# Patient Record
Sex: FEMALE | Race: WHITE | NOT HISPANIC OR LATINO | Employment: OTHER | URBAN - NONMETROPOLITAN AREA
[De-identification: names, ages, dates, MRNs, and addresses within clinical notes are randomized per-mention and may not be internally consistent; named-entity substitution may affect disease eponyms.]

---

## 2017-02-06 ENCOUNTER — OFFICE VISIT (OUTPATIENT)
Dept: FAMILY MEDICINE CLINIC | Facility: CLINIC | Age: 57
End: 2017-02-06

## 2017-02-06 VITALS
DIASTOLIC BLOOD PRESSURE: 70 MMHG | BODY MASS INDEX: 38.83 KG/M2 | SYSTOLIC BLOOD PRESSURE: 140 MMHG | OXYGEN SATURATION: 96 % | HEART RATE: 87 BPM | HEIGHT: 62 IN | WEIGHT: 211 LBS | TEMPERATURE: 98.3 F

## 2017-02-06 DIAGNOSIS — J44.9 CHRONIC OBSTRUCTIVE PULMONARY DISEASE, UNSPECIFIED COPD TYPE (HCC): ICD-10-CM

## 2017-02-06 DIAGNOSIS — L03.111 CELLULITIS OF RIGHT AXILLA: Primary | ICD-10-CM

## 2017-02-06 DIAGNOSIS — F17.200 TOBACCO USE DISORDER: ICD-10-CM

## 2017-02-06 DIAGNOSIS — E03.9 ACQUIRED HYPOTHYROIDISM: ICD-10-CM

## 2017-02-06 DIAGNOSIS — K21.9 GASTROESOPHAGEAL REFLUX DISEASE WITHOUT ESOPHAGITIS: ICD-10-CM

## 2017-02-06 PROCEDURE — 99214 OFFICE O/P EST MOD 30 MIN: CPT | Performed by: PHYSICIAN ASSISTANT

## 2017-02-06 PROCEDURE — 99406 BEHAV CHNG SMOKING 3-10 MIN: CPT | Performed by: PHYSICIAN ASSISTANT

## 2017-02-06 PROCEDURE — 10060 I&D ABSCESS SIMPLE/SINGLE: CPT | Performed by: PHYSICIAN ASSISTANT

## 2017-02-06 RX ORDER — DOXYCYCLINE HYCLATE 100 MG/1
100 CAPSULE ORAL 2 TIMES DAILY
Qty: 20 CAPSULE | Refills: 0 | Status: SHIPPED | OUTPATIENT
Start: 2017-02-06 | End: 2017-02-16

## 2017-02-06 NOTE — PROGRESS NOTES
Subjective   Lisa Hill is a 56 y.o. female.     History of Present Illness     Skin problem-  She is here today with complaints of a tender red bump in her right axilla.  Onset was 2 days ago.  No relief with antibiotic ointment topically.  She does complain of pain that is intermittent moderate and throbbing.      Tobacco use disorder-  She smoked 1 pack per day for over 30 years.  She states that she states she is interested in smoking cessation.  She states she does have some concerns about cost and insurance not covering her smoking cessation products.  Not at goal    COPD-not at goal due to continued tobacco use    Gastroesophageal reflux disease-stable    Hypothyroidism-stable with Synthroid 175 µg daily    The following portions of the patient's history were reviewed and updated as appropriate: allergies, current medications, past family history, past medical history, past social history, past surgical history and problem list.    Review of Systems   Constitutional: Negative for activity change, appetite change and fever.   HENT: Negative for ear pain, sinus pressure and sore throat.    Eyes: Negative for pain and visual disturbance.   Respiratory: Negative for cough and chest tightness.    Cardiovascular: Negative for chest pain and palpitations.   Gastrointestinal: Negative for abdominal pain, constipation, diarrhea, nausea and vomiting.   Endocrine: Negative for polydipsia and polyuria.   Genitourinary: Negative for dysuria and frequency.   Musculoskeletal: Negative for back pain and myalgias.   Skin: Negative for color change and rash.   Allergic/Immunologic: Negative for food allergies and immunocompromised state.   Neurological: Negative for dizziness, syncope and headaches.   Hematological: Negative for adenopathy. Does not bruise/bleed easily.   Psychiatric/Behavioral: Negative for hallucinations and suicidal ideas. The patient is not nervous/anxious.        Objective   Physical Exam    Constitutional: She is oriented to person, place, and time. She appears well-developed and well-nourished.   HENT:   Head: Normocephalic and atraumatic.   Nose: Nose normal.   Mouth/Throat: Oropharynx is clear and moist.   Eyes: Conjunctivae and EOM are normal. Pupils are equal, round, and reactive to light.   Neck: Normal range of motion. Neck supple. No tracheal deviation present. No thyromegaly present.   Cardiovascular: Normal rate, regular rhythm, normal heart sounds and intact distal pulses.    No murmur heard.  Pulmonary/Chest: Effort normal and breath sounds normal. No respiratory distress. She has no wheezes.   Abdominal: Soft. Bowel sounds are normal. There is no tenderness. There is no guarding.   Musculoskeletal: Normal range of motion. She exhibits no edema or tenderness.   Lymphadenopathy:     She has no cervical adenopathy.   Neurological: She is alert and oriented to person, place, and time.   Skin: Skin is warm and dry. No rash noted. There is erythema.   1 x 1 cm erythematous tender abscess noted in the right axilla   Psychiatric: She has a normal mood and affect. Her behavior is normal.   Nursing note and vitals reviewed.      Assessment/Plan   Diagnoses and all orders for this visit:    Cellulitis of right axilla  -     doxycycline (VIBRAMYCIN) 100 MG capsule; Take 1 capsule by mouth 2 (Two) Times a Day for 10 days.    Tobacco use disorder  -     nicotine (NICOTROL) 10 MG inhaler; Inhale 1 puff As Needed for smoking cessation.    Chronic obstructive pulmonary disease, unspecified COPD type    Gastroesophageal reflux disease without esophagitis    Acquired hypothyroidism     Incision and drainage of right axillary abscess with cellulitis was performed today using 11 blade scalpel and Betadine.  Patient tolerated the procedure well.    She was counseled on smoking cessation, benefits to her health, and options for successful cessation for 5 minutes.  Start Nicotrol inhaler

## 2017-03-31 ENCOUNTER — LAB (OUTPATIENT)
Dept: FAMILY MEDICINE CLINIC | Facility: CLINIC | Age: 57
End: 2017-03-31

## 2017-03-31 DIAGNOSIS — E78.49 OTHER HYPERLIPIDEMIA: ICD-10-CM

## 2017-03-31 DIAGNOSIS — R73.03 PREDIABETES: ICD-10-CM

## 2017-03-31 DIAGNOSIS — J44.9 CHRONIC OBSTRUCTIVE PULMONARY DISEASE, UNSPECIFIED COPD TYPE (HCC): ICD-10-CM

## 2017-03-31 DIAGNOSIS — E03.9 ACQUIRED HYPOTHYROIDISM: ICD-10-CM

## 2017-03-31 DIAGNOSIS — E55.9 VITAMIN D DEFICIENCY: ICD-10-CM

## 2017-03-31 DIAGNOSIS — Z00.00 HEALTHCARE MAINTENANCE: ICD-10-CM

## 2017-03-31 LAB
25(OH)D3 SERPL-MCNC: 38 NG/ML
ALBUMIN SERPL-MCNC: 4.8 G/DL (ref 3.5–5)
ALBUMIN/GLOB SERPL: 1.4 G/DL (ref 1.5–2.5)
ALP SERPL-CCNC: 58 U/L (ref 35–104)
ALT SERPL W P-5'-P-CCNC: 20 U/L (ref 10–36)
ANION GAP SERPL CALCULATED.3IONS-SCNC: 5.2 MMOL/L (ref 3.6–11.2)
AST SERPL-CCNC: 16 U/L (ref 10–30)
BASOPHILS # BLD AUTO: 0.08 10*3/MM3 (ref 0–0.3)
BASOPHILS NFR BLD AUTO: 0.7 % (ref 0–2)
BILIRUB SERPL-MCNC: 0.2 MG/DL (ref 0.2–1.8)
BUN BLD-MCNC: 12 MG/DL (ref 7–21)
BUN/CREAT SERPL: 11.3 (ref 7–25)
CALCIUM SPEC-SCNC: 9.8 MG/DL (ref 7.7–10)
CHLORIDE SERPL-SCNC: 104 MMOL/L (ref 99–112)
CHOLEST SERPL-MCNC: 160 MG/DL (ref 0–200)
CO2 SERPL-SCNC: 28.8 MMOL/L (ref 24.3–31.9)
CREAT BLD-MCNC: 1.06 MG/DL (ref 0.43–1.29)
DEPRECATED RDW RBC AUTO: 52.6 FL (ref 37–54)
EOSINOPHIL # BLD AUTO: 0.21 10*3/MM3 (ref 0–0.7)
EOSINOPHIL NFR BLD AUTO: 1.9 % (ref 0–5)
ERYTHROCYTE [DISTWIDTH] IN BLOOD BY AUTOMATED COUNT: 14.4 % (ref 11.5–14.5)
GFR SERPL CREATININE-BSD FRML MDRD: 53 ML/MIN/1.73
GLOBULIN UR ELPH-MCNC: 3.5 GM/DL
GLUCOSE BLD-MCNC: 114 MG/DL (ref 70–110)
HBA1C MFR BLD: 5.7 % (ref 4.5–5.7)
HBV SURFACE AB SER RIA-ACNC: NORMAL
HBV SURFACE AG SERPL QL IA: NORMAL
HCT VFR BLD AUTO: 47.6 % (ref 37–47)
HCV AB SER DONR QL: NORMAL
HDLC SERPL-MCNC: 43 MG/DL (ref 60–100)
HGB BLD-MCNC: 15.4 G/DL (ref 12–16)
IMM GRANULOCYTES # BLD: 0.03 10*3/MM3 (ref 0–0.03)
IMM GRANULOCYTES NFR BLD: 0.3 % (ref 0–0.5)
LDLC SERPL CALC-MCNC: 83 MG/DL (ref 0–100)
LDLC/HDLC SERPL: 1.93 {RATIO}
LYMPHOCYTES # BLD AUTO: 3.72 10*3/MM3 (ref 1–3)
LYMPHOCYTES NFR BLD AUTO: 34 % (ref 21–51)
MCH RBC QN AUTO: 32.7 PG (ref 27–33)
MCHC RBC AUTO-ENTMCNC: 32.4 G/DL (ref 33–37)
MCV RBC AUTO: 101.1 FL (ref 80–94)
MONOCYTES # BLD AUTO: 0.69 10*3/MM3 (ref 0.1–0.9)
MONOCYTES NFR BLD AUTO: 6.3 % (ref 0–10)
NEUTROPHILS # BLD AUTO: 6.2 10*3/MM3 (ref 1.4–6.5)
NEUTROPHILS NFR BLD AUTO: 56.8 % (ref 30–70)
OSMOLALITY SERPL CALC.SUM OF ELEC: 276.3 MOSM/KG (ref 273–305)
PLATELET # BLD AUTO: 331 10*3/MM3 (ref 130–400)
PMV BLD AUTO: 10.2 FL (ref 6–10)
POTASSIUM BLD-SCNC: 4.3 MMOL/L (ref 3.5–5.3)
PROT SERPL-MCNC: 8.3 G/DL (ref 6–8)
RBC # BLD AUTO: 4.71 10*6/MM3 (ref 4.2–5.4)
SODIUM BLD-SCNC: 138 MMOL/L (ref 135–153)
TRIGL SERPL-MCNC: 169 MG/DL (ref 0–150)
TSH SERPL DL<=0.05 MIU/L-ACNC: 5.5 MIU/ML (ref 0.55–4.78)
VLDLC SERPL-MCNC: 33.8 MG/DL
WBC NRBC COR # BLD: 10.93 10*3/MM3 (ref 4.5–12.5)

## 2017-03-31 PROCEDURE — 86803 HEPATITIS C AB TEST: CPT | Performed by: GENERAL PRACTICE

## 2017-03-31 PROCEDURE — 86704 HEP B CORE ANTIBODY TOTAL: CPT | Performed by: GENERAL PRACTICE

## 2017-03-31 PROCEDURE — 86706 HEP B SURFACE ANTIBODY: CPT | Performed by: GENERAL PRACTICE

## 2017-03-31 PROCEDURE — 82306 VITAMIN D 25 HYDROXY: CPT | Performed by: GENERAL PRACTICE

## 2017-03-31 PROCEDURE — 80050 GENERAL HEALTH PANEL: CPT | Performed by: GENERAL PRACTICE

## 2017-03-31 PROCEDURE — 87340 HEPATITIS B SURFACE AG IA: CPT | Performed by: GENERAL PRACTICE

## 2017-03-31 PROCEDURE — 83036 HEMOGLOBIN GLYCOSYLATED A1C: CPT | Performed by: GENERAL PRACTICE

## 2017-03-31 PROCEDURE — 80061 LIPID PANEL: CPT | Performed by: GENERAL PRACTICE

## 2017-03-31 PROCEDURE — 36415 COLL VENOUS BLD VENIPUNCTURE: CPT

## 2017-04-01 LAB — HBV CORE AB SER DONR QL IA: NEGATIVE

## 2017-04-03 DIAGNOSIS — D75.89 MACROCYTOSIS: Primary | ICD-10-CM

## 2017-04-10 ENCOUNTER — OFFICE VISIT (OUTPATIENT)
Dept: FAMILY MEDICINE CLINIC | Facility: CLINIC | Age: 57
End: 2017-04-10

## 2017-04-10 VITALS
TEMPERATURE: 97.8 F | RESPIRATION RATE: 12 BRPM | WEIGHT: 216 LBS | BODY MASS INDEX: 39.75 KG/M2 | HEART RATE: 104 BPM | OXYGEN SATURATION: 97 % | DIASTOLIC BLOOD PRESSURE: 70 MMHG | SYSTOLIC BLOOD PRESSURE: 115 MMHG | HEIGHT: 62 IN

## 2017-04-10 DIAGNOSIS — R73.03 PREDIABETES: ICD-10-CM

## 2017-04-10 DIAGNOSIS — E03.8 HYPOTHYROIDISM DUE TO HASHIMOTO'S THYROIDITIS: ICD-10-CM

## 2017-04-10 DIAGNOSIS — M15.9 GENERALIZED OSTEOARTHRITIS: ICD-10-CM

## 2017-04-10 DIAGNOSIS — Z00.00 HEALTHCARE MAINTENANCE: ICD-10-CM

## 2017-04-10 DIAGNOSIS — F17.200 SMOKER: ICD-10-CM

## 2017-04-10 DIAGNOSIS — B35.1 TINEA UNGUIUM: ICD-10-CM

## 2017-04-10 DIAGNOSIS — K21.9 GASTROESOPHAGEAL REFLUX DISEASE WITHOUT ESOPHAGITIS: ICD-10-CM

## 2017-04-10 DIAGNOSIS — J30.9 CHRONIC ALLERGIC RHINITIS: ICD-10-CM

## 2017-04-10 DIAGNOSIS — G47.39 OTHER SLEEP APNEA: ICD-10-CM

## 2017-04-10 DIAGNOSIS — F41.9 ANXIETY: ICD-10-CM

## 2017-04-10 DIAGNOSIS — E55.9 VITAMIN D DEFICIENCY: ICD-10-CM

## 2017-04-10 DIAGNOSIS — Z12.31 ENCOUNTER FOR SCREENING MAMMOGRAM FOR BREAST CANCER: ICD-10-CM

## 2017-04-10 DIAGNOSIS — L60.9 NAIL ABNORMALITIES: ICD-10-CM

## 2017-04-10 DIAGNOSIS — E78.2 MIXED HYPERLIPIDEMIA: Primary | ICD-10-CM

## 2017-04-10 DIAGNOSIS — K59.09 CHRONIC CONSTIPATION: ICD-10-CM

## 2017-04-10 DIAGNOSIS — E06.3 HYPOTHYROIDISM DUE TO HASHIMOTO'S THYROIDITIS: ICD-10-CM

## 2017-04-10 DIAGNOSIS — N39.3 STRESS BLADDER INCONTINENCE, FEMALE: ICD-10-CM

## 2017-04-10 DIAGNOSIS — J44.9 CHRONIC OBSTRUCTIVE PULMONARY DISEASE, UNSPECIFIED COPD TYPE (HCC): ICD-10-CM

## 2017-04-10 DIAGNOSIS — M85.80 OSTEOPENIA: ICD-10-CM

## 2017-04-10 DIAGNOSIS — F41.8 DEPRESSION WITH ANXIETY: ICD-10-CM

## 2017-04-10 PROCEDURE — 99214 OFFICE O/P EST MOD 30 MIN: CPT | Performed by: GENERAL PRACTICE

## 2017-04-10 PROCEDURE — 99407 BEHAV CHNG SMOKING > 10 MIN: CPT | Performed by: GENERAL PRACTICE

## 2017-04-10 RX ORDER — TERBINAFINE HYDROCHLORIDE 250 MG/1
250 TABLET ORAL DAILY
Qty: 30 TABLET | Refills: 3 | Status: SHIPPED | OUTPATIENT
Start: 2017-04-10 | End: 2017-07-06

## 2017-04-10 RX ORDER — VARENICLINE TARTRATE 1 MG/1
1 TABLET, FILM COATED ORAL 2 TIMES DAILY
Qty: 60 TABLET | Refills: 5 | Status: SHIPPED | OUTPATIENT
Start: 2017-04-10 | End: 2017-07-06

## 2017-04-10 RX ORDER — LORAZEPAM 1 MG/1
0.5 TABLET ORAL 2 TIMES DAILY
Qty: 90 TABLET | Refills: 0 | Status: SHIPPED | OUTPATIENT
Start: 2017-04-10 | End: 2017-08-11 | Stop reason: SDUPTHER

## 2017-04-10 NOTE — PROGRESS NOTES
Subjective   Lisa Hill is a 57 y.o. female.     History of Present Illness     COPD  The patient reports that her SOB and cough have been stable. She states that the symptoms occur at any time during the day or night. She reports that her symptoms become worse with activity, exposure to cold air and environmental allergens. Her symptoms are reduced with rest and with inhaled inhaled medication. The patient states she also has nasal congestion and post nasal drip/clearing throat. She is following the treatment plan, including medications as directed. She currently smokes approximately 1 packs per day.    Depression  Onset was a number of years ago. Symptoms were much worse through winter and have improved now in spring. Current symptoms include: difficulty concentrating, fatigue and impaired memory. Patient denies pression, anhedonia, recurrent thoughts of death or suicidal thoughts. Risk factors: history of seasonal affective disorder. Treatment has included medication buproprion, lorazepam and risperidone.  She stopped paroxetine several weeks ago and is doing well but intends to resume this in September or October.    Hypothyroidism  Patient presents for evaluation of thyroid function. Symptoms consist of weight gain, constipation. The symptoms are moderate.  The problem has been unchanged.  Previous thyroid studies include TSH. The hypothyroidism is due to Hashimoto's disease. She is currently prescribed levothyroxine. Most recent TSH:   Lab Results   Component Value Date    TSH 5.499 (H) 03/31/2017     Dyslipidemia  Compliance with treatment has been fair. The patient exercises occasionally. She is currently being prescribed the following medication for her dyslipidemia - simvastatin. Patient denies side effects associated with her medications. Most recent lipids include  Lab Results   Component Value Date    TRIG 169 (H) 03/31/2017    TRIG 208 (H) 09/02/2016    HDL 43 (L) 03/31/2017    HDL 41 (L)  09/02/2016    LDLCALC 83 03/31/2017    LDLCALC 70 09/02/2016    LDL 67 02/05/2016    LDL 55 06/22/2015     Rectal Bleeding  She has had intermittent bright red blood when wiping following bowel movements when constipated. There has been some improvement in the frequency and severity since last here. She denies any change in her bowel habits and has had no abdominal pain or melena.     Labs  Most recent Hgb 15.4 with a MCV of 101.1.  with an A1c of 5.7. Vitamin D 38. Hep C negative    The following portions of the patient's history were reviewed and updated as appropriate: allergies, current medications, past medical history, past social history and problem list.    Review of Systems   Constitutional: Positive for fatigue. Negative for appetite change, chills, fever and unexpected weight change.   HENT: Negative for congestion, ear pain, postnasal drip, rhinorrhea, sneezing, sore throat and voice change.    Eyes: Negative for visual disturbance.   Respiratory: Positive for cough and shortness of breath. Negative for wheezing.    Cardiovascular: Negative for chest pain, palpitations and leg swelling.   Gastrointestinal: Positive for anal bleeding and constipation. Negative for abdominal pain, blood in stool, diarrhea, nausea and vomiting.   Endocrine: Negative for polydipsia.   Genitourinary: Negative for difficulty urinating, dysuria, frequency, hematuria, menstrual problem, pelvic pain, urgency, vaginal bleeding, vaginal discharge and vaginal pain.        Incontinence with coughing, sneezing, or lifting   Musculoskeletal: Negative for arthralgias, back pain, joint swelling, myalgias and neck pain.   Skin: Negative for color change.        Chronic thickening and discoloration of the left second toenail   Neurological: Negative for tremors, weakness, numbness and headaches.   Psychiatric/Behavioral: Positive for decreased concentration and sleep disturbance (chronic-intermittent-sleeping well at present).  Negative for dysphoric mood and suicidal ideas.     Objective   Physical Exam   Constitutional: She is oriented to person, place, and time. No distress.   Bright, in good spirits.  No apparent distress.  No pallor, jaundice, cyanosis or diaphoresis   HENT:   Head: Atraumatic.   Right Ear: Tympanic membrane, external ear and ear canal normal.   Left Ear: Tympanic membrane, external ear and ear canal normal.   Nose: Nose normal.   Mouth/Throat: Oropharynx is clear and moist. Mucous membranes are not pale and not cyanotic.   Eyes: EOM are normal. Pupils are equal, round, and reactive to light. No scleral icterus.   Neck: No JVD present. Carotid bruit is not present. No tracheal deviation present. No thyromegaly present.   Cardiovascular: Normal rate, regular rhythm, S1 normal, S2 normal and intact distal pulses.  Exam reveals no gallop, no S3 and no S4.    No murmur heard.  Pulmonary/Chest: No accessory muscle usage or stridor. No respiratory distress. She has decreased breath sounds. She has wheezes (diffuse-mild).   Hyperinflation of the chest   Abdominal: Soft. Normal aorta and bowel sounds are normal. She exhibits no distension, no abdominal bruit and no mass. There is no hepatosplenomegaly. There is no tenderness. No hernia.   Musculoskeletal: She exhibits no tenderness or deformity.       Vascular Status -  Her exam exhibits no right foot edema. Her exam exhibits no left foot edema.  Lymphadenopathy:        Head (right side): No submandibular adenopathy present.        Head (left side): No submandibular adenopathy present.     She has no cervical adenopathy.   Neurological: She is alert and oriented to person, place, and time. She has normal reflexes. She displays normal reflexes. No cranial nerve deficit. She exhibits normal muscle tone. Coordination normal.   Skin: Skin is warm and dry. No rash noted. She is not diaphoretic. No cyanosis. No pallor. Nails show no clubbing.   Left second toenail thickened with  slight distal yellowish discoloration   Psychiatric: She has a normal mood and affect. Her speech is normal and behavior is normal. Thought content normal. Cognition and memory are normal.     Assessment/Plan   Problems Addressed this Visit        Cardiovascular and Mediastinum    Mixed hyperlipidemia  Encouraged to continue to work on her diet and exercise plan.       Respiratory    COPD (chronic obstructive pulmonary disease)  COPD is worsening.  Reminded of the importance of smoking cessation  Encouraged to remain as active as symptoms allow for  Trial of bivespi - provided with samples   Relevant Medications   Bivespi 2 puffs bid       Chronic allergic rhinitis       Digestive    Vitamin D deficiency    Gastroesophageal reflux disease without esophagitis    Chronic constipation       Endocrine    Hypothyroidism due to Hashimoto's thyroiditis  Clinically euthyroid. Continue current treatment.       Musculoskeletal and Integument    Osteopenia    Generalized osteoarthritis    Nail abnormalities  Possible tinea unguium.  Reviewed options and agreed on a trial of terbinafine     Relevant Medications    terbinafine (lamiSIL) 250 MG tablet    Genitourinary   Stress bladder incontinence, female   Other   Prediabetes  Will continue to monitor   Depression with anxiety  Well-controlled at present. Supportive therapy. Will continue current medication.   Relevant Medications   LORazepam (ATIVAN) 1 MG tablet   varenicline (CHANTIX STARTING MONTH NOLVIA) 0.5 MG X 11 & 1 MG X 42 tablet   varenicline (CHANTIX CONTINUING MONTH NOLVIA) 1 MG tablet   Sleep apnea   Smoker  Reminded of the importance of smoking cessation.  Lengthy discussion (15 minutes) regarding treatment options available - agreed on a trial of Chantix.  Advised of the potential side effects    Relevant Medications   varenicline (CHANTIX STARTING MONTH PAK) 0.5 MG X 11 & 1 MG X 42 tablet   varenicline (CHANTIX CONTINUING MONTH PAK) 1 MG tablet   Healthcare  maintenance  Reminded of the potential benefits of lung cancer screening.  Patient like to go ahead with this and arrangements will be made.  She remains uninterested in a screening on anoscopy at present but will consider and this will be discussed again at her return    Relevant Orders   CT Chest Low Dose Wo   Encounter for screening mammogram for breast cancer  Will also arrange an updated mammogram    Relevant Orders   Mammo Screening Digital Tomosynthesis Bilateral With CAD

## 2017-04-13 DIAGNOSIS — F17.200 SMOKER: Primary | ICD-10-CM

## 2017-04-13 DIAGNOSIS — Z87.891 PERSONAL HISTORY OF NICOTINE DEPENDENCE: ICD-10-CM

## 2017-05-05 ENCOUNTER — APPOINTMENT (OUTPATIENT)
Dept: CT IMAGING | Facility: HOSPITAL | Age: 57
End: 2017-05-05

## 2017-05-05 ENCOUNTER — APPOINTMENT (OUTPATIENT)
Dept: MAMMOGRAPHY | Facility: HOSPITAL | Age: 57
End: 2017-05-05

## 2017-05-10 DIAGNOSIS — E55.9 VITAMIN D DEFICIENCY: ICD-10-CM

## 2017-05-10 RX ORDER — MELATONIN
Qty: 30 TABLET | Refills: 5 | Status: SHIPPED | OUTPATIENT
Start: 2017-05-10 | End: 2017-11-10 | Stop reason: SDUPTHER

## 2017-05-12 ENCOUNTER — APPOINTMENT (OUTPATIENT)
Dept: CT IMAGING | Facility: HOSPITAL | Age: 57
End: 2017-05-12

## 2017-05-12 ENCOUNTER — APPOINTMENT (OUTPATIENT)
Dept: MAMMOGRAPHY | Facility: HOSPITAL | Age: 57
End: 2017-05-12

## 2017-06-02 ENCOUNTER — HOSPITAL ENCOUNTER (OUTPATIENT)
Dept: MAMMOGRAPHY | Facility: HOSPITAL | Age: 57
Discharge: HOME | End: 2017-06-02
Admitting: GENERAL PRACTICE

## 2017-06-02 ENCOUNTER — HOSPITAL ENCOUNTER (OUTPATIENT)
Dept: CT IMAGING | Facility: HOSPITAL | Age: 57
Discharge: HOME | End: 2017-06-02

## 2017-06-02 DIAGNOSIS — Z12.31 ENCOUNTER FOR SCREENING MAMMOGRAM FOR BREAST CANCER: ICD-10-CM

## 2017-06-02 DIAGNOSIS — Z00.00 HEALTHCARE MAINTENANCE: ICD-10-CM

## 2017-06-02 PROCEDURE — G0297 LDCT FOR LUNG CA SCREEN: HCPCS

## 2017-06-02 PROCEDURE — 77067 SCR MAMMO BI INCL CAD: CPT | Performed by: RADIOLOGY

## 2017-06-02 PROCEDURE — G0202 SCR MAMMO BI INCL CAD: HCPCS

## 2017-06-02 PROCEDURE — 77063 BREAST TOMOSYNTHESIS BI: CPT | Performed by: RADIOLOGY

## 2017-06-02 PROCEDURE — 77063 BREAST TOMOSYNTHESIS BI: CPT

## 2017-06-02 PROCEDURE — 71250 CT THORAX DX C-: CPT | Performed by: RADIOLOGY

## 2017-06-09 NOTE — PROGRESS NOTES
Spoke to pt about the following per Dr. Chavez.     -- Please let mrs wisdom know that her CT was ok - she will need her next in one year

## 2017-07-06 ENCOUNTER — OFFICE VISIT (OUTPATIENT)
Dept: FAMILY MEDICINE CLINIC | Facility: CLINIC | Age: 57
End: 2017-07-06

## 2017-07-06 VITALS
BODY MASS INDEX: 38.09 KG/M2 | HEIGHT: 62 IN | TEMPERATURE: 98.3 F | SYSTOLIC BLOOD PRESSURE: 118 MMHG | WEIGHT: 207 LBS | OXYGEN SATURATION: 95 % | HEART RATE: 96 BPM | DIASTOLIC BLOOD PRESSURE: 72 MMHG

## 2017-07-06 DIAGNOSIS — K21.9 GASTROESOPHAGEAL REFLUX DISEASE WITHOUT ESOPHAGITIS: ICD-10-CM

## 2017-07-06 DIAGNOSIS — L21.9 SEBORRHEIC DERMATITIS OF SCALP: ICD-10-CM

## 2017-07-06 DIAGNOSIS — L03.112 CELLULITIS OF LEFT AXILLA: Primary | ICD-10-CM

## 2017-07-06 DIAGNOSIS — E03.8 HYPOTHYROIDISM DUE TO HASHIMOTO'S THYROIDITIS: ICD-10-CM

## 2017-07-06 DIAGNOSIS — J44.9 CHRONIC OBSTRUCTIVE PULMONARY DISEASE, UNSPECIFIED COPD TYPE (HCC): ICD-10-CM

## 2017-07-06 DIAGNOSIS — E06.3 HYPOTHYROIDISM DUE TO HASHIMOTO'S THYROIDITIS: ICD-10-CM

## 2017-07-06 PROCEDURE — 99214 OFFICE O/P EST MOD 30 MIN: CPT | Performed by: PHYSICIAN ASSISTANT

## 2017-07-06 RX ORDER — BUDESONIDE AND FORMOTEROL FUMARATE DIHYDRATE 160; 4.5 UG/1; UG/1
2 AEROSOL RESPIRATORY (INHALATION)
Qty: 10.2 G | Refills: 5 | Status: SHIPPED | OUTPATIENT
Start: 2017-07-06 | End: 2017-08-11

## 2017-07-06 RX ORDER — AMOXICILLIN AND CLAVULANATE POTASSIUM 875; 125 MG/1; MG/1
1 TABLET, FILM COATED ORAL 2 TIMES DAILY
Qty: 20 TABLET | Refills: 0 | Status: SHIPPED | OUTPATIENT
Start: 2017-07-06 | End: 2017-07-27

## 2017-07-06 RX ORDER — TRIAMCINOLONE ACETONIDE 1 MG/G
CREAM TOPICAL 2 TIMES DAILY
Qty: 80 G | Refills: 1 | Status: SHIPPED | OUTPATIENT
Start: 2017-07-06 | End: 2020-07-27 | Stop reason: SDUPTHER

## 2017-07-06 NOTE — PROGRESS NOTES
Subjective   Lisa Hill is a 57 y.o. female.     History of Present Illness     Left axilla bump-  She is here today with complaints of a tender bump in her left axilla.  Onset was 2 days ago.  She denies any fever.    COPD-not at goal with intermittent shortness of breath.  She states this was well controlled in the past with Symbicort but her insurance stopped covering it.  She states that she now has a different insurance.    Scalp problem-  She complains of dry flaky scalp for over 6 months.  Some relief with T gel shampoo or head and shoulders dandruff shampoo.  She reports that over a month ago she had associated itching and scratched her scalp.  She states that all lesions have healed except for 2 that are very slow to heal on the right posterior scalp.  Not at goal    Gastroesophageal reflux disease-stable with pantoprazole    Hypothyroidism-stable with Synthroid 175 µg daily    The following portions of the patient's history were reviewed and updated as appropriate: allergies, current medications, past family history, past medical history, past social history, past surgical history and problem list.    Review of Systems   Constitutional: Negative for activity change, appetite change and fever.   HENT: Negative for ear pain, sinus pressure and sore throat.    Eyes: Negative for pain and visual disturbance.   Respiratory: Negative for cough and chest tightness.    Cardiovascular: Negative for chest pain and palpitations.   Gastrointestinal: Negative for abdominal pain, constipation, diarrhea, nausea and vomiting.   Endocrine: Negative for polydipsia and polyuria.   Genitourinary: Negative for dysuria and frequency.   Musculoskeletal: Negative for back pain and myalgias.   Skin: Negative for color change and rash.        Left axilla bump in skin   Allergic/Immunologic: Negative for food allergies and immunocompromised state.   Neurological: Negative for dizziness, syncope and headaches.   Hematological:  Negative for adenopathy. Does not bruise/bleed easily.   Psychiatric/Behavioral: Negative for hallucinations and suicidal ideas. The patient is not nervous/anxious.        Objective   Physical Exam   Constitutional: She is oriented to person, place, and time. She appears well-developed and well-nourished.   HENT:   Head: Normocephalic and atraumatic.   Nose: Nose normal.   Mouth/Throat: Oropharynx is clear and moist.   Eyes: Conjunctivae and EOM are normal. Pupils are equal, round, and reactive to light.   Neck: Normal range of motion. Neck supple. No tracheal deviation present. No thyromegaly present.   Cardiovascular: Normal rate, regular rhythm, normal heart sounds and intact distal pulses.    No murmur heard.  Pulmonary/Chest: Effort normal and breath sounds normal. No respiratory distress. She has no wheezes.   Abdominal: Soft. Bowel sounds are normal. There is no tenderness. There is no guarding.   Musculoskeletal: Normal range of motion. She exhibits no edema or tenderness.   Lymphadenopathy:     She has no cervical adenopathy.   Neurological: She is alert and oriented to person, place, and time.   Skin: Skin is warm and dry.   2 small scabbed lesions noted posterior right scalp.  Approximately 1 x 1 cm abscess noted in left axilla with associated tenderness   Psychiatric: She has a normal mood and affect. Her behavior is normal.   Nursing note and vitals reviewed.      Assessment/Plan   Diagnoses and all orders for this visit:    Cellulitis of left axilla  -     amoxicillin-clavulanate (AUGMENTIN) 875-125 MG per tablet; Take 1 tablet by mouth 2 (Two) Times a Day.    Chronic obstructive pulmonary disease, unspecified COPD type  -     budesonide-formoterol (SYMBICORT) 160-4.5 MCG/ACT inhaler; Inhale 2 puffs 2 (Two) Times a Day.    Seborrheic dermatitis of scalp  -     triamcinolone (KENALOG) 0.1 % cream; Apply  topically 2 (Two) Times a Day.    Gastroesophageal reflux disease without  esophagitis    Hypothyroidism due to Hashimoto's thyroiditis

## 2017-07-10 DIAGNOSIS — E03.9 ACQUIRED HYPOTHYROIDISM: ICD-10-CM

## 2017-07-10 RX ORDER — LEVOTHYROXINE SODIUM 175 UG/1
TABLET ORAL
Qty: 30 TABLET | Refills: 5 | Status: CANCELLED | OUTPATIENT
Start: 2017-07-10

## 2017-07-11 DIAGNOSIS — E03.9 ACQUIRED HYPOTHYROIDISM: ICD-10-CM

## 2017-07-11 DIAGNOSIS — J44.9 CHRONIC OBSTRUCTIVE PULMONARY DISEASE, UNSPECIFIED COPD TYPE (HCC): ICD-10-CM

## 2017-07-11 DIAGNOSIS — J30.9 CHRONIC ALLERGIC RHINITIS: ICD-10-CM

## 2017-07-12 RX ORDER — MONTELUKAST SODIUM 10 MG/1
TABLET ORAL
Qty: 30 TABLET | Refills: 5 | Status: SHIPPED | OUTPATIENT
Start: 2017-07-12 | End: 2017-08-11 | Stop reason: SDUPTHER

## 2017-07-12 RX ORDER — LEVOTHYROXINE SODIUM 175 UG/1
175 TABLET ORAL DAILY
Qty: 30 TABLET | Refills: 5
Start: 2017-07-12 | End: 2017-07-27 | Stop reason: SDUPTHER

## 2017-07-19 RX ORDER — FLUCONAZOLE 150 MG/1
150 TABLET ORAL ONCE
Qty: 2 TABLET | Refills: 0
Start: 2017-07-19 | End: 2017-07-19

## 2017-07-25 ENCOUNTER — DOCUMENTATION (OUTPATIENT)
Dept: FAMILY MEDICINE CLINIC | Facility: CLINIC | Age: 57
End: 2017-07-25

## 2017-07-25 NOTE — PROGRESS NOTES
Pt called and said she has taken all of the antibiotic that was given for a sore under her arm. Pt has been trying to get all of the pus out of the wound and it is still not getting better. Spoke with Kerry and she said to have the patient come in and be seen and have a culture done on the wound. Called pt and let her know to make appt to come in and be checked. PKF

## 2017-07-27 ENCOUNTER — OFFICE VISIT (OUTPATIENT)
Dept: FAMILY MEDICINE CLINIC | Facility: CLINIC | Age: 57
End: 2017-07-27

## 2017-07-27 VITALS
TEMPERATURE: 97.9 F | DIASTOLIC BLOOD PRESSURE: 80 MMHG | OXYGEN SATURATION: 96 % | HEART RATE: 82 BPM | BODY MASS INDEX: 38.09 KG/M2 | HEIGHT: 62 IN | SYSTOLIC BLOOD PRESSURE: 136 MMHG | WEIGHT: 207 LBS

## 2017-07-27 DIAGNOSIS — J44.9 CHRONIC OBSTRUCTIVE PULMONARY DISEASE, UNSPECIFIED COPD TYPE (HCC): ICD-10-CM

## 2017-07-27 DIAGNOSIS — R59.0 ENLARGED LYMPH NODES IN ARMPIT: Primary | ICD-10-CM

## 2017-07-27 DIAGNOSIS — E03.8 HYPOTHYROIDISM DUE TO HASHIMOTO'S THYROIDITIS: ICD-10-CM

## 2017-07-27 DIAGNOSIS — E06.3 HYPOTHYROIDISM DUE TO HASHIMOTO'S THYROIDITIS: ICD-10-CM

## 2017-07-27 DIAGNOSIS — K21.9 GASTROESOPHAGEAL REFLUX DISEASE WITHOUT ESOPHAGITIS: ICD-10-CM

## 2017-07-27 DIAGNOSIS — E03.9 ACQUIRED HYPOTHYROIDISM: ICD-10-CM

## 2017-07-27 PROCEDURE — 99214 OFFICE O/P EST MOD 30 MIN: CPT | Performed by: PHYSICIAN ASSISTANT

## 2017-07-27 RX ORDER — LEVOTHYROXINE SODIUM 175 UG/1
175 TABLET ORAL DAILY
Qty: 30 TABLET | Refills: 5
Start: 2017-07-27 | End: 2017-08-11 | Stop reason: SDUPTHER

## 2017-07-27 NOTE — PROGRESS NOTES
Subjective   Lisa Hill is a 57 y.o. female.     History of Present Illness     Lump in armpit-  She complains of left axilla lump that is improved with Augmentin.  The lump is no longer red or tender but is still hard and mobile.  She did get a candidiasis infection for which was treated with Diflucan.    COPD-stable    Gastro esophageal reflux disease-stable with Protonix    Hypothyroidism-stable with Synthroid 175 mg daily    The following portions of the patient's history were reviewed and updated as appropriate: allergies, current medications, past family history, past medical history, past social history, past surgical history and problem list.    Review of Systems   Constitutional: Negative for activity change, appetite change and fever.   HENT: Negative for ear pain, sinus pressure and sore throat.    Eyes: Negative for pain and visual disturbance.   Respiratory: Negative for cough and chest tightness.    Cardiovascular: Negative for chest pain and palpitations.   Gastrointestinal: Negative for abdominal pain, constipation, diarrhea, nausea and vomiting.   Endocrine: Negative for polydipsia and polyuria.   Genitourinary: Negative for dysuria and frequency.   Musculoskeletal: Negative for back pain and myalgias.   Skin: Negative for color change and rash.        Left axilla lump   Allergic/Immunologic: Negative for food allergies and immunocompromised state.   Neurological: Negative for dizziness, syncope and headaches.   Hematological: Negative for adenopathy. Does not bruise/bleed easily.   Psychiatric/Behavioral: Negative for hallucinations and suicidal ideas. The patient is not nervous/anxious.        Objective   Physical Exam   Constitutional: She is oriented to person, place, and time. She appears well-developed and well-nourished.   HENT:   Head: Normocephalic and atraumatic.   Nose: Nose normal.   Mouth/Throat: Oropharynx is clear and moist.   Eyes: Conjunctivae and EOM are normal. Pupils are  equal, round, and reactive to light.   Neck: Normal range of motion. Neck supple. No tracheal deviation present. No thyromegaly present.   Cardiovascular: Normal rate, regular rhythm, normal heart sounds and intact distal pulses.    No murmur heard.  Pulmonary/Chest: Effort normal and breath sounds normal. No respiratory distress. She has no wheezes.   Abdominal: Soft. Bowel sounds are normal. There is no tenderness. There is no guarding.   Musculoskeletal: Normal range of motion. She exhibits no edema or tenderness.   Lymphadenopathy:     She has no cervical adenopathy.   Neurological: She is alert and oriented to person, place, and time.   Skin: Skin is warm and dry.   2 small scabbed lesions noted posterior right scalp.  Approximately 1 x 1 cm hard mobile slightly darkened areain left axilla without tenderness or erythema   Psychiatric: She has a normal mood and affect. Her behavior is normal.   Nursing note and vitals reviewed.      Assessment/Plan   Diagnoses and all orders for this visit:    Enlarged lymph nodes in armpit    Chronic obstructive pulmonary disease, unspecified COPD type    Gastroesophageal reflux disease without esophagitis    Hypothyroidism due to Hashimoto's thyroiditis    Acquired hypothyroidism  -     levothyroxine (SYNTHROID, LEVOTHROID) 175 MCG tablet; Take 1 tablet by mouth Daily.    She was advised to continue to watch the enlarged lymph node in her armpit and should it change she will be referred for excisional biopsy.  She declines referral to surgeon at this time.

## 2017-08-01 DIAGNOSIS — F41.9 ANXIETY: ICD-10-CM

## 2017-08-02 RX ORDER — BUPROPION HYDROCHLORIDE 300 MG/1
TABLET ORAL
Qty: 30 TABLET | Refills: 5 | Status: SHIPPED | OUTPATIENT
Start: 2017-08-02 | End: 2017-08-11 | Stop reason: SDUPTHER

## 2017-08-10 DIAGNOSIS — K59.09 CHRONIC CONSTIPATION: ICD-10-CM

## 2017-08-10 RX ORDER — DOCUSATE SODIUM 100 MG/1
CAPSULE ORAL
Qty: 60 CAPSULE | Refills: 5 | Status: SHIPPED | OUTPATIENT
Start: 2017-08-10 | End: 2017-08-11 | Stop reason: SDUPTHER

## 2017-08-11 ENCOUNTER — OFFICE VISIT (OUTPATIENT)
Dept: FAMILY MEDICINE CLINIC | Facility: CLINIC | Age: 57
End: 2017-08-11

## 2017-08-11 DIAGNOSIS — M15.9 GENERALIZED OSTEOARTHRITIS: ICD-10-CM

## 2017-08-11 DIAGNOSIS — K59.09 CHRONIC CONSTIPATION: ICD-10-CM

## 2017-08-11 DIAGNOSIS — Z00.00 HEALTHCARE MAINTENANCE: ICD-10-CM

## 2017-08-11 DIAGNOSIS — E55.9 VITAMIN D DEFICIENCY: ICD-10-CM

## 2017-08-11 DIAGNOSIS — F17.200 SMOKER: ICD-10-CM

## 2017-08-11 DIAGNOSIS — K21.9 GASTROESOPHAGEAL REFLUX DISEASE WITHOUT ESOPHAGITIS: ICD-10-CM

## 2017-08-11 DIAGNOSIS — F41.9 ANXIETY: ICD-10-CM

## 2017-08-11 DIAGNOSIS — J44.9 CHRONIC OBSTRUCTIVE PULMONARY DISEASE, UNSPECIFIED COPD TYPE (HCC): ICD-10-CM

## 2017-08-11 DIAGNOSIS — E03.9 ACQUIRED HYPOTHYROIDISM: ICD-10-CM

## 2017-08-11 DIAGNOSIS — R73.03 PREDIABETES: ICD-10-CM

## 2017-08-11 DIAGNOSIS — M85.80 OSTEOPENIA: ICD-10-CM

## 2017-08-11 DIAGNOSIS — E03.8 HYPOTHYROIDISM DUE TO HASHIMOTO'S THYROIDITIS: ICD-10-CM

## 2017-08-11 DIAGNOSIS — F41.8 DEPRESSION WITH ANXIETY: ICD-10-CM

## 2017-08-11 DIAGNOSIS — R10.2 VAGINAL PAIN: ICD-10-CM

## 2017-08-11 DIAGNOSIS — E78.2 MIXED HYPERLIPIDEMIA: Primary | ICD-10-CM

## 2017-08-11 DIAGNOSIS — J30.1 SEASONAL ALLERGIC RHINITIS DUE TO POLLEN: ICD-10-CM

## 2017-08-11 DIAGNOSIS — E06.3 HYPOTHYROIDISM DUE TO HASHIMOTO'S THYROIDITIS: ICD-10-CM

## 2017-08-11 DIAGNOSIS — J30.9 CHRONIC ALLERGIC RHINITIS: ICD-10-CM

## 2017-08-11 PROBLEM — L60.9 NAIL ABNORMALITIES: Status: RESOLVED | Noted: 2017-04-10 | Resolved: 2017-08-11

## 2017-08-11 PROBLEM — B35.1 TINEA UNGUIUM: Status: RESOLVED | Noted: 2017-04-10 | Resolved: 2017-08-11

## 2017-08-11 PROCEDURE — 99214 OFFICE O/P EST MOD 30 MIN: CPT | Performed by: GENERAL PRACTICE

## 2017-08-11 RX ORDER — LORAZEPAM 1 MG/1
0.5 TABLET ORAL 2 TIMES DAILY
Qty: 90 TABLET | Refills: 0 | Status: SHIPPED | OUTPATIENT
Start: 2017-08-11 | End: 2017-11-16 | Stop reason: SDUPTHER

## 2017-08-11 RX ORDER — PANTOPRAZOLE SODIUM 40 MG/1
40 TABLET, DELAYED RELEASE ORAL DAILY
Qty: 30 TABLET | Refills: 5 | Status: SHIPPED | OUTPATIENT
Start: 2017-08-11 | End: 2017-11-16 | Stop reason: SDUPTHER

## 2017-08-11 RX ORDER — ALBUTEROL SULFATE 90 UG/1
2 AEROSOL, METERED RESPIRATORY (INHALATION) EVERY 4 HOURS PRN
Qty: 18 G | Refills: 5 | Status: SHIPPED | OUTPATIENT
Start: 2017-08-11 | End: 2019-05-15 | Stop reason: SDUPTHER

## 2017-08-11 RX ORDER — CETIRIZINE HYDROCHLORIDE 10 MG/1
10 TABLET ORAL DAILY
Qty: 30 TABLET | Refills: 5 | Status: SHIPPED | OUTPATIENT
Start: 2017-08-11 | End: 2018-01-19

## 2017-08-11 RX ORDER — TERBINAFINE HYDROCHLORIDE 250 MG/1
TABLET ORAL
COMMUNITY
Start: 2017-08-10 | End: 2017-09-26

## 2017-08-11 RX ORDER — FLUTICASONE PROPIONATE 50 MCG
SPRAY, SUSPENSION (ML) NASAL
Qty: 1 BOTTLE | Refills: 5 | Status: SHIPPED | OUTPATIENT
Start: 2017-08-11 | End: 2018-12-04 | Stop reason: SDUPTHER

## 2017-08-11 RX ORDER — DOCUSATE SODIUM 100 MG/1
100 CAPSULE, LIQUID FILLED ORAL 2 TIMES DAILY
Qty: 60 CAPSULE | Refills: 5 | Status: SHIPPED | OUTPATIENT
Start: 2017-08-11 | End: 2018-09-13

## 2017-08-11 RX ORDER — BUPROPION HYDROCHLORIDE 300 MG/1
300 TABLET ORAL EVERY MORNING
Qty: 30 TABLET | Refills: 5 | Status: SHIPPED | OUTPATIENT
Start: 2017-08-11 | End: 2018-05-16 | Stop reason: SDUPTHER

## 2017-08-11 RX ORDER — SIMVASTATIN 20 MG
20 TABLET ORAL NIGHTLY
Qty: 30 TABLET | Refills: 5 | Status: SHIPPED | OUTPATIENT
Start: 2017-08-11 | End: 2018-03-06 | Stop reason: SDUPTHER

## 2017-08-11 RX ORDER — RISPERIDONE 2 MG/1
2 TABLET ORAL NIGHTLY
Qty: 30 TABLET | Refills: 5 | Status: SHIPPED | OUTPATIENT
Start: 2017-08-11 | End: 2017-11-16

## 2017-08-11 RX ORDER — MONTELUKAST SODIUM 10 MG/1
10 TABLET ORAL DAILY
Qty: 30 TABLET | Refills: 5 | Status: SHIPPED | OUTPATIENT
Start: 2017-08-11 | End: 2018-01-19

## 2017-08-11 RX ORDER — LEVOTHYROXINE SODIUM 175 UG/1
175 TABLET ORAL DAILY
Qty: 30 TABLET | Refills: 5
Start: 2017-08-11 | End: 2018-01-04 | Stop reason: SDUPTHER

## 2017-08-11 RX ORDER — ALBUTEROL SULFATE 2.5 MG/3ML
2.5 SOLUTION RESPIRATORY (INHALATION) EVERY 4 HOURS PRN
Qty: 180 VIAL | Refills: 5 | Status: SHIPPED | OUTPATIENT
Start: 2017-08-11 | End: 2019-05-15

## 2017-08-11 NOTE — PROGRESS NOTES
Subjective   Lisa Hill is a 57 y.o. female.     History of Present Illness     COPD  The patient reports that her SOB and cough have remained stable. She states that these symptoms occur at any time during the day or night. She reports that her symptoms become worse with activity, exposure to cold air and environmental allergens. Her symptoms are reduced with rest and with inhaled inhaled medication. The patient states she also has nasal congestion and post nasal drip/clearing throat. She is following the treatment plan, including medications as directed. She feels that both bivespi and symbicort helped some with her SOB but made her cough worse for an hour or two after each dose. She currently smokes approximately 1 packs per day. Low dose CT of the chest performed on 6/2/17 revaled moderate emphysema as well as a benign appearing 4 mm semisolid nodule in the LLL. Repeat study in 1 year recommended    Depression  Onset was a number of years ago. Symptoms have consistently been worse in the winter months. Current symptoms include: difficulty concentrating, fatigue and impaired memory. Patient denies pression, anhedonia, recurrent thoughts of death or suicidal thoughts. Risk factors: history of seasonal affective disorder. Treatment has included medication buproprion, lorazepam and risperidone. She would like to try trintellix as her daughter has done really well with it    Hypothyroidism  Patient presents for evaluation of thyroid function. Symptoms consist of weight gain, constipation. The symptoms are moderate.  The problem has been unchanged.  Previous thyroid studies include TSH. The hypothyroidism is due to Hashimoto's disease.     Dyslipidemia  Compliance with treatment has been fair. The patient exercises occasionally. She is currently being prescribed the following medication for her dyslipidemia - simvastatin. Patient denies side effects associated with her medications. She has had no recent  labs    Rectal Bleeding  She has had occasional bright red blood when wiping following bowel movements when constipated. There has been a further improvement in the frequency and severity since last here. She denies any change in her bowel habits and has had no abdominal pain or melena.     The following portions of the patient's history were reviewed and updated as appropriate: allergies, current medications, past medical history, past social history and problem list.    Review of Systems   Constitutional: Positive for fatigue. Negative for appetite change, chills, fever and unexpected weight change.   HENT: Negative for congestion, ear pain, postnasal drip, rhinorrhea, sneezing, sore throat and voice change.    Eyes: Negative for visual disturbance.   Respiratory: Positive for cough and shortness of breath. Negative for wheezing.    Cardiovascular: Negative for chest pain, palpitations and leg swelling.   Gastrointestinal: Positive for anal bleeding and constipation. Negative for abdominal pain, blood in stool, diarrhea, nausea and vomiting.   Endocrine: Negative for polydipsia.   Genitourinary: Negative for difficulty urinating, dysuria, frequency, hematuria, menstrual problem, pelvic pain, urgency, vaginal bleeding, vaginal discharge and vaginal pain.        Incontinence with coughing, sneezing, or lifting   Musculoskeletal: Negative for arthralgias, back pain, joint swelling, myalgias and neck pain.   Skin: Negative for color change.   Neurological: Negative for tremors, weakness, numbness and headaches.   Psychiatric/Behavioral: Positive for decreased concentration and sleep disturbance (chronic-intermittent). Negative for dysphoric mood and suicidal ideas.     Objective   Physical Exam   Constitutional: She is oriented to person, place, and time. No distress.   Bright, in good spirits.  No apparent distress.  No pallor, jaundice, cyanosis or diaphoresis   HENT:   Head: Atraumatic.   Right Ear: Tympanic  membrane, external ear and ear canal normal.   Left Ear: Tympanic membrane, external ear and ear canal normal.   Nose: Nose normal.   Mouth/Throat: Oropharynx is clear and moist. Mucous membranes are not pale and not cyanotic.   Eyes: EOM are normal. Pupils are equal, round, and reactive to light. No scleral icterus.   Neck: No JVD present. Carotid bruit is not present. No tracheal deviation present. No thyromegaly present.   Cardiovascular: Normal rate, regular rhythm, S1 normal, S2 normal and intact distal pulses.  Exam reveals no gallop, no S3 and no S4.    No murmur heard.  Pulmonary/Chest: No accessory muscle usage or stridor. No respiratory distress. She has decreased breath sounds. She has wheezes (diffuse-mild).   Hyperinflation of the chest   Abdominal: Soft. Normal aorta and bowel sounds are normal. She exhibits no distension, no abdominal bruit and no mass. There is no hepatosplenomegaly. There is no tenderness. No hernia.   Musculoskeletal: She exhibits no tenderness or deformity.       Vascular Status -  Her exam exhibits no right foot edema. Her exam exhibits no left foot edema.  Lymphadenopathy:        Head (right side): No submandibular adenopathy present.        Head (left side): No submandibular adenopathy present.     She has no cervical adenopathy.   Neurological: She is alert and oriented to person, place, and time. She has normal reflexes. She displays normal reflexes. No cranial nerve deficit. She exhibits normal muscle tone. Coordination normal.   Skin: Skin is warm and dry. No rash noted. She is not diaphoretic. No cyanosis. No pallor. Nails show no clubbing.   Left second toenail thickened with slight distal yellowish discoloration   Psychiatric: She has a normal mood and affect. Her speech is normal and behavior is normal. Thought content normal. Cognition and memory are normal.     Assessment/Plan   Problems Addressed this Visit        Cardiovascular and Mediastinum    Mixed  hyperlipidemia  Encouraged to continue to work on her diet and exercise plan.  Continue current medication  Fasting labs will be updated at her return in 3-4 months    Relevant Medications    simvastatin (ZOCOR) 20 MG tablet       Respiratory    COPD (chronic obstructive pulmonary disease)  COPD is worsening.  Reminded of the importance of smoking cessation  Encouraged to remain as active as symptoms allow for  Trial of stiolto - provided with samples    Relevant Medications    albuterol (PROVENTIL) (2.5 MG/3ML) 0.083% nebulizer solution    albuterol (VENTOLIN HFA) 108 (90 BASE) MCG/ACT inhaler    cetirizine (zyrTEC) 10 MG tablet    fluticasone (FLONASE) 50 MCG/ACT nasal spray    montelukast (SINGULAIR) 10 MG tablet    tiotropium bromide-olodaterol (STIOLTO RESPIMAT) 2.5-2.5 MCG/ACT aerosol solution inhaler    Chronic allergic rhinitis    Relevant Medications    cetirizine (zyrTEC) 10 MG tablet    montelukast (SINGULAIR) 10 MG tablet       Digestive    Vitamin D deficiency  Will continue to monitor    Gastroesophageal reflux disease without esophagitis    Relevant Medications    pantoprazole (PROTONIX) 40 MG EC tablet    Chronic constipation    Relevant Medications    docusate sodium (DOCQLACE) 100 MG capsule       Endocrine    Hypothyroidism due to Hashimoto's thyroiditis  Clinically euthyroid. Continue current treatment.    Relevant Medications    levothyroxine (SYNTHROID, LEVOTHROID) 175 MCG tablet       Musculoskeletal and Integument    Osteopenia    Generalized osteoarthritis  Reminded regarding symptomatic treatment.   Will continue current treatment.       Other    Prediabetes  Will continue to monitor    Depression with anxiety  With strong seasonal component. Trial of trintellix. Will discontinue bupropion in 6 weeks  Encouraged to report if worse if any way prior to her return    Relevant Medications    buPROPion XL (WELLBUTRIN XL) 300 MG 24 hr tablet    LORazepam (ATIVAN) 1 MG tablet    risperiDONE  (risperDAL) 2 MG tablet    Vortioxetine HBr 10 MG tablet    Smoker    Healthcare maintenance  Patient remains uninterested in a colonoscopy  Reminded to get a flu shot when available

## 2017-08-12 VITALS
RESPIRATION RATE: 12 BRPM | WEIGHT: 206 LBS | BODY MASS INDEX: 37.91 KG/M2 | OXYGEN SATURATION: 96 % | HEART RATE: 91 BPM | SYSTOLIC BLOOD PRESSURE: 115 MMHG | TEMPERATURE: 97.8 F | DIASTOLIC BLOOD PRESSURE: 70 MMHG | HEIGHT: 62 IN

## 2017-08-15 DIAGNOSIS — R91.8 ABNORMAL CT LUNG SCREENING: Primary | ICD-10-CM

## 2017-08-16 DIAGNOSIS — J44.9 CHRONIC OBSTRUCTIVE PULMONARY DISEASE, UNSPECIFIED COPD TYPE (HCC): ICD-10-CM

## 2017-08-16 DIAGNOSIS — F41.8 DEPRESSION WITH ANXIETY: ICD-10-CM

## 2017-09-22 ENCOUNTER — OFFICE VISIT (OUTPATIENT)
Dept: PULMONOLOGY | Facility: CLINIC | Age: 57
End: 2017-09-22

## 2017-09-22 VITALS
HEIGHT: 62 IN | BODY MASS INDEX: 39.2 KG/M2 | SYSTOLIC BLOOD PRESSURE: 146 MMHG | TEMPERATURE: 97.9 F | HEART RATE: 83 BPM | OXYGEN SATURATION: 97 % | WEIGHT: 213 LBS | DIASTOLIC BLOOD PRESSURE: 85 MMHG

## 2017-09-22 DIAGNOSIS — J44.9 CHRONIC OBSTRUCTIVE PULMONARY DISEASE, UNSPECIFIED COPD TYPE (HCC): ICD-10-CM

## 2017-09-22 DIAGNOSIS — R91.1 LUNG NODULE: Primary | ICD-10-CM

## 2017-09-22 PROCEDURE — 99204 OFFICE O/P NEW MOD 45 MIN: CPT | Performed by: INTERNAL MEDICINE

## 2017-09-22 NOTE — PROGRESS NOTES
Subjective   Lisa Hill is a 57 y.o. female who is being seen for abnormal ct scan    History of Present Illness   This 57-year-old female has been referred to us for follow-up on an abnormal CT scan of the chest.  Patient tells me that she had a screening chest CT done because of her smoking history the CT scan showed a small nodular density and she was sent to us for further evaluation and management.  On questioning she denies any new symptoms, tells me that she has dyspnea on exertion for a long time and still has it.  Denies any unusual cough or hemoptysis.  Denies any weight loss.    Her shortness of breath is mostly exertional but sometime addressed especially after coughing spells.  She does not have any wheezing.  Complains of dry cough  Past Medical History:   Diagnosis Date   • Anxiety    • COPD (chronic obstructive pulmonary disease)    • Depression    • GERD (gastroesophageal reflux disease)    • Hyperlipidemia      Past Surgical History:   Procedure Laterality Date   • CHOLECYSTECTOMY     • HYSTERECTOMY     • TONSILLECTOMY       Family History   Problem Relation Age of Onset   • Kidney disease Mother    • Diabetes Mother    • Hypertension Father    • Breast cancer Maternal Aunt       reports that she has been smoking Cigarettes.  She has been smoking about 1.00 pack per day. She has never used smokeless tobacco. She reports that she does not drink alcohol.  Allergies   Allergen Reactions   • Sulfa Antibiotics            The following portions of the patient's history were reviewed and updated as appropriate: allergies, current medications, past family history, past medical history, past social history, past surgical history and problem list.    Review of Systems   Constitutional: Negative for appetite change, chills, diaphoresis and unexpected weight change.   HENT: Negative for sore throat, trouble swallowing and voice change.    Eyes: Negative for visual disturbance.   Respiratory: Positive for  "cough and shortness of breath. Negative for apnea, choking and wheezing.    Cardiovascular: Negative for chest pain, palpitations and leg swelling.   Gastrointestinal: Negative for abdominal pain, constipation, diarrhea, nausea and vomiting.   Endocrine: Negative for cold intolerance, heat intolerance, polydipsia, polyphagia and polyuria.   Genitourinary: Negative for difficulty urinating and dysuria.   Musculoskeletal: Negative for gait problem.   Skin: Negative for rash and wound.   Neurological: Negative for syncope and light-headedness.   Hematological: Negative for adenopathy.   Psychiatric/Behavioral: Negative for agitation, behavioral problems and confusion.   All other systems reviewed and are negative.      Objective   /85 (BP Location: Left arm, Patient Position: Sitting, Cuff Size: Adult)  Pulse 83  Temp 97.9 °F (36.6 °C) (Oral)   Ht 62\" (157.5 cm)  Wt 213 lb (96.6 kg)  SpO2 97%  BMI 38.96 kg/m2  Physical Exam   Constitutional: She is oriented to person, place, and time.   HENT:   Head: Normocephalic and atraumatic.   Nose: Mucosal edema present.   Eyes: EOM are normal. Pupils are equal, round, and reactive to light.   Neck: Neck supple.   Cardiovascular: Normal rate, regular rhythm and normal heart sounds.    Pulmonary/Chest: She has rhonchi.   Vesicular breath sound bilaterally with prolonged expiratory phase   Abdominal: Soft. Bowel sounds are normal.   Musculoskeletal: Normal range of motion. She exhibits no deformity.   Neurological: She is alert and oriented to person, place, and time.   Skin: Skin is warm and dry.   Psychiatric: She has a normal mood and affect. Her behavior is normal.   Nursing note and vitals reviewed.        Radiology:  Ct Chest Low Dose Wo    Result Date: 6/5/2017  1. 4 mm benign-appearing semisolid nodule left lower lobe. No suspicious nodules or pulmonary masses. 2. Moderate emphysema and underlying granulomatous changes. 3. Lung RADS category 2: Benign  Need " comparison:  No.  MANAGEMENT: IMAGING FOLLOWUP: Low Dose CT 12 months from screening exam. CLINICAL FOLLOWUP: See Physician as previously instructed.   Nodule     Low-Risk Patient       High-Risk Patient  Size < 4 mm     no follow up needed    follow up CT at 12 months                                   if unchanged, no follow up  4-8 mm       follow up CT at 6, 12 and 24 months               if no change, further follow up  > 8 mm     contrast enhanced CT, PET and/or biopsy, OR            watchful waiting: follow up CT at 3, 9 & 24 months   Notes: diameter = average width; high risk is defined as a history of smoking or other know risk factors for lung cancer; low risk is defined as minimal or absent history of smoking or other known risk factors; caveat: nodules with a ground glass component may require longer follow up to exclude indolent adenocarcinoma      This report was finalized on 6/5/2017 10:01 AM by Dr. Antonio Lewis MD.      Mammo Screening Digital Tomosynthesis Bilateral With Cad    Result Date: 6/5/2017  BI-RADS CATEGORY I: NEGATIVE  MANAGEMENT: Routine Mammography Screening     COMMUNICATION: A lay language written letter was sent to the patient regarding negative results.  PLEASE NOTE THE FOLLOWING ACR RECOMMENDATIONS: A:  Only 80% of breast cancers can be diagnosed by mammography. B:  A negative mammogram does not exclude cancer in clinically palpable abnormalities.  Biopsy should be done based on ultrasound findings and clinical judgment. C:  A mammogram has limited value in detecting cancer in patients with adenosis or dense breasts.  [employer] GUIDELINES For breast cancer detection in asymptomatic women: Age  ACR Recommendations 35-40  Baseline Mammogram 40-49  Annual or Biannual Mammogram 50+  Annual Mammogram Annual physical and frequent self breast examination.  Patient has been entered into an automatic reminder system.    This report was finalized on 6/5/2017 9:47 AM by  Dr. Antonio Lewis MD.        Lab Results:  Lab on 03/31/2017   Component Date Value Ref Range Status   • Glucose 03/31/2017 114* 70 - 110 mg/dL Final   • BUN 03/31/2017 12  7 - 21 mg/dL Final   • Creatinine 03/31/2017 1.06  0.43 - 1.29 mg/dL Final   • Sodium 03/31/2017 138  135 - 153 mmol/L Final   • Potassium 03/31/2017 4.3  3.5 - 5.3 mmol/L Final   • Chloride 03/31/2017 104  99 - 112 mmol/L Final   • CO2 03/31/2017 28.8  24.3 - 31.9 mmol/L Final   • Calcium 03/31/2017 9.8  7.7 - 10.0 mg/dL Final   • Total Protein 03/31/2017 8.3* 6.0 - 8.0 g/dL Final   • Albumin 03/31/2017 4.80  3.50 - 5.00 g/dL Final   • ALT (SGPT) 03/31/2017 20  10 - 36 U/L Final   • AST (SGOT) 03/31/2017 16  10 - 30 U/L Final   • Alkaline Phosphatase 03/31/2017 58  35 - 104 U/L Final   • Total Bilirubin 03/31/2017 0.2  0.2 - 1.8 mg/dL Final   • eGFR Non African Amer 03/31/2017 53* >60 mL/min/1.73 Final   • Globulin 03/31/2017 3.5  gm/dL Final   • A/G Ratio 03/31/2017 1.4* 1.5 - 2.5 g/dL Final   • BUN/Creatinine Ratio 03/31/2017 11.3  7.0 - 25.0 Final   • Anion Gap 03/31/2017 5.2  3.6 - 11.2 mmol/L Final   • Total Cholesterol 03/31/2017 160  0 - 200 mg/dL Final   • Triglycerides 03/31/2017 169* 0 - 150 mg/dL Final   • HDL Cholesterol 03/31/2017 43* 60 - 100 mg/dL Final   • LDL Cholesterol  03/31/2017 83  0 - 100 mg/dL Final   • VLDL Cholesterol 03/31/2017 33.8  mg/dL Final   • LDL/HDL Ratio 03/31/2017 1.93   Final   • TSH 03/31/2017 5.499* 0.550 - 4.780 mIU/mL Final   • Hemoglobin A1C 03/31/2017 5.70  4.50 - 5.70 % Final   • 25 Hydroxy, Vitamin D 03/31/2017 38.0  ng/ml Final   • Hepatitis C Ab 03/31/2017 Non-Reactive  Non-Reactive Final   • Hepatitis B Surface Ag 03/31/2017 Non-Reactive  Non-Reactive Final   • Hep B S Ab 03/31/2017 Non-Reactive  Non-Reactive Final   • Hep B Core Total Ab 03/31/2017 Negative  Negative Final   • WBC 03/31/2017 10.93  4.50 - 12.50 10*3/mm3 Final   • RBC 03/31/2017 4.71  4.20 - 5.40 10*6/mm3 Final   • Hemoglobin  03/31/2017 15.4  12.0 - 16.0 g/dL Final   • Hematocrit 03/31/2017 47.6* 37.0 - 47.0 % Final   • MCV 03/31/2017 101.1* 80.0 - 94.0 fL Final   • MCH 03/31/2017 32.7  27.0 - 33.0 pg Final   • MCHC 03/31/2017 32.4* 33.0 - 37.0 g/dL Final   • RDW 03/31/2017 14.4  11.5 - 14.5 % Final   • RDW-SD 03/31/2017 52.6  37.0 - 54.0 fl Final   • MPV 03/31/2017 10.2* 6.0 - 10.0 fL Final   • Platelets 03/31/2017 331  130 - 400 10*3/mm3 Final   • Neutrophil % 03/31/2017 56.8  30.0 - 70.0 % Final   • Lymphocyte % 03/31/2017 34.0  21.0 - 51.0 % Final   • Monocyte % 03/31/2017 6.3  0.0 - 10.0 % Final   • Eosinophil % 03/31/2017 1.9  0.0 - 5.0 % Final   • Basophil % 03/31/2017 0.7  0.0 - 2.0 % Final   • Immature Grans % 03/31/2017 0.3  0.0 - 0.5 % Final   • Neutrophils, Absolute 03/31/2017 6.20  1.40 - 6.50 10*3/mm3 Final   • Lymphocytes, Absolute 03/31/2017 3.72* 1.00 - 3.00 10*3/mm3 Final   • Monocytes, Absolute 03/31/2017 0.69  0.10 - 0.90 10*3/mm3 Final   • Eosinophils, Absolute 03/31/2017 0.21  0.00 - 0.70 10*3/mm3 Final   • Basophils, Absolute 03/31/2017 0.08  0.00 - 0.30 10*3/mm3 Final   • Immature Grans, Absolute 03/31/2017 0.03  0.00 - 0.03 10*3/mm3 Final   • Osmolality Calc 03/31/2017 276.3  273.0 - 305.0 mOsm/kg Final       Assessment      ICD-10-CM ICD-9-CM   1. Lung nodule R91.1 793.11   2. Chronic obstructive pulmonary disease, unspecified COPD type J44.9 496                DISCUSSION:  I have reviewed the chest CT which was actually a screening chest CT done in April of this year.  Patient had bullous emphysematous changes bilaterally, also had a approximately 4 mm size noted in the left lower lobe.    I'm sending her for a pulmonary function test for objective assessment of her airways function.  We also repeating her CT scan 6 months from the prior one and I like to see her again after that.  Meanwhile we counseled her for smoking cessation, spent approximately 5 minutes talking about it.  Patient tells me that she is  already motivated and has stopped smoking 2 days ago.  I, congratulated her and told her not to start smoking again.    Plan    Orders Placed This Encounter   Procedures   • CT Chest Without Contrast   • Pulmonary Function Test     No orders of the defined types were placed in this encounter.                 Sierra English MD, FCCP, [facility]  Pulmonary, Critical Care, and Sleep Medicine

## 2017-09-26 ENCOUNTER — OFFICE VISIT (OUTPATIENT)
Dept: FAMILY MEDICINE CLINIC | Facility: CLINIC | Age: 57
End: 2017-09-26

## 2017-09-26 VITALS
WEIGHT: 213 LBS | BODY MASS INDEX: 39.2 KG/M2 | HEART RATE: 84 BPM | SYSTOLIC BLOOD PRESSURE: 138 MMHG | OXYGEN SATURATION: 97 % | TEMPERATURE: 98.2 F | HEIGHT: 62 IN | DIASTOLIC BLOOD PRESSURE: 70 MMHG

## 2017-09-26 DIAGNOSIS — S23.9XXA THORACIC BACK SPRAIN, INITIAL ENCOUNTER: Primary | ICD-10-CM

## 2017-09-26 DIAGNOSIS — F17.200 TOBACCO USE DISORDER: ICD-10-CM

## 2017-09-26 DIAGNOSIS — J30.9 CHRONIC ALLERGIC RHINITIS: ICD-10-CM

## 2017-09-26 DIAGNOSIS — K21.9 GASTROESOPHAGEAL REFLUX DISEASE WITHOUT ESOPHAGITIS: ICD-10-CM

## 2017-09-26 DIAGNOSIS — J41.0 SIMPLE CHRONIC BRONCHITIS (HCC): ICD-10-CM

## 2017-09-26 PROCEDURE — 99406 BEHAV CHNG SMOKING 3-10 MIN: CPT | Performed by: PHYSICIAN ASSISTANT

## 2017-09-26 PROCEDURE — 96372 THER/PROPH/DIAG INJ SC/IM: CPT | Performed by: PHYSICIAN ASSISTANT

## 2017-09-26 PROCEDURE — 99214 OFFICE O/P EST MOD 30 MIN: CPT | Performed by: PHYSICIAN ASSISTANT

## 2017-09-26 RX ORDER — METHOCARBAMOL 500 MG/1
500 TABLET, FILM COATED ORAL NIGHTLY PRN
Qty: 30 TABLET | Refills: 0 | Status: SHIPPED | OUTPATIENT
Start: 2017-09-26 | End: 2018-01-19

## 2017-09-26 RX ORDER — METHYLPREDNISOLONE ACETATE 80 MG/ML
80 INJECTION, SUSPENSION INTRA-ARTICULAR; INTRALESIONAL; INTRAMUSCULAR; SOFT TISSUE ONCE
Status: COMPLETED | OUTPATIENT
Start: 2017-09-26 | End: 2017-09-26

## 2017-09-26 RX ORDER — NICOTINE 21 MG/24HR
1 PATCH, TRANSDERMAL 24 HOURS TRANSDERMAL SEE ADMIN INSTRUCTIONS
Qty: 30 PATCH | Refills: 1 | Status: SHIPPED | OUTPATIENT
Start: 2017-09-26 | End: 2017-11-16

## 2017-09-26 RX ADMIN — METHYLPREDNISOLONE ACETATE 80 MG: 80 INJECTION, SUSPENSION INTRA-ARTICULAR; INTRALESIONAL; INTRAMUSCULAR; SOFT TISSUE at 12:22

## 2017-09-26 NOTE — PROGRESS NOTES
Subjective   Lisa Hill is a 57 y.o. female.     History of Present Illness     Back pain-  She complains of moderate achy left lower thoracic back pain for the past week.  No known injury.  Pain is worse in the morning upon awakening.  Some relief with BenGay cream.    Tobacco use disorder-  She smokes 1 pack per day for the past 40+ years.  She reports that she cut down to 4 cigarettes per day 5 days ago.  Her  is also stopping smoking with her.  She is interested in smoking cessation help.  Not at goal    COPD-not at goal due to continued smoking.    Gastroesophageal reflux disease-stable with proton X    Chronic allergic rhinitis-stable with Flonase    The following portions of the patient's history were reviewed and updated as appropriate: allergies, current medications, past family history, past medical history, past social history, past surgical history and problem list.    Review of Systems   Constitutional: Negative for activity change, appetite change and fever.   HENT: Negative for ear pain, sinus pressure and sore throat.    Eyes: Negative for pain and visual disturbance.   Respiratory: Negative for cough and chest tightness.    Cardiovascular: Negative for chest pain and palpitations.   Gastrointestinal: Negative for abdominal pain, constipation, diarrhea, nausea and vomiting.   Endocrine: Negative for polydipsia and polyuria.   Genitourinary: Negative for dysuria and frequency.   Musculoskeletal: Positive for back pain. Negative for myalgias.   Skin: Negative for color change and rash.   Allergic/Immunologic: Negative for food allergies and immunocompromised state.   Neurological: Negative for dizziness, syncope and headaches.   Hematological: Negative for adenopathy. Does not bruise/bleed easily.   Psychiatric/Behavioral: Negative for hallucinations and suicidal ideas. The patient is not nervous/anxious.        Objective   Physical Exam   Constitutional: She is oriented to person, place,  and time. She appears well-developed and well-nourished.   HENT:   Head: Normocephalic and atraumatic.   Nose: Nose normal.   Mouth/Throat: Oropharynx is clear and moist.   Eyes: Conjunctivae and EOM are normal. Pupils are equal, round, and reactive to light.   Neck: Normal range of motion. Neck supple. No tracheal deviation present. No thyromegaly present.   Cardiovascular: Normal rate, regular rhythm, normal heart sounds and intact distal pulses.    No murmur heard.  Pulmonary/Chest: Effort normal and breath sounds normal. No respiratory distress. She has no wheezes.   Abdominal: Soft. Bowel sounds are normal. There is no tenderness. There is no guarding.   Musculoskeletal: Normal range of motion. She exhibits tenderness. She exhibits no edema.   Left lower thoracic lateral muscle tenderness without swelling or erythema noted.   Lymphadenopathy:     She has no cervical adenopathy.   Neurological: She is alert and oriented to person, place, and time.   Skin: Skin is warm and dry. No rash noted.   Psychiatric: She has a normal mood and affect. Her behavior is normal.   Nursing note and vitals reviewed.      Assessment/Plan   Diagnoses and all orders for this visit:    Thoracic back sprain, initial encounter  -     methocarbamol (ROBAXIN) 500 MG tablet; Take 1 tablet by mouth At Night As Needed for Muscle Spasms.  -     methylPREDNISolone acetate (DEPO-medrol) injection 80 mg; Inject 1 mL into the shoulder, thigh, or buttocks 1 (One) Time.    Tobacco use disorder  -     nicotine (NICODERM CQ) 14 MG/24HR patch; Place 1 patch on the skin See Admin Instructions. 14mg/d patch x 6 weeks then 7mg/d patch x 2 weeks    Simple chronic bronchitis    Gastroesophageal reflux disease without esophagitis    Chronic allergic rhinitis      She was counseled on smoking cessation and options for cessation for 4 minutes today.  Start NicoDerm CQ.

## 2017-09-29 ENCOUNTER — TELEPHONE (OUTPATIENT)
Dept: FAMILY MEDICINE CLINIC | Facility: CLINIC | Age: 57
End: 2017-09-29

## 2017-09-29 NOTE — TELEPHONE ENCOUNTER
----- Message from Scott Chavez MD sent at 9/29/2017  9:53 AM EDT -----  For now she should continue to check it twice weekly when at rest for 10-15 minutes and when relaxed and comfortable    ----- Message -----     From: Yesenia Cruz MA     Sent: 9/29/2017   9:03 AM       To: Scott Chavez MD    Alejandra stopped by the office this morning and wanted me to check her blood pressure. She stated that it has been running high for the last couple of weeks. Today it was 160/80. She wants to know what you would recommend her doing. Or if you wanted to see her?

## 2017-10-20 ENCOUNTER — OFFICE VISIT (OUTPATIENT)
Dept: FAMILY MEDICINE CLINIC | Facility: CLINIC | Age: 57
End: 2017-10-20

## 2017-10-20 VITALS
HEART RATE: 96 BPM | BODY MASS INDEX: 39.01 KG/M2 | SYSTOLIC BLOOD PRESSURE: 110 MMHG | DIASTOLIC BLOOD PRESSURE: 74 MMHG | WEIGHT: 212 LBS | OXYGEN SATURATION: 96 % | HEIGHT: 62 IN | TEMPERATURE: 98.7 F

## 2017-10-20 DIAGNOSIS — M62.830 MUSCLE SPASM OF BACK: Primary | ICD-10-CM

## 2017-10-20 DIAGNOSIS — M62.838 MUSCLE SPASM OF RIGHT LEG: ICD-10-CM

## 2017-10-20 DIAGNOSIS — R21 RASH: ICD-10-CM

## 2017-10-20 PROCEDURE — 99214 OFFICE O/P EST MOD 30 MIN: CPT | Performed by: NURSE PRACTITIONER

## 2017-10-20 PROCEDURE — 96372 THER/PROPH/DIAG INJ SC/IM: CPT | Performed by: NURSE PRACTITIONER

## 2017-10-20 RX ORDER — PREDNISONE 20 MG/1
TABLET ORAL
Qty: 6 TABLET | Refills: 0 | Status: SHIPPED | OUTPATIENT
Start: 2017-10-20 | End: 2017-10-30

## 2017-10-20 RX ORDER — KETOCONAZOLE 20 MG/ML
SHAMPOO TOPICAL 2 TIMES WEEKLY
Qty: 120 ML | Refills: 5 | Status: SHIPPED | OUTPATIENT
Start: 2017-10-23 | End: 2020-07-07 | Stop reason: SDUPTHER

## 2017-10-20 RX ORDER — DEXAMETHASONE SODIUM PHOSPHATE 4 MG/ML
8 INJECTION, SOLUTION INTRA-ARTICULAR; INTRALESIONAL; INTRAMUSCULAR; INTRAVENOUS; SOFT TISSUE ONCE
Status: COMPLETED | OUTPATIENT
Start: 2017-10-20 | End: 2017-10-20

## 2017-10-20 RX ORDER — PAROXETINE HYDROCHLORIDE 20 MG/1
TABLET, FILM COATED ORAL
COMMUNITY
Start: 2017-09-21 | End: 2017-11-15 | Stop reason: SDUPTHER

## 2017-10-20 RX ORDER — INFLUENZA A VIRUS A/SINGAPORE/GP1908/2015 IVR-180A (H1N1) ANTIGEN (PROPIOLACTONE INACTIVATED), INFLUENZA A VIRUS A/SINGAPORE/INFIMH-16-0019/2016 IVR-186 (H3N2) ANTIGEN (PROPIOLACTONE INACTIVATED), INFLUENZA B VIRUS B/MARYLAND/15/2016 ANTIGEN (PROPIOLACTONE INACTIVATED), AND INFLUENZA B VIRUS B/PHUKET/3073/2013 BVR-1B ANTIGEN (PROPIOLACTONE INACTIVATED) 15; 15; 15; 15 UG/.5ML; UG/.5ML; UG/.5ML; UG/.5ML
INJECTION, SUSPENSION INTRAMUSCULAR
COMMUNITY
Start: 2017-09-12 | End: 2018-01-19

## 2017-10-20 RX ADMIN — DEXAMETHASONE SODIUM PHOSPHATE 8 MG: 4 INJECTION, SOLUTION INTRA-ARTICULAR; INTRALESIONAL; INTRAMUSCULAR; INTRAVENOUS; SOFT TISSUE at 17:03

## 2017-10-20 NOTE — PROGRESS NOTES
"Subjective   Lisa Hill is a 57 y.o. female.     Chief Complaint   Patient presents with   • Spasms     right shoulder blade and right thigh       History of Present Illness     Thoracic back pain-under right scapular area.  She reports has been present for several weeks and did receive a steroid injection that helped for approx 2 days.  She denies any injury.  She had used heat prior to her last appt but has not used any since.    Posterior right thigh pain-She reports it has been present since before her last visit but she did not report it as she felt it was related to shoulder pain.  No known injury.  She reports no excessive walking, stretching or straining, and no near fall.  She does not work, does not stand excessively during the day.  She denies any overuse activity such as long periods of driving or foot use.  Not at goal.   Itching in head-reports last winter she got a rash in the back of her head.  Resolved with cream use.  She has not changed shampoo or styling products.  She denies sweating more than her usual. She reports she was told it was an allergy.  Since cold weather has occurred she is having rash again.  Not at goal.     The following portions of the patient's history were reviewed and updated as appropriate: allergies, current medications, past family history, past medical history, past social history, past surgical history and problem list.    Review of Systems   Constitutional: Negative for activity change, appetite change and fever.   HENT: Negative for ear pain, sinus pressure and sore throat.    Eyes: Negative for pain and visual disturbance.   Respiratory: Positive for shortness of breath (not more than her usual). Negative for cough and chest tightness.         Reports a \"spot on bottom of left lung\" does have follow up imaging ordered per pulmonology   Cardiovascular: Negative for chest pain and palpitations.   Gastrointestinal: Negative for abdominal pain, constipation, " "diarrhea, nausea and vomiting.   Endocrine: Negative for polydipsia and polyuria.   Genitourinary: Negative for dysuria and frequency.   Musculoskeletal: Positive for back pain and myalgias (right thoracic back, right thigh).   Skin: Positive for rash (head). Negative for color change.   Allergic/Immunologic: Negative for food allergies and immunocompromised state.   Neurological: Negative for dizziness, syncope and headaches.   Hematological: Negative for adenopathy. Does not bruise/bleed easily.   Psychiatric/Behavioral: Negative for hallucinations and suicidal ideas. The patient is not nervous/anxious.        Objective     /74 (BP Location: Left arm, Patient Position: Sitting)  Pulse 96  Temp 98.7 °F (37.1 °C) (Temporal Artery )   Ht 62\" (157.5 cm)  Wt 212 lb (96.2 kg)  SpO2 96%  BMI 38.78 kg/m2    Physical Exam   Constitutional: She is oriented to person, place, and time. She appears well-developed and well-nourished.   HENT:   Head: Normocephalic and atraumatic.   Nose: Nose normal.   Mouth/Throat: Oropharynx is clear and moist.   Eyes: Conjunctivae and EOM are normal. Pupils are equal, round, and reactive to light. No scleral icterus.   Neck: Normal range of motion. Neck supple. No tracheal deviation present. No thyromegaly present.   Cardiovascular: Normal rate, regular rhythm, normal heart sounds and intact distal pulses.    No murmur heard.  Pulmonary/Chest: Effort normal and breath sounds normal. No respiratory distress. She has no wheezes.   Abdominal: Soft. Bowel sounds are normal. There is no tenderness. There is no guarding.   Musculoskeletal: Normal range of motion. She exhibits tenderness. She exhibits no edema.   Left lower thoracic lateral muscle tenderness without swelling or erythema noted.   Lymphadenopathy:     She has no cervical adenopathy.   Neurological: She is alert and oriented to person, place, and time. No cranial nerve deficit. Coordination normal.   Skin: Skin is warm and " dry. No rash noted.   Psychiatric: She has a normal mood and affect. Her behavior is normal.   Nursing note and vitals reviewed.    Assessment/Plan     Problem List Items Addressed This Visit     None      Visit Diagnoses     Muscle spasm of back    -  Primary    Relevant Medications    dexamethasone (DECADRON) injection 8 mg (Start on 10/20/2017  5:15 PM)    predniSONE (DELTASONE) 20 MG tablet    Other Relevant Orders    Ambulatory Referral to Physical Therapy Evaluate and treat (Completed)    Muscle spasm of right leg        Relevant Orders    Ambulatory Referral to Physical Therapy Evaluate and treat (Completed)    Rash        scalp      Relevant Medications    ketoconazole (NIZORAL) 2 % shampoo (Start on 10/23/2017)        Injections today for acute symptom relief.   Referral as above to speciality.  Heat/Ice alternating.  Do not apply directly to skin.  Gentle stretching of area and massage.  Avoid overuse or activities that exacerbate.  Trial of shampoo for scalp irritation.  Understands disease processes and need for medications.  Understands reasons for urgent and emergent care.  Patient (& family) verbalized agreement for treatment plan.   RTC PRN 3-5 days for worsening or non resolving symptoms        This document has been electronically signed by:  NATALIE Mendenhall, FNP-C

## 2017-10-27 ENCOUNTER — APPOINTMENT (OUTPATIENT)
Dept: RESPIRATORY THERAPY | Facility: HOSPITAL | Age: 57
End: 2017-10-27
Attending: INTERNAL MEDICINE

## 2017-11-06 ENCOUNTER — HOSPITAL ENCOUNTER (OUTPATIENT)
Dept: RESPIRATORY THERAPY | Facility: HOSPITAL | Age: 57
End: 2017-11-06
Attending: INTERNAL MEDICINE

## 2017-11-10 DIAGNOSIS — E55.9 VITAMIN D DEFICIENCY: ICD-10-CM

## 2017-11-10 RX ORDER — MELATONIN
Qty: 30 TABLET | Refills: 5 | Status: SHIPPED | OUTPATIENT
Start: 2017-11-10 | End: 2018-05-03 | Stop reason: SDUPTHER

## 2017-11-15 ENCOUNTER — TELEPHONE (OUTPATIENT)
Dept: FAMILY MEDICINE CLINIC | Facility: CLINIC | Age: 57
End: 2017-11-15

## 2017-11-15 RX ORDER — PAROXETINE HYDROCHLORIDE 40 MG/1
40 TABLET, FILM COATED ORAL DAILY
Qty: 30 TABLET | Refills: 5 | Status: SHIPPED | OUTPATIENT
Start: 2017-11-15 | End: 2018-01-20

## 2017-11-15 NOTE — TELEPHONE ENCOUNTER
----- Message from Scott Chavez MD sent at 11/15/2017 10:03 AM EST -----  Regarding: RE: Pt called having alot of anxiety  Dinah emailed a script for the 40 mg tabs  She can use up the 20's by taking 2 daily    ----- Message -----     From: Scott Auguste MA     Sent: 11/14/2017   4:31 PM       To: Scott Chavez MD  Subject: Pt called having alot of anxiety                 Pt said she is on paxil right now 20mg. She said it doesn't seem to be working well enough. She is staying very anxious. She wants to know if you will increase her dosage on it. I told her that she has an appointment Thursday but she was insistent that she needs something now. Please advise. She uses [facility].  Thanks  Scott MEJIA

## 2017-11-16 ENCOUNTER — OFFICE VISIT (OUTPATIENT)
Dept: FAMILY MEDICINE CLINIC | Facility: CLINIC | Age: 57
End: 2017-11-16

## 2017-11-16 VITALS
SYSTOLIC BLOOD PRESSURE: 135 MMHG | DIASTOLIC BLOOD PRESSURE: 80 MMHG | BODY MASS INDEX: 40.3 KG/M2 | WEIGHT: 219 LBS | TEMPERATURE: 98.7 F | HEIGHT: 62 IN | OXYGEN SATURATION: 97 % | HEART RATE: 95 BPM | RESPIRATION RATE: 12 BRPM

## 2017-11-16 DIAGNOSIS — J44.9 CHRONIC OBSTRUCTIVE PULMONARY DISEASE, UNSPECIFIED COPD TYPE (HCC): ICD-10-CM

## 2017-11-16 DIAGNOSIS — E78.2 MIXED HYPERLIPIDEMIA: Primary | ICD-10-CM

## 2017-11-16 DIAGNOSIS — E55.9 VITAMIN D DEFICIENCY: ICD-10-CM

## 2017-11-16 DIAGNOSIS — K21.9 GASTROESOPHAGEAL REFLUX DISEASE WITHOUT ESOPHAGITIS: ICD-10-CM

## 2017-11-16 DIAGNOSIS — F41.8 DEPRESSION WITH ANXIETY: ICD-10-CM

## 2017-11-16 DIAGNOSIS — F17.200 SMOKER: ICD-10-CM

## 2017-11-16 DIAGNOSIS — R73.03 PREDIABETES: ICD-10-CM

## 2017-11-16 DIAGNOSIS — D75.89 MACROCYTOSIS: ICD-10-CM

## 2017-11-16 DIAGNOSIS — E06.3 HYPOTHYROIDISM DUE TO HASHIMOTO'S THYROIDITIS: ICD-10-CM

## 2017-11-16 DIAGNOSIS — M85.80 OSTEOPENIA, UNSPECIFIED LOCATION: ICD-10-CM

## 2017-11-16 DIAGNOSIS — K62.5 RECTAL BLEEDING: ICD-10-CM

## 2017-11-16 DIAGNOSIS — K59.09 CHRONIC CONSTIPATION: ICD-10-CM

## 2017-11-16 DIAGNOSIS — J30.2 CHRONIC SEASONAL ALLERGIC RHINITIS, UNSPECIFIED TRIGGER: ICD-10-CM

## 2017-11-16 DIAGNOSIS — Z00.00 HEALTHCARE MAINTENANCE: ICD-10-CM

## 2017-11-16 DIAGNOSIS — M15.9 GENERALIZED OSTEOARTHRITIS: ICD-10-CM

## 2017-11-16 DIAGNOSIS — E03.8 HYPOTHYROIDISM DUE TO HASHIMOTO'S THYROIDITIS: ICD-10-CM

## 2017-11-16 DIAGNOSIS — F41.9 ANXIETY: ICD-10-CM

## 2017-11-16 PROCEDURE — 99214 OFFICE O/P EST MOD 30 MIN: CPT | Performed by: GENERAL PRACTICE

## 2017-11-16 RX ORDER — PANTOPRAZOLE SODIUM 40 MG/1
40 TABLET, DELAYED RELEASE ORAL DAILY
Qty: 30 TABLET | Refills: 5 | Status: SHIPPED | OUTPATIENT
Start: 2017-11-16 | End: 2018-08-31 | Stop reason: SDUPTHER

## 2017-11-16 RX ORDER — LORAZEPAM 1 MG/1
0.5 TABLET ORAL 2 TIMES DAILY
Qty: 90 TABLET | Refills: 0 | Status: SHIPPED | OUTPATIENT
Start: 2017-11-16 | End: 2018-02-16 | Stop reason: SDUPTHER

## 2017-11-16 NOTE — PROGRESS NOTES
Subjective   Lisa Hill is a 57 y.o. female.     History of Present Illness     Depression  Onset was a number of years ago. Symptoms have consistently been worse in the winter months and she has once again noted an exacerbation over the last several months. Current symptoms include: depression, nervousness, anhedonia, difficulty concentrating, fatigue and impaired memory. Patient denies recurrent thoughts of death or suicidal thoughts. Risk factors: history of seasonal affective disorder.  Current medication includes paroxetine that was recently increased from 20-40 mg, bupropion 300 daily, and lorazepam 0.5 twice a day.  She stopped buspirone and trintellix due to a lack of affect as well as risperidone due to increased difficulty concentrating.    COPD  The patient reports that her SOB and cough have remained stable. She states that these symptoms occur at any time during the day or night. She reports that her symptoms become worse with activity, exposure to cold air and environmental allergens. Her symptoms are reduced with rest and with inhaled inhaled medication. The patient states she also has nasal congestion and post nasal drip/clearing throat. She is following the treatment plan, including medications as directed.  She is currently on incruse along with albuterol as needed.  She currently smokes approximately 1 packs per day. Low dose CT of the chest performed on 6/2/17 revaled moderate emphysema as well as a benign appearing 4 mm semisolid nodule in the LLL. Repeat study in 1 year was recommended    Hypothyroidism  Patient presents for evaluation of thyroid function. Symptoms consist of weight gain, constipation. The symptoms are moderate.  The problem has been unchanged.  Previous thyroid studies include TSH. The hypothyroidism is due to Hashimoto's disease.     Dyslipidemia  Compliance with treatment has been fair. The patient exercises occasionally. She is currently being prescribed the following  medication for her dyslipidemia - simvastatin. Patient denies side effects associated with her medications. She has had no recent labs    Rectal Bleeding  She continues to have occasional bright red blood when wiping following bowel movements when constipated. She denies any change in her bowel habits and has had no abdominal pain or melena.  There is no history of any fever, chills, night sweats or weight loss    The following portions of the patient's history were reviewed and updated as appropriate: allergies, current medications, past medical history, past social history and problem list.    Review of Systems   Constitutional: Positive for fatigue. Negative for appetite change, chills, fever and unexpected weight change.   HENT: Negative for congestion, ear pain, postnasal drip, rhinorrhea, sneezing, sore throat and voice change.    Eyes: Negative for visual disturbance.   Respiratory: Positive for cough and shortness of breath. Negative for wheezing.    Cardiovascular: Negative for chest pain, palpitations and leg swelling.   Gastrointestinal: Positive for anal bleeding and constipation. Negative for abdominal pain, blood in stool, diarrhea, nausea and vomiting.        Recurrent heartburn since last here - primarily occurs following meals and when she lies down at night   Endocrine: Negative for polydipsia.   Genitourinary: Negative for difficulty urinating, dysuria, frequency, hematuria, menstrual problem, pelvic pain, urgency, vaginal bleeding, vaginal discharge and vaginal pain.        Incontinence with coughing, sneezing, or lifting   Musculoskeletal: Negative for arthralgias, back pain, joint swelling, myalgias and neck pain.   Skin: Negative for color change.   Neurological: Negative for tremors, weakness, numbness and headaches.   Psychiatric/Behavioral: Positive for decreased concentration, dysphoric mood and sleep disturbance. Negative for suicidal ideas. The patient is nervous/anxious.      Objective    Physical Exam   Constitutional: She is oriented to person, place, and time. No distress.   Bright, in fair spirits.  No apparent distress.  No pallor, jaundice, cyanosis or diaphoresis   HENT:   Head: Atraumatic.   Right Ear: Tympanic membrane, external ear and ear canal normal.   Left Ear: Tympanic membrane, external ear and ear canal normal.   Nose: Nose normal.   Mouth/Throat: Oropharynx is clear and moist. Mucous membranes are not pale and not cyanotic.   Eyes: EOM are normal. Pupils are equal, round, and reactive to light. No scleral icterus.   Neck: No JVD present. Carotid bruit is not present. No tracheal deviation present. No thyromegaly present.   Cardiovascular: Normal rate, regular rhythm, S1 normal, S2 normal and intact distal pulses.  Exam reveals no gallop, no S3 and no S4.    No murmur heard.  Pulmonary/Chest: No accessory muscle usage or stridor. No respiratory distress. She has decreased breath sounds. She has wheezes (diffuse-mild).   Hyperinflation of the chest   Abdominal: Soft. Normal aorta and bowel sounds are normal. She exhibits no distension, no abdominal bruit and no mass. There is no hepatosplenomegaly. There is no tenderness. No hernia.   Musculoskeletal: She exhibits no tenderness or deformity.       Vascular Status -  Her exam exhibits no right foot edema. Her exam exhibits no left foot edema.  Lymphadenopathy:        Head (right side): No submandibular adenopathy present.        Head (left side): No submandibular adenopathy present.     She has no cervical adenopathy.   Neurological: She is alert and oriented to person, place, and time. She has normal reflexes. She displays normal reflexes. No cranial nerve deficit. She exhibits normal muscle tone. Coordination normal.   Skin: Skin is warm and dry. No rash noted. She is not diaphoretic. No cyanosis. No pallor. Nails show no clubbing.   Psychiatric: She has a normal mood and affect. Her speech is normal and behavior is normal. Thought  content normal. Cognition and memory are normal. She expresses no suicidal ideation.     Assessment/Plan   Problems Addressed this Visit        Cardiovascular and Mediastinum    Mixed hyperlipidemia   Encouraged to continue to work on her diet and exercise plan.  Continue current medication  Fasting lab work scheduled     Relevant Orders    Comprehensive Metabolic Panel    Lipid Panel       Respiratory    COPD (chronic obstructive pulmonary disease)  COPD is worsening.  Reminded of the importance of smoking cessation  Encouraged to remain as active as symptoms allow for    Chronic seasonal allergic rhinitis       Digestive    Vitamin D deficiency  Will continue to monitor    Relevant Orders    Vitamin D 25 Hydroxy    Gastroesophageal reflux disease without esophagitis  Symptoms are currently recurrent  Reminded regarding lifestyle modification.  Will titrate pantoprazole to twice a day  Encouraged to report if any worse, any new symptoms, or if not resolving over the next several weeks     Relevant Medications    pantoprazole (PROTONIX) 40 MG EC tablet    Rectal bleeding  Strongly recommended a GI assessment.  Patient remains uninterested but will report if she should change her mind    Chronic constipation       Endocrine    Hypothyroidism due to Hashimoto's thyroiditis  Clinically euthyroid  Continue current medication    Relevant Orders    TSH       Musculoskeletal and Integument    Osteopenia    Generalized osteoarthritis       Other    Prediabetes  Will continue to monitor    Relevant Orders    Hemoglobin A1c    Depression with anxiety  Supportive therapy  Referral for counseling  Trial of brexpiprazole  Encouraged to report if any worse or if any new symptoms or concerns.    Relevant Medications    LORazepam (ATIVAN) 1 MG tablet    Brexpiprazole (REXULTI) 1 MG tablet    Smoker    Healthcare maintenance  Patient has already received a flu shot this fall    Macrocytosis  Will continue to monitor    Relevant  Orders    CBC & Differential    Folate RBC    Vitamin B12

## 2017-11-18 ENCOUNTER — APPOINTMENT (OUTPATIENT)
Dept: RESPIRATORY THERAPY | Facility: HOSPITAL | Age: 57
End: 2017-11-18
Attending: INTERNAL MEDICINE

## 2017-11-29 ENCOUNTER — OFFICE VISIT (OUTPATIENT)
Dept: PSYCHIATRY | Facility: CLINIC | Age: 57
End: 2017-11-29

## 2017-11-29 ENCOUNTER — LAB (OUTPATIENT)
Dept: FAMILY MEDICINE CLINIC | Facility: CLINIC | Age: 57
End: 2017-11-29

## 2017-11-29 DIAGNOSIS — J30.2 CHRONIC SEASONAL ALLERGIC RHINITIS, UNSPECIFIED TRIGGER: ICD-10-CM

## 2017-11-29 DIAGNOSIS — E55.9 VITAMIN D DEFICIENCY: ICD-10-CM

## 2017-11-29 DIAGNOSIS — J44.9 CHRONIC OBSTRUCTIVE PULMONARY DISEASE, UNSPECIFIED COPD TYPE (HCC): ICD-10-CM

## 2017-11-29 DIAGNOSIS — E78.2 MIXED HYPERLIPIDEMIA: ICD-10-CM

## 2017-11-29 DIAGNOSIS — F41.8 DEPRESSION WITH ANXIETY: ICD-10-CM

## 2017-11-29 DIAGNOSIS — D75.89 MACROCYTOSIS: ICD-10-CM

## 2017-11-29 DIAGNOSIS — E06.3 HYPOTHYROIDISM DUE TO HASHIMOTO'S THYROIDITIS: ICD-10-CM

## 2017-11-29 DIAGNOSIS — E03.8 HYPOTHYROIDISM DUE TO HASHIMOTO'S THYROIDITIS: ICD-10-CM

## 2017-11-29 DIAGNOSIS — K59.09 CHRONIC CONSTIPATION: ICD-10-CM

## 2017-11-29 DIAGNOSIS — R73.03 PREDIABETES: ICD-10-CM

## 2017-11-29 LAB
25(OH)D3 SERPL-MCNC: 39 NG/ML
ALBUMIN SERPL-MCNC: 4.1 G/DL (ref 3.5–5)
ALBUMIN/GLOB SERPL: 1.4 G/DL (ref 1.5–2.5)
ALP SERPL-CCNC: 60 U/L (ref 35–104)
ALT SERPL W P-5'-P-CCNC: 27 U/L (ref 10–36)
ANION GAP SERPL CALCULATED.3IONS-SCNC: 6.3 MMOL/L (ref 3.6–11.2)
AST SERPL-CCNC: 18 U/L (ref 10–30)
BASOPHILS # BLD AUTO: 0.07 10*3/MM3 (ref 0–0.3)
BASOPHILS NFR BLD AUTO: 0.5 % (ref 0–2)
BILIRUB SERPL-MCNC: 0.3 MG/DL (ref 0.2–1.8)
BUN BLD-MCNC: 6 MG/DL (ref 7–21)
BUN/CREAT SERPL: 6.2 (ref 7–25)
CALCIUM SPEC-SCNC: 8.9 MG/DL (ref 7.7–10)
CHLORIDE SERPL-SCNC: 104 MMOL/L (ref 99–112)
CHOLEST SERPL-MCNC: 165 MG/DL (ref 0–200)
CO2 SERPL-SCNC: 28.7 MMOL/L (ref 24.3–31.9)
CREAT BLD-MCNC: 0.97 MG/DL (ref 0.43–1.29)
DEPRECATED RDW RBC AUTO: 48.3 FL (ref 37–54)
EOSINOPHIL # BLD AUTO: 0.18 10*3/MM3 (ref 0–0.7)
EOSINOPHIL NFR BLD AUTO: 1.4 % (ref 0–5)
ERYTHROCYTE [DISTWIDTH] IN BLOOD BY AUTOMATED COUNT: 13.6 % (ref 11.5–14.5)
GFR SERPL CREATININE-BSD FRML MDRD: 59 ML/MIN/1.73
GLOBULIN UR ELPH-MCNC: 3 GM/DL
GLUCOSE BLD-MCNC: 111 MG/DL (ref 70–110)
HBA1C MFR BLD: 6.1 % (ref 4.5–5.7)
HCT VFR BLD AUTO: 44.8 % (ref 37–47)
HDLC SERPL-MCNC: 48 MG/DL (ref 60–100)
HGB BLD-MCNC: 14.4 G/DL (ref 12–16)
IMM GRANULOCYTES # BLD: 0.04 10*3/MM3 (ref 0–0.03)
IMM GRANULOCYTES NFR BLD: 0.3 % (ref 0–0.5)
LDLC SERPL CALC-MCNC: 89 MG/DL (ref 0–100)
LDLC/HDLC SERPL: 1.85 {RATIO}
LYMPHOCYTES # BLD AUTO: 3.32 10*3/MM3 (ref 1–3)
LYMPHOCYTES NFR BLD AUTO: 25.3 % (ref 21–51)
MCH RBC QN AUTO: 31.4 PG (ref 27–33)
MCHC RBC AUTO-ENTMCNC: 32.1 G/DL (ref 33–37)
MCV RBC AUTO: 97.6 FL (ref 80–94)
MONOCYTES # BLD AUTO: 0.94 10*3/MM3 (ref 0.1–0.9)
MONOCYTES NFR BLD AUTO: 7.2 % (ref 0–10)
NEUTROPHILS # BLD AUTO: 8.57 10*3/MM3 (ref 1.4–6.5)
NEUTROPHILS NFR BLD AUTO: 65.3 % (ref 30–70)
OSMOLALITY SERPL CALC.SUM OF ELEC: 275.8 MOSM/KG (ref 273–305)
PLATELET # BLD AUTO: 363 10*3/MM3 (ref 130–400)
PMV BLD AUTO: 10.3 FL (ref 6–10)
POTASSIUM BLD-SCNC: 4.3 MMOL/L (ref 3.5–5.3)
PROT SERPL-MCNC: 7.1 G/DL (ref 6–8)
RBC # BLD AUTO: 4.59 10*6/MM3 (ref 4.2–5.4)
SODIUM BLD-SCNC: 139 MMOL/L (ref 135–153)
TRIGL SERPL-MCNC: 140 MG/DL (ref 0–150)
TSH SERPL DL<=0.05 MIU/L-ACNC: 0.47 MIU/ML (ref 0.55–4.78)
VIT B12 BLD-MCNC: 301 PG/ML (ref 211–911)
VLDLC SERPL-MCNC: 28 MG/DL
WBC NRBC COR # BLD: 13.12 10*3/MM3 (ref 4.5–12.5)

## 2017-11-29 PROCEDURE — 82747 ASSAY OF FOLIC ACID RBC: CPT | Performed by: GENERAL PRACTICE

## 2017-11-29 PROCEDURE — 82607 VITAMIN B-12: CPT | Performed by: GENERAL PRACTICE

## 2017-11-29 PROCEDURE — 36415 COLL VENOUS BLD VENIPUNCTURE: CPT | Performed by: GENERAL PRACTICE

## 2017-11-29 PROCEDURE — 80050 GENERAL HEALTH PANEL: CPT | Performed by: GENERAL PRACTICE

## 2017-11-29 PROCEDURE — 80061 LIPID PANEL: CPT | Performed by: GENERAL PRACTICE

## 2017-11-29 PROCEDURE — 85014 HEMATOCRIT: CPT | Performed by: GENERAL PRACTICE

## 2017-11-29 PROCEDURE — 82306 VITAMIN D 25 HYDROXY: CPT | Performed by: GENERAL PRACTICE

## 2017-11-29 PROCEDURE — 83036 HEMOGLOBIN GLYCOSYLATED A1C: CPT | Performed by: GENERAL PRACTICE

## 2017-11-29 PROCEDURE — 90791 PSYCH DIAGNOSTIC EVALUATION: CPT | Performed by: SOCIAL WORKER
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
## 2017-11-30 LAB
FOLATE BLD-MCNC: 459.4 NG/ML
FOLATE RBC-MCNC: 1044 NG/ML
HCT VFR BLD AUTO: 44 % (ref 34–46.6)

## 2017-12-08 ENCOUNTER — TELEPHONE (OUTPATIENT)
Dept: FAMILY MEDICINE CLINIC | Facility: CLINIC | Age: 57
End: 2017-12-08

## 2017-12-11 ENCOUNTER — APPOINTMENT (OUTPATIENT)
Dept: RESPIRATORY THERAPY | Facility: HOSPITAL | Age: 57
End: 2017-12-11
Attending: INTERNAL MEDICINE

## 2017-12-13 ENCOUNTER — OFFICE VISIT (OUTPATIENT)
Dept: PSYCHIATRY | Facility: CLINIC | Age: 57
End: 2017-12-13

## 2017-12-13 DIAGNOSIS — F41.8 DEPRESSION WITH ANXIETY: ICD-10-CM

## 2017-12-13 PROCEDURE — 90834 PSYTX W PT 45 MINUTES: CPT | Performed by: SOCIAL WORKER

## 2017-12-13 NOTE — PROGRESS NOTES
PROGRESS NOTE  Data:  Lisa Hill was seen for their regularly scheduled individual psychotherapy session in the [facility] at 1:45 PM to 2:30 PM.  Patient presenting on time, smiling and interacting with staff.  Patient talked about the family dynamics and the interactions that took place on Thanksgiving day between herself and her children.  According to patient her children are respectful to their father, they listen to him.  She questioned as to why they did not listen to what she had to say.    (Scales based on 0 - 10 with 10 being the worst)  Depression: 2 Anxiety: 5   Distress: 2 Sleep: 0   Tasks Completed on Time: 2 Mood: 1   Number of Panic Attacks: 0 Appetite: 0       Mental Status Exam  Appearance:  clean and casually dressed, appropriate  Attitude toward clinician:  cooperative and agreeable   Speech:    Rate:  regular rate and rhythm   Volume:  normal  Motor:  no abnormal movements present  Mood:  Within normal limits  Affect:  mood congruent  Thought Processes:  tangential  Thought Content:  Worrying excessively about health issues  Suicidal Thoughts:  absent  Homicidal Thoughts:  absent  Perceptual Disturbance: no perceptual disturbance  Attention and Concentration:  fair  Insight and Judgement:  poor  Memory:  memory appears to be intact    Patient's Support Network Includes:  Family  Assessment/Plan patient to return in 3 weeks for continued psychotherapy focusing on assisting patient in developing coping strategies associated with anxiety.  Provided patient with mindfulness CDs to begin education as well as specific techniques that will assist her.  Dr. Pena will continue to monitor patient's medication needs.  Clinical Maneuvering/Intervention:  Processing the above with patient.  Validating patient's emotions and feelings as well as her concerns.  Providing an environment which patient can explore and express those emotions and feelings without judgment.  Providing positive  support therapy.  Exploring patient's faulty thought process and believes systems associated with anxiety pertaining to health issues.  Patient admits that she worries excessively about her health.  In fact she was wanting a clarification and what her diagnosis was today.  Patient shared that she had a good Thanksgiving day with her entire family including her estranged .  Diagnoses and all orders for this visit:    Depression with anxiety    Prognosis: Fair at this time with patient having a long history of problems relating to anxiety and depression.  Patient has issues pertaining to cognitive limitations as well.  GAF: 60-patient reports she is doing better by not isolating at home.    Return in about 3 weeks (around 1/3/2018).

## 2017-12-27 ENCOUNTER — HOSPITAL ENCOUNTER (OUTPATIENT)
Dept: RESPIRATORY THERAPY | Facility: HOSPITAL | Age: 57
Discharge: HOME | End: 2017-12-27
Attending: INTERNAL MEDICINE | Admitting: INTERNAL MEDICINE

## 2017-12-27 DIAGNOSIS — J44.9 CHRONIC OBSTRUCTIVE PULMONARY DISEASE, UNSPECIFIED COPD TYPE (HCC): ICD-10-CM

## 2017-12-27 DIAGNOSIS — R91.1 LUNG NODULE: ICD-10-CM

## 2017-12-27 PROCEDURE — 94727 GAS DIL/WSHOT DETER LNG VOL: CPT | Performed by: INTERNAL MEDICINE

## 2017-12-27 PROCEDURE — 94729 DIFFUSING CAPACITY: CPT | Performed by: INTERNAL MEDICINE

## 2017-12-27 PROCEDURE — 94727 GAS DIL/WSHOT DETER LNG VOL: CPT

## 2017-12-27 PROCEDURE — 94729 DIFFUSING CAPACITY: CPT

## 2017-12-27 PROCEDURE — 94060 EVALUATION OF WHEEZING: CPT | Performed by: INTERNAL MEDICINE

## 2017-12-27 PROCEDURE — 94060 EVALUATION OF WHEEZING: CPT

## 2018-01-04 DIAGNOSIS — E03.9 ACQUIRED HYPOTHYROIDISM: ICD-10-CM

## 2018-01-04 RX ORDER — LEVOTHYROXINE SODIUM 175 UG/1
TABLET ORAL
Qty: 30 TABLET | Refills: 5 | Status: SHIPPED | OUTPATIENT
Start: 2018-01-04 | End: 2018-07-03 | Stop reason: SDUPTHER

## 2018-01-12 ENCOUNTER — OFFICE VISIT (OUTPATIENT)
Dept: PSYCHIATRY | Facility: CLINIC | Age: 58
End: 2018-01-12

## 2018-01-12 DIAGNOSIS — F41.8 DEPRESSION WITH ANXIETY: Primary | ICD-10-CM

## 2018-01-12 PROCEDURE — 90834 PSYTX W PT 45 MINUTES: CPT | Performed by: SOCIAL WORKER

## 2018-01-12 NOTE — PROGRESS NOTES
PROGRESS NOTE  Data:  Lisa Hill came in 1/12/2018 for her regularly scheduled therapy session starting at 10:45 AM and ending at 11:30 AM with Terry Brito LCSW .  Pt. Reports symptoms has improved.  Patient dressed in coordinating outfit.  Patient making good eye contact and smiling throughout session.  Patient reports that the holidays were good.  Apparently patient has been babysitting her 7-year-old grandson for 2 weeks due to school being out because of bad weather conditions.  When making the follow-up appointment patient was concerned as that day would be her son's 40th birthday.  Patient stating that she is uncertain as to what her daughter-in-law might have planned for the day.  Previous sessions patient had claimed that she has very little contact with her son as well as her daughter's.     (Scales based on 0 - 10 with 10 being the worst)  Depression: 1 Anxiety: 1   Distress: 1 Sleep: 0   Tasks Completed on Time: 1 Mood: 1   Number of Panic Attacks: 0 Appetite: 0     Mental Status Exam  Hygiene:  good  Dress:  casual  Attitude:  Cooperative  Motor Activity:  Appropriate  Speech:  Normal  Mood:  within normal limits  Affect:  congruent  Thought Processes:  Goal directed and Until she attempts to explain her mood changes that are related to times of the year such as September and March  Thought Content:  tangential  Suicidal Thoughts:  denies  Homicidal Thoughts:  denies  Crisis Safety Plan: yes, to come to the emergency room.  Hallucinations:  denies      Clinical Maneuvering/Intervention:     Allowed patient to freely discuss issues without interruption or judgment. Provided safe, confidential environment to facilitate the development of positive therapeutic relationship and encourage open, honest communication. Assisted patient in identifying risk factors which would indicate the need for higher level of care including thoughts to harm self or others and/or self-harming behavior and encouraged  "patient to contact this office, call 911, or present to the nearest emergency room should any of these events occur. Discussed crisis intervention services and means to access.  Patient adamantly and convincingly denies current suicidal or homicidal ideation or perceptual disturbance.    Prognosis: Fair with Ongoing Treatment     GAF: No impairment    Assessment at times patient's thought processes is intact and she is able to communicate clearly.  However in her attempts to discuss her bouts of depression, anxiety, and \"my head issues.,\"  It becomes unclear as to what is exactly taking place.  According to patient these episodes only last about 3 weeks and she slowly improves.  It has only been 2 months since first meeting patient and she does not present to be confused disoriented depressed and/or anxious.  Strongly encouraged patient to continue utilizing the mindfulness CDs.  At the beginning of the session patient asked if she needed to return though CDs as she had listened to them.  Reminding patient that it needs to be ongoing and she needs to apply this as part of coping strategies and be prepared to deal with the next episode of her depression and anxiety that she expects to return in March    Diagnoses and all orders for this visit:    Depression with anxiety          Patient's Support Network Includes:  extended family    Plan     Patient will adhere to medication regimen as prescribed and report any side effects. Patient will contact this office, call 911 or present to the nearest emergency room should suicidal or homicidal ideations occur. Provide Cognitive Behavioral Therapy and Solution Focused Therapy to improve functioning, maintain stability, and avoid decompensation and the need for higher level of care.          Return in about 1 month (around 2/12/2018) for Next scheduled follow up.          "

## 2018-01-19 ENCOUNTER — OFFICE VISIT (OUTPATIENT)
Dept: FAMILY MEDICINE CLINIC | Facility: CLINIC | Age: 58
End: 2018-01-19

## 2018-01-19 DIAGNOSIS — J30.2 CHRONIC SEASONAL ALLERGIC RHINITIS, UNSPECIFIED TRIGGER: ICD-10-CM

## 2018-01-19 DIAGNOSIS — R73.03 PREDIABETES: ICD-10-CM

## 2018-01-19 DIAGNOSIS — F17.200 SMOKER: ICD-10-CM

## 2018-01-19 DIAGNOSIS — E78.2 MIXED HYPERLIPIDEMIA: Primary | ICD-10-CM

## 2018-01-19 DIAGNOSIS — Z00.00 HEALTHCARE MAINTENANCE: ICD-10-CM

## 2018-01-19 DIAGNOSIS — M15.9 GENERALIZED OSTEOARTHRITIS: ICD-10-CM

## 2018-01-19 DIAGNOSIS — K21.9 GASTROESOPHAGEAL REFLUX DISEASE WITHOUT ESOPHAGITIS: ICD-10-CM

## 2018-01-19 DIAGNOSIS — E03.8 HYPOTHYROIDISM DUE TO HASHIMOTO'S THYROIDITIS: ICD-10-CM

## 2018-01-19 DIAGNOSIS — E06.3 HYPOTHYROIDISM DUE TO HASHIMOTO'S THYROIDITIS: ICD-10-CM

## 2018-01-19 DIAGNOSIS — M85.80 OSTEOPENIA, UNSPECIFIED LOCATION: ICD-10-CM

## 2018-01-19 DIAGNOSIS — D75.89 MACROCYTOSIS: ICD-10-CM

## 2018-01-19 DIAGNOSIS — F41.8 DEPRESSION WITH ANXIETY: ICD-10-CM

## 2018-01-19 DIAGNOSIS — E55.9 VITAMIN D DEFICIENCY: ICD-10-CM

## 2018-01-19 DIAGNOSIS — J44.9 CHRONIC OBSTRUCTIVE PULMONARY DISEASE, UNSPECIFIED COPD TYPE (HCC): ICD-10-CM

## 2018-01-19 PROCEDURE — 99214 OFFICE O/P EST MOD 30 MIN: CPT | Performed by: GENERAL PRACTICE

## 2018-01-20 VITALS
HEIGHT: 62 IN | RESPIRATION RATE: 12 BRPM | HEART RATE: 97 BPM | OXYGEN SATURATION: 97 % | SYSTOLIC BLOOD PRESSURE: 125 MMHG | WEIGHT: 233 LBS | BODY MASS INDEX: 42.88 KG/M2 | DIASTOLIC BLOOD PRESSURE: 70 MMHG | TEMPERATURE: 97.6 F

## 2018-01-20 RX ORDER — VILAZODONE HYDROCHLORIDE 20 MG/1
20 TABLET ORAL DAILY
Qty: 45 TABLET | Refills: 0 | Status: SHIPPED | OUTPATIENT
Start: 2018-01-20 | End: 2018-02-17

## 2018-01-20 NOTE — PROGRESS NOTES
Subjective   Lisa Hill is a 57 y.o. female.     History of Present Illness     Headache  She gives a several week history of headache. This has been nearly continuous over this time. She describes the pain as a bilateral pressure about the lower forehead. While she admits to nasal congestion she does not feel this has been any worse then usual and denies any other upper respiratory tract symptoms. There's no history of any changes in her vision, strength or sensation and she denies any new joint or muscle pain. There's no history of any nausea and she denies any fever or chills. She has not been on cetirizine for awhile as her insurance apparently doesn't cover it anymore. Her son is allergic to loratadine and she is reluctant to try it    COPD  The patient reports that her SOB and cough have remained stable. She states that these symptoms occur at any time during the day or night. She reports that her symptoms become worse with activity, exposure to cold air and environmental allergens. Her symptoms are reduced with rest and with inhaled inhaled medication. The patient states she also has nasal congestion. She is following the treatment plan, including medications as directed.  She is currently on albuterol as needed.  She currently smokes approximately 1 packs per day. Low dose CT of the chest performed on 6/2/17 revaled moderate emphysema as well as a benign appearing 4 mm semisolid nodule in the LLL. Repeat study in 1 year was recommended    Depression  Onset was a number of years ago. Symptoms have consistently been worse in the winter months and she once again noted an exacerbation late in the fall. Current symptoms include: depression, nervousness, anhedonia, difficulty concentrating, fatigue and impaired memory. Patient denies recurrent thoughts of death or suicidal thoughts. Risk factors: history of seasonal affective disorder.  Current medication includes paroxetine 40 mg, bupropion 300 daily, and  lorazepam 0.5 twice a day. She wants to change the paroxetine as she feels it has contributed to her weight gain. She did not feel any different while on rexulti. She has done better overall since last here. She feels psychotherapy has helped a lot    Hypothyroidism  Patient presents for evaluation of thyroid function. Symptoms consist of weight gain, constipation. The symptoms are moderate.  The problem has been unchanged.  Previous thyroid studies include TSH. The hypothyroidism is due to Hashimoto's disease.   Lab Results   Component Value Date    TSH 0.466 (L) 11/29/2017     Dyslipidemia  Compliance with treatment has been fair. The patient exercises occasionally. She is currently being prescribed the following medication for her dyslipidemia - simvastatin. Patient denies side effects associated with her medications.   Lab Results   Component Value Date    CHOL 165 11/29/2017    CHLPL 141 02/05/2016    TRIG 140 11/29/2017    HDL 48 (L) 11/29/2017    LDLCALC 89 11/29/2017    LDL 67 02/05/2016     Labs  Most recent  with an A1c of 6.1. Vitamin D 39. MCV 97.6.  B12 301 and RBC folate 1044.    The following portions of the patient's history were reviewed and updated as appropriate: allergies, current medications, past medical history, past social history and problem list.    Review of Systems   Constitutional: Positive for fatigue. Negative for appetite change, chills, fever and unexpected weight change.   HENT: Positive for congestion and sinus pressure. Negative for ear pain, postnasal drip, rhinorrhea, sneezing, sore throat and voice change.    Eyes: Negative for visual disturbance.   Respiratory: Positive for cough and shortness of breath. Negative for wheezing.    Cardiovascular: Negative for chest pain, palpitations and leg swelling.   Gastrointestinal: Positive for constipation. Negative for abdominal pain, anal bleeding, blood in stool, diarrhea, nausea and vomiting.        Occasional heartburn -  better since last here   Endocrine: Negative for polydipsia.   Genitourinary: Negative for difficulty urinating, dysuria, frequency, hematuria, menstrual problem, pelvic pain, urgency, vaginal bleeding, vaginal discharge and vaginal pain.        Incontinence with coughing, sneezing, or lifting   Musculoskeletal: Negative for arthralgias, back pain, joint swelling, myalgias and neck pain.   Skin: Negative for color change.   Neurological: Positive for headaches. Negative for tremors, weakness and numbness.   Psychiatric/Behavioral: Positive for decreased concentration, dysphoric mood and sleep disturbance. Negative for suicidal ideas. The patient is nervous/anxious.      Objective   Physical Exam   Constitutional: She is oriented to person, place, and time. No distress.   Bright, in fair spirits.  No apparent distress.  No pallor, jaundice, cyanosis or diaphoresis   HENT:   Head: Atraumatic.   Right Ear: Tympanic membrane, external ear and ear canal normal.   Left Ear: Tympanic membrane, external ear and ear canal normal.   Nose: Nose normal.   Mouth/Throat: Oropharynx is clear and moist. Mucous membranes are not pale and not cyanotic.   Eyes: EOM are normal. Pupils are equal, round, and reactive to light. No scleral icterus.   Neck: No JVD present. Carotid bruit is not present. No tracheal deviation present. No thyromegaly present.   Cardiovascular: Normal rate, regular rhythm, S1 normal, S2 normal and intact distal pulses.  Exam reveals no gallop, no S3 and no S4.    No murmur heard.  Pulmonary/Chest: No accessory muscle usage or stridor. No respiratory distress. She has decreased breath sounds. She has wheezes (diffuse-mild).   Hyperinflation of the chest   Abdominal: Soft. Normal aorta and bowel sounds are normal. She exhibits no distension, no abdominal bruit and no mass. There is no hepatosplenomegaly. There is no tenderness. No hernia.   Musculoskeletal: She exhibits no tenderness or deformity.       Vascular  Status -  Her exam exhibits no right foot edema. Her exam exhibits no left foot edema.  Lymphadenopathy:        Head (right side): No submandibular adenopathy present.        Head (left side): No submandibular adenopathy present.     She has no cervical adenopathy.   Neurological: She is alert and oriented to person, place, and time. She has normal reflexes. She displays normal reflexes. No cranial nerve deficit. She exhibits normal muscle tone. Coordination normal.   Skin: Skin is warm and dry. No rash noted. She is not diaphoretic. No cyanosis. No pallor. Nails show no clubbing.   Psychiatric: She has a normal mood and affect. Her speech is normal and behavior is normal. Thought content normal. Cognition and memory are normal. She expresses no suicidal ideation.     Assessment/Plan   Problems Addressed this Visit        Cardiovascular and Mediastinum    Mixed hyperlipidemia   Encouraged to continue to work on her diet and exercise plan.  Continue current medication       Respiratory    COPD (chronic obstructive pulmonary disease)  COPD is stable.  Reminded of the importance of smoking cessation  Encouraged to remain as active as symptoms allow for    Chronic seasonal allergic rhinitis  With headaches  Reviewed options. Patient will buy OTC cetirizine and report if any worse, any new symptoms, or if her headaches haven't resolved within the next week       Digestive    Vitamin D deficiency  Continue maintenance supplementation with monitoring.    Gastroesophageal reflux disease without esophagitis       Endocrine    Hypothyroidism due to Hashimoto's thyroiditis  Clinically euthyroid.  Continue current medication.       Musculoskeletal and Integument    Osteopenia    Generalized osteoarthritis       Other    Prediabetes  Will continue to monitor    Depression with anxiety  With probable SAD  Supportive therapy  Trial of viibryd in place of paroxetine  Follow up with psychotherapy  Encouraged to report if any worse  or if any new symptoms or concerns.    Smoker    Healthcare maintenance  Reviewed the potential benefits of shingrix. Will discuss further at return.  Patient remains uninterested in a screening colonoscopy    Macrocytosis  Unassociated with anemia. Stable. Normal B12 and folate  Will continue to monitor

## 2018-01-24 ENCOUNTER — APPOINTMENT (OUTPATIENT)
Dept: CT IMAGING | Facility: HOSPITAL | Age: 58
End: 2018-01-24

## 2018-01-24 ENCOUNTER — APPOINTMENT (OUTPATIENT)
Dept: GENERAL RADIOLOGY | Facility: HOSPITAL | Age: 58
End: 2018-01-24

## 2018-01-24 ENCOUNTER — HOSPITAL ENCOUNTER (EMERGENCY)
Facility: HOSPITAL | Age: 58
Discharge: HOME | End: 2018-01-24
Attending: FAMILY MEDICINE | Admitting: FAMILY MEDICINE

## 2018-01-24 VITALS
HEART RATE: 88 BPM | BODY MASS INDEX: 42.33 KG/M2 | OXYGEN SATURATION: 98 % | RESPIRATION RATE: 16 BRPM | WEIGHT: 230 LBS | DIASTOLIC BLOOD PRESSURE: 95 MMHG | SYSTOLIC BLOOD PRESSURE: 161 MMHG | HEIGHT: 62 IN | TEMPERATURE: 97.6 F

## 2018-01-24 DIAGNOSIS — J44.1 COPD EXACERBATION (HCC): Primary | ICD-10-CM

## 2018-01-24 LAB
A-A DO2: 13.9 MMHG (ref 0–300)
ALBUMIN SERPL-MCNC: 4.4 G/DL (ref 3.5–5)
ALBUMIN/GLOB SERPL: 1.3 G/DL (ref 1.5–2.5)
ALP SERPL-CCNC: 57 U/L (ref 35–104)
ALT SERPL W P-5'-P-CCNC: 45 U/L (ref 10–36)
ANION GAP SERPL CALCULATED.3IONS-SCNC: 9.1 MMOL/L (ref 3.6–11.2)
ARTERIAL PATENCY WRIST A: NORMAL
AST SERPL-CCNC: 31 U/L (ref 10–30)
ATMOSPHERIC PRESS: 729 MMHG
BASE EXCESS BLDA CALC-SCNC: -0.4 MMOL/L
BASOPHILS # BLD AUTO: 0.1 10*3/MM3 (ref 0–0.3)
BASOPHILS NFR BLD AUTO: 0.8 % (ref 0–2)
BDY SITE: NORMAL
BILIRUB SERPL-MCNC: 0.2 MG/DL (ref 0.2–1.8)
BODY TEMPERATURE: 98.6 C
BUN BLD-MCNC: 13 MG/DL (ref 7–21)
BUN/CREAT SERPL: 13.5 (ref 7–25)
CALCIUM SPEC-SCNC: 9.1 MG/DL (ref 7.7–10)
CHLORIDE SERPL-SCNC: 107 MMOL/L (ref 99–112)
CO2 SERPL-SCNC: 19.9 MMOL/L (ref 24.3–31.9)
COHGB MFR BLD: 4.9 % (ref 0–5)
CREAT BLD-MCNC: 0.96 MG/DL (ref 0.43–1.29)
D DIMER PPP FEU-MCNC: 1.12 MCGFEU/ML (ref 0–0.5)
DEPRECATED RDW RBC AUTO: 49 FL (ref 37–54)
EOSINOPHIL # BLD AUTO: 0.2 10*3/MM3 (ref 0–0.7)
EOSINOPHIL NFR BLD AUTO: 1.7 % (ref 0–5)
ERYTHROCYTE [DISTWIDTH] IN BLOOD BY AUTOMATED COUNT: 13.9 % (ref 11.5–14.5)
GFR SERPL CREATININE-BSD FRML MDRD: 59 ML/MIN/1.73
GLOBULIN UR ELPH-MCNC: 3.3 GM/DL
GLUCOSE BLD-MCNC: 99 MG/DL (ref 70–110)
HCO3 BLDA-SCNC: 23.5 MMOL/L (ref 22–26)
HCT VFR BLD AUTO: 45.1 % (ref 37–47)
HCT VFR BLD CALC: 42 % (ref 37–47)
HGB BLD-MCNC: 15.1 G/DL (ref 12–16)
HGB BLDA-MCNC: 14.3 G/DL (ref 12–16)
HOLD SPECIMEN: NORMAL
HOLD SPECIMEN: NORMAL
HOROWITZ INDEX BLD+IHG-RTO: 21 %
IMM GRANULOCYTES # BLD: 0.06 10*3/MM3 (ref 0–0.03)
IMM GRANULOCYTES NFR BLD: 0.5 % (ref 0–0.5)
INR PPP: 0.93 (ref 0.9–1.1)
LARGE PLATELETS: NORMAL
LYMPHOCYTES # BLD AUTO: 3.39 10*3/MM3 (ref 1–3)
LYMPHOCYTES NFR BLD AUTO: 28.7 % (ref 21–51)
MCH RBC QN AUTO: 32.3 PG (ref 27–33)
MCHC RBC AUTO-ENTMCNC: 33.5 G/DL (ref 33–37)
MCV RBC AUTO: 96.4 FL (ref 80–94)
METHGB BLD QL: 0 % (ref 0–3)
MODALITY: NORMAL
MONOCYTES # BLD AUTO: 1.05 10*3/MM3 (ref 0.1–0.9)
MONOCYTES NFR BLD AUTO: 8.9 % (ref 0–10)
NEUTROPHILS # BLD AUTO: 7.02 10*3/MM3 (ref 1.4–6.5)
NEUTROPHILS NFR BLD AUTO: 59.4 % (ref 30–70)
NRBC BLD MANUAL-RTO: 0 /100 WBC (ref 0–0)
OSMOLALITY SERPL CALC.SUM OF ELEC: 272.1 MOSM/KG (ref 273–305)
OXYHGB MFR BLDV: 92.2 % (ref 85–100)
PCO2 BLDA: 36.1 MM HG (ref 35–45)
PH BLDA: 7.43 PH UNITS (ref 7.35–7.45)
PLATELET # BLD AUTO: 377 10*3/MM3 (ref 130–400)
PMV BLD AUTO: 11.1 FL (ref 6–10)
PO2 BLDA: 86.1 MM HG (ref 80–100)
POTASSIUM BLD-SCNC: 4.6 MMOL/L (ref 3.5–5.3)
PROT SERPL-MCNC: 7.7 G/DL (ref 6–8)
PROTHROMBIN TIME: 12.5 SECONDS (ref 11–15.4)
RBC # BLD AUTO: 4.68 10*6/MM3 (ref 4.2–5.4)
RBC MORPH BLD: NORMAL
SAO2 % BLDCOA: 97 % (ref 90–100)
SODIUM BLD-SCNC: 136 MMOL/L (ref 135–153)
TROPONIN I SERPL-MCNC: <0.006 NG/ML
TROPONIN I SERPL-MCNC: <0.006 NG/ML
WBC NRBC COR # BLD: 11.82 10*3/MM3 (ref 4.5–12.5)
WHOLE BLOOD HOLD SPECIMEN: NORMAL
WHOLE BLOOD HOLD SPECIMEN: NORMAL

## 2018-01-24 PROCEDURE — 93005 ELECTROCARDIOGRAM TRACING: CPT | Performed by: PHYSICIAN ASSISTANT

## 2018-01-24 PROCEDURE — 36600 WITHDRAWAL OF ARTERIAL BLOOD: CPT | Performed by: PHYSICIAN ASSISTANT

## 2018-01-24 PROCEDURE — 85610 PROTHROMBIN TIME: CPT | Performed by: PHYSICIAN ASSISTANT

## 2018-01-24 PROCEDURE — 99284 EMERGENCY DEPT VISIT MOD MDM: CPT

## 2018-01-24 PROCEDURE — 82375 ASSAY CARBOXYHB QUANT: CPT | Performed by: PHYSICIAN ASSISTANT

## 2018-01-24 PROCEDURE — 85025 COMPLETE CBC W/AUTO DIFF WBC: CPT | Performed by: PHYSICIAN ASSISTANT

## 2018-01-24 PROCEDURE — 85379 FIBRIN DEGRADATION QUANT: CPT | Performed by: PHYSICIAN ASSISTANT

## 2018-01-24 PROCEDURE — 84484 ASSAY OF TROPONIN QUANT: CPT | Performed by: PHYSICIAN ASSISTANT

## 2018-01-24 PROCEDURE — 71275 CT ANGIOGRAPHY CHEST: CPT | Performed by: RADIOLOGY

## 2018-01-24 PROCEDURE — 80053 COMPREHEN METABOLIC PANEL: CPT | Performed by: PHYSICIAN ASSISTANT

## 2018-01-24 PROCEDURE — 93010 ELECTROCARDIOGRAM REPORT: CPT | Performed by: INTERNAL MEDICINE

## 2018-01-24 PROCEDURE — 83050 HGB METHEMOGLOBIN QUAN: CPT | Performed by: PHYSICIAN ASSISTANT

## 2018-01-24 PROCEDURE — 94799 UNLISTED PULMONARY SVC/PX: CPT

## 2018-01-24 PROCEDURE — 82805 BLOOD GASES W/O2 SATURATION: CPT | Performed by: PHYSICIAN ASSISTANT

## 2018-01-24 PROCEDURE — 71045 X-RAY EXAM CHEST 1 VIEW: CPT

## 2018-01-24 PROCEDURE — 0 IOPAMIDOL PER 1 ML: Performed by: PHYSICIAN ASSISTANT

## 2018-01-24 PROCEDURE — 94640 AIRWAY INHALATION TREATMENT: CPT

## 2018-01-24 PROCEDURE — 71275 CT ANGIOGRAPHY CHEST: CPT

## 2018-01-24 PROCEDURE — 85007 BL SMEAR W/DIFF WBC COUNT: CPT | Performed by: PHYSICIAN ASSISTANT

## 2018-01-24 PROCEDURE — 71045 X-RAY EXAM CHEST 1 VIEW: CPT | Performed by: RADIOLOGY

## 2018-01-24 PROCEDURE — 36415 COLL VENOUS BLD VENIPUNCTURE: CPT

## 2018-01-24 RX ORDER — PREDNISONE 10 MG/1
10 TABLET ORAL DAILY
Qty: 10 TABLET | Refills: 0 | Status: SHIPPED | OUTPATIENT
Start: 2018-01-24 | End: 2018-01-29

## 2018-01-24 RX ORDER — DOXYCYCLINE HYCLATE 100 MG/1
100 TABLET, DELAYED RELEASE ORAL 2 TIMES DAILY
Qty: 20 TABLET | Refills: 0 | Status: SHIPPED | OUTPATIENT
Start: 2018-01-24 | End: 2018-02-03

## 2018-01-24 RX ORDER — ASPIRIN 325 MG
325 TABLET ORAL ONCE
Status: COMPLETED | OUTPATIENT
Start: 2018-01-24 | End: 2018-01-24

## 2018-01-24 RX ORDER — SODIUM CHLORIDE 0.9 % (FLUSH) 0.9 %
10 SYRINGE (ML) INJECTION AS NEEDED
Status: DISCONTINUED | OUTPATIENT
Start: 2018-01-24 | End: 2018-01-24 | Stop reason: HOSPADM

## 2018-01-24 RX ORDER — LISINOPRIL 10 MG/1
10 TABLET ORAL DAILY
COMMUNITY
End: 2018-02-16

## 2018-01-24 RX ORDER — IPRATROPIUM BROMIDE AND ALBUTEROL SULFATE 2.5; .5 MG/3ML; MG/3ML
3 SOLUTION RESPIRATORY (INHALATION) ONCE
Status: COMPLETED | OUTPATIENT
Start: 2018-01-24 | End: 2018-01-24

## 2018-01-24 RX ADMIN — ASPIRIN 325 MG: 325 TABLET ORAL at 16:52

## 2018-01-24 RX ADMIN — IOPAMIDOL 80 ML: 755 INJECTION, SOLUTION INTRAVENOUS at 20:34

## 2018-01-24 RX ADMIN — IPRATROPIUM BROMIDE AND ALBUTEROL SULFATE 3 ML: .5; 3 SOLUTION RESPIRATORY (INHALATION) at 18:22

## 2018-01-24 NOTE — ED PROVIDER NOTES
Subjective   Patient is a 57 y.o. female presenting with chest pain.   Chest Pain   Pain location:  Substernal area  Pain quality: pressure    Pain radiates to:  Does not radiate  Pain severity:  Moderate  Onset quality:  Gradual  Duration:  3 days  Timing:  Intermittent  Chronicity:  New  Context: at rest    Context: not breathing    Associated symptoms: shortness of breath    Associated symptoms: no abdominal pain, no anxiety and no fever        Review of Systems   Constitutional: Negative.  Negative for fever.   HENT: Negative.    Respiratory: Positive for shortness of breath.    Cardiovascular: Positive for chest pain.   Gastrointestinal: Negative.  Negative for abdominal pain.   Endocrine: Negative.    Genitourinary: Negative.  Negative for dysuria.   Skin: Negative.    Neurological: Negative.    Psychiatric/Behavioral: Negative.    All other systems reviewed and are negative.      Past Medical History:   Diagnosis Date   • Anxiety    • COPD (chronic obstructive pulmonary disease)    • Depression    • GERD (gastroesophageal reflux disease)    • Hyperlipidemia    • Hypertension        Allergies   Allergen Reactions   • Sulfa Antibiotics        Past Surgical History:   Procedure Laterality Date   • CHOLECYSTECTOMY     • HYSTERECTOMY     • TONSILLECTOMY         Family History   Problem Relation Age of Onset   • Kidney disease Mother    • Diabetes Mother    • Hypertension Father    • Breast cancer Maternal Aunt        Social History     Social History   • Marital status:      Spouse name: kateryna   • Number of children: 3   • Years of education: 10     Occupational History   • disabled      Social History Main Topics   • Smoking status: Current Every Day Smoker     Packs/day: 0.25     Types: Cigarettes   • Smokeless tobacco: Never Used   • Alcohol use No   • Drug use: None   • Sexual activity: Defer     Other Topics Concern   • None     Social History Narrative   • None           Objective   Physical Exam    Constitutional: She is oriented to person, place, and time. She appears well-developed and well-nourished. No distress.   HENT:   Head: Normocephalic and atraumatic.   Right Ear: External ear normal.   Left Ear: External ear normal.   Nose: Nose normal.   Eyes: Conjunctivae and EOM are normal. Pupils are equal, round, and reactive to light.   Neck: Normal range of motion. Neck supple. No JVD present. No tracheal deviation present.   Cardiovascular: Normal rate, regular rhythm and normal heart sounds.    No murmur heard.  Pulmonary/Chest: Effort normal. No respiratory distress. She has wheezes.   Abdominal: Soft. Bowel sounds are normal. There is no tenderness.   Musculoskeletal: Normal range of motion. She exhibits no edema or deformity.   Neurological: She is alert and oriented to person, place, and time. No cranial nerve deficit.   Skin: Skin is warm and dry. No rash noted. She is not diaphoretic. No erythema. No pallor.   Psychiatric: She has a normal mood and affect. Her behavior is normal. Thought content normal.   Nursing note and vitals reviewed.      Procedures         ED Course  ED Course   Comment By Time   Endorsed to ANGEL Johnson 01/24 1957   Assumed care from LILI Dodson.  Patient is resting comfortably in bed. We are awaiting imaging and repeat cardiac enzymes. ANGEL Verma 01/24 2028   Patient continues to be resting comfortably in no distress. Patient states she is feeling better and is ready to go home. Vitals stable- 100% on RA with ambulation and at rest. Patient instructed to f/u with PCP. Discussed sx that owuld warrant return to the ED. ANGEL Verma 01/24 2120                  [facility]    Final diagnoses:   COPD exacerbation            ANGEL Verma  01/24/18 2122

## 2018-01-24 NOTE — ED NOTES
Pt presents to the ER with complaints of midsternal chest pain early this morning. Pt states that she has had intermittent chest pain for several weeks, but the pain this morning was different. Pt states that the pain has since passed. Pt states that she was recently taken off Paxil on Friday and started a on Lisinopril 10mg yesterday after being seen at [facility] for a headache. Pt states that her blood pressure was in the 190's systolically at the ER. Pt states that she has had 3 doses of Lisinopril. Pt denies chest pain at this time.     Loretta Lebron, AGUSTÍN  01/24/18 [id number]

## 2018-01-24 NOTE — ED NOTES
I called [facility] for patients most recent hospital visit and most recent ekgs.      Kenya Lainez  01/24/18 [id number]

## 2018-01-24 NOTE — ED NOTES
Pt verbalized understanding of CT scan. Pt signed consent at this time. Family at bedside.     Loretta Lebron RN  01/24/18 [id number]

## 2018-01-25 NOTE — DISCHARGE INSTRUCTIONS
Chronic Obstructive Pulmonary Disease Exacerbation  Chronic obstructive pulmonary disease (COPD) is a common lung problem. In COPD, the flow of air from the lungs is limited. COPD exacerbations are times that breathing gets worse and you need extra treatment. Without treatment they can be life threatening. If they happen often, your lungs can become more damaged. If your COPD gets worse, your doctor may treat you with:  · Medicines.  · Oxygen.  · Different ways to clear your airway, such as using a mask.  Follow these instructions at home:  · Do not smoke.  · Avoid tobacco smoke and other things that bother your lungs.  · If given, take your antibiotic medicine as told. Finish the medicine even if you start to feel better.  · Only take medicines as told by your doctor.  · Drink enough fluids to keep your pee (urine) clear or pale yellow (unless your doctor has told you not to).  · Use a cool mist machine (vaporizer).  · If you use oxygen or a machine that turns liquid medicine into a mist (nebulizer), continue to use them as told.  · Keep up with shots (vaccinations) as told by your doctor.  · Exercise regularly.  · Eat healthy foods.  · Keep all doctor visits as told.  Get help right away if:  · You are very short of breath and it gets worse.  · You have trouble talking.  · You have bad chest pain.  · You have blood in your spit (sputum).  · You have a fever.  · You keep throwing up (vomiting).  · You feel weak, or you pass out (faint).  · You feel confused.  · You keep getting worse.  This information is not intended to replace advice given to you by your health care provider. Make sure you discuss any questions you have with your health care provider.  Document Released: 12/06/2012 Document Revised: 05/25/2017 Document Reviewed: 08/22/2014  [employer]vier Interactive Patient Education © 2017 [employer]

## 2018-02-16 ENCOUNTER — OFFICE VISIT (OUTPATIENT)
Dept: FAMILY MEDICINE CLINIC | Facility: CLINIC | Age: 58
End: 2018-02-16

## 2018-02-16 DIAGNOSIS — F41.8 DEPRESSION WITH ANXIETY: ICD-10-CM

## 2018-02-16 DIAGNOSIS — E03.8 HYPOTHYROIDISM DUE TO HASHIMOTO'S THYROIDITIS: ICD-10-CM

## 2018-02-16 DIAGNOSIS — R73.03 PREDIABETES: ICD-10-CM

## 2018-02-16 DIAGNOSIS — K59.09 CHRONIC CONSTIPATION: ICD-10-CM

## 2018-02-16 DIAGNOSIS — J30.2 CHRONIC SEASONAL ALLERGIC RHINITIS, UNSPECIFIED TRIGGER: ICD-10-CM

## 2018-02-16 DIAGNOSIS — M15.9 GENERALIZED OSTEOARTHRITIS: ICD-10-CM

## 2018-02-16 DIAGNOSIS — E78.2 MIXED HYPERLIPIDEMIA: Primary | ICD-10-CM

## 2018-02-16 DIAGNOSIS — Z00.00 HEALTHCARE MAINTENANCE: ICD-10-CM

## 2018-02-16 DIAGNOSIS — M85.80 OSTEOPENIA, UNSPECIFIED LOCATION: ICD-10-CM

## 2018-02-16 DIAGNOSIS — F17.200 SMOKER: ICD-10-CM

## 2018-02-16 DIAGNOSIS — E06.3 HYPOTHYROIDISM DUE TO HASHIMOTO'S THYROIDITIS: ICD-10-CM

## 2018-02-16 DIAGNOSIS — E55.9 VITAMIN D DEFICIENCY: ICD-10-CM

## 2018-02-16 DIAGNOSIS — J44.9 CHRONIC OBSTRUCTIVE PULMONARY DISEASE, UNSPECIFIED COPD TYPE (HCC): ICD-10-CM

## 2018-02-16 DIAGNOSIS — K21.9 GASTROESOPHAGEAL REFLUX DISEASE WITHOUT ESOPHAGITIS: ICD-10-CM

## 2018-02-16 DIAGNOSIS — F41.9 ANXIETY: ICD-10-CM

## 2018-02-16 DIAGNOSIS — N39.3 STRESS BLADDER INCONTINENCE, FEMALE: ICD-10-CM

## 2018-02-16 PROCEDURE — 99214 OFFICE O/P EST MOD 30 MIN: CPT | Performed by: GENERAL PRACTICE

## 2018-02-16 RX ORDER — LORAZEPAM 1 MG/1
0.5 TABLET ORAL 2 TIMES DAILY
Qty: 90 TABLET | Refills: 0 | Status: SHIPPED | OUTPATIENT
Start: 2018-02-16 | End: 2018-03-14 | Stop reason: SDUPTHER

## 2018-02-16 NOTE — PROGRESS NOTES
Subjective   Lisa Hill is a 57 y.o. female.     History of Present Illness     COPD  Seen at [facility] [place] [facility] on 1/24/18 with an exacerbation of her symptoms. CT of the chest was reported as showing COPD as well as a groundglass density within the lungs bilaterally that might represent mild pulmonary edema. Treated with a course of antibiotics and oral corticosteroids with a return in her cough, shortness of breath and wheezing to baseline. She denies any chest pain or hemoptysis. Unfortunately she continues to smoke    Depression  Onset was a number of years ago. Symptoms have been relatively stable since last here. Current symptoms include: depression, nervousness, anhedonia, difficulty concentrating, fatigue and impaired memory. Patient denies recurrent thoughts of death or suicidal thoughts. Risk factors: history of seasonal affective disorder.  Current medication includes bupropion 300 daily, and lorazepam 0.5 twice a day. She did not start on viibryd because of concerns regarding potential side effects. She feels psychotherapy has helped and has an appointment for follow up within the next month    Hypothyroidism  Patient presents for evaluation of thyroid function. Symptoms consist of weight gain, constipation. The symptoms are moderate.  The problem has been unchanged.  Previous thyroid studies include TSH. The hypothyroidism is due to Hashimoto's disease.     Dyslipidemia  Compliance with treatment has been fair. The patient exercises occasionally. She is currently being prescribed the following medication for her dyslipidemia - simvastatin. Patient denies side effects associated with her medications.     Headache  She denies any further headaches since last here    The following portions of the patient's history were reviewed and updated as appropriate: allergies, current medications, past medical history, past social history and problem list.    Review of Systems   Constitutional: Positive for fatigue.  Negative for appetite change, chills, fever and unexpected weight change.   HENT: Negative for congestion, ear pain, postnasal drip, rhinorrhea, sneezing, sore throat and voice change.    Eyes: Negative for visual disturbance.   Respiratory: Positive for cough, shortness of breath and wheezing.    Cardiovascular: Negative for chest pain, palpitations and leg swelling.   Gastrointestinal: Positive for constipation. Negative for abdominal pain, anal bleeding, blood in stool, diarrhea, nausea and vomiting.   Endocrine: Negative for polydipsia.   Genitourinary: Negative for difficulty urinating, dysuria, frequency, hematuria, menstrual problem, pelvic pain, urgency, vaginal bleeding, vaginal discharge and vaginal pain.        Incontinence with coughing, sneezing, or lifting   Musculoskeletal: Negative for arthralgias, back pain, joint swelling, myalgias and neck pain.   Skin: Negative for color change.   Neurological: Negative for tremors, weakness, numbness and headaches.   Psychiatric/Behavioral: Positive for decreased concentration, dysphoric mood and sleep disturbance (chronic-intermittent). Negative for suicidal ideas.     Objective   Physical Exam   Constitutional: She is oriented to person, place, and time. No distress.   Bright, in fair spirits.  No apparent distress.  No pallor, jaundice, cyanosis or diaphoresis   HENT:   Head: Atraumatic.   Right Ear: Tympanic membrane, external ear and ear canal normal.   Left Ear: Tympanic membrane, external ear and ear canal normal.   Nose: Nose normal.   Mouth/Throat: Oropharynx is clear and moist. Mucous membranes are not pale and not cyanotic.   Eyes: EOM are normal. Pupils are equal, round, and reactive to light. No scleral icterus.   Neck: No JVD present. Carotid bruit is not present. No tracheal deviation present. No thyromegaly present.   Cardiovascular: Normal rate, regular rhythm, S1 normal, S2 normal and intact distal pulses.  Exam reveals no gallop, no S3 and no  S4.    No murmur heard.  Pulmonary/Chest: No accessory muscle usage or stridor. No respiratory distress. She has decreased breath sounds. She has wheezes (diffuse-mild).   Hyperinflation of the chest   Abdominal: Soft. Normal aorta and bowel sounds are normal. She exhibits no distension, no abdominal bruit and no mass. There is no hepatosplenomegaly. There is no tenderness. No hernia.   Musculoskeletal: She exhibits no tenderness or deformity.       Vascular Status -  Her exam exhibits no right foot edema. Her exam exhibits no left foot edema.  Lymphadenopathy:        Head (right side): No submandibular adenopathy present.        Head (left side): No submandibular adenopathy present.     She has no cervical adenopathy.   Neurological: She is alert and oriented to person, place, and time. She has normal reflexes. She displays normal reflexes. No cranial nerve deficit. She exhibits normal muscle tone. Coordination normal.   Skin: Skin is warm and dry. No rash noted. She is not diaphoretic. No cyanosis. No pallor. Nails show no clubbing.   Psychiatric: She has a normal mood and affect. Her speech is normal and behavior is normal. Thought content normal. Cognition and memory are normal. She expresses no suicidal ideation.     Assessment/Plan   Problems Addressed this Visit        Cardiovascular and Mediastinum    Mixed hyperlipidemia  Encouraged to continue to work on her diet and exercise plan.  Continue current medication       Respiratory    COPD (chronic obstructive pulmonary disease)  COPD is worsening.  Reminded of the importance of smoking cessation  Encouraged to remain as active as symptoms allow for    Chronic seasonal allergic rhinitis       Digestive    Vitamin D deficiency  Continue maintenance supplementation with monitoring.    Gastroesophageal reflux disease without esophagitis    Chronic constipation       Endocrine    Hypothyroidism due to Hashimoto's thyroiditis  Clinically euthyroid.  Continue  current medication.       Musculoskeletal and Integument    Osteopenia    Generalized osteoarthritis       Genitourinary    Stress bladder incontinence, female       Other    Prediabetes  Will continue to monitor    Depression with anxiety  Supportive therapy  Will resume risperidone as this has helped in the past with no apparent side effects  Follow up with psychotherapy    Relevant Medications    LORazepam (ATIVAN) 1 MG tablet    risperiDONE (risperDAL) 2 MG tablet    Smoker    Healthcare maintenance  Reviewed the potential benefits of shingrix. Prescription written.  Patient remains uninterested in a screening colonoscopy  Will plan on doing a DEXA scan at the time of her mammogram this year    Relevant Medications    Zoster Vac Recomb Adjuvanted (SHINGRIX) 50 MCG reconstituted suspension

## 2018-02-17 VITALS
SYSTOLIC BLOOD PRESSURE: 120 MMHG | DIASTOLIC BLOOD PRESSURE: 70 MMHG | HEIGHT: 62 IN | RESPIRATION RATE: 12 BRPM | BODY MASS INDEX: 43.06 KG/M2 | TEMPERATURE: 97.4 F | WEIGHT: 234 LBS | HEART RATE: 100 BPM | OXYGEN SATURATION: 97 %

## 2018-02-17 RX ORDER — RISPERIDONE 2 MG/1
2 TABLET ORAL NIGHTLY
Qty: 30 TABLET | Refills: 5 | Status: SHIPPED | OUTPATIENT
Start: 2018-02-17 | End: 2018-05-16

## 2018-02-20 ENCOUNTER — OFFICE VISIT (OUTPATIENT)
Dept: PSYCHIATRY | Facility: CLINIC | Age: 58
End: 2018-02-20

## 2018-02-20 DIAGNOSIS — M15.9 GENERALIZED OSTEOARTHRITIS: ICD-10-CM

## 2018-02-20 DIAGNOSIS — F45.0 SOMATIZATION DISORDER: ICD-10-CM

## 2018-02-20 PROCEDURE — 90834 PSYTX W PT 45 MINUTES: CPT | Performed by: SOCIAL WORKER

## 2018-02-20 NOTE — PROGRESS NOTES
PROGRESS NOTE  Data:  Lisa Hill came in 2/21/2018 for her regularly scheduled therapy session starting at 1:45 PM and ending at 2:30 PM with Terry Brito LCSW.  Patient finds it difficult to describe symptoms associated with seasonal affective disorder.  At one time patient was diagnosed as bipolar she was a patient at the local [facility].  Patient is an SSI recipient based upon her learning difficulties as well as back injuries, since 1992.  Patient has trouble with reading and spelling, she reports that she has always had a good memory for numbers.  According to patient the fact that she cannot read and write are issues that she is embarrassed and humiliated about.  She tries to keep others from discovering her limitations.  By fixating on health issues, patient is able to distract others from discovering that she cannot read and write.  Medicalization disorder cannot be ruled out at this time.  Patient has been on the same medications for the past 9 years.  According to patient she stopped taking those medicines for about a 3 month time frame.  Patient insists that there was no difference in regard to when she was taking the medicine and when she was not taking the medicine.        (Scales based on 0 - 10 with 10 being the worst)  Depression: 2 Anxiety: 2   Distress: 3 Sleep: 2   Tasks Completed on Time: 0 Mood: 0   Number of Panic Attacks: 0 Appetite: 0     Mental Status Exam  Hygiene:  good  Dress:  casual  Attitude:  Cooperative  Motor Activity:  Appropriate  Speech:  Normal  Mood:  within normal limits  Affect: Congruent  Thought Processes:  Tangential  Thought Content:  tangential  Suicidal Thoughts:  denies  Homicidal Thoughts:  denies  Crisis Safety Plan: yes, to come to the emergency room.  Hallucinations:  denies      Clinical Maneuvering/Intervention:   Processing the above with patient, rephrasing questions and attempting to assist patient in expressing her emotions and feelings.   "Patient acknowledges that she is not \"depressed\" nor is she \"happy\".  Allowed patient to freely discuss issues without interruption or judgment. Provided safe, confidential environment to facilitate the development of positive therapeutic relationship and encourage open, honest communication. Assisted patient in identifying risk factors which would indicate the need for higher level of care including thoughts to harm self or others and/or self-harming behavior and encouraged patient to contact this office, call 911, or present to the nearest emergency room should any of these events occur. Discussed crisis intervention services and means to access.  Patient adamantly and convincingly denies current suicidal or homicidal ideation or perceptual disturbance.    Prognosis: Fair with Ongoing Treatment     GAF: Mild impairment     Assessment   Patient believes that she has symptoms associated with seasonal affective disorder, however she finds it difficult to express verbally.  She describes her symptoms as if it is a biological or physical reaction instead of mental health disorder.  Patient has been to the emergency room twice in the past month another factor that would best fit into the category of hypochondriac and/or somatizations disorder instead of seasonal affective disorder.       Patient's Support Network Includes:  extended family  Visit Diagnosis: Somatization Disorder  Plan     Believe patient would benefit from an assessment/evaluation relating to medication management, will arrange for patient to see NATALIE Hernandez here in the [facility].  Patient will adhere to medication regimen as prescribed and report any side effects. Patient will contact this office, call 911 or present to the nearest emergency room should suicidal or homicidal ideations occur. Provide Cognitive Behavioral Therapy and Solution Focused Therapy to improve functioning, maintain stability, and avoid decompensation and the need " for higher level of care.          Return in about 1 month (around 3/20/2018), or needs appointment with ERICK Hernandez, for Next scheduled follow up.

## 2018-02-23 ENCOUNTER — OFFICE VISIT (OUTPATIENT)
Dept: FAMILY MEDICINE CLINIC | Facility: CLINIC | Age: 58
End: 2018-02-23

## 2018-02-23 DIAGNOSIS — M85.811 OSTEOPENIA OF RIGHT SHOULDER: ICD-10-CM

## 2018-02-23 DIAGNOSIS — J44.9 CHRONIC OBSTRUCTIVE PULMONARY DISEASE, UNSPECIFIED COPD TYPE (HCC): ICD-10-CM

## 2018-02-23 DIAGNOSIS — F17.200 SMOKER: ICD-10-CM

## 2018-02-23 DIAGNOSIS — R73.03 PREDIABETES: ICD-10-CM

## 2018-02-23 DIAGNOSIS — F41.8 DEPRESSION WITH ANXIETY: ICD-10-CM

## 2018-02-23 DIAGNOSIS — M15.9 GENERALIZED OSTEOARTHRITIS: ICD-10-CM

## 2018-02-23 DIAGNOSIS — E03.8 HYPOTHYROIDISM DUE TO HASHIMOTO'S THYROIDITIS: ICD-10-CM

## 2018-02-23 DIAGNOSIS — E78.2 MIXED HYPERLIPIDEMIA: Primary | ICD-10-CM

## 2018-02-23 DIAGNOSIS — K21.9 GASTROESOPHAGEAL REFLUX DISEASE WITHOUT ESOPHAGITIS: ICD-10-CM

## 2018-02-23 DIAGNOSIS — E06.3 HYPOTHYROIDISM DUE TO HASHIMOTO'S THYROIDITIS: ICD-10-CM

## 2018-02-23 DIAGNOSIS — G47.33 OBSTRUCTIVE SLEEP APNEA SYNDROME: ICD-10-CM

## 2018-02-23 DIAGNOSIS — J30.2 CHRONIC SEASONAL ALLERGIC RHINITIS, UNSPECIFIED TRIGGER: ICD-10-CM

## 2018-02-23 DIAGNOSIS — Z00.00 HEALTHCARE MAINTENANCE: ICD-10-CM

## 2018-02-23 PROCEDURE — 99407 BEHAV CHNG SMOKING > 10 MIN: CPT | Performed by: GENERAL PRACTICE

## 2018-02-23 PROCEDURE — 99214 OFFICE O/P EST MOD 30 MIN: CPT | Performed by: GENERAL PRACTICE

## 2018-02-23 RX ORDER — VARENICLINE TARTRATE 1 MG/1
1 TABLET, FILM COATED ORAL 2 TIMES DAILY
Qty: 60 TABLET | Refills: 5 | Status: SHIPPED | OUTPATIENT
Start: 2018-02-23 | End: 2018-05-19

## 2018-02-23 RX ORDER — DOXYCYCLINE HYCLATE 100 MG
TABLET ORAL
COMMUNITY
Start: 2018-01-25 | End: 2018-02-23

## 2018-02-23 RX ORDER — MONTELUKAST SODIUM 10 MG/1
TABLET ORAL
COMMUNITY
Start: 2018-02-01 | End: 2018-07-30

## 2018-02-23 RX ORDER — FUROSEMIDE 40 MG/1
40 TABLET ORAL EVERY MORNING
Qty: 5 TABLET | Refills: 0 | Status: SHIPPED | OUTPATIENT
Start: 2018-02-23 | End: 2018-07-30

## 2018-02-23 NOTE — PROGRESS NOTES
Subjective   Lisa Hill is a 57 y.o. female.     History of Present Illness     Swelling  She has noted more swelling of her hands and feet since last here. There has been no change on her shortness of breath and there's no history of any orthopnea or PND. She denies any diarrhea. She admits to a diet high in salt.  Lab Results   Component Value Date    CREATININE 0.96 01/24/2018     COPD  The patient reports that her SOB and cough have remained stable. She states that these symptoms occur at any time during the day or night. She reports that her symptoms become worse with activity, exposure to cold air and environmental allergens. Her symptoms are reduced with rest and with inhaled inhaled medication. The patient states she also has nasal congestion. She is following the treatment plan, including medications as directed.  She is currently on albuterol as needed.  She currently smokes approximately 1 packs per day. Low dose CT of the chest performed on 6/2/17 revaled moderate emphysema as well as a benign appearing 4 mm semisolid nodule in the LLL. Repeat study in 1 year was recommended    Depression  Onset was a number of years ago. Symptoms have been relatively stable since last here. Current symptoms include: depression, nervousness, anhedonia, difficulty concentrating, fatigue and impaired memory. Patient denies recurrent thoughts of death or suicidal thoughts. Risk factors: history of seasonal affective disorder.  Current medication includes bupropion 300 daily, risperidone 2 qpm. and lorazepam 0.5 twice a day. She did not start on viibryd because of concerns regarding potential side effects. She feels psychotherapy has helped    Hypothyroidism  Patient presents for evaluation of thyroid function. Symptoms consist of weight gain, constipation. The symptoms are moderate.  The problem has been unchanged.  Previous thyroid studies include TSH. The hypothyroidism is due to Hashimoto's disease.      Dyslipidemia  Compliance with treatment has been fair. The patient exercises occasionally. She is currently being prescribed the following medication for her dyslipidemia - simvastatin. Patient denies side effects associated with her medications.     The following portions of the patient's history were reviewed and updated as appropriate: allergies, current medications, past medical history, past social history and problem list.    Review of Systems   Constitutional: Positive for fatigue. Negative for appetite change, chills, fever and unexpected weight change.   HENT: Negative for congestion, ear pain, postnasal drip, rhinorrhea, sneezing, sore throat and voice change.    Eyes: Negative for visual disturbance.   Respiratory: Positive for cough, shortness of breath and wheezing.    Cardiovascular: Positive for leg swelling. Negative for chest pain and palpitations.   Gastrointestinal: Positive for constipation. Negative for abdominal pain, anal bleeding, blood in stool, diarrhea, nausea and vomiting.   Endocrine: Negative for polydipsia.   Genitourinary: Negative for difficulty urinating, dysuria, frequency, hematuria, menstrual problem, pelvic pain, urgency, vaginal bleeding, vaginal discharge and vaginal pain.        Incontinence with coughing, sneezing, or lifting   Musculoskeletal: Positive for arthralgias and back pain (worse as of late). Negative for joint swelling, myalgias and neck pain.   Skin: Negative for color change.   Neurological: Negative for tremors, weakness, numbness and headaches.   Psychiatric/Behavioral: Positive for decreased concentration, dysphoric mood and sleep disturbance (chronic-intermittent). Negative for suicidal ideas.     Objective   Physical Exam   Constitutional: She is oriented to person, place, and time. No distress.   Bright, in fair spirits.  No apparent distress.  No pallor, jaundice, cyanosis or diaphoresis   HENT:   Head: Atraumatic.   Right Ear: Tympanic membrane,  external ear and ear canal normal.   Left Ear: Tympanic membrane, external ear and ear canal normal.   Nose: Nose normal.   Mouth/Throat: Oropharynx is clear and moist. Mucous membranes are not pale and not cyanotic.   Eyes: EOM are normal. Pupils are equal, round, and reactive to light. No scleral icterus.   Neck: No JVD present. Carotid bruit is not present. No tracheal deviation present. No thyromegaly present.   Cardiovascular: Normal rate, regular rhythm, S1 normal, S2 normal and intact distal pulses.  Exam reveals no gallop, no S3 and no S4.    No murmur heard.  Pulmonary/Chest: No accessory muscle usage or stridor. No respiratory distress. She has decreased breath sounds. She has wheezes (diffuse-mild).   Hyperinflation of the chest   Abdominal: Soft. Normal aorta and bowel sounds are normal. She exhibits no distension, no abdominal bruit and no mass. There is no hepatosplenomegaly. There is no tenderness. No hernia.   Musculoskeletal: She exhibits no tenderness or deformity.       Vascular Status -  Her exam exhibits right foot edema (trace). Her exam exhibits left foot edema (trace).  Lymphadenopathy:        Head (right side): No submandibular adenopathy present.        Head (left side): No submandibular adenopathy present.     She has no cervical adenopathy.   Neurological: She is alert and oriented to person, place, and time. She has normal reflexes. She displays normal reflexes. No cranial nerve deficit. She exhibits normal muscle tone. Coordination normal.   Skin: Skin is warm and dry. No rash noted. She is not diaphoretic. No cyanosis. No pallor. Nails show no clubbing.   Psychiatric: She has a normal mood and affect. Her speech is normal and behavior is normal. Thought content normal. Cognition and memory are normal. She expresses no suicidal ideation.     Assessment/Plan   Problems Addressed this Visit        Cardiovascular and Mediastinum    Mixed hyperlipidemia   As above.   Continue current  medication.       Respiratory    COPD (chronic obstructive pulmonary disease)  COPD is worsening.  Reminded of the importance of smoking cessation  Encouraged to remain as active as symptoms allow for  Continue current medication    Relevant Medications    ANORO ELLIPTA 62.5-25 MCG/INH aerosol powder  inhaler    montelukast (SINGULAIR) 10 MG tablet    furosemide (LASIX) 40 MG tablet    varenicline (CHANTIX STARTING MONTH PAK) 0.5 MG X 11 & 1 MG X 42 tablet    varenicline (CHANTIX CONTINUING MONTH PAK) 1 MG tablet    Chronic seasonal allergic rhinitis       Digestive    Gastroesophageal reflux disease without esophagitis       Endocrine    Hypothyroidism due to Hashimoto's thyroiditis  Clinically euthyroid.  Continue current medication.       Musculoskeletal and Integument    Osteopenia    Generalized osteoarthritis  Reminded regarding symptomatic treatment.   Will arrange PT    Relevant Orders    Ambulatory Referral to Physical Therapy Evaluate and treat (Completed)       Other    Prediabetes  Will continue to monitor    Depression with anxiety    Stable.  Supportive therapy.   Continue current medication    Sleep apnea   Smoker  More than 10 minute discussion regarding the options with respect to smoking cessation. Agreed on a trial of   varenicline.   Relevant Medications   varenicline (CHANTIX STARTING MONTH NOLVIA) 0.5 MG X 11 & 1 MG X 42 tablet   varenicline (CHANTIX CONTINUING MONTH NOLVIA) 1 MG tablet   Healthcare maintenance  Advised to reduce her salt intake and continue to work in becoming more active  Agreed on a short course of furosemide  Relevant Medications   furosemide (LASIX) 40 MG tablet

## 2018-02-24 VITALS
HEART RATE: 97 BPM | SYSTOLIC BLOOD PRESSURE: 125 MMHG | RESPIRATION RATE: 12 BRPM | HEIGHT: 62 IN | OXYGEN SATURATION: 97 % | BODY MASS INDEX: 43.24 KG/M2 | DIASTOLIC BLOOD PRESSURE: 80 MMHG | TEMPERATURE: 98.7 F | WEIGHT: 235 LBS

## 2018-03-01 DIAGNOSIS — F41.9 ANXIETY: ICD-10-CM

## 2018-03-01 DIAGNOSIS — E78.2 MIXED HYPERLIPIDEMIA: ICD-10-CM

## 2018-03-01 RX ORDER — SIMVASTATIN 20 MG
TABLET ORAL
Qty: 30 TABLET | Refills: 5 | Status: CANCELLED | OUTPATIENT
Start: 2018-03-01

## 2018-03-06 ENCOUNTER — OFFICE VISIT (OUTPATIENT)
Dept: PULMONOLOGY | Facility: CLINIC | Age: 58
End: 2018-03-06

## 2018-03-06 VITALS
WEIGHT: 236 LBS | SYSTOLIC BLOOD PRESSURE: 195 MMHG | OXYGEN SATURATION: 97 % | DIASTOLIC BLOOD PRESSURE: 73 MMHG | HEIGHT: 62 IN | BODY MASS INDEX: 43.43 KG/M2 | HEART RATE: 95 BPM | TEMPERATURE: 97.5 F

## 2018-03-06 DIAGNOSIS — E66.01 MORBID OBESITY WITH BMI OF 40.0-44.9, ADULT (HCC): ICD-10-CM

## 2018-03-06 DIAGNOSIS — J44.9 CHRONIC OBSTRUCTIVE PULMONARY DISEASE, UNSPECIFIED COPD TYPE (HCC): Primary | ICD-10-CM

## 2018-03-06 DIAGNOSIS — E78.2 MIXED HYPERLIPIDEMIA: ICD-10-CM

## 2018-03-06 DIAGNOSIS — G47.33 OSA (OBSTRUCTIVE SLEEP APNEA): ICD-10-CM

## 2018-03-06 PROCEDURE — 99214 OFFICE O/P EST MOD 30 MIN: CPT | Performed by: INTERNAL MEDICINE

## 2018-03-06 RX ORDER — SIMVASTATIN 20 MG
20 TABLET ORAL NIGHTLY
Qty: 30 TABLET | Refills: 5 | Status: SHIPPED | OUTPATIENT
Start: 2018-03-06 | End: 2018-07-30

## 2018-03-06 RX ORDER — BUDESONIDE AND FORMOTEROL FUMARATE DIHYDRATE 160; 4.5 UG/1; UG/1
2 AEROSOL RESPIRATORY (INHALATION)
Qty: 1 INHALER | Refills: 5 | Status: SHIPPED | OUTPATIENT
Start: 2018-03-06 | End: 2018-05-18

## 2018-03-06 RX ORDER — LORAZEPAM 1 MG/1
TABLET ORAL
Qty: 90 TABLET | Refills: 0 | OUTPATIENT
Start: 2018-03-06

## 2018-03-06 RX ORDER — ALBUTEROL SULFATE 90 UG/1
2 AEROSOL, METERED RESPIRATORY (INHALATION) EVERY 4 HOURS PRN
Qty: 1 INHALER | Refills: 11 | Status: SHIPPED | OUTPATIENT
Start: 2018-03-06 | End: 2018-05-25 | Stop reason: SDUPTHER

## 2018-03-06 NOTE — PROGRESS NOTES
Subjective   Lisa Hill is a 57 y.o. female who is being seen for Results and Lung Nodule    History of Present Illness   Patient returns after pulmonary function test and a CT scan of the chest.  This 57-year-old female is a smoker and we wanted to do a pulmonary function test to see her airways function objectively.  She also had a 4 mm size lung nodule for which we aren't repeating her CT scan in 6 months interval.  Subjectively she still continues to have significant shortness of breath on mild exertion.  Today she is telling me that her sleep disturbances increased, she wakes up multiple times at night and in the morning does not feel fresh.  Daytime sleepiness as well as fatigue has also increased.  She tells me that she is gaining weight 2  Past Medical History:   Diagnosis Date   • Anxiety    • COPD (chronic obstructive pulmonary disease)    • Depression    • GERD (gastroesophageal reflux disease)    • Hyperlipidemia    • Hypertension      Past Surgical History:   Procedure Laterality Date   • CHOLECYSTECTOMY     • HYSTERECTOMY     • TONSILLECTOMY       Family History   Problem Relation Age of Onset   • Kidney disease Mother    • Diabetes Mother    • Hypertension Father    • Breast cancer Maternal Aunt       reports that she has been smoking Cigarettes.  She has been smoking about 0.25 packs per day. She has never used smokeless tobacco. She reports that she does not drink alcohol.  Allergies   Allergen Reactions   • Sulfa Antibiotics            The following portions of the patient's history were reviewed and updated as appropriate: allergies, current medications, past family history, past medical history, past social history, past surgical history and problem list.    Review of Systems   Constitutional: Negative for appetite change, chills, diaphoresis and unexpected weight change.   HENT: Negative for sore throat, trouble swallowing and voice change.    Eyes: Negative for visual disturbance.  "  Respiratory: Positive for cough, shortness of breath (Worsens at night) and wheezing. Negative for apnea and choking.    Cardiovascular: Negative for chest pain, palpitations and leg swelling.   Gastrointestinal: Negative for abdominal pain, constipation, diarrhea, nausea and vomiting.   Endocrine: Negative for cold intolerance, heat intolerance, polydipsia, polyphagia and polyuria.   Genitourinary: Negative for difficulty urinating and dysuria.   Musculoskeletal: Negative for gait problem.   Skin: Negative for rash and wound.   Neurological: Negative for syncope and light-headedness.   Hematological: Negative for adenopathy.   Psychiatric/Behavioral: Negative for agitation, behavioral problems and confusion.   All other systems reviewed and are negative.      Objective   BP (!) 195/73 (BP Location: Left arm, Patient Position: Sitting)  Pulse 95  Temp 97.5 °F (36.4 °C)  Ht 157.5 cm (62.01\")  Wt 107 kg (236 lb)  SpO2 97%  BMI 43.15 kg/m2  Physical Exam   Constitutional: She is oriented to person, place, and time.   HENT:   Head: Normocephalic and atraumatic.   Nose: Mucosal edema present.   Eyes: EOM are normal. Pupils are equal, round, and reactive to light.   Neck: Neck supple.   Cardiovascular: Normal rate, regular rhythm and normal heart sounds.    Pulmonary/Chest: She has rhonchi.   Vesicular breath sound bilaterally with prolonged expiratory phase   Abdominal: Soft. Bowel sounds are normal.   Musculoskeletal: Normal range of motion. She exhibits no deformity.   Neurological: She is alert and oriented to person, place, and time.   Skin: Skin is warm and dry.   Psychiatric: She has a normal mood and affect. Her behavior is normal.   Nursing note and vitals reviewed.        Radiology:  Xr Chest 1 View    Result Date: 1/25/2018  No evidence of active or acute cardiopulmonary disease on today's chest radiograph.     This report was finalized on 1/25/2018 7:49 AM by Dr. Sergio Juares MD.      Ct Chest " Pulmonary Embolism With Contrast    Result Date: 1/25/2018  1. No evidence of a pulmonary embolus. 2. Groundglass density in the lungs bilaterally which may represent mild degree of edema. 3. COPD.  This report was finalized on 1/25/2018 7:43 AM by Dr. Sergio Juares MD.        Lab Results:  Admission on 01/24/2018, Discharged on 01/24/2018   Component Date Value Ref Range Status   • Glucose 01/24/2018 99  70 - 110 mg/dL Final   • BUN 01/24/2018 13  7 - 21 mg/dL Final   • Creatinine 01/24/2018 0.96  0.43 - 1.29 mg/dL Final   • Sodium 01/24/2018 136  135 - 153 mmol/L Final   • Potassium 01/24/2018 4.6  3.5 - 5.3 mmol/L Final   • Chloride 01/24/2018 107  99 - 112 mmol/L Final   • CO2 01/24/2018 19.9* 24.3 - 31.9 mmol/L Final   • Calcium 01/24/2018 9.1  7.7 - 10.0 mg/dL Final   • Total Protein 01/24/2018 7.7  6.0 - 8.0 g/dL Final   • Albumin 01/24/2018 4.40  3.50 - 5.00 g/dL Final   • ALT (SGPT) 01/24/2018 45* 10 - 36 U/L Final   • AST (SGOT) 01/24/2018 31* 10 - 30 U/L Final   • Alkaline Phosphatase 01/24/2018 57  35 - 104 U/L Final   • Total Bilirubin 01/24/2018 0.2  0.2 - 1.8 mg/dL Final   • eGFR Non African Amer 01/24/2018 59* >60 mL/min/1.73 Final   • Globulin 01/24/2018 3.3  gm/dL Final   • A/G Ratio 01/24/2018 1.3* 1.5 - 2.5 g/dL Final   • BUN/Creatinine Ratio 01/24/2018 13.5  7.0 - 25.0 Final   • Anion Gap 01/24/2018 9.1  3.6 - 11.2 mmol/L Final   • Troponin I 01/24/2018 <0.006  <=0.040 ng/mL Final   • Protime 01/24/2018 12.5  11.0 - 15.4 Seconds Final   • INR 01/24/2018 0.93  0.90 - 1.10 Final   • Extra Tube 01/24/2018 hold for add-on   Final   • Extra Tube 01/24/2018 Hold for add-ons.   Final   • Extra Tube 01/24/2018 hold for add-on   Final   • Extra Tube 01/24/2018 Hold for add-ons.   Final   • WBC 01/24/2018 11.82  4.50 - 12.50 10*3/mm3 Final   • RBC 01/24/2018 4.68  4.20 - 5.40 10*6/mm3 Final   • Hemoglobin 01/24/2018 15.1  12.0 - 16.0 g/dL Final   • Hematocrit 01/24/2018 45.1  37.0 - 47.0 % Final   • MCV  01/24/2018 96.4* 80.0 - 94.0 fL Final   • MCH 01/24/2018 32.3  27.0 - 33.0 pg Final   • MCHC 01/24/2018 33.5  33.0 - 37.0 g/dL Final   • RDW 01/24/2018 13.9  11.5 - 14.5 % Final   • RDW-SD 01/24/2018 49.0  37.0 - 54.0 fl Final   • MPV 01/24/2018 11.1* 6.0 - 10.0 fL Final   • Platelets 01/24/2018 377  130 - 400 10*3/mm3 Final   • Neutrophil % 01/24/2018 59.4  30.0 - 70.0 % Final   • Lymphocyte % 01/24/2018 28.7  21.0 - 51.0 % Final   • Monocyte % 01/24/2018 8.9  0.0 - 10.0 % Final   • Eosinophil % 01/24/2018 1.7  0.0 - 5.0 % Final   • Basophil % 01/24/2018 0.8  0.0 - 2.0 % Final   • Immature Grans % 01/24/2018 0.5  0.0 - 0.5 % Final   • Neutrophils, Absolute 01/24/2018 7.02* 1.40 - 6.50 10*3/mm3 Final   • Lymphocytes, Absolute 01/24/2018 3.39* 1.00 - 3.00 10*3/mm3 Final   • Monocytes, Absolute 01/24/2018 1.05* 0.10 - 0.90 10*3/mm3 Final   • Eosinophils, Absolute 01/24/2018 0.20  0.00 - 0.70 10*3/mm3 Final   • Basophils, Absolute 01/24/2018 0.10  0.00 - 0.30 10*3/mm3 Final   • Immature Grans, Absolute 01/24/2018 0.06* 0.00 - 0.03 10*3/mm3 Final   • nRBC 01/24/2018 0.0  0.0 - 0.0 /100 WBC Final   • RBC Morphology 01/24/2018 Normal  Normal Final   • Large Platelets 01/24/2018 Slight/1+  None Seen Final   • Osmolality Calc 01/24/2018 272.1* 273.0 - 305.0 mOsm/kg Final   • Site 01/24/2018 Arterial: right brachial   Final   • Ivan's Test 01/24/2018 N/A   Final   • pH, Arterial 01/24/2018 7.431  7.350 - 7.450 pH units Final   • pCO2, Arterial 01/24/2018 36.1  35.0 - 45.0 mm Hg Final   • pO2, Arterial 01/24/2018 86.1  80.0 - 100.0 mm Hg Final   • HCO3, Arterial 01/24/2018 23.5  22.0 - 26.0 mmol/L Final   • Base Excess, Arterial 01/24/2018 -0.4  mmol/L Final   • O2 Saturation, Arterial 01/24/2018 97.0  90.0 - 100.0 % Final   • Hemoglobin, Blood Gas 01/24/2018 14.3  12 - 16 g/dL Final   • Hematocrit, Blood Gas 01/24/2018 42.0  37.0 - 47.0 % Final   • Oxyhemoglobin 01/24/2018 92.2  85 - 100 % Final   • Methemoglobin 01/24/2018  0.00  0.00 - 3.00 % Final   • Carboxyhemoglobin 01/24/2018 4.9  0 - 5 % Final   • A-a Gradiant 01/24/2018 13.9  0.0 - 300.0 mmHg Final   • Temperature 01/24/2018 98.6  C Final   • Barometric Pressure for Blood Gas 01/24/2018 729  mmHg Final   • Modality 01/24/2018 Room Air   Final   • FIO2 01/24/2018 21  % Final   • D-Dimer, Quantitative 01/24/2018 1.12* 0.00 - 0.50 MCGFEU/mL Final   • Troponin I 01/24/2018 <0.006  <=0.040 ng/mL Final     Pulmonary Function Test   Order: [id number]   Status:  Final result   Visible to patient:  No (Not Released)   Dx:  Lung nodule; Chronic obstructive pulm...   Details   Reading Physician Reading Date Result Priority   Fransisco Phoenix MD 12/29/2017 Routine   Narrative & Impression         Moderate obstructive lung disease without significant cartilage response end of this occasion.  Lung volumes are abnormally high likely due to leaking the system.  Flow-volume loop is obstructed and restricted.  Diffusing capacity is normal.  Recommend repeating the lung volumes.             Assessment      ICD-10-CM ICD-9-CM   1. Chronic obstructive pulmonary disease, unspecified COPD type J44.9 496   2. HERBERT (obstructive sleep apnea) G47.33 327.23   3. Morbid obesity with BMI of 40.0-44.9, adult E66.01 278.01    Z68.41 V85.41                DISCUSSION:  I have reviewed the pulmonary function test.  Patient has evidence of obstructive airway disease, FEV1 was reduced to 1.70 L which was 71% predicted and had no improvement after bronchodilator.  FEV1 to FVC ratio was reduced to 65.  Diffusion capacity was normal.  Findings are consistent with COPD.    I have reviewed her CT scan of the chest, previously noted nodule is not visible in this film.  This on the other hand shows mild groundglass opacity bilaterally.    Currently patient is using albuterol inhaler, I'm adding Symbicort to her regimen.  Regarding her sleep disturbance are equal and is to have a nocturnal polysomnography.  Patient tells me  that she does not want to go for an in the test since she has problem at home.  We are ordering a home sleep study instead.  I would see her again in approximately 3 months from now.    Plan    Orders Placed This Encounter   Procedures   • Home Sleep Study     New Medications Ordered This Visit   Medications   • albuterol (PROAIR HFA) 108 (90 Base) MCG/ACT inhaler     Sig: Inhale 2 puffs Every 4 (Four) Hours As Needed for Shortness of Air.     Dispense:  1 inhaler     Refill:  11   • budesonide-formoterol (SYMBICORT) 160-4.5 MCG/ACT inhaler     Sig: Inhale 2 puffs 2 (Two) Times a Day.     Dispense:  1 inhaler     Refill:  5                  Sierra English MD, FCCP, FAASM  Pulmonary, Critical Care, and Sleep Medicine

## 2018-03-14 ENCOUNTER — OFFICE VISIT (OUTPATIENT)
Dept: PSYCHIATRY | Facility: CLINIC | Age: 58
End: 2018-03-14

## 2018-03-14 VITALS
BODY MASS INDEX: 43.06 KG/M2 | HEIGHT: 62 IN | SYSTOLIC BLOOD PRESSURE: 138 MMHG | WEIGHT: 234 LBS | DIASTOLIC BLOOD PRESSURE: 82 MMHG | HEART RATE: 94 BPM

## 2018-03-14 DIAGNOSIS — F41.9 ANXIETY: ICD-10-CM

## 2018-03-14 DIAGNOSIS — F41.8 DEPRESSION WITH ANXIETY: ICD-10-CM

## 2018-03-14 DIAGNOSIS — F45.0 SOMATIZATION DISORDER: Primary | ICD-10-CM

## 2018-03-14 PROCEDURE — 99214 OFFICE O/P EST MOD 30 MIN: CPT | Performed by: NURSE PRACTITIONER

## 2018-03-14 RX ORDER — LORAZEPAM 1 MG/1
0.5 TABLET ORAL DAILY PRN
Qty: 15 TABLET | Refills: 0 | Status: SHIPPED | OUTPATIENT
Start: 2018-03-14 | End: 2018-05-18 | Stop reason: SDUPTHER

## 2018-03-14 NOTE — PROGRESS NOTES
"Subjective   Lisa Hill is a 57 y.o. female who is here today for initial appointment.     Chief Complaint:  No chief complaint on file.      HPI:  History of Present IllnessPatient presents for medication management of anxiety and depression.  Patient has long history of symptoms-she relates varying symptoms.  She reports that her symptoms are made extremely worse by the winter months.  She relates that her depression anxiety become so severe that she will be isolated in her room for long periods of time.  She has panic attacks feels as if she cannot breathe and that her heart is skipping.  It appears that her anxiety symptoms are physiologic in nature.  The patient is reporting that during the spring she has minimal to no symptoms and they're currently being controlled by her medication regime.  She denies any medication related side effects She is currently denying depression -but reports anxiety is pretty bad sometimes\".  The patient reports the following symptoms of anxiety: constant anxiety/worry, restlessness/on edge, difficulty concentrating, mind goes blank, irritability, muscle tension, sleep disturbance and anxiety causes distress/impairment in important areas of functioning and have caused impairment in important areas of functioning.     During the winter months patient reports depressive symptoms including depressed mood, crying spells, overeating, feelings of guilt, feelings of helplessness, feelings of worthlessness and low energy, and have caused impairment in important areas of functioning.  Currently the patient denies any sad moods or depression.  She currently rates Depression rated 1/10 with 10 being the worst.  She adamantly and consistently denied any thoughts to harm herself or others.  She denied any psychotic phenomenon.         Past Psych History: Reviewed previous records by Terry Brito.  Patient reports a long history of treatment also reports a long history of benzodiazepine use " for anxiety management.  Substance Abuse: Patient denies any she denies any problems in the past with substances    Past Social History: Please see previous social and personal history reviewed from information by Terry Brito.    Family History:  family history includes Breast cancer in her maternal aunt; Diabetes in her mother; Hypertension in her father; Kidney disease in her mother.    Medical/Surgical History:  Past Medical History:   Diagnosis Date   • Anxiety    • COPD (chronic obstructive pulmonary disease)    • Depression    • GERD (gastroesophageal reflux disease)    • Hyperlipidemia    • Hypertension      Past Surgical History:   Procedure Laterality Date   • CHOLECYSTECTOMY     • HYSTERECTOMY     • TONSILLECTOMY         Allergies   Allergen Reactions   • Sulfa Antibiotics        Current Medications:   Current Outpatient Prescriptions   Medication Sig Dispense Refill   • albuterol (PROAIR HFA) 108 (90 Base) MCG/ACT inhaler Inhale 2 puffs Every 4 (Four) Hours As Needed for Shortness of Air. 1 inhaler 11   • albuterol (PROVENTIL) (2.5 MG/3ML) 0.083% nebulizer solution Take 2.5 mg by nebulization Every 4 (Four) Hours As Needed for Shortness of Air. 180 vial 5   • albuterol (VENTOLIN HFA) 108 (90 BASE) MCG/ACT inhaler Inhale 2 puffs Every 4 (Four) Hours As Needed for Wheezing. 18 g 5   • ANORO ELLIPTA 62.5-25 MCG/INH aerosol powder  inhaler      • budesonide-formoterol (SYMBICORT) 160-4.5 MCG/ACT inhaler Inhale 2 puffs 2 (Two) Times a Day. 1 inhaler 5   • buPROPion XL (WELLBUTRIN XL) 300 MG 24 hr tablet Take 1 tablet by mouth Every Morning. 30 tablet 5   • cholecalciferol (VITAMIN D3) 1000 units tablet TAKE ONE TABLET BY MOUTH EVERY DAY 30 tablet 5   • conjugated estrogens (PREMARIN) 0.625 MG/GM vaginal cream Insert  into the vagina 2 (Two) Times a Week. 30 g 5   • docusate sodium (DOCQLACE) 100 MG capsule Take 1 capsule by mouth 2 (Two) Times a Day. 60 capsule 5   • fluticasone (FLONASE) 50 MCG/ACT nasal  spray Administer 2 sprays both nostrils once daily 1 bottle 5   • furosemide (LASIX) 40 MG tablet Take 1 tablet by mouth Every Morning. 5 tablet 0   • ketoconazole (NIZORAL) 2 % shampoo Apply  topically 2 (Two) Times a Week. 120 mL 5   • levothyroxine (SYNTHROID, LEVOTHROID) 175 MCG tablet TAKE 1 TABLET BY MOUTH DAILY 30 tablet 5   • LORazepam (ATIVAN) 1 MG tablet Take 0.5 tablets by mouth 2 (Two) Times a Day. 90 tablet 0   • montelukast (SINGULAIR) 10 MG tablet      • pantoprazole (PROTONIX) 40 MG EC tablet Take 1 tablet by mouth Daily. 30 tablet 5   • risperiDONE (risperDAL) 2 MG tablet Take 1 tablet by mouth Every Night. 30 tablet 5   • simvastatin (ZOCOR) 20 MG tablet Take 1 tablet by mouth Every Night. 30 tablet 5   • triamcinolone (KENALOG) 0.1 % cream Apply  topically 2 (Two) Times a Day. 80 g 1   • varenicline (CHANTIX CONTINUING MONTH NOLVIA) 1 MG tablet Take 1 tablet by mouth 2 (Two) Times a Day. 60 tablet 5   • varenicline (CHANTIX STARTING MONTH NOLVIA) 0.5 MG X 11 & 1 MG X 42 tablet Take 0.5 mg one daily on days 1-3 and and 0.5 mg twice daily on days 4-7.Then 1 mg twice daily. 53 tablet 0   • Zoster Vac Recomb Adjuvanted (SHINGRIX) 50 MCG reconstituted suspension Inject 50 mcg into the shoulder, thigh, or buttocks See Admin Instructions. Repeat in 2-6 months 1 each 1     No current facility-administered medications for this visit.        Review of Systems    Review of Systems - General ROS: negative for - chills, fever or malaise  Ophthalmic ROS: negative for - loss of vision  ENT ROS: negative for - hearing change  Allergy and Immunology ROS: negative for - hives  Hematological and Lymphatic ROS: negative for - bleeding problems  Endocrine ROS: negative for - skin changes  Respiratory ROS: no cough, shortness of breath, or wheezing  Cardiovascular ROS: no chest pain or dyspnea on exertion  Gastrointestinal ROS: no abdominal pain, change in bowel habits, or black or bloody stools  Genito-Urinary ROS: no  "dysuria, trouble voiding, or hematuria  Musculoskeletal ROS: negative for - gait disturbance  Neurological ROS: no TIA or stroke symptoms  Dermatological ROS: negative for rash    Objective   Physical Exam  Blood pressure 138/82, pulse 94, height 157.5 cm (62\"), weight 106 kg (234 lb).    Mental Status Exam:   Hygiene:   good  Cooperation:  Cooperative  Eye Contact:  Good  Psychomotor Behavior:  Appropriate  Affect:  Full range  Hopelessness: Denies  Speech:  Normal  Thought Process:  Goal directed and Linear  Thought Content:  Mood congurent  Suicidal:  None  Homicidal:  None  Hallucinations:  None  Delusion:  None  Memory:  Intact  Orientation:  Person, Place, Time and Situation  Reliability:  fair  Insight:  Fair  Judgement:  Fair  Impulse Control:  Fair  Physical/Medical Issues:  Yes See medical history        Assessment/Plan   Diagnoses and all orders for this visit:    Somatization disorder    Depression with anxiety    Anxiety  -     LORazepam (ATIVAN) 1 MG tablet; Take 0.5 tablets by mouth Daily As Needed for Anxiety (Panic). May take in addition to previously prescribed 0.5 mg-during periods of panic.        Patient was continue her previously prescribed medications including Wellbutrin 300 mg once a day Risperdal 2 mg by mouth once a day.  Patient will be maintained as she is relatively symptom-free at this time.  We will resume and begin medication trials and September related to her seasonal symptoms.  Patient is being prescribed a controlled substance as part of treatment plan. Patient has been educated of appropriate use of the medications, including risk of somnolence, limited ability to drive and/or work safely, and potential for dependence, respiratory depression and overdose. Patient is also informed that the medication are to be used by the patient only- avoid any combined use of ETOH or other substances unless prescribed.       We discussed risks, benefits, and side effects of the above " medication and the patient was agreeable with the plan.     Return in about 2 months (around 5/14/2018).

## 2018-03-20 ENCOUNTER — OFFICE VISIT (OUTPATIENT)
Dept: PSYCHIATRY | Facility: CLINIC | Age: 58
End: 2018-03-20

## 2018-03-20 DIAGNOSIS — F45.0 SOMATIZATION DISORDER: Primary | ICD-10-CM

## 2018-03-20 DIAGNOSIS — F41.8 DEPRESSION WITH ANXIETY: ICD-10-CM

## 2018-03-20 PROCEDURE — 90832 PSYTX W PT 30 MINUTES: CPT | Performed by: SOCIAL WORKER

## 2018-03-20 NOTE — PROGRESS NOTES
PROGRESS NOTE  Data:  Lisa Hill came in 3/20/2018 for her regularly scheduled therapy session starting at 1 PM and ending at 1: 30 p.m. with Terry Brito LCSW in the [facility] .  Patient reports that her mood continues to improve in addition to her anxiety decreasing.  In fact patient states that her relationship with her children is also improved in the form of decreased arguing.  Patient able to acknowledge that how she presented the relationship between herself and her children may have been affected by her depression and in fact it is quite possible them the family dynamics are normal with her adult children engaged in her own life and activities.  According to patient her neighbors are THAT of the elderly population.  She has been living in that apartment complex for almost a year now.  Patient made reference to the fact that her  also lives in the same apartment.  Patient had previously shared that she and  were  for the past 19 years.  Uncertain as to what the motivation would be and not disclosing this information before now.  According to patient they have been  for 43 years.     (Scales based on 0 - 10 with 10 being the worst)  Depression: 1 Anxiety: 1   Distress: 0 Sleep: 0   Tasks Completed on Time: 0 Mood: 1   Number of Panic Attacks: 0 Appetite: 0     Mental Status Exam  Hygiene:  good  Dress:  casual  Attitude:  Guarded  Motor Activity:  Appropriate  Speech:  Normal  Mood:  within normal limits  Affect:  calm and pleasant and labile  Thought Processes:  Linear  Thought Content:  normal  Suicidal Thoughts:  denies  Homicidal Thoughts:  denies  Crisis Safety Plan: yes, to come to the emergency room.  Hallucinations:  denies      Clinical Maneuvering/Intervention:     Allowed patient to freely discuss issues without interruption or judgment. Provided safe, confidential environment to facilitate the development of positive therapeutic relationship and  encourage open, honest communication. Assisted patient in identifying risk factors which would indicate the need for higher level of care including thoughts to harm self or others and/or self-harming behavior and encouraged patient to contact this office, call 911, or present to the nearest emergency room should any of these events occur. Discussed crisis intervention services and means to access.  Patient adamantly and convincingly denies current suicidal or homicidal ideation or perceptual disturbance.    Prognosis: Good with Ongoing Treatment     GAF: No impairment    Assessment from further information gathered over the course of treatment, there have been no significant changes in patient's lifestyle and/or environment.  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  Contrary to the claims that they have been  for 19 years her  apparently lives in the same apartment that patient does.  He is present for all family gatherings    Diagnoses and all orders for this visit:    Somatization disorder    Depression with anxiety            Patient's Support Network Includes:  significant other and children    Plan     Patient will adhere to medication regimen as prescribed and report any side effects. Patient will contact this office, call 911 or present to the nearest emergency room should suicidal or homicidal ideations occur. Provide Cognitive Behavioral Therapy and Solution Focused Therapy to improve functioning, maintain stability, and avoid decompensation and the need for higher level of care.          Return in about 45 days (around 5/4/2018) for Next scheduled follow up.

## 2018-03-22 ENCOUNTER — TELEPHONE (OUTPATIENT)
Dept: PSYCHIATRY | Facility: CLINIC | Age: 58
End: 2018-03-22

## 2018-03-22 NOTE — TELEPHONE ENCOUNTER
Patient called stating she is not able to get her script of the Ativan until 3/28/18 also getting ativan from another dr due to that her insurance will not allow her to get both scripts. Wants to change script until she is able to get her Script Ativan from you.

## 2018-03-28 NOTE — TELEPHONE ENCOUNTER
She said she couldn't get your script until 3/28/18 which is today. I've not heard anything back from her.

## 2018-03-28 NOTE — TELEPHONE ENCOUNTER
Where she is getting another script on Ativan from another provider her insurance will not cover in until that other script runs out.

## 2018-05-03 DIAGNOSIS — E55.9 VITAMIN D DEFICIENCY: ICD-10-CM

## 2018-05-03 DIAGNOSIS — F41.9 ANXIETY: ICD-10-CM

## 2018-05-03 RX ORDER — BUSPIRONE HYDROCHLORIDE 15 MG/1
TABLET ORAL
Qty: 90 TABLET | Refills: 5 | Status: SHIPPED | OUTPATIENT
Start: 2018-05-03 | End: 2018-05-16 | Stop reason: SDUPTHER

## 2018-05-03 RX ORDER — MELATONIN
Qty: 30 TABLET | Refills: 5 | Status: SHIPPED | OUTPATIENT
Start: 2018-05-03 | End: 2018-10-27 | Stop reason: SDUPTHER

## 2018-05-16 ENCOUNTER — OFFICE VISIT (OUTPATIENT)
Dept: PSYCHIATRY | Facility: CLINIC | Age: 58
End: 2018-05-16

## 2018-05-16 VITALS
BODY MASS INDEX: 43.06 KG/M2 | HEART RATE: 103 BPM | SYSTOLIC BLOOD PRESSURE: 140 MMHG | HEIGHT: 62 IN | WEIGHT: 234 LBS | DIASTOLIC BLOOD PRESSURE: 86 MMHG | OXYGEN SATURATION: 97 %

## 2018-05-16 DIAGNOSIS — F41.9 ANXIETY: ICD-10-CM

## 2018-05-16 PROCEDURE — 99214 OFFICE O/P EST MOD 30 MIN: CPT | Performed by: NURSE PRACTITIONER

## 2018-05-16 RX ORDER — BUSPIRONE HYDROCHLORIDE 15 MG/1
15 TABLET ORAL 3 TIMES DAILY
Qty: 90 TABLET | Refills: 0 | Status: SHIPPED | OUTPATIENT
Start: 2018-05-16 | End: 2018-06-13 | Stop reason: SDUPTHER

## 2018-05-16 RX ORDER — BUPROPION HYDROCHLORIDE 300 MG/1
300 TABLET ORAL EVERY MORNING
Qty: 30 TABLET | Refills: 0 | Status: SHIPPED | OUTPATIENT
Start: 2018-05-16 | End: 2018-06-13 | Stop reason: SDUPTHER

## 2018-05-16 RX ORDER — MIRTAZAPINE 15 MG/1
15 TABLET, FILM COATED ORAL NIGHTLY
Qty: 30 TABLET | Refills: 0 | Status: SHIPPED | OUTPATIENT
Start: 2018-05-16 | End: 2018-06-13 | Stop reason: SDUPTHER

## 2018-05-16 RX ORDER — ARIPIPRAZOLE 5 MG/1
2.5 TABLET ORAL EVERY MORNING
Qty: 15 TABLET | Refills: 0 | Status: SHIPPED | OUTPATIENT
Start: 2018-05-16 | End: 2018-06-13 | Stop reason: SDUPTHER

## 2018-05-16 NOTE — PROGRESS NOTES
"Subjective   Lisa Hill is a 58 y.o. female who is here today for initial appointment.     Chief Complaint:  No chief complaint on file.      HPI:  History of Present IllnessPatient presents for medication management of anxiety and depression.  Patient has long history of symptoms-she relates varying symptoms.  She reports that her symptoms have not resolved. She relates that her depression anxiety remain She has panic attacks feels as if she cannot breathe and that her heart is skipping.  I The patient is reporting that by this time her symptoms are cleared./  She denies any medication related side effects She  reports anxiety is pretty bad sometimes\".  The patient reports the following symptoms of anxiety: constant anxiety/worry, restlessness/on edge, difficulty concentrating, mind goes blank, irritability, muscle tension, sleep disturbance and anxiety causes distress/impairment in important areas of functioning and have caused impairment in important areas of functioning.     Currently,  patient reports depressive symptoms including depressed mood, crying spells, overeating, feelings of guilt, feelings of helplessness, feelings of worthlessness and low energy, and have caused impairment in important areas of functioning.  .  She currently rates Depression rated 7/10 with 10 being the worst.  She adamantly and consistently denied any thoughts to harm herself or others.  She denied any psychotic phenomenon.     Family History:  family history includes Breast cancer in her maternal aunt; Diabetes in her mother; Hypertension in her father; Kidney disease in her mother.    Medical/Surgical History:  Past Medical History:   Diagnosis Date   • Anxiety    • COPD (chronic obstructive pulmonary disease)    • Depression    • GERD (gastroesophageal reflux disease)    • Hyperlipidemia    • Hypertension      Past Surgical History:   Procedure Laterality Date   • CHOLECYSTECTOMY     • HYSTERECTOMY     • TONSILLECTOMY   "       Allergies   Allergen Reactions   • Sulfa Antibiotics        Current Medications:   Current Outpatient Prescriptions   Medication Sig Dispense Refill   • albuterol (PROAIR HFA) 108 (90 Base) MCG/ACT inhaler Inhale 2 puffs Every 4 (Four) Hours As Needed for Shortness of Air. 1 inhaler 11   • albuterol (PROVENTIL) (2.5 MG/3ML) 0.083% nebulizer solution Take 2.5 mg by nebulization Every 4 (Four) Hours As Needed for Shortness of Air. 180 vial 5   • albuterol (VENTOLIN HFA) 108 (90 BASE) MCG/ACT inhaler Inhale 2 puffs Every 4 (Four) Hours As Needed for Wheezing. 18 g 5   • ANORO ELLIPTA 62.5-25 MCG/INH aerosol powder  inhaler      • budesonide-formoterol (SYMBICORT) 160-4.5 MCG/ACT inhaler Inhale 2 puffs 2 (Two) Times a Day. 1 inhaler 5   • buPROPion XL (WELLBUTRIN XL) 300 MG 24 hr tablet Take 1 tablet by mouth Every Morning. 30 tablet 5   • busPIRone (BUSPAR) 15 MG tablet TAKE 1 TABLET BY MOUTH 3 TIMES DAILY 90 tablet 5   • cholecalciferol (VITAMIN D3) 1000 units tablet TAKE 1 TABLET BY MOUTH DAILY 30 tablet 5   • conjugated estrogens (PREMARIN) 0.625 MG/GM vaginal cream Insert  into the vagina 2 (Two) Times a Week. 30 g 5   • docusate sodium (DOCQLACE) 100 MG capsule Take 1 capsule by mouth 2 (Two) Times a Day. 60 capsule 5   • fluticasone (FLONASE) 50 MCG/ACT nasal spray Administer 2 sprays both nostrils once daily 1 bottle 5   • furosemide (LASIX) 40 MG tablet Take 1 tablet by mouth Every Morning. 5 tablet 0   • ketoconazole (NIZORAL) 2 % shampoo Apply  topically 2 (Two) Times a Week. 120 mL 5   • levothyroxine (SYNTHROID, LEVOTHROID) 175 MCG tablet TAKE 1 TABLET BY MOUTH DAILY 30 tablet 5   • LORazepam (ATIVAN) 1 MG tablet Take 0.5 tablets by mouth Daily As Needed for Anxiety (Panic). May take in addition to previously prescribed 0.5 mg-during periods of panic. 15 tablet 0   • montelukast (SINGULAIR) 10 MG tablet      • pantoprazole (PROTONIX) 40 MG EC tablet Take 1 tablet by mouth Daily. 30 tablet 5   •  "risperiDONE (risperDAL) 2 MG tablet Take 1 tablet by mouth Every Night. 30 tablet 5   • simvastatin (ZOCOR) 20 MG tablet Take 1 tablet by mouth Every Night. 30 tablet 5   • triamcinolone (KENALOG) 0.1 % cream Apply  topically 2 (Two) Times a Day. 80 g 1   • varenicline (CHANTIX CONTINUING MONTH PAK) 1 MG tablet Take 1 tablet by mouth 2 (Two) Times a Day. 60 tablet 5   • varenicline (CHANTIX STARTING MONTH PAK) 0.5 MG X 11 & 1 MG X 42 tablet Take 0.5 mg one daily on days 1-3 and and 0.5 mg twice daily on days 4-7.Then 1 mg twice daily. 53 tablet 0   • Zoster Vac Recomb Adjuvanted (SHINGRIX) 50 MCG reconstituted suspension Inject 50 mcg into the shoulder, thigh, or buttocks See Admin Instructions. Repeat in 2-6 months 1 each 1     No current facility-administered medications for this visit.        Review of Systems      Objective   Physical Exam  Blood pressure 140/86, pulse 103, height 157.5 cm (62\"), weight 106 kg (234 lb), SpO2 97 %.    Mental Status Exam:   Hygiene:   good  Cooperation:  Cooperative  Eye Contact:  Good  Psychomotor Behavior:  Appropriate  Affect:  Full range  Hopelessness: Denies  Speech:  Normal  Thought Process:  Goal directed and Linear  Thought Content:  Mood congurent  Suicidal:  None  Homicidal:  None  Hallucinations:  None  Delusion:  None  Memory:  Intact  Orientation:  Person, Place, Time and Situation  Reliability:  fair  Insight:  Fair  Judgement:  Fair  Impulse Control:  Fair  Physical/Medical Issues:  Yes See medical history        Assessment/Plan   Diagnoses and all orders for this visit:    Anxiety  -     buPROPion XL (WELLBUTRIN XL) 300 MG 24 hr tablet; Take 1 tablet by mouth Every Morning.  -     busPIRone (BUSPAR) 15 MG tablet; Take 1 tablet by mouth 3 (Three) Times a Day.    Other orders  -     ARIPiprazole (ABILIFY) 5 MG tablet; Take 0.5 tablets by mouth Every Morning.  -     mirtazapine (REMERON) 15 MG tablet; Take 1 tablet by mouth Every Night.        Patient was continue her " previously prescribed medications including Wellbutrin 300 mg once a day-will add remeron. will discontinue Risperdal 2 mg by mouth once a day.  Will attempt to add Abilify she has had minimal use of Abilify states that she'll and taken one pillin last month.  Patient is being prescribed a controlled substance as part of treatment plan. Patient has been educated of appropriate use of the medications, including risk of somnolence, limited ability to drive and/or work safely, and potential for dependence, respiratory depression and overdose. Patient is also informed that the medication are to be used by the patient only- avoid any combined use of ETOH or other substances unless prescribed.       We discussed risks, benefits, and side effects of the above medication and the patient was agreeable with the plan.     Return in about 6 weeks (around 6/27/2018).

## 2018-05-18 ENCOUNTER — OFFICE VISIT (OUTPATIENT)
Dept: FAMILY MEDICINE CLINIC | Facility: CLINIC | Age: 58
End: 2018-05-18

## 2018-05-18 DIAGNOSIS — F41.9 ANXIETY: ICD-10-CM

## 2018-05-18 DIAGNOSIS — E06.3 HYPOTHYROIDISM DUE TO HASHIMOTO'S THYROIDITIS: ICD-10-CM

## 2018-05-18 DIAGNOSIS — E55.9 VITAMIN D DEFICIENCY: ICD-10-CM

## 2018-05-18 DIAGNOSIS — K21.9 GASTROESOPHAGEAL REFLUX DISEASE WITHOUT ESOPHAGITIS: ICD-10-CM

## 2018-05-18 DIAGNOSIS — K59.09 CHRONIC CONSTIPATION: ICD-10-CM

## 2018-05-18 DIAGNOSIS — J30.2 CHRONIC SEASONAL ALLERGIC RHINITIS, UNSPECIFIED TRIGGER: ICD-10-CM

## 2018-05-18 DIAGNOSIS — R73.03 PREDIABETES: ICD-10-CM

## 2018-05-18 DIAGNOSIS — D75.89 MACROCYTOSIS: ICD-10-CM

## 2018-05-18 DIAGNOSIS — F17.200 SMOKER: ICD-10-CM

## 2018-05-18 DIAGNOSIS — J44.9 COPD MIXED TYPE (HCC): ICD-10-CM

## 2018-05-18 DIAGNOSIS — Z00.00 HEALTHCARE MAINTENANCE: ICD-10-CM

## 2018-05-18 DIAGNOSIS — N39.3 STRESS BLADDER INCONTINENCE, FEMALE: ICD-10-CM

## 2018-05-18 DIAGNOSIS — E03.8 HYPOTHYROIDISM DUE TO HASHIMOTO'S THYROIDITIS: ICD-10-CM

## 2018-05-18 DIAGNOSIS — M85.80 OSTEOPENIA, UNSPECIFIED LOCATION: ICD-10-CM

## 2018-05-18 DIAGNOSIS — M15.9 GENERALIZED OSTEOARTHRITIS: ICD-10-CM

## 2018-05-18 DIAGNOSIS — E78.2 MIXED HYPERLIPIDEMIA: Primary | ICD-10-CM

## 2018-05-18 DIAGNOSIS — F41.8 DEPRESSION WITH ANXIETY: ICD-10-CM

## 2018-05-18 PROCEDURE — 99214 OFFICE O/P EST MOD 30 MIN: CPT | Performed by: GENERAL PRACTICE

## 2018-05-18 RX ORDER — LORAZEPAM 1 MG/1
1 TABLET ORAL DAILY PRN
Qty: 90 TABLET | Refills: 0 | Status: SHIPPED | OUTPATIENT
Start: 2018-05-18 | End: 2018-06-13 | Stop reason: SDUPTHER

## 2018-05-18 NOTE — PROGRESS NOTES
Subjective   Lisa Hill is a 58 y.o. female.     History of Present Illness     COPD  The patient reports that her SOB and cough have remained stable. She states that these symptoms occur at any time during the day or night. She reports that her symptoms become worse with activity, exposure to cold air and environmental allergens. Her symptoms are reduced with rest and with inhaled inhaled medication. The patient states she also has nasal congestion. She is following the treatment plan, including medications as directed.  She is currently on albuterol as needed. She underwent a pulmonology assessment with Dr English since last here and was started on symbicort but was unable to tolerate this. She will return for a pulmonology reassessment in 6/20/18. She continues to smoke about 1 pack per day. Low dose CT of the chest performed on 6/2/17 revaled moderate emphysema as well as a benign appearing 4 mm semisolid nodule in the LLL. Repeat study in 1 year was recommended    Depression  Onset was a number of years ago. Symptoms have been relatively stable since last here. Current symptoms include: depression, nervousness, anhedonia, difficulty concentrating, fatigue and impaired memory. Patient denies recurrent thoughts of death or suicidal thoughts. Risk factors: history of seasonal affective disorder.  Current medication includes bupropion 300 daily, mirtazapine 15 qpm, aripiprazole 2.5 qam, and lorazepam 0.5 twice a day. She is currently followed by psychiatry and psychotherapy    Hypothyroidism  Patient presents for evaluation of thyroid function. Symptoms consist of weight gain, constipation. The symptoms are moderate.  The problem has been unchanged.  Previous thyroid studies include TSH. The hypothyroidism is due to Hashimoto's disease.     Dyslipidemia  Compliance with treatment has been fair. The patient exercises occasionally. She is currently being prescribed the following medication for her dyslipidemia -  simvastatin. Patient denies side effects associated with her medications.     The following portions of the patient's history were reviewed and updated as appropriate: allergies, current medications, past medical history, past social history and problem list.    Review of Systems   Constitutional: Positive for fatigue. Negative for appetite change, chills, fever and unexpected weight change.   HENT: Negative for congestion, ear pain, postnasal drip, rhinorrhea, sneezing, sore throat and voice change.    Eyes: Negative for visual disturbance.   Respiratory: Positive for cough, shortness of breath and wheezing.    Cardiovascular: Positive for leg swelling. Negative for chest pain and palpitations.   Gastrointestinal: Positive for constipation. Negative for abdominal pain, anal bleeding, blood in stool, diarrhea, nausea and vomiting.   Endocrine: Negative for polydipsia.   Genitourinary: Negative for difficulty urinating, dysuria, frequency, hematuria, menstrual problem, pelvic pain, urgency, vaginal bleeding, vaginal discharge and vaginal pain.        Incontinence with coughing, sneezing, or lifting   Musculoskeletal: Positive for arthralgias and back pain. Negative for joint swelling, myalgias and neck pain.   Skin: Negative for color change.   Neurological: Negative for tremors, weakness, numbness and headaches.   Psychiatric/Behavioral: Positive for decreased concentration, dysphoric mood and sleep disturbance (chronic-intermittent). Negative for suicidal ideas.     Objective   Physical Exam   Constitutional: She is oriented to person, place, and time. No distress.   Bright, in fair spirits.  No apparent distress.  No pallor, jaundice, cyanosis or diaphoresis   HENT:   Head: Atraumatic.   Right Ear: Tympanic membrane, external ear and ear canal normal.   Left Ear: Tympanic membrane, external ear and ear canal normal.   Nose: Nose normal.   Mouth/Throat: Oropharynx is clear and moist. Mucous membranes are not pale  and not cyanotic.   Eyes: EOM are normal. Pupils are equal, round, and reactive to light. No scleral icterus.   Neck: No JVD present. Carotid bruit is not present. No tracheal deviation present. No thyromegaly present.   Cardiovascular: Normal rate, regular rhythm, S1 normal, S2 normal and intact distal pulses.  Exam reveals no gallop, no S3 and no S4.    No murmur heard.  Pulmonary/Chest: No accessory muscle usage or stridor. No respiratory distress. She has decreased breath sounds. She has wheezes (diffuse-mild).   Hyperinflation of the chest   Abdominal: Soft. Normal aorta and bowel sounds are normal. She exhibits no distension, no abdominal bruit and no mass. There is no hepatosplenomegaly. There is no tenderness. No hernia.   Musculoskeletal: She exhibits no tenderness or deformity.     Vascular Status -  Her right foot exhibits abnormal foot edema (trace). Her left foot exhibits abnormal foot edema (trace).  Lymphadenopathy:        Head (right side): No submandibular adenopathy present.        Head (left side): No submandibular adenopathy present.     She has no cervical adenopathy.   Neurological: She is alert and oriented to person, place, and time. She has normal reflexes. She displays normal reflexes. No cranial nerve deficit. She exhibits normal muscle tone. Coordination normal.   Skin: Skin is warm and dry. No rash noted. She is not diaphoretic. No cyanosis. No pallor. Nails show no clubbing.   Psychiatric: She has a normal mood and affect. Her speech is normal and behavior is normal. Thought content normal. Cognition and memory are normal. She expresses no suicidal ideation.     Assessment/Plan   Problems Addressed this Visit        Cardiovascular and Mediastinum    Mixed hyperlipidemia   Encouraged to continue to work on her diet and exercise plan.  Continue current medication  Fasting labs scheduled    Relevant Orders    Comprehensive Metabolic Panel    Lipid Panel       Respiratory    COPD mixed  type  COPD is worsening.  Reminded of the importance of smoking cessation  Encouraged to remain as active as symptoms allow for  Follow up with pulmonology     Chronic seasonal allergic rhinitis       Digestive    Vitamin D deficiency  Continue maintenance supplementation with monitoring.    Relevant Orders    Vitamin D 25 Hydroxy    Gastroesophageal reflux disease without esophagitis    Chronic constipation       Endocrine    Hypothyroidism due to Hashimoto's thyroiditis  Clinically euthyroid.  Continue current medication.    Relevant Orders    TSH       Musculoskeletal and Integument    Osteopenia    Generalized osteoarthritis  Reminded regarding symptomatic treatment.   Continue current medication       Genitourinary    Stress bladder incontinence, female       Other    Prediabetes  Will continue to monitor    Relevant Orders    Hemoglobin A1c    Depression with anxiety  Improved  Supportive therapy  Continue current medication  Follow up with psychiatry    Relevant Medications    LORazepam (ATIVAN) 1 MG tablet    Smoker    Healthcare maintenance  Will arrange for a DEXA scan at the same time as her scheduled mammogram    Relevant Orders    DEXA Bone Density Axial    Macrocytosis  Will continue to monitor    Relevant Orders    CBC & Differential    Vitamin B12    Folate RBC

## 2018-05-19 VITALS
HEIGHT: 62 IN | BODY MASS INDEX: 42.69 KG/M2 | SYSTOLIC BLOOD PRESSURE: 125 MMHG | TEMPERATURE: 98.6 F | OXYGEN SATURATION: 96 % | HEART RATE: 108 BPM | WEIGHT: 232 LBS | RESPIRATION RATE: 12 BRPM | DIASTOLIC BLOOD PRESSURE: 70 MMHG

## 2018-05-25 RX ORDER — ALBUTEROL SULFATE 90 UG/1
2 AEROSOL, METERED RESPIRATORY (INHALATION) EVERY 4 HOURS PRN
Qty: 1 INHALER | Refills: 11 | Status: SHIPPED | OUTPATIENT
Start: 2018-05-25 | End: 2018-07-30 | Stop reason: SDUPTHER

## 2018-05-30 ENCOUNTER — OFFICE VISIT (OUTPATIENT)
Dept: PSYCHIATRY | Facility: CLINIC | Age: 58
End: 2018-05-30

## 2018-05-30 DIAGNOSIS — F41.8 DEPRESSION WITH ANXIETY: ICD-10-CM

## 2018-05-30 DIAGNOSIS — F45.0 SOMATIZATION DISORDER: ICD-10-CM

## 2018-05-30 PROCEDURE — 90834 PSYTX W PT 45 MINUTES: CPT | Performed by: SOCIAL WORKER

## 2018-05-30 NOTE — PROGRESS NOTES
"PROGRESS NOTE  Data:  Lisa Hill came in 5/30/2018 for her regularly scheduled therapy session starting at 2 PM and ending at 245 PM with Terry Brito, [facility] .  Patient reporting that she was started on new medications 2 weeks ago.  According to patient her sleep has improved significantly.  Patient has not followed through on the homework assignment of listening and utilizing the mindfulness CDs that were given to her to address techniques in coping with her anxiety.  Patient shared that only this past week she had thoughts about the pending fall whether and return of the \"seasonal affective disorder\".  Reinforcing to patient the importance of focusing on the present as a necessity to overcoming her anxiety and fears.  Patient is taking care of her 7-year-old grandson throughout the day during the summer months.  Apparently this has been an ongoing situation for the past 3 years.  She acknowledges that it is good for her to have the company of her grandson yet when he returns to school she does become somewhat saddened.  This may be a cousin tripping factor to patient's mood changes that occur at the time in which she starts experiencing symptoms associated with her seasonal affective disorder      (Scales based on 0 - 10 with 10 being the worst)  Depression: 0 Anxiety: 2   Distress: 1 Sleep: 0   Tasks Completed on Time: 2 Mood: 1   Number of Panic Attacks: 0 Appetite: 0     Mental Status Exam  Hygiene:  good  Dress:  casual  Attitude:  Cooperative  Motor Activity:  hand fidgeting and twisting observed at times  Speech:  Normal  Mood:  within normal limits  Affect:  labile  Thought Processes:  Tangential  Thought Content:  normal  Suicidal Thoughts:  denies  Homicidal Thoughts:  denies  Crisis Safety Plan: yes, to come to the emergency room.  Hallucinations:  denies      Clinical Maneuvering/Intervention:  Processing the above with patient.  Validating patient's emotions and feelings as " well as her concerns.  Exploring issues associated with aging and developmental stages as well as the importance of finding alternative techniques in accomplishing the same task that she has found enjoyment in in the past but physically she is not up to completing them as of this time.  Identifying and setting goals to accomplish tasks that she wants to continue engaging in.  Allowed patient to freely discuss issues without interruption or judgment. Provided safe, confidential environment to facilitate the development of positive therapeutic relationship and encourage open, honest communication. Assisted patient in identifying risk factors which would indicate the need for higher level of care including thoughts to harm self or others and/or self-harming behavior and encouraged patient to contact this office, call 911, or present to the nearest emergency room should any of these events occur. Discussed crisis intervention services and means to access.  Patient adamantly and convincingly denies current suicidal or homicidal ideation or perceptual disturbance.    Prognosis: Fair with Ongoing Treatment     GAF: No impairment    Assessment patient displaying the tendency of engaging in self-fulfilling prophecy as she was informed and educated that she had seasonal affective disorder and she saw that as a disability instead of a disorder that she could overcome.  Has limited intellectual skills and this may be a contributing factor to her inability to believe in herself and make the necessary changes.    Diagnoses and all orders for this visit:    Depression with anxiety    Somatization disorder            Patient's Support Network Includes:  , daughter, son and extended family    Plan     We will titrate the psychotherapy appointments over the course of summer months with the focus being on patient living in the present and enjoying her life with out the fear of changes that may come in the fall months.  Patient  to continue utilizing the mindfulness CDs during this time.  Patient was informed that she could at any time call the office here in the [facility] to schedule an appointment if she began to feel distressed or needed to come in to see therapist before her next scheduled appointment.  Patient will adhere to medication regimen as prescribed and report any side effects. Patient will contact this office, call 911 or present to the nearest emergency room should suicidal or homicidal ideations occur. Provide Cognitive Behavioral Therapy and Solution Focused Therapy to improve functioning, maintain stability, and avoid decompensation and the need for higher level of care.          Return in about 2 months (around 7/30/2018) for Next scheduled follow up.

## 2018-06-04 ENCOUNTER — HOSPITAL ENCOUNTER (OUTPATIENT)
Dept: MAMMOGRAPHY | Facility: HOSPITAL | Age: 58
Discharge: HOME | End: 2018-06-04

## 2018-06-04 ENCOUNTER — APPOINTMENT (OUTPATIENT)
Dept: BONE DENSITY | Facility: HOSPITAL | Age: 58
End: 2018-06-04

## 2018-06-13 ENCOUNTER — OFFICE VISIT (OUTPATIENT)
Dept: PSYCHIATRY | Facility: CLINIC | Age: 58
End: 2018-06-13

## 2018-06-13 VITALS
DIASTOLIC BLOOD PRESSURE: 80 MMHG | HEIGHT: 62 IN | HEART RATE: 96 BPM | OXYGEN SATURATION: 94 % | SYSTOLIC BLOOD PRESSURE: 140 MMHG | BODY MASS INDEX: 42.88 KG/M2 | WEIGHT: 233 LBS

## 2018-06-13 DIAGNOSIS — F33.1 MDD (MAJOR DEPRESSIVE DISORDER), RECURRENT EPISODE, MODERATE (HCC): ICD-10-CM

## 2018-06-13 DIAGNOSIS — F45.0 SOMATIZATION DISORDER: Primary | ICD-10-CM

## 2018-06-13 PROCEDURE — 99214 OFFICE O/P EST MOD 30 MIN: CPT | Performed by: NURSE PRACTITIONER

## 2018-06-13 RX ORDER — BUPROPION HYDROCHLORIDE 300 MG/1
300 TABLET ORAL EVERY MORNING
Qty: 30 TABLET | Refills: 1 | Status: SHIPPED | OUTPATIENT
Start: 2018-06-13 | End: 2018-09-06 | Stop reason: SDUPTHER

## 2018-06-13 RX ORDER — ARIPIPRAZOLE 5 MG/1
5 TABLET ORAL EVERY MORNING
Qty: 30 TABLET | Refills: 1 | Status: SHIPPED | OUTPATIENT
Start: 2018-06-13 | End: 2018-07-30 | Stop reason: SINTOL

## 2018-06-13 RX ORDER — LORAZEPAM 1 MG/1
1 TABLET ORAL DAILY PRN
Qty: 90 TABLET | Refills: 0 | Status: SHIPPED | OUTPATIENT
Start: 2018-06-13 | End: 2018-08-14 | Stop reason: SDUPTHER

## 2018-06-13 RX ORDER — MIRTAZAPINE 15 MG/1
15 TABLET, FILM COATED ORAL NIGHTLY
Qty: 30 TABLET | Refills: 1 | Status: SHIPPED | OUTPATIENT
Start: 2018-06-13 | End: 2018-07-30

## 2018-06-13 RX ORDER — BUSPIRONE HYDROCHLORIDE 15 MG/1
15 TABLET ORAL 3 TIMES DAILY
Qty: 90 TABLET | Refills: 1 | Status: SHIPPED | OUTPATIENT
Start: 2018-06-13 | End: 2020-08-17

## 2018-06-13 NOTE — PROGRESS NOTES
"Subjective   Lisa Hill is a 58 y.o. female who is here today for follow up appointment.     Chief Complaint:       HPI:  History of Present IllnessPatient presents for medication management of anxiety and depression.  Patient has long history of symptoms-she relates varying symptoms.  She reports that her symptoms have not resolved. She shared she continues to have issues with depression and anxiety.  She has panic attacks feels as if she cannot breathe and that her heart is skipping.  The patient is reporting that by this time her symptoms are cleared.  She denies any medication related side effects.  She reports anxiety is \"pretty bad sometimes\".  The patient reports the following symptoms of anxiety: constant anxiety/worry, restlessness/on edge, difficulty concentrating, mind goes blank, irritability.  Sleep disturbance has improved, sleeping 9 hours per night with occasional awakening to go to the bathroom but falls back to sleep.  Rates 2/10 with 10 being the worst.       Currently, patient reports depressive symptoms including depressed mood, overeating, feelings of guilt, feelings of helplessness, feelings of worthlessness and low energy, and have caused impairment in important areas of functioning.  .  She currently rates Depression rated 4/10 with 10 being the worst.  She adamantly and consistently denied any thoughts to harm herself or others.  She denied any psychotic phenomenon.     Family History:  family history includes Breast cancer in her maternal aunt; Diabetes in her mother; Hypertension in her father; Kidney disease in her mother.    Medical/Surgical History:  Past Medical History:   Diagnosis Date   • Anxiety    • COPD (chronic obstructive pulmonary disease)    • Depression    • GERD (gastroesophageal reflux disease)    • Hyperlipidemia    • Hypertension      Past Surgical History:   Procedure Laterality Date   • CHOLECYSTECTOMY     • HYSTERECTOMY     • TONSILLECTOMY         Allergies "   Allergen Reactions   • Sulfa Antibiotics        Current Medications:   Current Outpatient Prescriptions   Medication Sig Dispense Refill   • albuterol (PROAIR HFA) 108 (90 Base) MCG/ACT inhaler Inhale 2 puffs Every 4 (Four) Hours As Needed for Shortness of Air. 1 inhaler 11   • albuterol (PROVENTIL) (2.5 MG/3ML) 0.083% nebulizer solution Take 2.5 mg by nebulization Every 4 (Four) Hours As Needed for Shortness of Air. 180 vial 5   • albuterol (VENTOLIN HFA) 108 (90 BASE) MCG/ACT inhaler Inhale 2 puffs Every 4 (Four) Hours As Needed for Wheezing. 18 g 5   • ARIPiprazole (ABILIFY) 5 MG tablet Take 0.5 tablets by mouth Every Morning. 15 tablet 0   • buPROPion XL (WELLBUTRIN XL) 300 MG 24 hr tablet Take 1 tablet by mouth Every Morning. 30 tablet 0   • busPIRone (BUSPAR) 15 MG tablet Take 1 tablet by mouth 3 (Three) Times a Day. 90 tablet 0   • cholecalciferol (VITAMIN D3) 1000 units tablet TAKE 1 TABLET BY MOUTH DAILY 30 tablet 5   • conjugated estrogens (PREMARIN) 0.625 MG/GM vaginal cream Insert  into the vagina 2 (Two) Times a Week. 30 g 5   • docusate sodium (DOCQLACE) 100 MG capsule Take 1 capsule by mouth 2 (Two) Times a Day. 60 capsule 5   • fluticasone (FLONASE) 50 MCG/ACT nasal spray Administer 2 sprays both nostrils once daily 1 bottle 5   • furosemide (LASIX) 40 MG tablet Take 1 tablet by mouth Every Morning. 5 tablet 0   • ketoconazole (NIZORAL) 2 % shampoo Apply  topically 2 (Two) Times a Week. 120 mL 5   • levothyroxine (SYNTHROID, LEVOTHROID) 175 MCG tablet TAKE 1 TABLET BY MOUTH DAILY 30 tablet 5   • LORazepam (ATIVAN) 1 MG tablet Take 1 tablet by mouth Daily As Needed for Anxiety (Panic). May take in addition to previously prescribed 0.5 mg-during periods of panic. 90 tablet 0   • mirtazapine (REMERON) 15 MG tablet Take 1 tablet by mouth Every Night. 30 tablet 0   • montelukast (SINGULAIR) 10 MG tablet      • pantoprazole (PROTONIX) 40 MG EC tablet Take 1 tablet by mouth Daily. 30 tablet 5   •  "simvastatin (ZOCOR) 20 MG tablet Take 1 tablet by mouth Every Night. 30 tablet 5   • triamcinolone (KENALOG) 0.1 % cream Apply  topically 2 (Two) Times a Day. 80 g 1   • Zoster Vac Recomb Adjuvanted (SHINGRIX) 50 MCG reconstituted suspension Inject 50 mcg into the shoulder, thigh, or buttocks See Admin Instructions. Repeat in 2-6 months 1 each 1     No current facility-administered medications for this visit.        Review of Systems      Objective   Physical Exam  Blood pressure 140/80, pulse 96, height 157.5 cm (62\"), weight 106 kg (233 lb), SpO2 94 %.    Mental Status Exam:   Hygiene:   good  Cooperation:  Cooperative  Eye Contact:  Good  Psychomotor Behavior:  Appropriate  Affect:  Full range  Hopelessness: Denies  Speech:  Normal  Thought Process:  Goal directed and Linear  Thought Content:  Mood congurent  Suicidal:  None  Homicidal:  None  Hallucinations:  None  Delusion:  None  Memory:  Intact  Orientation:  Person, Place, Time and Situation  Reliability:  fair  Insight:  Fair  Judgement:  Fair  Impulse Control:  Fair  Physical/Medical Issues:  Yes See medical history        Assessment/Plan   Diagnoses and all orders for this visit:    Somatization disorder    MDD (major depressive disorder), recurrent episode, moderate        Patient was continue her previously prescribed medications including Wellbutrin 300 mg once a day, Remeron 15 PO QHS. Increase Abilify 5mg PO daily.  Patient has poor insight into symptoms of anxiety and depression.    Patient is being prescribed a controlled substance as part of treatment plan. Patient has been educated of appropriate use of the medications, including risk of somnolence, limited ability to drive and/or work safely, and potential for dependence, respiratory depression and overdose. Patient is also informed that the medication are to be used by the patient only- avoid any combined use of ETOH or other substances unless prescribed.       We discussed risks, benefits, and " side effects of the above medication and the patient was agreeable with the plan.     No Follow-up on file.

## 2018-06-20 ENCOUNTER — OFFICE VISIT (OUTPATIENT)
Dept: PULMONOLOGY | Facility: CLINIC | Age: 58
End: 2018-06-20

## 2018-06-20 VITALS
WEIGHT: 233 LBS | BODY MASS INDEX: 42.88 KG/M2 | SYSTOLIC BLOOD PRESSURE: 170 MMHG | OXYGEN SATURATION: 97 % | HEART RATE: 97 BPM | HEIGHT: 62 IN | DIASTOLIC BLOOD PRESSURE: 97 MMHG

## 2018-06-20 DIAGNOSIS — J44.9 NOCTURNAL HYPOXEMIA DUE TO OBSTRUCTIVE CHRONIC BRONCHITIS (HCC): ICD-10-CM

## 2018-06-20 DIAGNOSIS — G47.36 NOCTURNAL HYPOXEMIA DUE TO OBSTRUCTIVE CHRONIC BRONCHITIS (HCC): ICD-10-CM

## 2018-06-20 DIAGNOSIS — J41.0 SIMPLE CHRONIC BRONCHITIS (HCC): Primary | ICD-10-CM

## 2018-06-20 DIAGNOSIS — E66.01 MORBID OBESITY WITH BMI OF 40.0-44.9, ADULT (HCC): ICD-10-CM

## 2018-06-20 PROCEDURE — 99214 OFFICE O/P EST MOD 30 MIN: CPT | Performed by: INTERNAL MEDICINE

## 2018-06-20 RX ORDER — ALBUTEROL SULFATE 90 UG/1
2 AEROSOL, METERED RESPIRATORY (INHALATION) EVERY 4 HOURS PRN
Qty: 1 INHALER | Refills: 11 | Status: SHIPPED | OUTPATIENT
Start: 2018-06-20 | End: 2018-07-30 | Stop reason: SDUPTHER

## 2018-06-20 NOTE — PROGRESS NOTES
Subjective   Lisa Hill is a 58 y.o. female who is being seen for Shortness of Breath; Cough; Wheezing; and COPD    History of Present Illness   Patient returns after home sleep study.  This patient presented to us with symptoms of ongoing shortness of breath, she also had symptoms consistent with ulcerative sleep apnea.  We went to order an in lab study but patient did not want to go for it.  Finally she went for a home sleep study.  Patient tells me that that night she could not sleep properly and her symptoms of snoring was much less.  Subjectively she still continues to have significant shortness of breath on mild exertion.  She also has the same symptoms of snoring, fragmented sleep and increased daytime sleepiness.  Past Medical History:   Diagnosis Date   • Anxiety    • COPD (chronic obstructive pulmonary disease)    • Depression    • GERD (gastroesophageal reflux disease)    • Hyperlipidemia    • Hypertension      Past Surgical History:   Procedure Laterality Date   • CHOLECYSTECTOMY     • HYSTERECTOMY     • TONSILLECTOMY       Family History   Problem Relation Age of Onset   • Kidney disease Mother    • Diabetes Mother    • Hypertension Father    • Breast cancer Maternal Aunt       reports that she has been smoking Cigarettes.  She has been smoking about 0.25 packs per day. She has never used smokeless tobacco. She reports that she does not drink alcohol.  Allergies   Allergen Reactions   • Sulfa Antibiotics            Patient has received a flu shot and a pneumonia vaccination.     The following portions of the patient's history were reviewed and updated as appropriate: allergies, current medications, past family history, past medical history, past social history, past surgical history and problem list.    Review of Systems   Constitutional: Negative for appetite change, chills, diaphoresis and unexpected weight change.   HENT: Negative for sore throat, trouble swallowing and voice change.    Eyes:  "Negative for visual disturbance.   Respiratory: Positive for cough (Productive), shortness of breath and wheezing. Negative for apnea and choking.    Cardiovascular: Negative for chest pain, palpitations and leg swelling.   Gastrointestinal: Negative for abdominal pain, constipation, diarrhea, nausea and vomiting.   Endocrine: Negative for cold intolerance, heat intolerance, polydipsia, polyphagia and polyuria.   Genitourinary: Negative for difficulty urinating and dysuria.   Musculoskeletal: Negative for gait problem.   Skin: Negative for rash and wound.   Neurological: Negative for syncope and light-headedness.   Hematological: Negative for adenopathy.   Psychiatric/Behavioral: Negative for agitation, behavioral problems and confusion.   All other systems reviewed and are negative.      Objective   /97   Pulse 97   Ht 157.5 cm (62\")   Wt 106 kg (233 lb)   SpO2 97%   BMI 42.62 kg/m²   Physical Exam   Constitutional: She is oriented to person, place, and time.   HENT:   Head: Normocephalic and atraumatic.   Nose: Mucosal edema present.   Eyes: EOM are normal. Pupils are equal, round, and reactive to light.   Neck: Neck supple.   Cardiovascular: Normal rate, regular rhythm and normal heart sounds.    Pulmonary/Chest: She has rhonchi.   Vesicular breath sound bilaterally with prolonged expiratory phase   Abdominal: Soft. Bowel sounds are normal.   Musculoskeletal: Normal range of motion. She exhibits no deformity.   Neurological: She is alert and oriented to person, place, and time.   Skin: Skin is warm and dry.   Psychiatric: She has a normal mood and affect. Her behavior is normal.   Nursing note and vitals reviewed.        Radiology:  No Images in the past 120 days found..    Lab Results:  Admission on 01/24/2018, Discharged on 01/24/2018   Component Date Value Ref Range Status   • Glucose 01/24/2018 99  70 - 110 mg/dL Final   • BUN 01/24/2018 13  7 - 21 mg/dL Final   • Creatinine 01/24/2018 0.96  0.43 - " 1.29 mg/dL Final   • Sodium 01/24/2018 136  135 - 153 mmol/L Final   • Potassium 01/24/2018 4.6  3.5 - 5.3 mmol/L Final   • Chloride 01/24/2018 107  99 - 112 mmol/L Final   • CO2 01/24/2018 19.9* 24.3 - 31.9 mmol/L Final   • Calcium 01/24/2018 9.1  7.7 - 10.0 mg/dL Final   • Total Protein 01/24/2018 7.7  6.0 - 8.0 g/dL Final   • Albumin 01/24/2018 4.40  3.50 - 5.00 g/dL Final   • ALT (SGPT) 01/24/2018 45* 10 - 36 U/L Final   • AST (SGOT) 01/24/2018 31* 10 - 30 U/L Final   • Alkaline Phosphatase 01/24/2018 57  35 - 104 U/L Final   • Total Bilirubin 01/24/2018 0.2  0.2 - 1.8 mg/dL Final   • eGFR Non African Amer 01/24/2018 59* >60 mL/min/1.73 Final   • Globulin 01/24/2018 3.3  gm/dL Final   • A/G Ratio 01/24/2018 1.3* 1.5 - 2.5 g/dL Final   • BUN/Creatinine Ratio 01/24/2018 13.5  7.0 - 25.0 Final   • Anion Gap 01/24/2018 9.1  3.6 - 11.2 mmol/L Final   • Troponin I 01/24/2018 <0.006  <=0.040 ng/mL Final   • Protime 01/24/2018 12.5  11.0 - 15.4 Seconds Final   • INR 01/24/2018 0.93  0.90 - 1.10 Final   • Extra Tube 01/24/2018 hold for add-on   Final   • Extra Tube 01/24/2018 Hold for add-ons.   Final   • Extra Tube 01/24/2018 hold for add-on   Final   • Extra Tube 01/24/2018 Hold for add-ons.   Final   • WBC 01/24/2018 11.82  4.50 - 12.50 10*3/mm3 Final   • RBC 01/24/2018 4.68  4.20 - 5.40 10*6/mm3 Final   • Hemoglobin 01/24/2018 15.1  12.0 - 16.0 g/dL Final   • Hematocrit 01/24/2018 45.1  37.0 - 47.0 % Final   • MCV 01/24/2018 96.4* 80.0 - 94.0 fL Final   • MCH 01/24/2018 32.3  27.0 - 33.0 pg Final   • MCHC 01/24/2018 33.5  33.0 - 37.0 g/dL Final   • RDW 01/24/2018 13.9  11.5 - 14.5 % Final   • RDW-SD 01/24/2018 49.0  37.0 - 54.0 fl Final   • MPV 01/24/2018 11.1* 6.0 - 10.0 fL Final   • Platelets 01/24/2018 377  130 - 400 10*3/mm3 Final   • Neutrophil % 01/24/2018 59.4  30.0 - 70.0 % Final   • Lymphocyte % 01/24/2018 28.7  21.0 - 51.0 % Final   • Monocyte % 01/24/2018 8.9  0.0 - 10.0 % Final   • Eosinophil % 01/24/2018  1.7  0.0 - 5.0 % Final   • Basophil % 01/24/2018 0.8  0.0 - 2.0 % Final   • Immature Grans % 01/24/2018 0.5  0.0 - 0.5 % Final   • Neutrophils, Absolute 01/24/2018 7.02* 1.40 - 6.50 10*3/mm3 Final   • Lymphocytes, Absolute 01/24/2018 3.39* 1.00 - 3.00 10*3/mm3 Final   • Monocytes, Absolute 01/24/2018 1.05* 0.10 - 0.90 10*3/mm3 Final   • Eosinophils, Absolute 01/24/2018 0.20  0.00 - 0.70 10*3/mm3 Final   • Basophils, Absolute 01/24/2018 0.10  0.00 - 0.30 10*3/mm3 Final   • Immature Grans, Absolute 01/24/2018 0.06* 0.00 - 0.03 10*3/mm3 Final   • nRBC 01/24/2018 0.0  0.0 - 0.0 /100 WBC Final   • RBC Morphology 01/24/2018 Normal  Normal Final   • Large Platelets 01/24/2018 Slight/1+  None Seen Final   • Osmolality Calc 01/24/2018 272.1* 273.0 - 305.0 mOsm/kg Final   • Site 01/24/2018 Arterial: right brachial   Final   • Ivan's Test 01/24/2018 N/A   Final   • pH, Arterial 01/24/2018 7.431  7.350 - 7.450 pH units Final   • pCO2, Arterial 01/24/2018 36.1  35.0 - 45.0 mm Hg Final   • pO2, Arterial 01/24/2018 86.1  80.0 - 100.0 mm Hg Final   • HCO3, Arterial 01/24/2018 23.5  22.0 - 26.0 mmol/L Final   • Base Excess, Arterial 01/24/2018 -0.4  mmol/L Final   • O2 Saturation, Arterial 01/24/2018 97.0  90.0 - 100.0 % Final   • Hemoglobin, Blood Gas 01/24/2018 14.3  12 - 16 g/dL Final   • Hematocrit, Blood Gas 01/24/2018 42.0  37.0 - 47.0 % Final   • Oxyhemoglobin 01/24/2018 92.2  85 - 100 % Final   • Methemoglobin 01/24/2018 0.00  0.00 - 3.00 % Final   • Carboxyhemoglobin 01/24/2018 4.9  0 - 5 % Final   • A-a Gradiant 01/24/2018 13.9  0.0 - 300.0 mmHg Final   • Temperature 01/24/2018 98.6  C Final   • Barometric Pressure for Blood Gas 01/24/2018 729  mmHg Final   • Modality 01/24/2018 Room Air   Final   • FIO2 01/24/2018 21  % Final   • D-Dimer, Quantitative 01/24/2018 1.12* 0.00 - 0.50 MCGFEU/mL Final   • Troponin I 01/24/2018 <0.006  <=0.040 ng/mL Final           Assessment      ICD-10-CM ICD-9-CM   1. Simple chronic  bronchitis J41.0 491.0   2. Nocturnal hypoxemia due to obstructive chronic bronchitis J44.9 491.20    G47.36 327.26   3. Morbid obesity with BMI of 40.0-44.9, adult E66.01 278.01    Z68.41 V85.41                DISCUSSION:  I have reviewed the home sleep study.  Patient not meet the threshold for a diagnosis of obstructive sleep apnea although she had few apnea-hypopnea episodes.  On the other hand she had significant oxygen desaturation.  I'm wondering if we have missed that diagnosis of obstructive sleep apnea.  I had a good discussion with her, our recommendation is to go for a in lab study.  But she wanted to wait little bit.  Meanwhile we are ordering a overnight sleep oximetry study to see if she would qualify for nocturnal oxygen therapy.    Her symptoms of COPD is still persisting but this is at her baseline.  We are continuing her same medications and giving her refills.    Plan    Orders Placed This Encounter   Procedures   • Overnight Sleep Oximetry Study     New Medications Ordered This Visit   Medications   • albuterol (PROAIR HFA) 108 (90 Base) MCG/ACT inhaler     Sig: Inhale 2 puffs Every 4 (Four) Hours As Needed for Shortness of Air.     Dispense:  1 inhaler     Refill:  11   • umeclidinium-vilanterol (ANORO ELLIPTA) 62.5-25 MCG/INH aerosol powder  inhaler     Sig: Inhale 1 puff Daily.     Dispense:  1 each     Refill:  6                  Sierra English MD, FCCP, [facility]  Pulmonary, Critical Care, and Sleep Medicine

## 2018-06-21 ENCOUNTER — OFFICE VISIT (OUTPATIENT)
Dept: FAMILY MEDICINE CLINIC | Facility: CLINIC | Age: 58
End: 2018-06-21

## 2018-06-21 VITALS
HEIGHT: 62 IN | RESPIRATION RATE: 12 BRPM | SYSTOLIC BLOOD PRESSURE: 150 MMHG | OXYGEN SATURATION: 96 % | TEMPERATURE: 98.1 F | DIASTOLIC BLOOD PRESSURE: 75 MMHG | HEART RATE: 100 BPM | BODY MASS INDEX: 43.02 KG/M2 | WEIGHT: 233.8 LBS

## 2018-06-21 DIAGNOSIS — I10 ESSENTIAL HYPERTENSION: Primary | ICD-10-CM

## 2018-06-21 DIAGNOSIS — E06.3 HYPOTHYROIDISM DUE TO HASHIMOTO'S THYROIDITIS: ICD-10-CM

## 2018-06-21 DIAGNOSIS — Z00.00 HEALTHCARE MAINTENANCE: ICD-10-CM

## 2018-06-21 DIAGNOSIS — F17.200 SMOKER: ICD-10-CM

## 2018-06-21 DIAGNOSIS — R73.03 PREDIABETES: ICD-10-CM

## 2018-06-21 DIAGNOSIS — F41.8 DEPRESSION WITH ANXIETY: ICD-10-CM

## 2018-06-21 DIAGNOSIS — E03.8 HYPOTHYROIDISM DUE TO HASHIMOTO'S THYROIDITIS: ICD-10-CM

## 2018-06-21 DIAGNOSIS — E78.2 MIXED HYPERLIPIDEMIA: ICD-10-CM

## 2018-06-21 DIAGNOSIS — J44.9 COPD MIXED TYPE (HCC): ICD-10-CM

## 2018-06-21 PROCEDURE — 99214 OFFICE O/P EST MOD 30 MIN: CPT | Performed by: GENERAL PRACTICE

## 2018-06-21 PROCEDURE — 96372 THER/PROPH/DIAG INJ SC/IM: CPT | Performed by: GENERAL PRACTICE

## 2018-06-21 RX ORDER — METHYLPREDNISOLONE ACETATE 80 MG/ML
80 INJECTION, SUSPENSION INTRA-ARTICULAR; INTRALESIONAL; INTRAMUSCULAR; SOFT TISSUE ONCE
Status: COMPLETED | OUTPATIENT
Start: 2018-06-21 | End: 2018-06-21

## 2018-06-21 RX ORDER — LOSARTAN POTASSIUM 50 MG/1
50 TABLET ORAL EVERY EVENING
Qty: 30 TABLET | Refills: 5 | Status: SHIPPED | OUTPATIENT
Start: 2018-06-21 | End: 2018-07-16 | Stop reason: SDUPTHER

## 2018-06-21 RX ADMIN — METHYLPREDNISOLONE ACETATE 80 MG: 80 INJECTION, SUSPENSION INTRA-ARTICULAR; INTRALESIONAL; INTRAMUSCULAR; SOFT TISSUE at 12:16

## 2018-06-21 NOTE — PROGRESS NOTES
Subjective   Lisa Hill is a 58 y.o. female.     History of Present Illness     COPD Exacerbation  Current symptoms include acute on chronic dyspnea, cough productive of white sputum in small amounts and wheezing. Symptoms have been present since about a month ago and have been unchanged. She denies any chest pain or hemoptysis.  There is no history of any fever or chills.  Underwent a pulmonology reassessment yesterday at which time she was changed from symbicort to anoro and has been advised to undergo an another sleep study    Elevated Blood Pressure  When seen by pulmonology yesterday her blood pressure was 170/97.  She has been hypertensive on three other occasions over the last 6 months.    Depression  Onset was a number of years ago. Symptoms have been somewhat better since last here. Current symptoms include: depression, nervousness, anhedonia, difficulty concentrating, fatigue and impaired memory. Patient denies recurrent thoughts of death or suicidal thoughts. Risk factors: history of seasonal affective disorder.  The dosage of aripiprazole was titrated to 5 every morning since last here.  She remains on bupropion 300 daily, mirtazapine 15 qpm,  and lorazepam 0.5 twice a day. She is currently followed by psychiatry and psychotherapy    The following portions of the patient's history were reviewed and updated as appropriate: allergies, current medications, past medical history and problem list.    Review of Systems   Constitutional: Positive for fatigue. Negative for appetite change, chills, fever and unexpected weight change.   HENT: Negative for congestion, ear pain, postnasal drip, rhinorrhea, sneezing, sore throat and voice change.    Eyes: Negative for visual disturbance.   Respiratory: Positive for cough, shortness of breath and wheezing.    Cardiovascular: Positive for leg swelling. Negative for chest pain and palpitations.   Gastrointestinal: Positive for constipation. Negative for abdominal  pain, anal bleeding, blood in stool, diarrhea, nausea and vomiting.   Endocrine: Negative for polydipsia.   Genitourinary: Negative for difficulty urinating, dysuria, frequency, hematuria, menstrual problem, pelvic pain, urgency, vaginal bleeding, vaginal discharge and vaginal pain.        Incontinence with coughing, sneezing, or lifting   Musculoskeletal: Positive for arthralgias and back pain. Negative for joint swelling, myalgias and neck pain.   Skin: Negative for color change.   Neurological: Negative for tremors, weakness, numbness and headaches.   Psychiatric/Behavioral: Positive for decreased concentration, dysphoric mood and sleep disturbance (chronic-intermittent). Negative for suicidal ideas.     Objective   Physical Exam   Constitutional: She is oriented to person, place, and time. No distress.   Bright, in fair spirits.  No apparent distress.  No pallor, jaundice, cyanosis or diaphoresis   HENT:   Head: Atraumatic.   Right Ear: Tympanic membrane, external ear and ear canal normal.   Left Ear: Tympanic membrane, external ear and ear canal normal.   Nose: Nose normal.   Mouth/Throat: Oropharynx is clear and moist. Mucous membranes are not pale and not cyanotic.   Eyes: EOM are normal. Pupils are equal, round, and reactive to light. No scleral icterus.   Neck: No JVD present. Carotid bruit is not present. No tracheal deviation present. No thyromegaly present.   Cardiovascular: Normal rate, regular rhythm, S1 normal, S2 normal and intact distal pulses.  Exam reveals no gallop, no S3 and no S4.    No murmur heard.  Pulmonary/Chest: No accessory muscle usage or stridor. No respiratory distress. She has decreased breath sounds. She has wheezes (diffuse-mild).   Hyperinflation of the chest   Abdominal: Soft. Normal aorta and bowel sounds are normal. She exhibits no distension, no abdominal bruit and no mass. There is no hepatosplenomegaly. There is no tenderness. No hernia.   Musculoskeletal: She exhibits no  tenderness or deformity.     Vascular Status -  Her right foot exhibits abnormal foot edema (trace). Her left foot exhibits abnormal foot edema (trace).  Lymphadenopathy:        Head (right side): No submandibular adenopathy present.        Head (left side): No submandibular adenopathy present.     She has no cervical adenopathy.   Neurological: She is alert and oriented to person, place, and time. She has normal reflexes. She displays normal reflexes. No cranial nerve deficit. She exhibits normal muscle tone. Coordination normal.   Skin: Skin is warm and dry. No rash noted. She is not diaphoretic. No cyanosis. No pallor. Nails show no clubbing.   Psychiatric: She has a normal mood and affect. Her speech is normal and behavior is normal. Thought content normal. Cognition and memory are normal. She expresses no suicidal ideation.     Assessment/Plan   Problems Addressed this Visit        Cardiovascular and Mediastinum    Mixed hyperlipidemia  Encouraged to continue to work on her diet and exercise plan.  Continue current medication    Essential hypertension  As above.  Agreed on a trial of losartan     Relevant Medications    losartan (COZAAR) 50 MG tablet       Respiratory    COPD mixed type  COPD is worsening.  Reminded of the importance of smoking cessation  Encouraged to remain as active as symptoms allow for  Agreed on a depo corticosteroid shot   Follow up with pulmonology     Relevant Medications    methylPREDNISolone acetate (DEPO-medrol) injection 80 mg (Completed)       Endocrine    Hypothyroidism due to Hashimoto's thyroiditis       Other    Prediabetes    Depression with anxiety  Stable.  Supportive therapy.   Continue current medication.  Follow up with psychiatry    Smoker    Healthcare maintenance  Encouraged to follow up with shingrix.

## 2018-06-28 ENCOUNTER — APPOINTMENT (OUTPATIENT)
Dept: MAMMOGRAPHY | Facility: HOSPITAL | Age: 58
End: 2018-06-28

## 2018-06-28 ENCOUNTER — APPOINTMENT (OUTPATIENT)
Dept: BONE DENSITY | Facility: HOSPITAL | Age: 58
End: 2018-06-28

## 2018-07-02 ENCOUNTER — TELEPHONE (OUTPATIENT)
Dept: PSYCHIATRY | Facility: CLINIC | Age: 58
End: 2018-07-02

## 2018-07-02 NOTE — TELEPHONE ENCOUNTER
Patient of Yohana Cates, patient called stating Abilify or Trazodone is making her nervous, on the edge, and cant sit still. States she is not like she was. Also states the Trazodone is not helping her sleep at night, made her aware Yohana is out on vacation and I would try to get another providers opinion. I also suggested for her to half the Abilify and Trazodone to see if that would help as well.

## 2018-07-02 NOTE — TELEPHONE ENCOUNTER
She can take half the abilify for now see if this helps.  I do not see Trazadone listed on her medications.  Did she get that from us? What mg is it?

## 2018-07-02 NOTE — TELEPHONE ENCOUNTER
My mistake its the Remeron she is not sleeping well on, but she is aware to only take half of the Abilify to see if that helps.

## 2018-07-03 DIAGNOSIS — E03.9 ACQUIRED HYPOTHYROIDISM: ICD-10-CM

## 2018-07-03 DIAGNOSIS — J44.9 CHRONIC OBSTRUCTIVE PULMONARY DISEASE, UNSPECIFIED COPD TYPE (HCC): ICD-10-CM

## 2018-07-03 DIAGNOSIS — J30.9 CHRONIC ALLERGIC RHINITIS: ICD-10-CM

## 2018-07-03 RX ORDER — MONTELUKAST SODIUM 10 MG/1
10 TABLET ORAL DAILY
Qty: 30 TABLET | Refills: 5 | Status: SHIPPED | OUTPATIENT
Start: 2018-07-03 | End: 2018-07-30

## 2018-07-03 RX ORDER — LEVOTHYROXINE SODIUM 175 UG/1
TABLET ORAL
Qty: 30 TABLET | Refills: 5 | Status: SHIPPED | OUTPATIENT
Start: 2018-07-03 | End: 2019-01-03 | Stop reason: SDUPTHER

## 2018-07-13 ENCOUNTER — CLINICAL SUPPORT (OUTPATIENT)
Dept: FAMILY MEDICINE CLINIC | Facility: CLINIC | Age: 58
End: 2018-07-13

## 2018-07-13 VITALS — SYSTOLIC BLOOD PRESSURE: 170 MMHG | DIASTOLIC BLOOD PRESSURE: 85 MMHG

## 2018-07-16 DIAGNOSIS — I10 ESSENTIAL HYPERTENSION: ICD-10-CM

## 2018-07-16 RX ORDER — LOSARTAN POTASSIUM 50 MG/1
50 TABLET ORAL 2 TIMES DAILY
Qty: 30 TABLET | Refills: 5 | Status: SHIPPED | OUTPATIENT
Start: 2018-07-16 | End: 2018-11-21 | Stop reason: SDUPTHER

## 2018-07-16 NOTE — TELEPHONE ENCOUNTER
Patient called and stated that her pharmacy did not receive the prescription for Anoro. Re-ordered and sending for approval.

## 2018-07-16 NOTE — TELEPHONE ENCOUNTER
Patient is aware of this information.  ----- Message from Scott Chavez MD sent at 7/14/2018 10:08 AM EDT -----  She can start taking 2 of the losartan 50 mg tabs daily - she can take them at the same time    ----- Message -----  From: Yesenia Cruz MA  Sent: 7/13/2018   2:50 PM  To: Scott Chavez MD    Patient came in today for a BP check. It was 170/85. She said you had changed her medicine's at her last appointment. She wants to know if you want to change it again?

## 2018-07-30 ENCOUNTER — OFFICE VISIT (OUTPATIENT)
Dept: FAMILY MEDICINE CLINIC | Facility: CLINIC | Age: 58
End: 2018-07-30

## 2018-07-30 VITALS
TEMPERATURE: 97.4 F | WEIGHT: 228 LBS | DIASTOLIC BLOOD PRESSURE: 70 MMHG | HEIGHT: 62 IN | OXYGEN SATURATION: 95 % | SYSTOLIC BLOOD PRESSURE: 130 MMHG | BODY MASS INDEX: 41.96 KG/M2 | HEART RATE: 91 BPM

## 2018-07-30 DIAGNOSIS — F41.8 DEPRESSION WITH ANXIETY: ICD-10-CM

## 2018-07-30 DIAGNOSIS — F17.200 SMOKER: ICD-10-CM

## 2018-07-30 DIAGNOSIS — L29.0 RECTAL ITCHING: Primary | ICD-10-CM

## 2018-07-30 DIAGNOSIS — R21 RASH: ICD-10-CM

## 2018-07-30 PROCEDURE — 99407 BEHAV CHNG SMOKING > 10 MIN: CPT | Performed by: NURSE PRACTITIONER

## 2018-07-30 PROCEDURE — 99214 OFFICE O/P EST MOD 30 MIN: CPT | Performed by: NURSE PRACTITIONER

## 2018-07-30 RX ORDER — NYSTATIN AND TRIAMCINOLONE ACETONIDE 100000; 1 [USP'U]/G; MG/G
OINTMENT TOPICAL EVERY 12 HOURS SCHEDULED
Qty: 30 BOTTLE | Refills: 2 | Status: SHIPPED | OUTPATIENT
Start: 2018-07-30 | End: 2019-12-11

## 2018-07-30 RX ORDER — ESCITALOPRAM OXALATE 10 MG/1
10 TABLET ORAL DAILY
Qty: 30 TABLET | Refills: 5 | Status: SHIPPED | OUTPATIENT
Start: 2018-07-30 | End: 2019-01-31 | Stop reason: SDUPTHER

## 2018-07-30 RX ORDER — LIDOCAINE 50 MG/G
OINTMENT TOPICAL
Qty: 1 TUBE | Refills: 5 | Status: SHIPPED | OUTPATIENT
Start: 2018-07-30 | End: 2019-08-02 | Stop reason: SDUPTHER

## 2018-07-30 NOTE — PROGRESS NOTES
Subjective   Lisa Hill is a 58 y.o. female.     Chief Complaint   Patient presents with   • personal       History of Present Illness     Rectal pain and excoriation. Rash and tenderness around her rectal area. Worse after a bowel movement. Has been putting some yeast cream on the area. Becomes so raw that she bleeds at times. No history of trauma. Not had a colonoscopy. Does see large amounts of blood when she wipes at times Not noticed if blood is mixed with stool. Stool is normal consistency most of the time.     Obesity - would like to have bariatric surgery but has not quit smoking.    Smoker - smokes over a pack a day. Has smoked for many years. Using a tar blocking end for her cigarettes.     Copd - frequent exacerbations.  Under the care of pulmonology.        The following portions of the patient's history were reviewed and updated as appropriate: allergies, current medications, past family history, past medical history, past social history, past surgical history and problem list.    Review of Systems   Constitutional: Positive for fatigue. Negative for appetite change and unexpected weight change.   HENT: Positive for sinus pain. Negative for congestion, ear pain, nosebleeds, postnasal drip, rhinorrhea, sinus pressure, sore throat, trouble swallowing and voice change.    Eyes: Negative for pain and visual disturbance.   Respiratory: Positive for cough, shortness of breath and wheezing. Negative for chest tightness.    Cardiovascular: Negative for chest pain, palpitations and leg swelling.   Gastrointestinal: Positive for anal bleeding and rectal pain. Negative for abdominal pain, blood in stool, constipation and diarrhea.   Endocrine: Negative for cold intolerance and polydipsia.   Genitourinary: Negative for difficulty urinating, dysuria, flank pain, hematuria and pelvic pain.   Musculoskeletal: Positive for arthralgias and back pain. Negative for gait problem, joint swelling and myalgias.   Skin:  "Positive for color change and rash.   Allergic/Immunologic: Positive for environmental allergies.   Neurological: Negative for syncope, numbness and headaches.   Hematological: Negative.    Psychiatric/Behavioral: Positive for dysphoric mood (seasonal depression, stopped paxil a few months ago. ready to start back on med, requesting to try lexapro). Negative for sleep disturbance and suicidal ideas.   All other systems reviewed and are negative.      Objective     /70   Pulse 91   Temp 97.4 °F (36.3 °C) (Tympanic)   Ht 157.5 cm (62\")   Wt 103 kg (228 lb)   SpO2 95%   BMI 41.70 kg/m²   Admission on 01/24/2018, Discharged on 01/24/2018   Component Date Value Ref Range Status   • Glucose 01/24/2018 99  70 - 110 mg/dL Final   • BUN 01/24/2018 13  7 - 21 mg/dL Final   • Creatinine 01/24/2018 0.96  0.43 - 1.29 mg/dL Final   • Sodium 01/24/2018 136  135 - 153 mmol/L Final   • Potassium 01/24/2018 4.6  3.5 - 5.3 mmol/L Final   • Chloride 01/24/2018 107  99 - 112 mmol/L Final   • CO2 01/24/2018 19.9* 24.3 - 31.9 mmol/L Final   • Calcium 01/24/2018 9.1  7.7 - 10.0 mg/dL Final   • Total Protein 01/24/2018 7.7  6.0 - 8.0 g/dL Final   • Albumin 01/24/2018 4.40  3.50 - 5.00 g/dL Final   • ALT (SGPT) 01/24/2018 45* 10 - 36 U/L Final   • AST (SGOT) 01/24/2018 31* 10 - 30 U/L Final   • Alkaline Phosphatase 01/24/2018 57  35 - 104 U/L Final   • Total Bilirubin 01/24/2018 0.2  0.2 - 1.8 mg/dL Final   • eGFR Non African Amer 01/24/2018 59* >60 mL/min/1.73 Final   • Globulin 01/24/2018 3.3  gm/dL Final   • A/G Ratio 01/24/2018 1.3* 1.5 - 2.5 g/dL Final   • BUN/Creatinine Ratio 01/24/2018 13.5  7.0 - 25.0 Final   • Anion Gap 01/24/2018 9.1  3.6 - 11.2 mmol/L Final   • Troponin I 01/24/2018 <0.006  <=0.040 ng/mL Final   • Protime 01/24/2018 12.5  11.0 - 15.4 Seconds Final   • INR 01/24/2018 0.93  0.90 - 1.10 Final   • Extra Tube 01/24/2018 hold for add-on   Final   • Extra Tube 01/24/2018 Hold for add-ons.   Final   • Extra " Tube 01/24/2018 hold for add-on   Final   • Extra Tube 01/24/2018 Hold for add-ons.   Final   • WBC 01/24/2018 11.82  4.50 - 12.50 10*3/mm3 Final   • RBC 01/24/2018 4.68  4.20 - 5.40 10*6/mm3 Final   • Hemoglobin 01/24/2018 15.1  12.0 - 16.0 g/dL Final   • Hematocrit 01/24/2018 45.1  37.0 - 47.0 % Final   • MCV 01/24/2018 96.4* 80.0 - 94.0 fL Final   • MCH 01/24/2018 32.3  27.0 - 33.0 pg Final   • MCHC 01/24/2018 33.5  33.0 - 37.0 g/dL Final   • RDW 01/24/2018 13.9  11.5 - 14.5 % Final   • RDW-SD 01/24/2018 49.0  37.0 - 54.0 fl Final   • MPV 01/24/2018 11.1* 6.0 - 10.0 fL Final   • Platelets 01/24/2018 377  130 - 400 10*3/mm3 Final   • Neutrophil % 01/24/2018 59.4  30.0 - 70.0 % Final   • Lymphocyte % 01/24/2018 28.7  21.0 - 51.0 % Final   • Monocyte % 01/24/2018 8.9  0.0 - 10.0 % Final   • Eosinophil % 01/24/2018 1.7  0.0 - 5.0 % Final   • Basophil % 01/24/2018 0.8  0.0 - 2.0 % Final   • Immature Grans % 01/24/2018 0.5  0.0 - 0.5 % Final   • Neutrophils, Absolute 01/24/2018 7.02* 1.40 - 6.50 10*3/mm3 Final   • Lymphocytes, Absolute 01/24/2018 3.39* 1.00 - 3.00 10*3/mm3 Final   • Monocytes, Absolute 01/24/2018 1.05* 0.10 - 0.90 10*3/mm3 Final   • Eosinophils, Absolute 01/24/2018 0.20  0.00 - 0.70 10*3/mm3 Final   • Basophils, Absolute 01/24/2018 0.10  0.00 - 0.30 10*3/mm3 Final   • Immature Grans, Absolute 01/24/2018 0.06* 0.00 - 0.03 10*3/mm3 Final   • nRBC 01/24/2018 0.0  0.0 - 0.0 /100 WBC Final   • RBC Morphology 01/24/2018 Normal  Normal Final   • Large Platelets 01/24/2018 Slight/1+  None Seen Final   • Osmolality Calc 01/24/2018 272.1* 273.0 - 305.0 mOsm/kg Final   • Site 01/24/2018 Arterial: right brachial   Final   • Ivan's Test 01/24/2018 N/A   Final   • pH, Arterial 01/24/2018 7.431  7.350 - 7.450 pH units Final   • pCO2, Arterial 01/24/2018 36.1  35.0 - 45.0 mm Hg Final   • pO2, Arterial 01/24/2018 86.1  80.0 - 100.0 mm Hg Final   • HCO3, Arterial 01/24/2018 23.5  22.0 - 26.0 mmol/L Final   • Base  Excess, Arterial 01/24/2018 -0.4  mmol/L Final   • O2 Saturation, Arterial 01/24/2018 97.0  90.0 - 100.0 % Final   • Hemoglobin, Blood Gas 01/24/2018 14.3  12 - 16 g/dL Final   • Hematocrit, Blood Gas 01/24/2018 42.0  37.0 - 47.0 % Final   • Oxyhemoglobin 01/24/2018 92.2  85 - 100 % Final   • Methemoglobin 01/24/2018 0.00  0.00 - 3.00 % Final   • Carboxyhemoglobin 01/24/2018 4.9  0 - 5 % Final   • A-a Gradiant 01/24/2018 13.9  0.0 - 300.0 mmHg Final   • Temperature 01/24/2018 98.6  C Final   • Barometric Pressure for Blood Gas 01/24/2018 729  mmHg Final   • Modality 01/24/2018 Room Air   Final   • FIO2 01/24/2018 21  % Final   • D-Dimer, Quantitative 01/24/2018 1.12* 0.00 - 0.50 MCGFEU/mL Final   • Troponin I 01/24/2018 <0.006  <=0.040 ng/mL Final       Physical Exam   Constitutional: She is oriented to person, place, and time. Vital signs are normal. She appears well-developed and well-nourished. No distress.   Visibly obese.   Very pleasant lady.    HENT:   Head: Normocephalic and atraumatic.   Right Ear: External ear normal.   Left Ear: External ear normal.   Nose: Nose normal.   Mouth/Throat: Oropharynx is clear and moist. No oropharyngeal exudate.   Eyes: Pupils are equal, round, and reactive to light. Conjunctivae and EOM are normal. Right eye exhibits no discharge. Left eye exhibits no discharge.   Neck: Normal range of motion. Neck supple. No tracheal deviation present. No thyromegaly present.   Cardiovascular: Normal rate, regular rhythm, normal heart sounds and intact distal pulses.    No murmur heard.  Pulmonary/Chest: Effort normal and breath sounds normal. No respiratory distress.   Abdominal: Soft. Bowel sounds are normal.   Genitourinary: Rectal exam shows no external hemorrhoid and anal tone normal.       Pelvic exam was performed with patient prone.   Lymphadenopathy:     She has no cervical adenopathy.   Neurological: She is alert and oriented to person, place, and time.   Skin: Skin is warm and  dry. Capillary refill takes less than 2 seconds. Rash noted. She is not diaphoretic. There is erythema.   Psychiatric: She has a normal mood and affect. Her behavior is normal. Judgment and thought content normal.       Assessment/Plan     Problem List Items Addressed This Visit        Musculoskeletal and Integument    Rash    Relevant Medications    nystatin-triamcinolone (MYCOLOG) 760884-0.1 UNIT/GM-% ointment    lidocaine (XYLOCAINE) 5 % ointment    Rectal itching - Primary    Relevant Medications    nystatin-triamcinolone (MYCOLOG) 614635-0.1 UNIT/GM-% ointment    lidocaine (XYLOCAINE) 5 % ointment    Other Relevant Orders    Stool Culture With Yersinia - Stool, Per Rectum    Ambulatory Referral For Screening Colonoscopy    Ova & Parasite Examination - Stool, Per Rectum    Occult Blood X 3, Stool - Stool, Per Rectum       Other    Depression with anxiety    Current Assessment & Plan     Psychological condition is worsening.  remain under the care of behavior health. Start lexapro 10 mg daily along with current medication regimen.   Psychological condition  will be reassessed in 4 weeks.         Relevant Medications    escitalopram (LEXAPRO) 10 MG tablet    Smoker    Current Assessment & Plan     Smoke cessation education provided.  We have established a quit date. Reviewed the adverse effects of smoking. Discussed community programs as well as medical options to assist with cessation. Total time today spent on smoke cessation education 11 minutes. We have discussed the risk versus benefits of nicotine patches, Wellbutrin and Chantix.  We have discussed methods to distract from the habit of having a cigarette in hand such as carrying a strong chewing gum.  Individualized plan of care has been developed.                         Patient's Body mass index is 41.7 kg/m². BMI is above normal parameters. Recommendations include: exercise counseling and nutrition counseling.    I advised Lisa of the risks of  continuing to use tobacco, and I provided her with tobacco cessation educational materials in the After Visit Summary.     During this visit, I spent 11 minutes counseling the patient regarding tobacco cessation.    I have discussed diagnosis in detail today allowing time for questions and answers. Pt is aware of reasons to seek urgent or emergent medical care as well as reasons to return to the clinic for evaluation. Possible side effects, interactions and progression of symptoms discussed as well. Pt / family states understanding.   Emotional support and active listening provided.       Schedule for colonoscopy. Keep rash dry and clean. Use oint/cream as directed. Follow up in 2-4 weeks for evaluation, sooner if needed.           Errors in dictation may reflect use of voice recognition software and not all errors in transcription may have been detected prior to signing.         This document has been electronically signed by:  NATALIE Kauffman, NP-C

## 2018-07-31 ENCOUNTER — LAB (OUTPATIENT)
Dept: FAMILY MEDICINE CLINIC | Facility: CLINIC | Age: 58
End: 2018-07-31

## 2018-07-31 ENCOUNTER — HOSPITAL ENCOUNTER (OUTPATIENT)
Dept: MAMMOGRAPHY | Facility: HOSPITAL | Age: 58
Discharge: HOME | End: 2018-07-31

## 2018-07-31 ENCOUNTER — HOSPITAL ENCOUNTER (OUTPATIENT)
Dept: BONE DENSITY | Facility: HOSPITAL | Age: 58
Discharge: HOME | End: 2018-07-31
Admitting: GENERAL PRACTICE

## 2018-07-31 DIAGNOSIS — D75.89 MACROCYTOSIS: ICD-10-CM

## 2018-07-31 DIAGNOSIS — E55.9 VITAMIN D DEFICIENCY: ICD-10-CM

## 2018-07-31 DIAGNOSIS — Z00.00 HEALTHCARE MAINTENANCE: ICD-10-CM

## 2018-07-31 DIAGNOSIS — R73.03 PREDIABETES: ICD-10-CM

## 2018-07-31 DIAGNOSIS — E03.8 HYPOTHYROIDISM DUE TO HASHIMOTO'S THYROIDITIS: ICD-10-CM

## 2018-07-31 DIAGNOSIS — Z12.39 BREAST CANCER SCREENING: ICD-10-CM

## 2018-07-31 DIAGNOSIS — L29.0 RECTAL ITCHING: ICD-10-CM

## 2018-07-31 DIAGNOSIS — E78.2 MIXED HYPERLIPIDEMIA: ICD-10-CM

## 2018-07-31 DIAGNOSIS — E06.3 HYPOTHYROIDISM DUE TO HASHIMOTO'S THYROIDITIS: ICD-10-CM

## 2018-07-31 LAB
COLLECT DATE SP2 STL: NORMAL
COLLECT DATE STL: NORMAL
HEMOCCULT STL QL: NEGATIVE
HEMOCCULT STL QL: NEGATIVE
HEMOCCULT STL QL: NORMAL
Lab: 1100
Lab: 600

## 2018-07-31 PROCEDURE — 87045 FECES CULTURE AEROBIC BACT: CPT | Performed by: NURSE PRACTITIONER

## 2018-07-31 PROCEDURE — 77063 BREAST TOMOSYNTHESIS BI: CPT | Performed by: RADIOLOGY

## 2018-07-31 PROCEDURE — 77067 SCR MAMMO BI INCL CAD: CPT

## 2018-07-31 PROCEDURE — 77080 DXA BONE DENSITY AXIAL: CPT

## 2018-07-31 PROCEDURE — 77080 DXA BONE DENSITY AXIAL: CPT | Performed by: RADIOLOGY

## 2018-07-31 PROCEDURE — 87046 STOOL CULTR AEROBIC BACT EA: CPT | Performed by: NURSE PRACTITIONER

## 2018-07-31 PROCEDURE — 87899 AGENT NOS ASSAY W/OPTIC: CPT | Performed by: NURSE PRACTITIONER

## 2018-07-31 PROCEDURE — 77063 BREAST TOMOSYNTHESIS BI: CPT

## 2018-07-31 PROCEDURE — 82270 OCCULT BLOOD FECES: CPT | Performed by: NURSE PRACTITIONER

## 2018-07-31 PROCEDURE — 77067 SCR MAMMO BI INCL CAD: CPT | Performed by: RADIOLOGY

## 2018-07-31 PROCEDURE — 87177 OVA AND PARASITES SMEARS: CPT | Performed by: NURSE PRACTITIONER

## 2018-07-31 PROCEDURE — 87209 SMEAR COMPLEX STAIN: CPT | Performed by: NURSE PRACTITIONER

## 2018-07-31 NOTE — ASSESSMENT & PLAN NOTE
Psychological condition is worsening.  remain under the care of behavior health. Start lexapro 10 mg daily along with current medication regimen.   Psychological condition  will be reassessed in 4 weeks.

## 2018-07-31 NOTE — ASSESSMENT & PLAN NOTE
Smoke cessation education provided.  We have established a quit date. Reviewed the adverse effects of smoking. Discussed community programs as well as medical options to assist with cessation. Total time today spent on smoke cessation education 11 minutes. We have discussed the risk versus benefits of nicotine patches, Wellbutrin and Chantix.  We have discussed methods to distract from the habit of having a cigarette in hand such as carrying a strong chewing gum.  Individualized plan of care has been developed.

## 2018-08-02 LAB — BACTERIA SPEC AEROBE CULT: NORMAL

## 2018-08-03 ENCOUNTER — TELEPHONE (OUTPATIENT)
Dept: FAMILY MEDICINE CLINIC | Facility: CLINIC | Age: 58
End: 2018-08-03

## 2018-08-03 NOTE — TELEPHONE ENCOUNTER
----- Message from NATAILE Drew sent at 8/1/2018  8:15 AM EDT -----  Let pt know that her stools are normal.    Spoke with the pt

## 2018-08-04 LAB
YERSINIA SPEC CULT: NORMAL
YERSINIA SPEC CULT: NORMAL

## 2018-08-06 LAB
25(OH)D3 SERPL-MCNC: 33 NG/ML
ALBUMIN SERPL-MCNC: 4.1 G/DL (ref 3.4–4.8)
ALBUMIN/GLOB SERPL: 1.4 G/DL (ref 1.5–2.5)
ALP SERPL-CCNC: 69 U/L (ref 35–104)
ALT SERPL W P-5'-P-CCNC: 39 U/L (ref 10–36)
ANION GAP SERPL CALCULATED.3IONS-SCNC: 5.2 MMOL/L (ref 3.6–11.2)
AST SERPL-CCNC: 24 U/L (ref 10–30)
BASOPHILS # BLD AUTO: 0.07 10*3/MM3 (ref 0–0.3)
BASOPHILS NFR BLD AUTO: 0.6 % (ref 0–2)
BILIRUB SERPL-MCNC: 0.3 MG/DL (ref 0.2–1.8)
BUN BLD-MCNC: 9 MG/DL (ref 7–21)
BUN/CREAT SERPL: 9.1 (ref 7–25)
CALCIUM SPEC-SCNC: 9.2 MG/DL (ref 7.7–10)
CHLORIDE SERPL-SCNC: 107 MMOL/L (ref 99–112)
CHOLEST SERPL-MCNC: 135 MG/DL (ref 0–200)
CO2 SERPL-SCNC: 26.8 MMOL/L (ref 24.3–31.9)
CREAT BLD-MCNC: 0.99 MG/DL (ref 0.43–1.29)
DEPRECATED RDW RBC AUTO: 47.6 FL (ref 37–54)
EOSINOPHIL # BLD AUTO: 0.22 10*3/MM3 (ref 0–0.7)
EOSINOPHIL NFR BLD AUTO: 2 % (ref 0–5)
ERYTHROCYTE [DISTWIDTH] IN BLOOD BY AUTOMATED COUNT: 13.9 % (ref 11.5–14.5)
GFR SERPL CREATININE-BSD FRML MDRD: 57 ML/MIN/1.73
GLOBULIN UR ELPH-MCNC: 3 GM/DL
GLUCOSE BLD-MCNC: 107 MG/DL (ref 70–110)
HBA1C MFR BLD: 5.7 % (ref 4.5–5.7)
HCT VFR BLD AUTO: 43.2 % (ref 37–47)
HDLC SERPL-MCNC: 44 MG/DL (ref 60–100)
HGB BLD-MCNC: 14.2 G/DL (ref 12–16)
IMM GRANULOCYTES # BLD: 0.03 10*3/MM3 (ref 0–0.03)
IMM GRANULOCYTES NFR BLD: 0.3 % (ref 0–0.5)
LDLC SERPL CALC-MCNC: 62 MG/DL (ref 0–100)
LDLC/HDLC SERPL: 1.42 {RATIO}
LYMPHOCYTES # BLD AUTO: 3.26 10*3/MM3 (ref 1–3)
LYMPHOCYTES NFR BLD AUTO: 29.8 % (ref 21–51)
MCH RBC QN AUTO: 32 PG (ref 27–33)
MCHC RBC AUTO-ENTMCNC: 32.9 G/DL (ref 33–37)
MCV RBC AUTO: 97.3 FL (ref 80–94)
MONOCYTES # BLD AUTO: 0.59 10*3/MM3 (ref 0.1–0.9)
MONOCYTES NFR BLD AUTO: 5.4 % (ref 0–10)
NEUTROPHILS # BLD AUTO: 6.76 10*3/MM3 (ref 1.4–6.5)
NEUTROPHILS NFR BLD AUTO: 61.9 % (ref 30–70)
OSMOLALITY SERPL CALC.SUM OF ELEC: 276.7 MOSM/KG (ref 273–305)
PLATELET # BLD AUTO: 356 10*3/MM3 (ref 130–400)
PMV BLD AUTO: 10.7 FL (ref 6–10)
POTASSIUM BLD-SCNC: 4.4 MMOL/L (ref 3.5–5.3)
PROT SERPL-MCNC: 7.1 G/DL (ref 6–8)
RBC # BLD AUTO: 4.44 10*6/MM3 (ref 4.2–5.4)
SODIUM BLD-SCNC: 139 MMOL/L (ref 135–153)
TRIGL SERPL-MCNC: 143 MG/DL (ref 0–150)
TSH SERPL DL<=0.05 MIU/L-ACNC: 0.42 MIU/ML (ref 0.55–4.78)
VIT B12 BLD-MCNC: 247 PG/ML (ref 211–911)
VLDLC SERPL-MCNC: 28.6 MG/DL
WBC NRBC COR # BLD: 10.93 10*3/MM3 (ref 4.5–12.5)

## 2018-08-06 PROCEDURE — 80061 LIPID PANEL: CPT | Performed by: GENERAL PRACTICE

## 2018-08-06 PROCEDURE — 82306 VITAMIN D 25 HYDROXY: CPT | Performed by: GENERAL PRACTICE

## 2018-08-06 PROCEDURE — 82747 ASSAY OF FOLIC ACID RBC: CPT | Performed by: GENERAL PRACTICE

## 2018-08-06 PROCEDURE — 36415 COLL VENOUS BLD VENIPUNCTURE: CPT

## 2018-08-06 PROCEDURE — 83036 HEMOGLOBIN GLYCOSYLATED A1C: CPT | Performed by: GENERAL PRACTICE

## 2018-08-06 PROCEDURE — 80050 GENERAL HEALTH PANEL: CPT | Performed by: GENERAL PRACTICE

## 2018-08-06 PROCEDURE — 82607 VITAMIN B-12: CPT | Performed by: GENERAL PRACTICE

## 2018-08-06 PROCEDURE — 85014 HEMATOCRIT: CPT | Performed by: GENERAL PRACTICE

## 2018-08-06 RX ORDER — LANOLIN ALCOHOL/MO/W.PET/CERES
1000 CREAM (GRAM) TOPICAL DAILY
Qty: 30 TABLET | Refills: 5 | Status: SHIPPED | OUTPATIENT
Start: 2018-08-06 | End: 2019-01-31 | Stop reason: SDUPTHER

## 2018-08-07 LAB
FOLATE BLD-MCNC: 405 NG/ML
FOLATE RBC-MCNC: 957 NG/ML
HCT VFR BLD AUTO: 42.3 % (ref 34–46.6)

## 2018-08-13 LAB
O+P SPEC MICRO: NORMAL
OVA + PARASITE RESULT 1: NORMAL

## 2018-08-14 ENCOUNTER — OFFICE VISIT (OUTPATIENT)
Dept: FAMILY MEDICINE CLINIC | Facility: CLINIC | Age: 58
End: 2018-08-14

## 2018-08-14 DIAGNOSIS — F17.200 SMOKER: ICD-10-CM

## 2018-08-14 DIAGNOSIS — K21.9 GASTROESOPHAGEAL REFLUX DISEASE WITHOUT ESOPHAGITIS: ICD-10-CM

## 2018-08-14 DIAGNOSIS — F41.8 DEPRESSION WITH ANXIETY: ICD-10-CM

## 2018-08-14 DIAGNOSIS — J30.2 CHRONIC SEASONAL ALLERGIC RHINITIS, UNSPECIFIED TRIGGER: ICD-10-CM

## 2018-08-14 DIAGNOSIS — N39.3 STRESS BLADDER INCONTINENCE, FEMALE: ICD-10-CM

## 2018-08-14 DIAGNOSIS — E78.2 MIXED HYPERLIPIDEMIA: ICD-10-CM

## 2018-08-14 DIAGNOSIS — R73.03 PREDIABETES: ICD-10-CM

## 2018-08-14 DIAGNOSIS — J44.9 COPD MIXED TYPE (HCC): ICD-10-CM

## 2018-08-14 DIAGNOSIS — I10 ESSENTIAL HYPERTENSION: Primary | ICD-10-CM

## 2018-08-14 DIAGNOSIS — E06.3 HYPOTHYROIDISM DUE TO HASHIMOTO'S THYROIDITIS: ICD-10-CM

## 2018-08-14 DIAGNOSIS — E55.9 VITAMIN D DEFICIENCY: ICD-10-CM

## 2018-08-14 DIAGNOSIS — K59.09 CHRONIC CONSTIPATION: ICD-10-CM

## 2018-08-14 DIAGNOSIS — M15.9 GENERALIZED OSTEOARTHRITIS: ICD-10-CM

## 2018-08-14 DIAGNOSIS — E03.8 HYPOTHYROIDISM DUE TO HASHIMOTO'S THYROIDITIS: ICD-10-CM

## 2018-08-14 DIAGNOSIS — Z00.00 HEALTHCARE MAINTENANCE: ICD-10-CM

## 2018-08-14 DIAGNOSIS — M85.80 OSTEOPENIA, UNSPECIFIED LOCATION: ICD-10-CM

## 2018-08-14 PROCEDURE — 99214 OFFICE O/P EST MOD 30 MIN: CPT | Performed by: GENERAL PRACTICE

## 2018-08-14 RX ORDER — LORAZEPAM 1 MG/1
0.5 TABLET ORAL 2 TIMES DAILY PRN
Qty: 90 TABLET | Refills: 0 | Status: SHIPPED | OUTPATIENT
Start: 2018-08-14 | End: 2018-11-29 | Stop reason: SDUPTHER

## 2018-08-14 NOTE — PROGRESS NOTES
Subjective   Lisa Hill is a 58 y.o. female.     History of Present Illness     COPD  The patient reports that her SOB and cough have remained stable. She states that these symptoms occur at any time during the day or night. She reports that her symptoms become worse with activity, exposure to cold air and environmental allergens. Her symptoms are reduced with rest and inhaled inhaled medication. She is using supplemental oxygen at night but is unconvinced that it helps at all. The patient states she also has nasal congestion. She is following the treatment plan, including medications as directed. She continues to smoke about 1 pack per day. Low dose CT of the chest performed on 6/2/17 revaled moderate emphysema as well as a benign appearing 4 mm semisolid nodule in the LLL.  CT of the chest performed with PE protocol on 1/24/18 was reported as showing COPD as well as ground-glass densities in both lungs suggestive of edema    Depression  Onset was a number of years ago. Symptoms have been relatively stable since last here. Current symptoms include: depression, nervousness, anhedonia, difficulty concentrating, fatigue and impaired memory. Patient denies recurrent thoughts of death or suicidal thoughts. Risk factors: history of seasonal affective disorder.  Current medication includes escitalopram 10 daily,  bupropion 300 daily, risperdone 2 nightly, and lorazepam 0.5 twice a day. She denies any side effects with escitalopram    Perianal Irritation  She continues to have occasional perianal irritation. This generally follows harder BMs. She denies any associated symptoms. She cancelled her appointment with Dr Melgoza for a colonoscopy    Hypothyroidism  Patient presents for evaluation of thyroid function. Symptoms consist of weight gain, constipation. The symptoms are moderate.  The problem has been unchanged.  Previous thyroid studies include TSH. The hypothyroidism is due to Hashimoto's disease.   Lab Results    Component Value Date    TSH 0.418 (L) 08/06/2018     Dyslipidemia  Compliance with treatment has been fair. The patient exercises occasionally. She is currently being prescribed the following medication for her dyslipidemia - simvastatin. Patient denies side effects associated with her medications.  Lab Results   Component Value Date    CHOL 135 08/06/2018    CHLPL 141 02/05/2016    TRIG 143 08/06/2018    HDL 44 (L) 08/06/2018    LDL 62 08/06/2018     Labs  Most recent fasting glucose 107 with an A1c of 5.7. B12 247 with a vitamin D of 33    Imaging  DEXA performed on 7/31/18 returned with a T score of -1.8    The following portions of the patient's history were reviewed and updated as appropriate: allergies, current medications, past medical history, past social history and problem list.    Review of Systems   Constitutional: Positive for fatigue. Negative for appetite change, chills, fever and unexpected weight change.   HENT: Negative for congestion, ear pain, postnasal drip, rhinorrhea, sneezing, sore throat and voice change.    Eyes: Negative for visual disturbance.   Respiratory: Positive for cough, shortness of breath and wheezing.    Cardiovascular: Positive for leg swelling. Negative for chest pain and palpitations.   Gastrointestinal: Positive for constipation (with occasional perianal irritation). Negative for abdominal pain, anal bleeding, blood in stool, diarrhea, nausea and vomiting.   Endocrine: Negative for polydipsia.   Genitourinary: Negative for difficulty urinating, dysuria, frequency, hematuria, menstrual problem, pelvic pain, urgency, vaginal bleeding, vaginal discharge and vaginal pain.        Incontinence with coughing, sneezing, or lifting   Musculoskeletal: Positive for arthralgias and back pain. Negative for joint swelling, myalgias and neck pain.   Skin: Negative for color change.   Neurological: Negative for tremors, weakness, numbness and headaches.   Psychiatric/Behavioral: Positive  for decreased concentration, dysphoric mood and sleep disturbance (chronic-intermittent). Negative for suicidal ideas.     Objective   Physical Exam   Constitutional: She is oriented to person, place, and time. No distress.   Bright, in fair spirits.  No apparent distress.  No pallor, jaundice, cyanosis or diaphoresis   HENT:   Head: Atraumatic.   Right Ear: Tympanic membrane, external ear and ear canal normal.   Left Ear: Tympanic membrane, external ear and ear canal normal.   Nose: Nose normal.   Mouth/Throat: Oropharynx is clear and moist. Mucous membranes are not pale and not cyanotic.   Eyes: Pupils are equal, round, and reactive to light. EOM are normal. No scleral icterus.   Neck: No JVD present. Carotid bruit is not present. No tracheal deviation present. No thyromegaly present.   Cardiovascular: Normal rate, regular rhythm, S1 normal, S2 normal and intact distal pulses.  Exam reveals no gallop, no S3 and no S4.    No murmur heard.  Pulmonary/Chest: No accessory muscle usage or stridor. No respiratory distress. She has decreased breath sounds. She has wheezes (diffuse-mild).   Hyperinflation of the chest   Abdominal: Soft. Normal aorta and bowel sounds are normal. She exhibits no distension, no abdominal bruit and no mass. There is no hepatosplenomegaly. There is no tenderness. No hernia.   Musculoskeletal: She exhibits no tenderness or deformity.     Vascular Status -  Her right foot exhibits abnormal foot edema (trace). Her left foot exhibits abnormal foot edema (trace).  Lymphadenopathy:        Head (right side): No submandibular adenopathy present.        Head (left side): No submandibular adenopathy present.     She has no cervical adenopathy.   Neurological: She is alert and oriented to person, place, and time. She has normal reflexes. She displays normal reflexes. No cranial nerve deficit. She exhibits normal muscle tone. Coordination normal.   Skin: Skin is warm and dry. No rash noted. She is not  diaphoretic. No cyanosis. No pallor. Nails show no clubbing.   Psychiatric: She has a normal mood and affect. Her speech is normal and behavior is normal. Thought content normal. Cognition and memory are normal. She expresses no suicidal ideation.     Assessment/Plan   Problems Addressed this Visit        Cardiovascular and Mediastinum    Mixed hyperlipidemia  Encouraged to continue to work on her diet and exercise plan.  Continue current medication    Essential hypertension  As above.   Continue current medication.       Respiratory    COPD mixed type (CMS/HCC)  COPD is worsening.  Reminded of the importance of smoking cessation  Encouraged to remain as active as symptoms allow for    Chronic seasonal allergic rhinitis       Digestive    Vitamin D deficiency  Continue maintenance supplementation with monitoring.    Gastroesophageal reflux disease without esophagitis    Chronic constipation  Recommended a colonoscopy. Patient remains uninterested but does agree to a cologard and this will be arranged       Endocrine    Hypothyroidism due to Hashimoto's thyroiditis  Clinically euthyroid.  Continue current medication.       Musculoskeletal and Integument    Osteopenia  DEXA scan stable  Encouraged to continue to pursue weight bearing activities while exercising joint protection.    Generalized osteoarthritis       Genitourinary    Stress bladder incontinence, female       Other    Prediabetes  Will continue to monitor    Depression with anxiety  Stable.  Supportive therapy.   Continue current medication.    Relevant Medications    LORazepam (ATIVAN) 1 MG tablet    Smoker    Healthcare maintenance  Encouraged to follow up with shingrix.  Reminded to get a flu shot when available.    Relevant Medications    Zoster Vac Recomb Adjuvanted (SHINGRIX) 50 MCG reconstituted suspension

## 2018-08-15 VITALS
WEIGHT: 228 LBS | DIASTOLIC BLOOD PRESSURE: 80 MMHG | OXYGEN SATURATION: 96 % | HEART RATE: 94 BPM | BODY MASS INDEX: 41.96 KG/M2 | TEMPERATURE: 98.5 F | RESPIRATION RATE: 12 BRPM | SYSTOLIC BLOOD PRESSURE: 125 MMHG | HEIGHT: 62 IN

## 2018-08-15 PROBLEM — R21 RASH: Status: RESOLVED | Noted: 2018-07-30 | Resolved: 2018-08-15

## 2018-08-15 PROBLEM — L29.0 RECTAL ITCHING: Status: RESOLVED | Noted: 2018-07-30 | Resolved: 2018-08-15

## 2018-08-17 ENCOUNTER — TELEPHONE (OUTPATIENT)
Dept: FAMILY MEDICINE CLINIC | Facility: CLINIC | Age: 58
End: 2018-08-17

## 2018-08-17 NOTE — TELEPHONE ENCOUNTER
Faxed PA request to [facility]    ----- Message from Scott Chavez MD sent at 8/15/2018 11:01 AM EDT -----  Please call in script for cologard to her pharm

## 2018-08-18 RX ORDER — NICOTINE 21 MG/24HR
1 PATCH, TRANSDERMAL 24 HOURS TRANSDERMAL EVERY 24 HOURS
Qty: 28 PATCH | Refills: 0 | Status: SHIPPED | OUTPATIENT
Start: 2018-08-18 | End: 2019-03-14

## 2018-08-20 ENCOUNTER — TELEPHONE (OUTPATIENT)
Dept: FAMILY MEDICINE CLINIC | Facility: CLINIC | Age: 58
End: 2018-08-20

## 2018-08-20 NOTE — TELEPHONE ENCOUNTER
----- Message from Scott Chavez MD sent at 8/18/2018  9:54 AM EDT -----  Emailed    ----- Message -----  From: Yesenia Cruz MA  Sent: 8/17/2018   3:01 PM  To: Scott Chavez MD    Patient wants to know if you would send her in some nicotine patches.

## 2018-08-22 ENCOUNTER — PRIOR AUTHORIZATION (OUTPATIENT)
Dept: FAMILY MEDICINE CLINIC | Facility: CLINIC | Age: 58
End: 2018-08-22

## 2018-08-23 ENCOUNTER — OFFICE VISIT (OUTPATIENT)
Dept: PULMONOLOGY | Facility: CLINIC | Age: 58
End: 2018-08-23

## 2018-08-23 VITALS
TEMPERATURE: 97.6 F | BODY MASS INDEX: 42.69 KG/M2 | OXYGEN SATURATION: 94 % | HEART RATE: 80 BPM | WEIGHT: 232 LBS | DIASTOLIC BLOOD PRESSURE: 85 MMHG | SYSTOLIC BLOOD PRESSURE: 171 MMHG | HEIGHT: 62 IN

## 2018-08-23 DIAGNOSIS — E66.01 MORBID OBESITY WITH BMI OF 40.0-44.9, ADULT (HCC): ICD-10-CM

## 2018-08-23 DIAGNOSIS — G47.33 OSA (OBSTRUCTIVE SLEEP APNEA): Primary | ICD-10-CM

## 2018-08-23 DIAGNOSIS — G47.34 NOCTURNAL HYPOXEMIA: ICD-10-CM

## 2018-08-23 PROCEDURE — 99214 OFFICE O/P EST MOD 30 MIN: CPT | Performed by: INTERNAL MEDICINE

## 2018-08-27 RX ORDER — LORAZEPAM 1 MG/1
TABLET ORAL
Qty: 90 TABLET | Refills: 2 | OUTPATIENT
Start: 2018-08-27

## 2018-08-29 ENCOUNTER — TELEPHONE (OUTPATIENT)
Dept: FAMILY MEDICINE CLINIC | Facility: CLINIC | Age: 58
End: 2018-08-29

## 2018-08-29 NOTE — TELEPHONE ENCOUNTER
Pt is aware of this information.   ----- Message from Scott Chavez MD sent at 8/29/2018  8:50 AM EDT -----  Please let patient know that her insurance has approved shingrix but that she has to get her second dose by 2/22/19

## 2018-08-31 DIAGNOSIS — E78.2 MIXED HYPERLIPIDEMIA: ICD-10-CM

## 2018-08-31 DIAGNOSIS — K21.9 GASTROESOPHAGEAL REFLUX DISEASE WITHOUT ESOPHAGITIS: ICD-10-CM

## 2018-08-31 RX ORDER — PANTOPRAZOLE SODIUM 40 MG/1
TABLET, DELAYED RELEASE ORAL
Qty: 30 TABLET | Refills: 5 | Status: SHIPPED | OUTPATIENT
Start: 2018-08-31 | End: 2019-02-28 | Stop reason: SDUPTHER

## 2018-08-31 RX ORDER — SIMVASTATIN 20 MG
20 TABLET ORAL NIGHTLY
Qty: 30 TABLET | Refills: 5 | Status: SHIPPED | OUTPATIENT
Start: 2018-08-31 | End: 2019-02-28 | Stop reason: SDUPTHER

## 2018-09-05 DIAGNOSIS — R19.5 POSITIVE COLORECTAL CANCER SCREENING USING DNA-BASED STOOL TEST: Primary | ICD-10-CM

## 2018-09-10 ENCOUNTER — OFFICE VISIT (OUTPATIENT)
Dept: SURGERY | Facility: CLINIC | Age: 58
End: 2018-09-10

## 2018-09-10 VITALS
HEIGHT: 62 IN | DIASTOLIC BLOOD PRESSURE: 74 MMHG | SYSTOLIC BLOOD PRESSURE: 140 MMHG | HEART RATE: 85 BPM | BODY MASS INDEX: 42.69 KG/M2 | WEIGHT: 232 LBS

## 2018-09-10 DIAGNOSIS — R19.5 OCCULT BLOOD POSITIVE STOOL: Primary | ICD-10-CM

## 2018-09-10 PROCEDURE — 99243 OFF/OP CNSLTJ NEW/EST LOW 30: CPT | Performed by: SURGERY

## 2018-09-10 PROCEDURE — 99406 BEHAV CHNG SMOKING 3-10 MIN: CPT | Performed by: SURGERY

## 2018-09-10 RX ORDER — BUPROPION HYDROCHLORIDE 300 MG/1
300 TABLET ORAL EVERY MORNING
Qty: 30 TABLET | Refills: 1 | Status: SHIPPED | OUTPATIENT
Start: 2018-09-10 | End: 2018-11-02 | Stop reason: SDUPTHER

## 2018-09-10 NOTE — PROGRESS NOTES
Subjective   Lisa Hill is a 58 y.o. female here as consultation from Scott Chavez MD    58 y.o. female here for occult positive blood. There is no family history of colon neoplasia. No family hx of gastric, ovarian, or uterine cancer.No previous colonoscopy.  Patient has previously undergone the following surgeries: cholecystectomy and hysterectomy.  Patient is not on anticoagulation.  Patient denies weight loss.  Normal bowel function and consistency..     The following portions of the patient's history were reviewed and updated as appropriate: allergies, current medications, past family history, past medical history, past social history, past surgical history and problem list.    Past Medical History:   Diagnosis Date   • Anxiety    • COPD (chronic obstructive pulmonary disease) (CMS/HCC)    • Depression    • GERD (gastroesophageal reflux disease)    • Hyperlipidemia    • Hypertension        Family History   Problem Relation Age of Onset   • Kidney disease Mother    • Diabetes Mother    • Hypertension Father    • Breast cancer Maternal Aunt        Social History     Social History   • Marital status:      Spouse name: kateryna   • Number of children: 3   • Years of education: 10     Occupational History   • disabled      Social History Main Topics   • Smoking status: Current Every Day Smoker     Packs/day: 0.25     Types: Cigarettes   • Smokeless tobacco: Never Used      Comment: cessation has been discussed   • Alcohol use No   • Drug use: No   • Sexual activity: Defer     Other Topics Concern   • Not on file     Social History Narrative   • No narrative on file       Past Surgical History:   Procedure Laterality Date   • CHOLECYSTECTOMY     • HYSTERECTOMY     • TONSILLECTOMY           Review of Systems   Constitutional: Negative for activity change, appetite change, chills and fever.   HENT: Negative for sore throat and trouble swallowing.    Eyes: Negative for visual disturbance.  "  Respiratory: Negative for cough and shortness of breath.    Cardiovascular: Negative for chest pain and palpitations.   Gastrointestinal: Negative for abdominal distention, abdominal pain, blood in stool, constipation, diarrhea, nausea and vomiting.   Endocrine: Negative for cold intolerance and heat intolerance.   Genitourinary: Negative for dysuria.   Musculoskeletal: Negative for joint swelling.   Skin: Negative for color change, rash and wound.   Allergic/Immunologic: Negative for immunocompromised state.   Neurological: Negative for dizziness, seizures, weakness and headaches.   Hematological: Negative for adenopathy. Does not bruise/bleed easily.   Psychiatric/Behavioral: Negative for agitation and confusion.         /74   Pulse 85   Ht 157.5 cm (62\")   Wt 105 kg (232 lb)   LMP  (LMP Unknown)   BMI 42.43 kg/m²   Objective   Physical Exam   Constitutional: She is oriented to person, place, and time. She appears well-developed.   HENT:   Head: Normocephalic and atraumatic.   Mouth/Throat: Mucous membranes are normal.   Eyes: Pupils are equal, round, and reactive to light. Conjunctivae are normal.   Neck: Neck supple. No JVD present. No tracheal deviation present. No thyromegaly present.   Cardiovascular: Normal rate and regular rhythm.  Exam reveals no gallop and no friction rub.    No murmur heard.  Pulmonary/Chest: Effort normal and breath sounds normal.   Abdominal: Soft. She exhibits no distension. There is no splenomegaly or hepatomegaly. There is no tenderness. No hernia.   Musculoskeletal: Normal range of motion. She exhibits no deformity.   Neurological: She is alert and oriented to person, place, and time.   Skin: Skin is warm and dry.   Psychiatric: She has a normal mood and affect.         Lisa was seen today for colonoscopy consult.    Diagnoses and all orders for this visit:    Occult blood positive stool        Assessment     57 yo obese F with occult positive blood.  Patient is in " need of screening colonoscopy.  She understands the risks of surgery and will proceed with colonoscopy.  Patient's Body mass index is 42.43 kg/m². BMI is above normal parameters. Recommendations include: educational material.    I advised Lisa of the risks of continuing to use tobacco, and I provided her with tobacco cessation educational materials in the After Visit Summary.     During this visit, I spent 3 minutes counseling the patient regarding tobacco cessation.

## 2018-09-14 ENCOUNTER — HOSPITAL ENCOUNTER (OUTPATIENT)
Facility: HOSPITAL | Age: 58
Setting detail: HOSPITAL OUTPATIENT SURGERY
Discharge: HOME | End: 2018-09-14
Attending: SURGERY | Admitting: ANESTHESIOLOGY

## 2018-09-14 ENCOUNTER — ANESTHESIA EVENT (OUTPATIENT)
Dept: PERIOP | Facility: HOSPITAL | Age: 58
End: 2018-09-14

## 2018-09-14 ENCOUNTER — ANESTHESIA (OUTPATIENT)
Dept: PERIOP | Facility: HOSPITAL | Age: 58
End: 2018-09-14

## 2018-09-14 VITALS
OXYGEN SATURATION: 95 % | TEMPERATURE: 97.5 F | HEART RATE: 82 BPM | SYSTOLIC BLOOD PRESSURE: 139 MMHG | RESPIRATION RATE: 20 BRPM | DIASTOLIC BLOOD PRESSURE: 73 MMHG

## 2018-09-14 DIAGNOSIS — R19.5 OCCULT BLOOD POSITIVE STOOL: ICD-10-CM

## 2018-09-14 PROCEDURE — 25010000002 PROPOFOL 10 MG/ML EMULSION: Performed by: NURSE ANESTHETIST, CERTIFIED REGISTERED

## 2018-09-14 PROCEDURE — 25010000002 FENTANYL CITRATE (PF) 100 MCG/2ML SOLUTION: Performed by: NURSE ANESTHETIST, CERTIFIED REGISTERED

## 2018-09-14 PROCEDURE — 45385 COLONOSCOPY W/LESION REMOVAL: CPT | Performed by: SURGERY

## 2018-09-14 RX ORDER — ONDANSETRON 2 MG/ML
4 INJECTION INTRAMUSCULAR; INTRAVENOUS ONCE AS NEEDED
Status: DISCONTINUED | OUTPATIENT
Start: 2018-09-14 | End: 2018-09-14 | Stop reason: HOSPADM

## 2018-09-14 RX ORDER — IPRATROPIUM BROMIDE AND ALBUTEROL SULFATE 2.5; .5 MG/3ML; MG/3ML
3 SOLUTION RESPIRATORY (INHALATION) ONCE AS NEEDED
Status: DISCONTINUED | OUTPATIENT
Start: 2018-09-14 | End: 2018-09-14 | Stop reason: HOSPADM

## 2018-09-14 RX ORDER — PROPOFOL 10 MG/ML
VIAL (ML) INTRAVENOUS CONTINUOUS PRN
Status: DISCONTINUED | OUTPATIENT
Start: 2018-09-14 | End: 2018-09-14 | Stop reason: SURG

## 2018-09-14 RX ORDER — OXYCODONE HYDROCHLORIDE AND ACETAMINOPHEN 5; 325 MG/1; MG/1
1 TABLET ORAL ONCE AS NEEDED
Status: DISCONTINUED | OUTPATIENT
Start: 2018-09-14 | End: 2018-09-14 | Stop reason: HOSPADM

## 2018-09-14 RX ORDER — SODIUM CHLORIDE, SODIUM LACTATE, POTASSIUM CHLORIDE, CALCIUM CHLORIDE 600; 310; 30; 20 MG/100ML; MG/100ML; MG/100ML; MG/100ML
125 INJECTION, SOLUTION INTRAVENOUS CONTINUOUS
Status: DISCONTINUED | OUTPATIENT
Start: 2018-09-14 | End: 2018-09-14 | Stop reason: HOSPADM

## 2018-09-14 RX ORDER — FENTANYL CITRATE 50 UG/ML
50 INJECTION, SOLUTION INTRAMUSCULAR; INTRAVENOUS
Status: DISCONTINUED | OUTPATIENT
Start: 2018-09-14 | End: 2018-09-14 | Stop reason: HOSPADM

## 2018-09-14 RX ORDER — SODIUM CHLORIDE 0.9 % (FLUSH) 0.9 %
1-10 SYRINGE (ML) INJECTION AS NEEDED
Status: DISCONTINUED | OUTPATIENT
Start: 2018-09-14 | End: 2018-09-14 | Stop reason: HOSPADM

## 2018-09-14 RX ORDER — FENTANYL CITRATE 50 UG/ML
INJECTION, SOLUTION INTRAMUSCULAR; INTRAVENOUS AS NEEDED
Status: DISCONTINUED | OUTPATIENT
Start: 2018-09-14 | End: 2018-09-14 | Stop reason: SURG

## 2018-09-14 RX ORDER — MEPERIDINE HYDROCHLORIDE 50 MG/ML
12.5 INJECTION INTRAMUSCULAR; INTRAVENOUS; SUBCUTANEOUS
Status: DISCONTINUED | OUTPATIENT
Start: 2018-09-14 | End: 2018-09-14 | Stop reason: HOSPADM

## 2018-09-14 RX ORDER — LIDOCAINE HYDROCHLORIDE 10 MG/ML
INJECTION, SOLUTION INFILTRATION; PERINEURAL AS NEEDED
Status: DISCONTINUED | OUTPATIENT
Start: 2018-09-14 | End: 2018-09-14 | Stop reason: SURG

## 2018-09-14 RX ADMIN — LIDOCAINE HYDROCHLORIDE 60 MG: 10 INJECTION, SOLUTION INFILTRATION; PERINEURAL at 10:30

## 2018-09-14 RX ADMIN — PROPOFOL 100 MCG/KG/MIN: 10 INJECTION, EMULSION INTRAVENOUS at 10:33

## 2018-09-14 RX ADMIN — SODIUM CHLORIDE, POTASSIUM CHLORIDE, SODIUM LACTATE AND CALCIUM CHLORIDE: 600; 310; 30; 20 INJECTION, SOLUTION INTRAVENOUS at 10:25

## 2018-09-14 RX ADMIN — FENTANYL CITRATE 100 MCG: 50 INJECTION INTRAMUSCULAR; INTRAVENOUS at 10:30

## 2018-09-14 NOTE — OP NOTE
COLONOSCOPY  Procedure Note    Lisa Hill  9/14/2018    Pre-op Diagnosis:   Occult blood positive stool [R19.5]    Post-op Diagnosis:   Appendiceal orifice mass Indications: See above    Procedure(s):  COLONOSCOPY    Surgeon(s):  Keon Melgoza MD    Anesthesia: Choice    Staff:   Circulator: Sammi Lovett RN  Endo Technician: Griffin Campo    Findings: Normal appearing colon, at the appendiceal orifice there was a polypoid mass that was removed with snare polypectomy    Operative Procedure: The patient was taken to the operating suite and placed in left lateral decubitus position.  Bilateral sequential compression devices were in place and IV anesthesia was administered.  Timeout procedure was performed.  Digital rectal examination was negative.  The colonoscope was inserted and advanced to the cecum as evidenced by the ileocecal valve and appendiceal orifice.  The bowel prep was excellent.  The colonoscope was slowly removed and the entirety of the colonic mucosa was evaluated.  Colonoscopic findings included At the cecum at the appendiceal orifice there was a polypoid appearing lesion with no evidence of active bleeding.  Snare polypectomy was performed with cautery. The colonoscope was then removed and the patient was awakened from anesthesia and taken recovery.  They tolerated the procedure well.    Estimated Blood Loss: minimal    Specimens:  Appendiceal orifice mass                 Drains: none    Grafts or Implants: none    Complications: None    Recommendations: screening colonoscopy in 10 years pending pathology    Keon Melgoza MD     Date: 9/14/2018  Time: 3:35 PM

## 2018-09-14 NOTE — ANESTHESIA POSTPROCEDURE EVALUATION
Patient: Lisa Hill    Procedure Summary     Date:  09/14/18 Room / Location:  [facility] OR [address] [place] [address] COR OR    Anesthesia Start:  1031 Anesthesia Stop:  1055    Procedure:  COLONOSCOPY (N/A ) Diagnosis:       Occult blood positive stool      (Occult blood positive stool [R19.5])    Surgeon:  Keon Melgoza MD Provider:  Osmani Jose MD    Anesthesia Type:  general ASA Status:  3          Anesthesia Type: general  Last vitals  BP   123/63 (09/14/18 1105)   Temp   97.5 °F (36.4 °C) (09/14/18 1055)   Pulse   88 (09/14/18 1105)   Resp   20 (09/14/18 1105)     SpO2   94 % (09/14/18 1105)     Post Anesthesia Care and Evaluation    Patient location during evaluation: PHASE II  Patient participation: complete - patient participated  Level of consciousness: awake and alert  Pain score: 1  Pain management: adequate  Airway patency: patent  Anesthetic complications: No anesthetic complications  PONV Status: controlled  Cardiovascular status: acceptable  Respiratory status: acceptable  Hydration status: acceptable

## 2018-09-14 NOTE — ANESTHESIA PREPROCEDURE EVALUATION
Anesthesia Evaluation     Patient summary reviewed and Nursing notes reviewed   no history of anesthetic complications:  NPO Solid Status: > 8 hours  NPO Liquid Status: > 8 hours           Airway   Mallampati: II  TM distance: <3 FB  Neck ROM: full  Possible difficult intubation, Small opening, Anterior and Large neck circumference  Dental - normal exam   (+) poor dentition and upper dentures    Pulmonary - normal exam   (+) a smoker Current Smoked day of surgery, COPD, sleep apnea,   Cardiovascular - normal exam  Exercise tolerance: good (4-7 METS)    NYHA Classification: II    (+) hypertension, past MI (2011)  >12 months, dysrhythmias, angina at rest, hyperlipidemia,   (-) CHF      Neuro/Psych  (+) psychiatric history Depression and Anxiety,     (-) seizures, CVA  GI/Hepatic/Renal/Endo    (+)  GERD,  hypothyroidism,   (-) liver disease, no renal disease, diabetes    Musculoskeletal     Abdominal  - normal exam    Bowel sounds: normal.   Substance History - negative use     OB/GYN negative ob/gyn ROS         Other   (+) arthritis                     Anesthesia Plan    ASA 3     general     intravenous induction   Anesthetic plan, all risks, benefits, and alternatives have been provided, discussed and informed consent has been obtained with: patient.  Use of blood products discussed with patient  Consented to blood products.

## 2018-09-18 LAB
LAB AP CASE REPORT: NORMAL
PATH REPORT.FINAL DX SPEC: NORMAL

## 2018-09-21 ENCOUNTER — PRIOR AUTHORIZATION (OUTPATIENT)
Dept: FAMILY MEDICINE CLINIC | Facility: CLINIC | Age: 58
End: 2018-09-21

## 2018-10-01 DIAGNOSIS — F41.8 DEPRESSION WITH ANXIETY: ICD-10-CM

## 2018-10-01 RX ORDER — RISPERIDONE 2 MG/1
2 TABLET ORAL NIGHTLY
Qty: 30 TABLET | Refills: 5 | Status: SHIPPED | OUTPATIENT
Start: 2018-10-01 | End: 2019-03-28 | Stop reason: SDUPTHER

## 2018-10-09 ENCOUNTER — OFFICE VISIT (OUTPATIENT)
Dept: SURGERY | Facility: CLINIC | Age: 58
End: 2018-10-09

## 2018-10-09 VITALS — HEIGHT: 62 IN | BODY MASS INDEX: 42.69 KG/M2 | WEIGHT: 232 LBS

## 2018-10-09 DIAGNOSIS — K59.09 CHRONIC CONSTIPATION: Primary | ICD-10-CM

## 2018-10-09 DIAGNOSIS — R19.5 OCCULT BLOOD POSITIVE STOOL: ICD-10-CM

## 2018-10-09 PROCEDURE — 99212 OFFICE O/P EST SF 10 MIN: CPT | Performed by: SURGERY

## 2018-10-09 RX ORDER — HYDROCORTISONE ACETATE 25 MG/1
25 SUPPOSITORY RECTAL 2 TIMES DAILY
Qty: 28 SUPPOSITORY | Refills: 0 | Status: SHIPPED | OUTPATIENT
Start: 2018-10-09 | End: 2018-10-23

## 2018-10-09 NOTE — PROGRESS NOTES
Subjective   Lisa Hill is a 58 y.o. female  is here today for follow-up.         Lisa Hill is a 58 y.o. female here for follow up on anoscopy.  Final pathology was consistent with benign appendiceal mucosa likely secondary to an incidental appendectomy at the time of hysterectomy.  No atypia or malignancy identified.  The patient is doing well and her internal hemorrhoids are persistent but because no active bleeding or pain.  Itching as her biggest complaint.        Lisa was seen today for s/p colonoscopy.    Diagnoses and all orders for this visit:    Chronic constipation    Occult blood positive stool    Other orders  -     hydrocortisone (ANUSOL-HC) 25 MG suppository; Insert 1 suppository into the rectum 2 (Two) Times a Day for 14 days.        Assessment     Lisa Hill is a 58 y.o. female status post screening colonoscopy which was negative for malignancy.  The patient will require repeat colonoscopy in 10 years unless symptoms develop.  With regards to her internal hemorrhoids she is been prescribed appropriate bowel regimen and Anusol suppositories.

## 2018-10-11 ENCOUNTER — TELEPHONE (OUTPATIENT)
Dept: SURGERY | Facility: CLINIC | Age: 58
End: 2018-10-11

## 2018-10-11 PROBLEM — K64.8 INTERNAL HEMORRHOIDS: Status: ACTIVE | Noted: 2018-10-11

## 2018-10-24 ENCOUNTER — OFFICE VISIT (OUTPATIENT)
Dept: PULMONOLOGY | Facility: CLINIC | Age: 58
End: 2018-10-24

## 2018-10-24 VITALS
DIASTOLIC BLOOD PRESSURE: 72 MMHG | HEART RATE: 84 BPM | HEIGHT: 62 IN | BODY MASS INDEX: 44.16 KG/M2 | TEMPERATURE: 97.9 F | SYSTOLIC BLOOD PRESSURE: 134 MMHG | OXYGEN SATURATION: 93 % | WEIGHT: 240 LBS

## 2018-10-24 DIAGNOSIS — E66.01 MORBID OBESITY WITH BMI OF 40.0-44.9, ADULT (HCC): ICD-10-CM

## 2018-10-24 DIAGNOSIS — J41.0 SIMPLE CHRONIC BRONCHITIS (HCC): ICD-10-CM

## 2018-10-24 DIAGNOSIS — G47.33 OSA (OBSTRUCTIVE SLEEP APNEA): Primary | ICD-10-CM

## 2018-10-24 PROCEDURE — 99214 OFFICE O/P EST MOD 30 MIN: CPT | Performed by: INTERNAL MEDICINE

## 2018-10-24 NOTE — PROGRESS NOTES
Subjective   Lisa Hill is a 58 y.o. female who is being seen for Sleep Apnea    History of Present Illness   Patient returns after a nocturnal polysomnography.  Symptomatically she continues to have exertional dyspnea which is better than before after she was started on Anoro.  Sleep disturbance is still persisting.  Past Medical History:   Diagnosis Date   • Anxiety    • Arthritis    • COPD (chronic obstructive pulmonary disease) (CMS/HCC)    • Depression    • Disease of thyroid gland     GRAVES DISEASE   • Elevated cholesterol    • GERD (gastroesophageal reflux disease)    • History of transfusion    • Hyperlipidemia    • Hypertension      Past Surgical History:   Procedure Laterality Date   • CHOLECYSTECTOMY     • COLONOSCOPY N/A 9/14/2018    Procedure: COLONOSCOPY;  Surgeon: Keon Melgoza MD;  Location: [facility];  Service: Gastroenterology   • HYSTERECTOMY     • TONSILLECTOMY       Family History   Problem Relation Age of Onset   • Kidney disease Mother    • Diabetes Mother    • Hypertension Father    • Breast cancer Maternal Aunt       reports that she has been smoking Cigarettes.  She has a 43.00 pack-year smoking history. She has never used smokeless tobacco. She reports that she does not drink alcohol or use drugs.  Allergies   Allergen Reactions   • Sulfa Antibiotics        Occupational history: Patient is current on both flu and pneumonia vaccinations.     The following portions of the patient's history were reviewed and updated as appropriate: allergies, current medications, past family history, past medical history, past social history, past surgical history and problem list.    Review of Systems   Constitutional: Positive for fatigue (After doing simple chores around the house. ). Negative for appetite change, chills, diaphoresis and unexpected weight change.   HENT: Negative for sore throat, trouble swallowing and voice change.    Eyes: Negative for visual disturbance.   Respiratory:  "Positive for cough (Productive, and worse in the mornings after waking up. ) and shortness of breath. Negative for apnea, choking and wheezing.    Cardiovascular: Negative for chest pain, palpitations and leg swelling.   Gastrointestinal: Negative for abdominal pain, constipation, diarrhea, nausea and vomiting.   Endocrine: Negative for cold intolerance, heat intolerance, polydipsia, polyphagia and polyuria.   Genitourinary: Negative for difficulty urinating and dysuria.   Musculoskeletal: Negative for gait problem.   Skin: Negative for rash and wound.   Neurological: Positive for dizziness. Negative for syncope and light-headedness.   Hematological: Negative for adenopathy.   Psychiatric/Behavioral: Negative for agitation, behavioral problems and confusion.   All other systems reviewed and are negative.      Objective   /72   Pulse 84   Temp 97.9 °F (36.6 °C) (Oral)   Ht 157.5 cm (62\")   Wt 109 kg (240 lb)   LMP  (LMP Unknown)   SpO2 93%   BMI 43.90 kg/m²   Physical Exam   Constitutional: She is oriented to person, place, and time.   HENT:   Head: Normocephalic and atraumatic.   Nose: Mucosal edema present.   Eyes: Pupils are equal, round, and reactive to light. EOM are normal.   Neck: Neck supple.   Cardiovascular: Normal rate, regular rhythm and normal heart sounds.    Pulmonary/Chest: She has rhonchi.   Vesicular breath sound bilaterally with prolonged expiratory phase   Abdominal: Soft. Bowel sounds are normal.   Musculoskeletal: Normal range of motion. She exhibits no deformity.   Neurological: She is alert and oriented to person, place, and time.   Skin: Skin is warm and dry.   Psychiatric: She has a normal mood and affect. Her behavior is normal.   Nursing note and vitals reviewed.        Radiology:  Dexa Bone Density Axial    Result Date: 7/31/2018  The patient remains in the osteopenia range. The current T score is -1.9. The previous T score was -1.8.  This report was finalized on 7/31/2018 " 12:29 PM by Dr. Satya Mcintosh II, MD.      Mammo Screening Digital Tomosynthesis Bilateral W Cad    Result Date: 7/31/2018  BIRADS 2: Benign findings  RECOMMENDATION: Recommend routine follow-up for continued breast evaluation.  Today's study was evaluated in conjunction with computer aided detection.  PLEASE NOTE THE FOLLOWING ACR RECOMMENDATIONS: A:  Only 80% of breast cancers can be diagnosed by mammography. B:  A negative mammogram does not exclude cancer in clinically palpable abnormalities.  Biopsy should be done based on ultrasound findings and clinical judgment. C:  A mammogram has limited value in detecting cancer in patients with adenosis or dense breasts.  [employer] GUIDELINES For breast cancer detection in asymptomatic women: Age  ACR Recommendations 35-40  Baseline Mammogram 40-49  Annual or Biannual Mammogram 50+  Annual Mammogram   Annual physical and frequent self breast examination.  Patient has been entered into an automatic reminder system.  This report was finalized on 7/31/2018 11:16 AM by Dr. Sergio Juares MD.        Lab Results:  Admission on 09/14/2018, Discharged on 09/14/2018   Component Date Value Ref Range Status   • Case Report 09/14/2018    Final                    Value:Surgical Pathology Report                         Case: [id number]                                  Authorizing Provider:  Keon Melgoza MD   Collected:           09/14/2018 10:41 AM          Ordering Location:     [facility]      Received:            09/14/2018 01:04 PM                                 OPERATING ROOM DEPARTMENT                                                    Pathologist:           Svetlana Porter MD                                                       Specimen:    Large Intestine, appendiceal orifice polyp                                                • Final Diagnosis 09/14/2018    Final                    Value:This result contains rich text formatting  which cannot be displayed here.           Assessment      ICD-10-CM ICD-9-CM   1. HERBERT (obstructive sleep apnea) G47.33 327.23   2. Simple chronic bronchitis (CMS/[facility]) J41.0 491.0   3. Morbid obesity with BMI of 40.0-44.9, adult (CMS/[facility]) E66.01 278.01    Z68.41 V85.41                DISCUSSION:  Reviewed the nocturnal polysomnography.  Findings are consistent with obstructive sleep apnea with respiratory disturbance index of 10 and lowest O2 sat of 78%.  This patient is morbidly obese and is very was symptomatic.  I like to start her on pressure therapy as soon as possible.  We are putting her on AutoPap with a pressure range of 5-15 cm water and would reevaluate her again in approximately month from now.  Meanwhile we asked her to continue her current medications.      Sleep hygiene instructions given.  Hazards of drowsy driving discussed.  We also discussed about the issue of morbid obesity.Patient's Body mass index is 43.9 kg/m². BMI is above normal parameters. Recommendations include: educational material and exercise counseling.      Plan    Orders Placed This Encounter   Procedures   • PAP Therapy     No orders of the defined types were placed in this encounter.                 Sierra English MD, FCCP, [facility]  Pulmonary, Critical Care, and Sleep Medicine

## 2018-10-25 ENCOUNTER — OFFICE VISIT (OUTPATIENT)
Dept: FAMILY MEDICINE CLINIC | Facility: CLINIC | Age: 58
End: 2018-10-25

## 2018-10-25 VITALS
DIASTOLIC BLOOD PRESSURE: 70 MMHG | RESPIRATION RATE: 12 BRPM | HEIGHT: 62 IN | OXYGEN SATURATION: 97 % | HEART RATE: 100 BPM | SYSTOLIC BLOOD PRESSURE: 120 MMHG | TEMPERATURE: 97.8 F | BODY MASS INDEX: 43.61 KG/M2 | WEIGHT: 237 LBS

## 2018-10-25 DIAGNOSIS — M54.59 MECHANICAL LOW BACK PAIN: Primary | ICD-10-CM

## 2018-10-25 PROCEDURE — 99213 OFFICE O/P EST LOW 20 MIN: CPT | Performed by: GENERAL PRACTICE

## 2018-10-25 NOTE — PROGRESS NOTES
Subjective   Lisa Hill is a 58 y.o. female.     History of Present Illness     Low Back Pain  Presents with a one week history of low back pain.  There is no history of any strain or trauma nor any change in her activities.  The pain is described as a sharp ache that occurs with prolonged standing.  The pain does not radiate elsewhere and has been unassociated with any other symptoms.  There is no history of any weakness, numbness, tingling, or any change in her bowel/bladder control.  There is no history of any fever, chills, night sweats or weight loss.  When she sits down the pain resolves within minutes and she denies any problem at night.  She's had similar pain on and off for years.  She has never been to physical therapy    The following portions of the patient's history were reviewed and updated as appropriate: allergies, current medications, past medical history and problem list.    Review of Systems   Constitutional: Positive for fatigue. Negative for appetite change, chills, fever and unexpected weight change.   HENT: Negative for congestion, ear pain, postnasal drip, rhinorrhea, sneezing, sore throat and voice change.    Eyes: Negative for visual disturbance.   Respiratory: Positive for cough, shortness of breath and wheezing.    Cardiovascular: Positive for leg swelling. Negative for chest pain and palpitations.   Gastrointestinal: Positive for constipation (with occasional perianal irritation). Negative for abdominal pain, anal bleeding, blood in stool, diarrhea, nausea and vomiting.   Endocrine: Negative for polydipsia.   Genitourinary: Negative for difficulty urinating, dysuria, frequency, hematuria, menstrual problem, pelvic pain, urgency, vaginal bleeding, vaginal discharge and vaginal pain.        Incontinence with coughing, sneezing, or lifting   Musculoskeletal: Positive for arthralgias and back pain. Negative for joint swelling, myalgias and neck pain.   Skin: Negative for color change.    Neurological: Negative for tremors, weakness, numbness and headaches.   Psychiatric/Behavioral: Positive for decreased concentration, dysphoric mood and sleep disturbance (chronic-intermittent). Negative for suicidal ideas.     Objective   Physical Exam   Constitutional: She is oriented to person, place, and time. No distress.   Bright, in fair spirits.  No apparent distress.  No pallor, jaundice, cyanosis or diaphoresis   HENT:   Head: Atraumatic.   Right Ear: Tympanic membrane, external ear and ear canal normal.   Left Ear: Tympanic membrane, external ear and ear canal normal.   Nose: Nose normal.   Mouth/Throat: Oropharynx is clear and moist. Mucous membranes are not pale and not cyanotic.   Eyes: Pupils are equal, round, and reactive to light. EOM are normal. No scleral icterus.   Neck: No JVD present. Carotid bruit is not present. No tracheal deviation present. No thyromegaly present.   Cardiovascular: Normal rate, regular rhythm, S1 normal, S2 normal and intact distal pulses.  Exam reveals no gallop, no S3 and no S4.    No murmur heard.  Pulmonary/Chest: No accessory muscle usage or stridor. No respiratory distress. She has decreased breath sounds. She has wheezes (diffuse-mild).   Hyperinflation of the chest   Abdominal: Soft. Normal aorta and bowel sounds are normal. She exhibits no distension, no abdominal bruit and no mass. There is no hepatosplenomegaly. There is no tenderness. No hernia.   Musculoskeletal: She exhibits no deformity.        Lumbar back: She exhibits tenderness (bilateral lumbar paraspinal muscle tenderness). She exhibits normal range of motion, no bony tenderness and no deformity.   Negative straight leg raise.     Vascular Status -  Her right foot exhibits abnormal foot edema (trace). Her left foot exhibits abnormal foot edema (trace).  Lymphadenopathy:        Head (right side): No submandibular adenopathy present.        Head (left side): No submandibular adenopathy present.     She  has no cervical adenopathy.   Neurological: She is alert and oriented to person, place, and time. She has normal reflexes. She displays normal reflexes. No cranial nerve deficit. She exhibits normal muscle tone. Coordination normal.   Reflex Scores:       Patellar reflexes are 2+ on the right side and 2+ on the left side.       Achilles reflexes are 2+ on the right side and 2+ on the left side.  1+ hamstrings reflex bilaterally. Able to walk slowly on both her heels and toes   Skin: Skin is warm and dry. No rash noted. She is not diaphoretic. No cyanosis. No pallor. Nails show no clubbing.   Psychiatric: She has a normal mood and affect. Her speech is normal and behavior is normal. Thought content normal. Cognition and memory are normal. She expresses no suicidal ideation.     Assessment/Plan   Problems Addressed this Visit        Nervous and Auditory    Mechanical low back pain   Advised regarding rest, gentle exercise, ice and heat.  Referral will be made to physical therapy   Encouraged to report if any worse, any new symptoms, or if not resolving over the next month     Relevant Orders    Ambulatory Referral to Physical Therapy Evaluate and treat (Completed)

## 2018-10-27 DIAGNOSIS — E55.9 VITAMIN D DEFICIENCY: ICD-10-CM

## 2018-10-29 RX ORDER — MELATONIN
Qty: 30 TABLET | Refills: 5 | Status: SHIPPED | OUTPATIENT
Start: 2018-10-29 | End: 2019-04-30 | Stop reason: SDUPTHER

## 2018-11-05 RX ORDER — BUPROPION HYDROCHLORIDE 300 MG/1
300 TABLET ORAL EVERY MORNING
Qty: 30 TABLET | Refills: 1 | Status: SHIPPED | OUTPATIENT
Start: 2018-11-05 | End: 2019-03-13 | Stop reason: SDUPTHER

## 2018-11-06 ENCOUNTER — TELEPHONE (OUTPATIENT)
Dept: SURGERY | Facility: CLINIC | Age: 58
End: 2018-11-06

## 2018-11-06 NOTE — TELEPHONE ENCOUNTER
Patient called stating [facility] was currently out of the Proctofoam HC rectal foam but they did have the cream. I informed her I would contact the pharmacy and change her Rx to the cream until they could order the foam again. I spoke with Bessie, Pharmacist and verbally order the script. Patient was left a voicemail to inform her that it should be ready to  in 20-30 minutes.    BC 11/6/18 @ 12:27pm

## 2018-11-21 ENCOUNTER — TELEPHONE (OUTPATIENT)
Dept: FAMILY MEDICINE CLINIC | Facility: CLINIC | Age: 58
End: 2018-11-21

## 2018-11-21 DIAGNOSIS — I10 ESSENTIAL HYPERTENSION: ICD-10-CM

## 2018-11-21 RX ORDER — LOSARTAN POTASSIUM 50 MG/1
50 TABLET ORAL DAILY
Qty: 30 TABLET | Refills: 5 | Status: SHIPPED | OUTPATIENT
Start: 2018-11-21 | End: 2019-08-02 | Stop reason: SDUPTHER

## 2018-11-21 NOTE — TELEPHONE ENCOUNTER
----- Message from Scott Chavez MD sent at 11/21/2018  2:52 PM EST -----  Once daily - thanks    ----- Message -----  From: Vernell Dotson MA  Sent: 11/21/2018  10:26 AM  To: Scott Chavez MD    Should her BP med be once or bid?

## 2018-11-29 ENCOUNTER — OFFICE VISIT (OUTPATIENT)
Dept: FAMILY MEDICINE CLINIC | Facility: CLINIC | Age: 58
End: 2018-11-29

## 2018-11-29 VITALS
RESPIRATION RATE: 12 BRPM | BODY MASS INDEX: 43.98 KG/M2 | HEIGHT: 62 IN | OXYGEN SATURATION: 94 % | HEART RATE: 96 BPM | SYSTOLIC BLOOD PRESSURE: 125 MMHG | WEIGHT: 239 LBS | DIASTOLIC BLOOD PRESSURE: 80 MMHG | TEMPERATURE: 98 F

## 2018-11-29 DIAGNOSIS — M85.80 OSTEOPENIA, UNSPECIFIED LOCATION: ICD-10-CM

## 2018-11-29 DIAGNOSIS — J44.9 COPD MIXED TYPE (HCC): ICD-10-CM

## 2018-11-29 DIAGNOSIS — K21.9 GASTROESOPHAGEAL REFLUX DISEASE WITHOUT ESOPHAGITIS: ICD-10-CM

## 2018-11-29 DIAGNOSIS — E06.3 HYPOTHYROIDISM DUE TO HASHIMOTO'S THYROIDITIS: ICD-10-CM

## 2018-11-29 DIAGNOSIS — Z00.00 HEALTHCARE MAINTENANCE: ICD-10-CM

## 2018-11-29 DIAGNOSIS — E78.2 MIXED HYPERLIPIDEMIA: Primary | ICD-10-CM

## 2018-11-29 DIAGNOSIS — F41.8 DEPRESSION WITH ANXIETY: ICD-10-CM

## 2018-11-29 DIAGNOSIS — K59.09 CHRONIC CONSTIPATION: ICD-10-CM

## 2018-11-29 DIAGNOSIS — I10 ESSENTIAL HYPERTENSION: ICD-10-CM

## 2018-11-29 DIAGNOSIS — J30.2 CHRONIC SEASONAL ALLERGIC RHINITIS: ICD-10-CM

## 2018-11-29 DIAGNOSIS — M15.9 GENERALIZED OSTEOARTHRITIS: ICD-10-CM

## 2018-11-29 DIAGNOSIS — E03.8 HYPOTHYROIDISM DUE TO HASHIMOTO'S THYROIDITIS: ICD-10-CM

## 2018-11-29 DIAGNOSIS — R73.03 PREDIABETES: ICD-10-CM

## 2018-11-29 DIAGNOSIS — E55.9 VITAMIN D DEFICIENCY: ICD-10-CM

## 2018-11-29 DIAGNOSIS — M54.59 MECHANICAL LOW BACK PAIN: ICD-10-CM

## 2018-11-29 DIAGNOSIS — F17.200 SMOKER: ICD-10-CM

## 2018-11-29 PROBLEM — R19.5 OCCULT BLOOD POSITIVE STOOL: Status: RESOLVED | Noted: 2018-09-10 | Resolved: 2018-11-29

## 2018-11-29 PROBLEM — K64.8 INTERNAL HEMORRHOIDS: Status: RESOLVED | Noted: 2018-10-11 | Resolved: 2018-11-29

## 2018-11-29 PROCEDURE — 99214 OFFICE O/P EST MOD 30 MIN: CPT | Performed by: GENERAL PRACTICE

## 2018-11-29 RX ORDER — LORAZEPAM 1 MG/1
0.5 TABLET ORAL 2 TIMES DAILY PRN
Qty: 90 TABLET | Refills: 0 | Status: SHIPPED | OUTPATIENT
Start: 2018-11-29 | End: 2019-02-28 | Stop reason: SDUPTHER

## 2018-11-29 RX ORDER — TOPIRAMATE 100 MG/1
100 TABLET, FILM COATED ORAL 2 TIMES DAILY
Qty: 60 TABLET | Refills: 5
Start: 2018-11-29 | End: 2021-11-15 | Stop reason: SDUPTHER

## 2018-11-29 NOTE — PROGRESS NOTES
Subjective   Lisa Hill is a 58 y.o. female.     History of Present Illness     Low Back Pain  With physical therapy she noted a significant improvement in her low back pain.  There has been no change in the quality nor any new associated symptoms. There is no history of any weakness, numbness, tingling, or any change in her bowel/bladder control.  There is no history of any fever, chills, night sweats or weight loss.  When she sits down the pain resolves within minutes and she denies any problem at night.  She's had similar pain on and off for years.  She has tried her 's TENS unit and feels that it helps    COPD  The patient reports that her SOB and cough have remained stable. She states that these symptoms occur at any time during the day or night. She reports that her symptoms become worse with activity, exposure to cold air and environmental allergens. Her symptoms are reduced with rest and inhaled inhaled medication. She is using supplemental oxygen at night but is unconvinced that it helps at all. The patient states she also has nasal congestion. She is following the treatment plan, including medications as directed. She continues to smoke about 1 pack per day. Low dose CT of the chest performed on 6/2/17 revaled moderate emphysema as well as a benign appearing 4 mm semisolid nodule in the LLL.  CT of the chest performed with PE protocol on 1/24/18 was reported as showing COPD as well as ground-glass densities in both lungs suggestive of edema    Depression  Onset was a number of years ago.  Despite the shorter days her symptoms have remained relatively stable since last here. Current symptoms include: depression, nervousness, anhedonia, difficulty concentrating, fatigue and impaired memory. Patient denies recurrent thoughts of death or suicidal thoughts. Risk factors: history of seasonal affective disorder.  Current medication includes escitalopram 10 daily,  bupropion 300 daily, risperdone 2  nightly, and lorazepam 0.5 twice a day. She denies any side effects with escitalopram    Hypothyroidism  Patient presents for evaluation of thyroid function. Symptoms consist of weight gain, constipation. The symptoms are moderate.  The problem has been unchanged.  Previous thyroid studies include TSH. The hypothyroidism is due to Hashimoto's disease.   Lab Results   Component Value Date    TSH 0.418 (L) 08/06/2018     Dyslipidemia  Compliance with treatment has been fair. The patient exercises occasionally. She is currently being prescribed the following medication for her dyslipidemia - simvastatin. Patient denies side effects associated with her medications.  Lab Results   Component Value Date    CHOL 135 08/06/2018    CHLPL 141 02/05/2016    TRIG 143 08/06/2018    HDL 44 (L) 08/06/2018    LDL 62 08/06/2018     Labs  Most recent fasting glucose 107 with an A1c of 5.7. B12 247 with a vitamin D of 33    The following portions of the patient's history were reviewed and updated as appropriate: allergies, current medications, past medical history, past social history and problem list.    Review of Systems   Constitutional: Positive for fatigue. Negative for appetite change, chills, fever and unexpected weight change.   HENT: Negative for congestion, ear pain, postnasal drip, rhinorrhea, sneezing, sore throat and voice change.    Eyes: Negative for visual disturbance.   Respiratory: Positive for cough, shortness of breath and wheezing.    Cardiovascular: Positive for leg swelling. Negative for chest pain and palpitations.   Gastrointestinal: Positive for constipation (with occasional perianal irritation). Negative for abdominal pain, anal bleeding, blood in stool, diarrhea, nausea and vomiting.   Endocrine: Negative for polydipsia.   Genitourinary: Negative for difficulty urinating, dysuria, frequency, hematuria, menstrual problem, pelvic pain, urgency, vaginal bleeding, vaginal discharge and vaginal pain.         Incontinence with coughing, sneezing, or lifting   Musculoskeletal: Positive for arthralgias and back pain. Negative for joint swelling, myalgias and neck pain.   Skin: Negative for color change.   Neurological: Negative for tremors, weakness, numbness and headaches.   Psychiatric/Behavioral: Positive for decreased concentration, dysphoric mood and sleep disturbance (chronic-intermittent). Negative for suicidal ideas.     Objective   Physical Exam   Constitutional: She is oriented to person, place, and time. No distress.   Bright, in fair spirits.  No apparent distress.  No pallor, jaundice, cyanosis or diaphoresis   HENT:   Head: Atraumatic.   Right Ear: Tympanic membrane, external ear and ear canal normal.   Left Ear: Tympanic membrane, external ear and ear canal normal.   Nose: Nose normal.   Mouth/Throat: Oropharynx is clear and moist. Mucous membranes are not pale and not cyanotic.   Eyes: EOM are normal. Pupils are equal, round, and reactive to light. No scleral icterus.   Neck: No JVD present. Carotid bruit is not present. No tracheal deviation present. No thyromegaly present.   Cardiovascular: Normal rate, regular rhythm, S1 normal, S2 normal and intact distal pulses. Exam reveals no gallop, no S3 and no S4.   No murmur heard.  Pulmonary/Chest: No accessory muscle usage or stridor. No respiratory distress. She has decreased breath sounds. She has wheezes (diffuse-mild).   Hyperinflation of the chest   Abdominal: Soft. Normal aorta and bowel sounds are normal. She exhibits no distension, no abdominal bruit and no mass. There is no hepatosplenomegaly. There is no tenderness. No hernia.   Musculoskeletal: She exhibits no deformity.        Lumbar back: She exhibits tenderness (bilateral lumbar paraspinal muscle tenderness). She exhibits normal range of motion, no bony tenderness and no deformity.   Negative straight leg raise.     Vascular Status -  Her right foot exhibits abnormal foot edema (trace). Her left  foot exhibits abnormal foot edema (trace).  Lymphadenopathy:        Head (right side): No submandibular adenopathy present.        Head (left side): No submandibular adenopathy present.     She has no cervical adenopathy.   Neurological: She is alert and oriented to person, place, and time. She has normal reflexes. She displays normal reflexes. No cranial nerve deficit. She exhibits normal muscle tone. Coordination normal.   Skin: Skin is warm and dry. No rash noted. She is not diaphoretic. No cyanosis. No pallor. Nails show no clubbing.   Psychiatric: She has a normal mood and affect. Her speech is normal and behavior is normal. Thought content normal. Cognition and memory are normal. She expresses no suicidal ideation.     Assessment/Plan   Problems Addressed this Visit        Cardiovascular and Mediastinum    Mixed hyperlipidemia   Encouraged to continue to work on her diet and exercise plan.  Continue current medication  Updated labs will be arranged at return.    Essential hypertension  As above.   Continue current medication.       Respiratory    COPD mixed type (CMS/HCC)   COPD is stable.  Reminded of the importance of smoking cessation  Encouraged to remain as active as symptoms allow for  Follow up with pulmonology     Chronic seasonal allergic rhinitis       Digestive    Vitamin D deficiency    Gastroesophageal reflux disease without esophagitis    Chronic constipation       Endocrine    Hypothyroidism due to Hashimoto's thyroiditis  Clinically euthyroid.  Continue current medication.       Nervous and Auditory    Mechanical low back pain    Reminded regarding symptomatic treatment.   Continue current medication  Prescription written for a trial of a TENS unit as well as a back brace when lifting            Musculoskeletal and Integument    Osteopenia    Generalized osteoarthritis       Other    Prediabetes  Will continue to monitor    Depression with anxiety  Stable.  Supportive therapy.   Continue  current medication.    Relevant Medications    LORazepam (ATIVAN) 1 MG tablet    topiramate (TOPAMAX) 100 MG tablet    Smoker    Relevant Orders    CT Chest Low Dose Wo    Healthcare maintenance  Patient has already received a flu shot as well as both doses of shingrix  Will arrange an updated low-dose CT of the chest     Relevant Orders    CT Chest Low Dose Wo

## 2018-12-04 DIAGNOSIS — J30.1 SEASONAL ALLERGIC RHINITIS DUE TO POLLEN: ICD-10-CM

## 2018-12-04 RX ORDER — FLUTICASONE PROPIONATE 50 MCG
SPRAY, SUSPENSION (ML) NASAL
Qty: 16 G | Refills: 5 | Status: SHIPPED | OUTPATIENT
Start: 2018-12-04 | End: 2019-02-13 | Stop reason: SDUPTHER

## 2018-12-12 ENCOUNTER — OFFICE VISIT (OUTPATIENT)
Dept: SURGERY | Facility: CLINIC | Age: 58
End: 2018-12-12

## 2018-12-12 VITALS — HEIGHT: 62 IN | WEIGHT: 238 LBS | BODY MASS INDEX: 43.79 KG/M2

## 2018-12-12 DIAGNOSIS — K64.8 OTHER HEMORRHOIDS: Primary | ICD-10-CM

## 2018-12-12 PROCEDURE — 99212 OFFICE O/P EST SF 10 MIN: CPT | Performed by: SURGERY

## 2018-12-13 NOTE — PROGRESS NOTES
Subjective   Lisa Hill is a 58 y.o. female  is here today for follow-up.         Lisa Hill is a 58 y.o. female here for follow up on anoscopy.  Final pathology was consistent with benign appendiceal mucosa likely secondary to an incidental appendectomy at the time of hysterectomy.  No atypia or malignancy identified.  The patient is doing well and her internal hemorrhoids are persistent but because no active bleeding or pain.  Itching as her biggest complaint.        There are no diagnoses linked to this encounter.    Assessment     Lisa Hill is a 58 y.o. female status post screening colonoscopy which was negative for malignancy.  The patient will require repeat colonoscopy in 10 years unless symptoms develop.  With regards to her internal hemorrhoids she is been prescribed appropriate bowel regimen and Anusol suppositories.

## 2019-01-03 DIAGNOSIS — E03.9 ACQUIRED HYPOTHYROIDISM: ICD-10-CM

## 2019-01-03 RX ORDER — LEVOTHYROXINE SODIUM 175 UG/1
TABLET ORAL
Qty: 30 TABLET | Refills: 5 | Status: SHIPPED | OUTPATIENT
Start: 2019-01-03 | End: 2019-06-27 | Stop reason: SDUPTHER

## 2019-01-31 DIAGNOSIS — F41.8 DEPRESSION WITH ANXIETY: ICD-10-CM

## 2019-01-31 RX ORDER — ESCITALOPRAM OXALATE 10 MG/1
10 TABLET ORAL DAILY
Qty: 30 TABLET | Refills: 5 | Status: SHIPPED | OUTPATIENT
Start: 2019-01-31 | End: 2019-02-13

## 2019-01-31 RX ORDER — LANOLIN ALCOHOL/MO/W.PET/CERES
1000 CREAM (GRAM) TOPICAL DAILY
Qty: 30 TABLET | Refills: 5 | Status: SHIPPED | OUTPATIENT
Start: 2019-01-31 | End: 2019-08-02 | Stop reason: SDUPTHER

## 2019-02-06 ENCOUNTER — APPOINTMENT (OUTPATIENT)
Dept: CT IMAGING | Facility: HOSPITAL | Age: 59
End: 2019-02-06

## 2019-02-07 ENCOUNTER — APPOINTMENT (OUTPATIENT)
Dept: CT IMAGING | Facility: HOSPITAL | Age: 59
End: 2019-02-07

## 2019-02-13 ENCOUNTER — OFFICE VISIT (OUTPATIENT)
Dept: PULMONOLOGY | Facility: CLINIC | Age: 59
End: 2019-02-13

## 2019-02-13 VITALS
WEIGHT: 250 LBS | HEART RATE: 100 BPM | BODY MASS INDEX: 46.01 KG/M2 | SYSTOLIC BLOOD PRESSURE: 166 MMHG | HEIGHT: 62 IN | OXYGEN SATURATION: 96 % | DIASTOLIC BLOOD PRESSURE: 87 MMHG

## 2019-02-13 DIAGNOSIS — R06.09 DYSPNEA ON EXERTION: ICD-10-CM

## 2019-02-13 DIAGNOSIS — G47.33 OBSTRUCTIVE SLEEP APNEA SYNDROME: ICD-10-CM

## 2019-02-13 DIAGNOSIS — J30.1 SEASONAL ALLERGIC RHINITIS DUE TO POLLEN: ICD-10-CM

## 2019-02-13 DIAGNOSIS — J44.9 CHRONIC OBSTRUCTIVE PULMONARY DISEASE, UNSPECIFIED COPD TYPE (HCC): Primary | ICD-10-CM

## 2019-02-13 PROCEDURE — 94618 PULMONARY STRESS TESTING: CPT | Performed by: PHYSICIAN ASSISTANT

## 2019-02-13 PROCEDURE — 99214 OFFICE O/P EST MOD 30 MIN: CPT | Performed by: PHYSICIAN ASSISTANT

## 2019-02-13 RX ORDER — FLUTICASONE PROPIONATE 50 MCG
2 SPRAY, SUSPENSION (ML) NASAL DAILY
Qty: 1 BOTTLE | Refills: 5 | Status: CANCELLED | OUTPATIENT
Start: 2019-02-13

## 2019-02-13 RX ORDER — FLUTICASONE PROPIONATE 50 MCG
SPRAY, SUSPENSION (ML) NASAL
Qty: 16 G | Refills: 5 | Status: SHIPPED | OUTPATIENT
Start: 2019-02-13 | End: 2019-11-06 | Stop reason: SDUPTHER

## 2019-02-13 NOTE — PROGRESS NOTES
Interval history since last visit:cough, sob, zayda    Recent hospitalizations: none    Investigations (imaging, PFT's, labs, sleep study, record requests, etc.) none    Have you had the Influenza Vaccine? yes  Would you like to receive this Vaccine today? no    Have you had the Pneumonia Vaccine?  yes   Would you like to receive this Vaccine today? no    Subjective    Lisa Hill presents for the following Cough; Sleep Apnea (no longer using c-pap); and Shortness of Breath  .    History of Present Illness     Ms. Hill is here today for follow up of sleep apnea and chronic bronchitis. She was started on AutoPAP of 5-15 cm water at the last visit. She was also using Anoro Ellipta with Albuterol as needed.   Today, she reports using oxygen at night, on 2 L, as she could not tolerate the AutoPAP. However, she still feels that she sarah at night. She reports occasional daytime fatigue.     She continues to use her albuterol inhaler and Anoro once daily. She uses the albuterol at least once-twice per day. She reports progressively worsening shortness of breath with mild exertion. She did note some shortness of breath upon walking into the office today. Also notes increased sputum production. Yellow mucous, occasionally brown..   Still smokes about 1 pack per day. She is afraid to try chantix with her history of depression. She reports that she has nicotine patches as home, but reports little improvement with use.   She asked for a Flonase prescription as this improves her nasal congestion due to allergic rhinitis.              Review of Systems   Constitutional: Positive for fatigue (with increased daytime sleepiness). Negative for appetite change.   HENT: Positive for sinus pressure. Negative for congestion, sneezing and sore throat.         Loud snoring   Respiratory: Positive for apnea (Witnessed apnea per family/spouse), cough and shortness of breath (exhustional).    Cardiovascular: Negative for chest pain  and leg swelling.   Gastrointestinal: Negative for abdominal distention and abdominal pain.   Endocrine: Negative for polydipsia and polyuria.   Musculoskeletal: Negative for arthralgias and joint swelling.   Skin: Negative for color change and pallor.   Allergic/Immunologic: Positive for environmental allergies.   Neurological: Negative for dizziness and headaches.   Psychiatric/Behavioral: Positive for sleep disturbance (Fragmented sleep with unrefreshed feeling in the morning ). Negative for agitation and confusion.        SLEEP: Loud snoring, fragmented sleep, un refreshed feeling in the morning, increased daytime sleepiness    All other systems reviewed and are negative.      Active Problems:  Problem List Items Addressed This Visit        Respiratory    Sleep apnea      Other Visit Diagnoses     Chronic obstructive pulmonary disease, unspecified COPD type (CMS/[facility])    -  Primary    Relevant Medications    fluticasone (FLONASE) 50 MCG/ACT nasal spray    Fluticasone Furoate (ARNUITY ELLIPTA) 100 MCG/ACT aerosol powder     Other Relevant Orders    Full Pulmonary Function Test With Bronchodilator    Seasonal allergic rhinitis due to pollen        Relevant Medications    fluticasone (FLONASE) 50 MCG/ACT nasal spray    Dyspnea on exertion        Relevant Orders    Adult Transthoracic Echo Complete W/ Cont if Necessary Per Protocol          Past Medical History:  Past Medical History:   Diagnosis Date   • Anxiety    • Arthritis    • COPD (chronic obstructive pulmonary disease) (CMS/[facility])    • Depression    • Disease of thyroid gland     GRAVES DISEASE   • Elevated cholesterol    • GERD (gastroesophageal reflux disease)    • History of transfusion    • Hyperlipidemia    • Hypertension        Family History:  Family History   Problem Relation Age of Onset   • Kidney disease Mother    • Diabetes Mother    • Hypertension Father    • Breast cancer Maternal Aunt        Social History:  Social History     Tobacco Use   •  Smoking status: Current Every Day Smoker     Packs/day: 1.00     Years: 43.00     Pack years: 43.00     Types: Cigarettes   • Smokeless tobacco: Never Used   • Tobacco comment: cessation has been discussed   Substance Use Topics   • Alcohol use: No       Current Medications:  Current Outpatient Medications   Medication Sig Dispense Refill   • albuterol (PROVENTIL) (2.5 MG/3ML) 0.083% nebulizer solution Take 2.5 mg by nebulization Every 4 (Four) Hours As Needed for Shortness of Air. 180 vial 5   • albuterol (VENTOLIN HFA) 108 (90 BASE) MCG/ACT inhaler Inhale 2 puffs Every 4 (Four) Hours As Needed for Wheezing. 18 g 5   • buPROPion XL (WELLBUTRIN XL) 300 MG 24 hr tablet TAKE 1 TABLET BY MOUTH EVERY MORNING. 30 tablet 1   • busPIRone (BUSPAR) 15 MG tablet Take 1 tablet by mouth 3 (Three) Times a Day. 90 tablet 1   • cholecalciferol (VITAMIN D3) 1000 units tablet TAKE 1 TABLET BY MOUTH DAILY 30 tablet 5   • conjugated estrogens (PREMARIN) 0.625 MG/GM vaginal cream Insert  into the vagina 2 (Two) Times a Week. 30 g 5   • fluticasone (FLONASE) 50 MCG/ACT nasal spray INSTILL 2 SPRAYS IN EACH NOSTRIL DAILY 16 g 5   • hydrocortisone-pramoxine (PROCTOFOAM HC) 1-1 % rectal foam Insert 1 applicator into the rectum 3 (Three) Times a Day. 1 each 1   • ketoconazole (NIZORAL) 2 % shampoo Apply  topically 2 (Two) Times a Week. 120 mL 5   • levothyroxine (SYNTHROID, LEVOTHROID) 175 MCG tablet TAKE 1 TABLET BY MOUTH DAILY 30 tablet 5   • lidocaine (XYLOCAINE) 5 % ointment Apply  topically to the appropriate area as directed Every 2 (Two) Hours As Needed for Mild Pain . 1 tube 5   • LORazepam (ATIVAN) 1 MG tablet Take 0.5 tablets by mouth 2 (Two) Times a Day As Needed for Anxiety (Panic). 90 tablet 0   • losartan (COZAAR) 50 MG tablet Take 1 tablet by mouth Daily. 30 tablet 5   • nicotine (NICODERM CQ) 21 MG/24HR patch Place 1 patch on the skin as directed by provider Daily. 28 patch 0   • nystatin-triamcinolone (MYCOLOG) 882116-7.1  "UNIT/GM-% ointment Apply  topically to the appropriate area as directed Every 12 (Twelve) Hours. Apply topically to area of rash twice daily 30 bottle 2   • pantoprazole (PROTONIX) 40 MG EC tablet TAKE ONE TABLET BY MOUTH EVERY DAY 30 tablet 5   • PROCTOZONE-HC 2.5 % rectal cream JAQUELIN TO RECTUM 2-4 TIMES DAILY FOR UP TO 2 WEEKS  1   • risperiDONE (risperDAL) 2 MG tablet TAKE 1 TABLET BY MOUTH EVERY NIGHT. 30 tablet 5   • simvastatin (ZOCOR) 20 MG tablet TAKE 1 TABLET BY MOUTH EVERY NIGHT. 30 tablet 5   • topiramate (TOPAMAX) 100 MG tablet Take 1 tablet by mouth 2 (Two) Times a Day. 60 tablet 5   • triamcinolone (KENALOG) 0.1 % cream Apply  topically 2 (Two) Times a Day. 80 g 1   • umeclidinium-vilanterol (ANORO ELLIPTA) 62.5-25 MCG/INH aerosol powder  inhaler Inhale 1 puff Daily. 1 each 6   • vitamin B-12 (CYANOCOBALAMIN) 1000 MCG tablet TAKE 1 TABLET BY MOUTH DAILY. 30 tablet 5   • Fluticasone Furoate (ARNUITY ELLIPTA) 100 MCG/ACT aerosol powder  Inhale 1 puff Daily. 30 each 5   • triamcinolone (KENALOG) 0.1 % ointment APPLY TO RASH TWO TIMES A DAY  2   • Zoster Vac Recomb Adjuvanted (SHINGRIX) 50 MCG reconstituted suspension Inject 50 mcg into the appropriate muscle as directed by prescriber See Admin Instructions. 1 each 0     No current facility-administered medications for this visit.        Allergies:  Allergies   Allergen Reactions   • Sulfa Antibiotics        Vitals:  /87   Pulse 100   Ht 157.5 cm (62\")   Wt 113 kg (250 lb)   LMP  (LMP Unknown)   SpO2 96%   BMI 45.73 kg/m²     Imaging:    Imaging Results (most recent)     None          Pulmonary Functions Testing Results:    No results found for: FEV1, FVC, CZQ2DJN, TLC, DLCO    Results for orders placed or performed during the hospital encounter of 09/14/18   Tissue Pathology Exam   Result Value Ref Range    Case Report       Surgical Pathology Report                         Case: [id number]                                  Authorizing Provider:  " Keon Melgoza MD   Collected:           09/14/2018 10:41 AM          Ordering Location:     [facility]      Received:            09/14/2018 01:04 PM                                 OPERATING ROOM DEPARTMENT                                                    Pathologist:           Svetlana Porter MD                                                       Specimen:    Large Intestine, appendiceal orifice polyp                                                 Final Diagnosis       See Scanned Report           Objective   Physical Exam    General - normal appearance. No signs of acute distress. Not using supplemental oxygen.     HEENT - pupils equally reactive to light, normal in size, no scleral icterus    Neck - supple. No thyromegaly.     Respiratory - Normal rate and effort. Audible wheeze noted. Breath sounds appreciated throughout, No wheezing, crackles rhonchi, or diminished breath sounds on auscultation.     Cardiovascular - Normal S1 and S2. No S3, S4 or murmurs. No edema, pulses normal bilaterally.     GI - nontender nondistended. Active bowel sounds.    CNS - strength and sensation equal bilaterally.     Musculoskeletal - no edema. No visible joint deformity.     Psychiatric - mood good, good eye contact. Alert, and oriented x3.        Assessment/Plan     I have reviewed the past medical history, family history, social history, surgical history, and allergies.   Reviewed previous labs, CT scan, PFT.     Previous CT was completed 1/24/2018. Other order placed on 11/2018 by PCP but appears to not have been completed yet.   Suggested no nodules, no mediastinal lymphadenopathy.     Last PFT completed in 12/2017 with moderate obstruction, with obstruction-restrictive appearance of flow volume loop.           ICD-10-CM ICD-9-CM   1. Chronic obstructive pulmonary disease, unspecified COPD type (CMS/[facility]) J44.9 496   2. Seasonal allergic rhinitis due to pollen J30.1 477.0   3. Dyspnea on exertion  R06.09 786.09   4. Obstructive sleep apnea syndrome G47.33 327.23       - Recommended to increased supplemental oxygen at night to 3 L due to smothering. Discussed that this may not help completely, but may partially improve symptoms.   Untreated sleep apnea can lead to cardiovascular/metabolic disorder, neurocognitive deficit, daytime sleepiness, motor vehicle accidents, depression, mood disorders and reduced quality of life. Patient does not wish to use BiPAP/CPAP machine at this time, and will continue with oxygen therapy.   - Encouraged smoking cessation. Patient has been unsuccessful with patches and cannot use Chantix.  Reminded that smoking can lead to cardiovascular/metabolic, lung cancer, mouth cancer, pulmonary disorder including COPD and reduced quality of life.  At the end of the conversation, she voices understanding of the risk involved in smoking.  She also understands the benefits of quitting. Patient displayed understanding and stated that would try to quit smoking without assistance.  During this visit I spent 10  minutes counseling the patient regarding the smoking cessation.  - Ordered Arnuity ellipta. Reviewed inhaler technique with discussion, as this inhaler is the same type as her Anoro that she is currently using. Reminded her to rinse her mouth after use of Arunity as this is a steroid, to reduce risk of oral fungal infections.   As she occasionally developed infections with the higher dose, tried on the lower dose. Recommended to use it in the morning time before brushing her teeth to assist with preventing infection. Stated if she noted irritation developing to stop use immediately. She stated that she would do so and willing to try this inhaler.   - Continue anoro, once puff once daily.   Continue albuterol inhaler 2 puffs, every 4 hours as needed for shortness of breath/wheezing.  - Reordered flonase. Use 2 sprays per nostril, once daily.   - Ordered PFT due to worsening of symptoms.    - Ordered echo for possible cardiac related dyspnea on exertion.   - Will offer repeat low dose CT scan for lung cancer screening at the next visit. No reported significant changes in weight or hemoptysis noted at this time.       - Inhaler technique discussion:   In summary, the steps were discussed in the following order. Patient was advised to rinse the mouth after steroid inhalation to prevent fungal mucositis.  · Remove the cap from the inhaler and shake well.    · Breathe out all the way.    · Place the mouthpiece of the inhaler between your teeth and seal your lips tightly around it.    · As you start to blow in slowly, press down on the canister one time.   · Keep breathing in as slowly and deeply as you can.    · It should take about 5 seconds for you to completely breathe in.    · Hold your breath for 10 seconds(count to 10 slowly) to allow the medication to reach the airways of the lung.    · Repeat the above steps for each puff.    · Wait about 1 minute between the puffs.    · Replaced the cap on the inhaler when finished.      Vaccinations: up to date on influenza and pneumonia vaccinations.       Patient's Body mass index is 45.73 kg/m². BMI is above normal parameters. Recommendations include: exercise counseling and nutrition counseling.        Return in about 3 months (around 5/13/2019).

## 2019-02-28 DIAGNOSIS — K21.9 GASTROESOPHAGEAL REFLUX DISEASE WITHOUT ESOPHAGITIS: ICD-10-CM

## 2019-02-28 DIAGNOSIS — E78.2 MIXED HYPERLIPIDEMIA: ICD-10-CM

## 2019-02-28 DIAGNOSIS — F41.8 DEPRESSION WITH ANXIETY: ICD-10-CM

## 2019-02-28 RX ORDER — PANTOPRAZOLE SODIUM 40 MG/1
TABLET, DELAYED RELEASE ORAL
Qty: 30 TABLET | Refills: 5 | Status: SHIPPED | OUTPATIENT
Start: 2019-02-28 | End: 2019-09-03 | Stop reason: SDUPTHER

## 2019-02-28 RX ORDER — LORAZEPAM 1 MG/1
TABLET ORAL
Qty: 90 TABLET | Refills: 0 | Status: SHIPPED | OUTPATIENT
Start: 2019-02-28 | End: 2019-05-29 | Stop reason: SDUPTHER

## 2019-02-28 RX ORDER — SIMVASTATIN 20 MG
20 TABLET ORAL NIGHTLY
Qty: 30 TABLET | Refills: 5 | Status: SHIPPED | OUTPATIENT
Start: 2019-02-28 | End: 2019-09-03 | Stop reason: SDUPTHER

## 2019-03-01 ENCOUNTER — HOSPITAL ENCOUNTER (OUTPATIENT)
Dept: CT IMAGING | Facility: HOSPITAL | Age: 59
Discharge: HOME | End: 2019-03-01
Admitting: GENERAL PRACTICE

## 2019-03-01 ENCOUNTER — APPOINTMENT (OUTPATIENT)
Dept: CT IMAGING | Facility: HOSPITAL | Age: 59
End: 2019-03-01

## 2019-03-01 DIAGNOSIS — F17.200 SMOKER: ICD-10-CM

## 2019-03-01 DIAGNOSIS — Z00.00 HEALTHCARE MAINTENANCE: ICD-10-CM

## 2019-03-01 PROCEDURE — G0297 LDCT FOR LUNG CA SCREEN: HCPCS

## 2019-03-01 PROCEDURE — 71250 CT THORAX DX C-: CPT | Performed by: RADIOLOGY

## 2019-03-04 ENCOUNTER — OFFICE VISIT (OUTPATIENT)
Dept: FAMILY MEDICINE CLINIC | Facility: CLINIC | Age: 59
End: 2019-03-04

## 2019-03-04 VITALS
DIASTOLIC BLOOD PRESSURE: 65 MMHG | HEIGHT: 62 IN | SYSTOLIC BLOOD PRESSURE: 120 MMHG | WEIGHT: 247 LBS | BODY MASS INDEX: 45.45 KG/M2 | OXYGEN SATURATION: 97 % | TEMPERATURE: 98.1 F | HEART RATE: 104 BPM | RESPIRATION RATE: 12 BRPM

## 2019-03-04 DIAGNOSIS — E06.3 HYPOTHYROIDISM DUE TO HASHIMOTO'S THYROIDITIS: ICD-10-CM

## 2019-03-04 DIAGNOSIS — N39.3 STRESS BLADDER INCONTINENCE, FEMALE: ICD-10-CM

## 2019-03-04 DIAGNOSIS — E03.8 HYPOTHYROIDISM DUE TO HASHIMOTO'S THYROIDITIS: ICD-10-CM

## 2019-03-04 DIAGNOSIS — E78.2 MIXED HYPERLIPIDEMIA: Primary | ICD-10-CM

## 2019-03-04 DIAGNOSIS — E55.9 VITAMIN D DEFICIENCY: ICD-10-CM

## 2019-03-04 DIAGNOSIS — M85.80 OSTEOPENIA, UNSPECIFIED LOCATION: ICD-10-CM

## 2019-03-04 DIAGNOSIS — M54.59 MECHANICAL LOW BACK PAIN: ICD-10-CM

## 2019-03-04 DIAGNOSIS — M15.9 GENERALIZED OSTEOARTHRITIS: ICD-10-CM

## 2019-03-04 DIAGNOSIS — K59.09 CHRONIC CONSTIPATION: ICD-10-CM

## 2019-03-04 DIAGNOSIS — Z00.00 HEALTHCARE MAINTENANCE: ICD-10-CM

## 2019-03-04 DIAGNOSIS — J44.9 COPD MIXED TYPE (HCC): ICD-10-CM

## 2019-03-04 DIAGNOSIS — Z23 ENCOUNTER FOR IMMUNIZATION: ICD-10-CM

## 2019-03-04 DIAGNOSIS — J30.2 CHRONIC SEASONAL ALLERGIC RHINITIS: ICD-10-CM

## 2019-03-04 DIAGNOSIS — F45.0 SOMATIZATION DISORDER: ICD-10-CM

## 2019-03-04 DIAGNOSIS — K21.9 GASTROESOPHAGEAL REFLUX DISEASE WITHOUT ESOPHAGITIS: ICD-10-CM

## 2019-03-04 DIAGNOSIS — R73.03 PREDIABETES: ICD-10-CM

## 2019-03-04 DIAGNOSIS — F17.200 SMOKER: ICD-10-CM

## 2019-03-04 DIAGNOSIS — I10 ESSENTIAL HYPERTENSION: ICD-10-CM

## 2019-03-04 DIAGNOSIS — F41.8 DEPRESSION WITH ANXIETY: ICD-10-CM

## 2019-03-04 PROBLEM — K64.8 OTHER HEMORRHOIDS: Status: RESOLVED | Noted: 2018-10-11 | Resolved: 2019-03-04

## 2019-03-04 PROCEDURE — 90471 IMMUNIZATION ADMIN: CPT | Performed by: GENERAL PRACTICE

## 2019-03-04 PROCEDURE — 90632 HEPA VACCINE ADULT IM: CPT | Performed by: GENERAL PRACTICE

## 2019-03-04 PROCEDURE — 99214 OFFICE O/P EST MOD 30 MIN: CPT | Performed by: GENERAL PRACTICE

## 2019-03-04 NOTE — PROGRESS NOTES
Subjective   Lisa Hill is a 58 y.o. female.     History of Present Illness     COPD  The patient reports that her SOB and cough have remained stable. She states that these symptoms occur at any time during the day or night. She reports that her symptoms become worse with activity, exposure to cold air and environmental allergens. Her symptoms are reduced with rest and inhaled inhaled medication. She is using supplemental oxygen at night but is unconvinced that it helps at all. The patient states she also has nasal congestion. She is following the treatment plan, including medications as directed. She continues to smoke about 1 pack per day. Low dose CT of the chest performed on 3/1/2019 was reported as showing a stable 2.3 mm right lung nodule, and moderate COPD.  She was advised to have her next study in 1 year    Dyslipidemia  Compliance with treatment has been fair. The patient exercises occasionally. She is currently being prescribed the following medication for her dyslipidemia - simvastatin. Patient denies side effects associated with her medications.  She has had no recent labs    Hypothyroidism  Patient presents for evaluation of thyroid function. Symptoms consist of weight gain, constipation. The symptoms are moderate.  The problem has been unchanged.  Previous thyroid studies include TSH. The hypothyroidism is due to Hashimoto's disease.     Depression  Onset was a number of years ago.  Despite the winter days her symptoms have remained relatively stable since last here. Current symptoms include: depression, nervousness, anhedonia, difficulty concentrating, fatigue and impaired memory. Patient denies recurrent thoughts of death or suicidal thoughts. Risk factors: history of seasonal affective disorder.  Current medication includes escitalopram 10 daily,  bupropion 300 daily, risperdone 2 nightly, and lorazepam 0.5 twice a day. She denies any side effects with escitalopram    Low Back Pain  She  continues to have intermittent low back pain.  There has been no change in the quality nor any new associated symptoms. There is no history of any weakness, numbness, tingling, or any change in her bowel/bladder control.  There is no history of any fever, chills, night sweats or weight loss.  When she sits down the pain resolves within minutes and she denies any problem at night.  She's had similar pain on and off for years.  She has been using a TENS unit for several hours daily and continues to feel that it helps    The following portions of the patient's history were reviewed and updated as appropriate: allergies, current medications, past medical history, past social history and problem list.    Review of Systems   Constitutional: Positive for fatigue. Negative for appetite change, chills, fever and unexpected weight change.   HENT: Negative for congestion, ear pain, postnasal drip, rhinorrhea, sneezing, sore throat and voice change.    Eyes: Negative for visual disturbance.   Respiratory: Positive for cough, shortness of breath and wheezing.    Cardiovascular: Positive for leg swelling. Negative for chest pain and palpitations.   Gastrointestinal: Positive for constipation. Negative for abdominal pain, anal bleeding, blood in stool, diarrhea, nausea and vomiting.   Endocrine: Negative for polydipsia.   Genitourinary: Negative for difficulty urinating, dysuria, frequency, hematuria, menstrual problem, pelvic pain, urgency, vaginal bleeding, vaginal discharge and vaginal pain.        Incontinence with coughing, sneezing, or lifting   Musculoskeletal: Positive for arthralgias and back pain. Negative for joint swelling, myalgias and neck pain.   Skin: Negative for color change.   Neurological: Negative for tremors, weakness, numbness and headaches.   Psychiatric/Behavioral: Positive for decreased concentration, dysphoric mood and sleep disturbance (chronic-intermittent). Negative for suicidal ideas.     Objective    Physical Exam   Constitutional: She is oriented to person, place, and time. No distress.   Bright, in fair spirits.  No apparent distress.  No pallor, jaundice, cyanosis or diaphoresis   HENT:   Head: Atraumatic.   Right Ear: Tympanic membrane, external ear and ear canal normal.   Left Ear: Tympanic membrane, external ear and ear canal normal.   Nose: Nose normal.   Mouth/Throat: Oropharynx is clear and moist. Mucous membranes are not pale and not cyanotic.   Eyes: EOM are normal. Pupils are equal, round, and reactive to light. No scleral icterus.   Neck: No JVD present. Carotid bruit is not present. No tracheal deviation present. No thyromegaly present.   Cardiovascular: Normal rate, regular rhythm, S1 normal, S2 normal and intact distal pulses. Exam reveals no gallop, no S3 and no S4.   No murmur heard.  Pulmonary/Chest: No accessory muscle usage or stridor. No respiratory distress. She has decreased breath sounds. She has wheezes (diffuse-mild).   Hyperinflation of the chest   Abdominal: Soft. Normal aorta and bowel sounds are normal. She exhibits no distension, no abdominal bruit and no mass. There is no hepatosplenomegaly. There is no tenderness. No hernia.   Musculoskeletal: She exhibits no deformity.        Lumbar back: She exhibits tenderness (bilateral lumbar paraspinal muscle tenderness). She exhibits normal range of motion, no bony tenderness and no deformity.   Negative straight leg raise.     Vascular Status -  Her right foot exhibits abnormal foot edema (trace). Her left foot exhibits abnormal foot edema (trace).  Lymphadenopathy:        Head (right side): No submandibular adenopathy present.        Head (left side): No submandibular adenopathy present.     She has no cervical adenopathy.   Neurological: She is alert and oriented to person, place, and time. She has normal reflexes. She displays normal reflexes. No cranial nerve deficit. She exhibits normal muscle tone. Coordination normal.   Skin:  Skin is warm and dry. No rash noted. She is not diaphoretic. No cyanosis. No pallor. Nails show no clubbing.   Psychiatric: She has a normal mood and affect. Her speech is normal and behavior is normal. Thought content normal. Cognition and memory are normal. She expresses no suicidal ideation.     Assessment/Plan   Problems Addressed this Visit        Cardiovascular and Mediastinum    Mixed hyperlipidemia   Encouraged to continue to work on her diet and exercise plan.  Continue current medication  Updated labs will be drawn at her return.    Essential hypertension  As above.   Continue current medication.       Respiratory    COPD mixed type (CMS/HCC)   COPD is stable.  Reminded of the importance of smoking cessation  Encouraged to remain as active as symptoms allow for  Follow up with pulmonology     Chronic seasonal allergic rhinitis       Digestive    Vitamin D deficiency  Continue supplementation with monitoring.    Gastroesophageal reflux disease without esophagitis    Chronic constipation       Endocrine    Hypothyroidism due to Hashimoto's thyroiditis  Clinically euthyroid.  Continue current medication.       Nervous and Auditory    Mechanical low back pain  Reminded regarding symptomatic treatment.   Continue current medication and TENS unit       Musculoskeletal and Integument    Osteopenia    Generalized osteoarthritis       Genitourinary    Stress bladder incontinence, female       Other    Prediabetes  Will continue to monitor    Depression with anxiety  Stable.  Supportive therapy.   Continue current medication.    Smoker    Encounter for immunization    Relevant Orders    Hepatitis A Vaccine Adult IM (Completed)    Healthcare maintenance  Reviewed the potential benefits and risks of hepatitis A immunizations. Patient wished to proceed with this and dose # 1 administered. Patient is aware that a second dose is required in 6 months.    Relevant Orders    Hepatitis A Vaccine Adult IM (Completed)     Somatization disorder

## 2019-03-05 ENCOUNTER — APPOINTMENT (OUTPATIENT)
Dept: RESPIRATORY THERAPY | Facility: HOSPITAL | Age: 59
End: 2019-03-05

## 2019-03-05 ENCOUNTER — APPOINTMENT (OUTPATIENT)
Dept: CARDIOLOGY | Facility: HOSPITAL | Age: 59
End: 2019-03-05

## 2019-03-13 RX ORDER — BUPROPION HYDROCHLORIDE 300 MG/1
300 TABLET ORAL EVERY MORNING
Qty: 30 TABLET | Refills: 5 | Status: SHIPPED | OUTPATIENT
Start: 2019-03-13 | End: 2019-09-03 | Stop reason: SDUPTHER

## 2019-03-14 ENCOUNTER — OFFICE VISIT (OUTPATIENT)
Dept: FAMILY MEDICINE CLINIC | Facility: CLINIC | Age: 59
End: 2019-03-14

## 2019-03-14 VITALS
OXYGEN SATURATION: 96 % | TEMPERATURE: 98 F | BODY MASS INDEX: 46.01 KG/M2 | HEART RATE: 90 BPM | HEIGHT: 62 IN | DIASTOLIC BLOOD PRESSURE: 80 MMHG | WEIGHT: 250 LBS | SYSTOLIC BLOOD PRESSURE: 150 MMHG

## 2019-03-14 DIAGNOSIS — L02.411 ABSCESS OF AXILLA, RIGHT: Primary | ICD-10-CM

## 2019-03-14 DIAGNOSIS — E78.2 MIXED HYPERLIPIDEMIA: ICD-10-CM

## 2019-03-14 DIAGNOSIS — I10 ESSENTIAL HYPERTENSION: ICD-10-CM

## 2019-03-14 DIAGNOSIS — J44.9 COPD MIXED TYPE (HCC): ICD-10-CM

## 2019-03-14 DIAGNOSIS — L02.411 ABSCESS OF AXILLA, RIGHT: ICD-10-CM

## 2019-03-14 PROCEDURE — 87205 SMEAR GRAM STAIN: CPT | Performed by: PHYSICIAN ASSISTANT

## 2019-03-14 PROCEDURE — 87070 CULTURE OTHR SPECIMN AEROBIC: CPT | Performed by: PHYSICIAN ASSISTANT

## 2019-03-14 PROCEDURE — 87186 SC STD MICRODIL/AGAR DIL: CPT | Performed by: PHYSICIAN ASSISTANT

## 2019-03-14 PROCEDURE — 99214 OFFICE O/P EST MOD 30 MIN: CPT | Performed by: PHYSICIAN ASSISTANT

## 2019-03-14 RX ORDER — CEPHALEXIN 500 MG/1
500 CAPSULE ORAL 3 TIMES DAILY
Qty: 30 CAPSULE | Refills: 0 | Status: SHIPPED | OUTPATIENT
Start: 2019-03-14 | End: 2019-03-24

## 2019-03-14 NOTE — PROGRESS NOTES
Subjective   Lisa Hill is a 58 y.o. female.       Chief Complaint -  abscess    History of Present Illness -      Abscess-  She complains of abscess in right axilla that is recurrent.  This is the third time it has recurred in the past year.  Current abscess for the last week.  Abscess is painful.    Hypertension-may be elevated today due to the infection.  She reports home blood pressure less than 140/80 consistently.  Stable with losartan    Hyperlipidemia-improved with simvastatin  Lab Results   Component Value Date    CHOL 135 08/06/2018    CHLPL 141 02/05/2016    TRIG 143 08/06/2018    HDL 44 (L) 08/06/2018    LDL 62 08/06/2018     COPD-stable with Anoro and albuterol    Lab Results   Component Value Date    WBC 10.93 08/06/2018    HGB 14.2 08/06/2018    HCT 42.3 08/06/2018    HCT 43.2 08/06/2018    MCV 97.3 (H) 08/06/2018     08/06/2018     Lab Results   Component Value Date    GLUCOSE 107 08/06/2018    BUN 9 08/06/2018    CREATININE 0.99 08/06/2018    EGFRIFNONA 57 (L) 08/06/2018    EGFRIFAFRI  09/02/2016      Comment:      <15 Indicative of kidney failure.    BCR 9.1 08/06/2018    K 4.4 08/06/2018    CO2 26.8 08/06/2018    CALCIUM 9.2 08/06/2018    ALBUMIN 4.10 08/06/2018    LABIL2 1.3 (L) 02/05/2016    AST 24 08/06/2018    ALT 39 (H) 08/06/2018     The following portions of the patient's history were reviewed and updated as appropriate: allergies, current medications, past family history, past medical history, past social history, past surgical history and problem list.    Review of Systems   Constitutional: Negative for activity change, appetite change and fever.   HENT: Negative for ear pain, sinus pressure and sore throat.    Eyes: Negative for pain and visual disturbance.   Respiratory: Negative for cough and chest tightness.    Cardiovascular: Negative for chest pain and palpitations.   Gastrointestinal: Negative for abdominal pain, constipation, diarrhea, nausea and vomiting.   Endocrine:  "Negative for polydipsia and polyuria.   Genitourinary: Negative for dysuria and frequency.   Musculoskeletal: Negative for back pain and myalgias.   Skin: Positive for wound. Negative for color change and rash.        Abscess right axilla   Allergic/Immunologic: Negative for food allergies and immunocompromised state.   Neurological: Negative for dizziness, syncope and headaches.   Hematological: Negative for adenopathy. Does not bruise/bleed easily.   Psychiatric/Behavioral: Negative for hallucinations and suicidal ideas. The patient is not nervous/anxious.        /80 (BP Location: Right leg)   Pulse 90   Temp 98 °F (36.7 °C) (Oral)   Ht 157.5 cm (62.01\")   Wt 113 kg (250 lb)   LMP  (LMP Unknown)   SpO2 96%   BMI 45.71 kg/m²     Physical Exam   Constitutional: She is oriented to person, place, and time. She appears well-developed and well-nourished.   HENT:   Head: Normocephalic and atraumatic.   Nose: Nose normal.   Mouth/Throat: Oropharynx is clear and moist.   Eyes: Conjunctivae and EOM are normal. Pupils are equal, round, and reactive to light.   Neck: Normal range of motion. Neck supple. No tracheal deviation present. No thyromegaly present.   Cardiovascular: Normal rate, regular rhythm, normal heart sounds and intact distal pulses.   No murmur heard.  Pulmonary/Chest: Effort normal and breath sounds normal. No respiratory distress. She has no wheezes.   Abdominal: Soft. Bowel sounds are normal. There is no tenderness. There is no guarding.   Musculoskeletal: Normal range of motion. She exhibits no edema or tenderness.   Lymphadenopathy:     She has no cervical adenopathy.   Neurological: She is alert and oriented to person, place, and time.   Skin: Skin is warm and dry. No rash noted. There is erythema.   Erythematous abscess approximately 1 x 2 cm right axilla   Psychiatric: She has a normal mood and affect. Her behavior is normal.   Nursing note and vitals reviewed.      Assessment/Plan "     Diagnoses and all orders for this visit:    Abscess of axilla, right  -     cephalexin (KEFLEX) 500 MG capsule; Take 1 capsule by mouth 3 (Three) Times a Day for 10 days.  -     Ambulatory Referral to General Surgery  -     Wound Culture - Wound, Axilla, Left; Future    Essential hypertension  Comments:  Advised to monitor blood pressure daily and report any readings over 130/80 consistently.  Continue losartan    Mixed hyperlipidemia  Comments:  Continue simvastatin and a low-cholesterol diet    COPD mixed type (CMS/HCC)  Comments:  continue Anoro and Albuterol      Incision and drainage performed today of abscess right axilla.  Patient tolerated procedure well.  Wound culture taken and sent for pathology.           This document has been electronically signed by:  Kerry Bravo PA-C

## 2019-03-17 LAB
BACTERIA SPEC AEROBE CULT: ABNORMAL
GRAM STN SPEC: ABNORMAL
GRAM STN SPEC: ABNORMAL

## 2019-03-18 ENCOUNTER — TELEPHONE (OUTPATIENT)
Dept: FAMILY MEDICINE CLINIC | Facility: CLINIC | Age: 59
End: 2019-03-18

## 2019-03-25 ENCOUNTER — HOSPITAL ENCOUNTER (OUTPATIENT)
Dept: RESPIRATORY THERAPY | Facility: HOSPITAL | Age: 59
Discharge: HOME | End: 2019-03-25
Admitting: PHYSICIAN ASSISTANT

## 2019-03-25 ENCOUNTER — HOSPITAL ENCOUNTER (OUTPATIENT)
Dept: CARDIOLOGY | Facility: HOSPITAL | Age: 59
Discharge: HOME | End: 2019-03-25

## 2019-03-25 DIAGNOSIS — R06.09 DYSPNEA ON EXERTION: ICD-10-CM

## 2019-03-25 DIAGNOSIS — J44.9 CHRONIC OBSTRUCTIVE PULMONARY DISEASE, UNSPECIFIED COPD TYPE (HCC): ICD-10-CM

## 2019-03-25 PROCEDURE — 93306 TTE W/DOPPLER COMPLETE: CPT | Performed by: INTERNAL MEDICINE

## 2019-03-25 PROCEDURE — 94729 DIFFUSING CAPACITY: CPT

## 2019-03-25 PROCEDURE — 94727 GAS DIL/WSHOT DETER LNG VOL: CPT

## 2019-03-25 PROCEDURE — 94010 BREATHING CAPACITY TEST: CPT

## 2019-03-25 PROCEDURE — 93306 TTE W/DOPPLER COMPLETE: CPT

## 2019-03-25 PROCEDURE — 94010 BREATHING CAPACITY TEST: CPT | Performed by: INTERNAL MEDICINE

## 2019-03-25 PROCEDURE — 94727 GAS DIL/WSHOT DETER LNG VOL: CPT | Performed by: INTERNAL MEDICINE

## 2019-03-25 PROCEDURE — 94729 DIFFUSING CAPACITY: CPT | Performed by: INTERNAL MEDICINE

## 2019-03-26 LAB
BH CV ECHO MEAS - % IVS THICK: 31.9 %
BH CV ECHO MEAS - % LVPW THICK: 58.2 %
BH CV ECHO MEAS - ACS: 2.2 CM
BH CV ECHO MEAS - AO MAX PG: 7.4 MMHG
BH CV ECHO MEAS - AO MEAN PG: 3.4 MMHG
BH CV ECHO MEAS - AO ROOT AREA (BSA CORRECTED): 1.6
BH CV ECHO MEAS - AO ROOT AREA: 8.4 CM^2
BH CV ECHO MEAS - AO ROOT DIAM: 3.3 CM
BH CV ECHO MEAS - AO V2 MAX: 135.7 CM/SEC
BH CV ECHO MEAS - AO V2 MEAN: 85.7 CM/SEC
BH CV ECHO MEAS - AO V2 VTI: 24.4 CM
BH CV ECHO MEAS - BSA(HAYCOCK): 2.3 M^2
BH CV ECHO MEAS - BSA: 2.1 M^2
BH CV ECHO MEAS - BZI_BMI: 45.7 KILOGRAMS/M^2
BH CV ECHO MEAS - BZI_METRIC_HEIGHT: 157.5 CM
BH CV ECHO MEAS - BZI_METRIC_WEIGHT: 113.4 KG
BH CV ECHO MEAS - EDV(CUBED): 108.4 ML
BH CV ECHO MEAS - EDV(MOD-SP4): 41 ML
BH CV ECHO MEAS - EDV(TEICH): 105.9 ML
BH CV ECHO MEAS - EF(CUBED): 72.6 %
BH CV ECHO MEAS - EF(MOD-SP4): 51.2 %
BH CV ECHO MEAS - EF(TEICH): 64.3 %
BH CV ECHO MEAS - ESV(CUBED): 29.7 ML
BH CV ECHO MEAS - ESV(MOD-SP4): 20 ML
BH CV ECHO MEAS - ESV(TEICH): 37.8 ML
BH CV ECHO MEAS - FS: 35.1 %
BH CV ECHO MEAS - IVS/LVPW: 0.89
BH CV ECHO MEAS - IVSD: 0.99 CM
BH CV ECHO MEAS - IVSS: 1.3 CM
BH CV ECHO MEAS - LA DIMENSION: 2.3 CM
BH CV ECHO MEAS - LA/AO: 0.7
BH CV ECHO MEAS - LV DIASTOLIC VOL/BSA (35-75): 19.5 ML/M^2
BH CV ECHO MEAS - LV MASS(C)D: 178.8 GRAMS
BH CV ECHO MEAS - LV MASS(C)DI: 85.1 GRAMS/M^2
BH CV ECHO MEAS - LV MASS(C)S: 168.9 GRAMS
BH CV ECHO MEAS - LV MASS(C)SI: 80.4 GRAMS/M^2
BH CV ECHO MEAS - LV SYSTOLIC VOL/BSA (12-30): 9.5 ML/M^2
BH CV ECHO MEAS - LVIDD: 4.8 CM
BH CV ECHO MEAS - LVIDS: 3.1 CM
BH CV ECHO MEAS - LVLD AP4: 6.4 CM
BH CV ECHO MEAS - LVLS AP4: 5.9 CM
BH CV ECHO MEAS - LVOT AREA (M): 3.1 CM^2
BH CV ECHO MEAS - LVOT AREA: 3.3 CM^2
BH CV ECHO MEAS - LVOT DIAM: 2 CM
BH CV ECHO MEAS - LVPWD: 1.1 CM
BH CV ECHO MEAS - LVPWS: 1.7 CM
BH CV ECHO MEAS - MV A MAX VEL: 86.9 CM/SEC
BH CV ECHO MEAS - MV E MAX VEL: 95.8 CM/SEC
BH CV ECHO MEAS - MV E/A: 1.1
BH CV ECHO MEAS - PA ACC SLOPE: 1441 CM/SEC^2
BH CV ECHO MEAS - PA ACC TIME: 0.08 SEC
BH CV ECHO MEAS - PA PR(ACCEL): 42.6 MMHG
BH CV ECHO MEAS - RAP SYSTOLE: 10 MMHG
BH CV ECHO MEAS - RVSP: 30.3 MMHG
BH CV ECHO MEAS - SI(AO): 97.5 ML/M^2
BH CV ECHO MEAS - SI(CUBED): 37.5 ML/M^2
BH CV ECHO MEAS - SI(MOD-SP4): 10 ML/M^2
BH CV ECHO MEAS - SI(TEICH): 32.4 ML/M^2
BH CV ECHO MEAS - SV(AO): 205 ML
BH CV ECHO MEAS - SV(CUBED): 78.7 ML
BH CV ECHO MEAS - SV(MOD-SP4): 21 ML
BH CV ECHO MEAS - SV(TEICH): 68.1 ML
BH CV ECHO MEAS - TR MAX VEL: 225.3 CM/SEC
MAXIMAL PREDICTED HEART RATE: 160 BPM
STRESS TARGET HR: 136 BPM

## 2019-03-28 DIAGNOSIS — F41.8 DEPRESSION WITH ANXIETY: ICD-10-CM

## 2019-03-28 RX ORDER — RISPERIDONE 2 MG/1
TABLET ORAL
Qty: 30 TABLET | Refills: 5 | Status: SHIPPED | OUTPATIENT
Start: 2019-03-28 | End: 2019-09-26 | Stop reason: SDUPTHER

## 2019-04-08 ENCOUNTER — TELEPHONE (OUTPATIENT)
Dept: PULMONOLOGY | Facility: CLINIC | Age: 59
End: 2019-04-08

## 2019-04-08 DIAGNOSIS — G47.33 OBSTRUCTIVE SLEEP APNEA SYNDROME: Primary | ICD-10-CM

## 2019-04-08 NOTE — TELEPHONE ENCOUNTER
Called the patient to review PFT and echo.   No significant findings noted with echocardiogram.  PFT did not show significant change from previous exam.    Recommended to continue use of current inhalers.    During the call, patient asked if AutoPap machine could be reordered, as she would like to restart using this and the machine was previously collected when not used.  Sleep study completed in August 2018 suggestive for mild obstructive sleep apnea.    Will order AutoPap machine with pressure range of 5-15 cm.  Check to patient that if she did not hear from [facility] [employer] within a few weeks to call the office for reorder.

## 2019-04-30 DIAGNOSIS — E55.9 VITAMIN D DEFICIENCY: ICD-10-CM

## 2019-04-30 RX ORDER — MELATONIN
Qty: 30 TABLET | Refills: 5 | Status: SHIPPED | OUTPATIENT
Start: 2019-04-30 | End: 2019-06-11 | Stop reason: SDUPTHER

## 2019-05-13 ENCOUNTER — TELEPHONE (OUTPATIENT)
Dept: FAMILY MEDICINE CLINIC | Facility: CLINIC | Age: 59
End: 2019-05-13

## 2019-05-13 NOTE — TELEPHONE ENCOUNTER
----- Message from Scott Chavez MD sent at 5/13/2019 11:30 AM EDT -----  Emailed    ----- Message -----  From: Yesenia Cruz MA  Sent: 5/9/2019  11:02 AM  To: Scott Chavez MD    Patient wanted to know if you could write her a prescription for chantix.

## 2019-05-15 ENCOUNTER — OFFICE VISIT (OUTPATIENT)
Dept: PULMONOLOGY | Facility: CLINIC | Age: 59
End: 2019-05-15

## 2019-05-15 VITALS
WEIGHT: 250 LBS | HEIGHT: 62 IN | SYSTOLIC BLOOD PRESSURE: 164 MMHG | TEMPERATURE: 98 F | OXYGEN SATURATION: 95 % | HEART RATE: 95 BPM | BODY MASS INDEX: 46.01 KG/M2 | DIASTOLIC BLOOD PRESSURE: 86 MMHG

## 2019-05-15 DIAGNOSIS — F17.200 SMOKER: ICD-10-CM

## 2019-05-15 DIAGNOSIS — G47.34 NOCTURNAL HYPOXIA: ICD-10-CM

## 2019-05-15 DIAGNOSIS — J44.9 CHRONIC OBSTRUCTIVE PULMONARY DISEASE, UNSPECIFIED COPD TYPE (HCC): ICD-10-CM

## 2019-05-15 DIAGNOSIS — J30.2 CHRONIC SEASONAL ALLERGIC RHINITIS: ICD-10-CM

## 2019-05-15 DIAGNOSIS — J43.1 PANLOBULAR EMPHYSEMA (HCC): Primary | ICD-10-CM

## 2019-05-15 DIAGNOSIS — E66.01 MORBID OBESITY WITH BMI OF 45.0-49.9, ADULT (HCC): ICD-10-CM

## 2019-05-15 PROCEDURE — 99214 OFFICE O/P EST MOD 30 MIN: CPT | Performed by: PHYSICIAN ASSISTANT

## 2019-05-15 PROCEDURE — 94618 PULMONARY STRESS TESTING: CPT | Performed by: PHYSICIAN ASSISTANT

## 2019-05-15 RX ORDER — MONTELUKAST SODIUM 10 MG/1
10 TABLET ORAL NIGHTLY
Qty: 30 TABLET | Refills: 11 | Status: SHIPPED | OUTPATIENT
Start: 2019-05-15 | End: 2019-11-06 | Stop reason: SDUPTHER

## 2019-05-15 RX ORDER — IPRATROPIUM BROMIDE AND ALBUTEROL SULFATE 2.5; .5 MG/3ML; MG/3ML
3 SOLUTION RESPIRATORY (INHALATION)
Qty: 120 ML | Refills: 11 | Status: SHIPPED | OUTPATIENT
Start: 2019-05-15 | End: 2019-11-06 | Stop reason: SDUPTHER

## 2019-05-15 RX ORDER — ALBUTEROL SULFATE 90 UG/1
2 AEROSOL, METERED RESPIRATORY (INHALATION) EVERY 4 HOURS PRN
Qty: 18 G | Refills: 5 | Status: SHIPPED | OUTPATIENT
Start: 2019-05-15 | End: 2019-11-06 | Stop reason: SDUPTHER

## 2019-05-15 NOTE — PROGRESS NOTES
Interval history since last visit: increased dyspnea on exertion.     Recent hospitalizations: No    Investigations (imaging, PFT's, labs, sleep study, record requests, etc.)    Have you had the Influenza Vaccine? Yes  Would you like to receive this Vaccine today? no    Have you had the Pneumonia Vaccine?  Yes  Would you like to receive this Vaccine today? no    Subjective    Lisa Hill presents for the following COPD      History of Present Illness     Ms. Hill presents today for follow up of COPD and nocturnal hypoxia.   She continues to use supplemental oxygen at nighttime, currently on 2 L/m by nasal cannula.   Since her previous visit, she has noticed an increase in shortness of breath upon minimal exertion. She has difficulty with daily activities, and reports that this makes it difficult for her when, for example, going to the store. She typically notices shortness of breath as soon as walking from the parking lot. Shortness of breath is improved with rest and rescue inhaler use.   She denies significant cough or sputum production.   She reports her physical activity has been limited by these worsening symptoms, but realizes that shortness of breath may also be related to physical inactivity and deconditioning. She would like to start exercising more often, but reports an increased anxiety/fear with shortness of breath.  She has continued on Arnuity and Anoro as directed.   Continues to use Ventolin as needed. She reports use is not every day, but may be increased to multiples times per day if symptoms worsen.   She continues with use of supplemental oxygen at nighttime of 2 L/m. She does not use supplemental oxygen during the daytime.   She is a current smoker, and was recently prescribed Chantix.      Review of Systems   Constitutional: Positive for activity change. Negative for chills, fever and unexpected weight change.   HENT: Negative for congestion, postnasal drip and rhinorrhea.    Respiratory:  Positive for cough, chest tightness, shortness of breath and wheezing.    Cardiovascular: Negative for chest pain and palpitations.   Gastrointestinal: Negative for abdominal pain and nausea.   Endocrine: Negative for cold intolerance and heat intolerance.   Musculoskeletal: Negative for arthralgias and gait problem.   Skin: Negative for color change and pallor.   Allergic/Immunologic: Negative for environmental allergies.   Neurological: Positive for light-headedness. Negative for dizziness, syncope and headaches.   Psychiatric/Behavioral: Positive for sleep disturbance. Negative for confusion.       Active Problems:  Problem List Items Addressed This Visit        Respiratory    Panlobular emphysema (CMS/[facility]) - Primary    Relevant Medications    montelukast (SINGULAIR) 10 MG tablet    Fluticasone-Umeclidin-Vilant (TRELEGY ELLIPTA) 100-62.5-25 MCG/INH aerosol powder     albuterol sulfate HFA (VENTOLIN HFA) 108 (90 Base) MCG/ACT inhaler    ipratropium-albuterol (DUO-NEB) 0.5-2.5 mg/3 ml nebulizer    Chronic seasonal allergic rhinitis    Sleep apnea    Nocturnal hypoxia       Other    Smoker      Other Visit Diagnoses     Chronic obstructive pulmonary disease, unspecified COPD type (CMS/[facility])        Relevant Medications    montelukast (SINGULAIR) 10 MG tablet    Fluticasone-Umeclidin-Vilant (TRELEGY ELLIPTA) 100-62.5-25 MCG/INH aerosol powder     albuterol sulfate HFA (VENTOLIN HFA) 108 (90 Base) MCG/ACT inhaler    ipratropium-albuterol (DUO-NEB) 0.5-2.5 mg/3 ml nebulizer          Past Medical History:  Past Medical History:   Diagnosis Date   • Anxiety    • Arthritis    • COPD (chronic obstructive pulmonary disease) (CMS/[facility])    • Depression    • Disease of thyroid gland     GRAVES DISEASE   • Elevated cholesterol    • GERD (gastroesophageal reflux disease)    • History of transfusion    • Hyperlipidemia    • Hypertension        Family History:  Family History   Problem Relation Age of Onset   • Kidney disease Mother     • Diabetes Mother    • Hypertension Father    • Breast cancer Maternal Aunt        Social History:  Social History     Tobacco Use   • Smoking status: Current Every Day Smoker     Packs/day: 1.00     Years: 43.00     Pack years: 43.00     Types: Cigarettes   • Smokeless tobacco: Never Used   • Tobacco comment: cessation has been discussed   Substance Use Topics   • Alcohol use: No       Current Medications:  Current Outpatient Medications   Medication Sig Dispense Refill   • albuterol sulfate HFA (VENTOLIN HFA) 108 (90 Base) MCG/ACT inhaler Inhale 2 puffs Every 4 (Four) Hours As Needed for Wheezing. 18 g 5   • buPROPion XL (WELLBUTRIN XL) 300 MG 24 hr tablet Take 1 tablet by mouth Every Morning. 30 tablet 5   • busPIRone (BUSPAR) 15 MG tablet Take 1 tablet by mouth 3 (Three) Times a Day. 90 tablet 1   • cholecalciferol (VITAMIN D3) 1000 units tablet TAKE 1 TABLET BY MOUTH DAILY 30 tablet 5   • conjugated estrogens (PREMARIN) 0.625 MG/GM vaginal cream Insert  into the vagina 2 (Two) Times a Week. 30 g 5   • fluticasone (FLONASE) 50 MCG/ACT nasal spray INSTILL 2 SPRAYS IN EACH NOSTRIL DAILY 16 g 5   • ketoconazole (NIZORAL) 2 % shampoo Apply  topically 2 (Two) Times a Week. 120 mL 5   • levothyroxine (SYNTHROID, LEVOTHROID) 175 MCG tablet TAKE 1 TABLET BY MOUTH DAILY 30 tablet 5   • lidocaine (XYLOCAINE) 5 % ointment Apply  topically to the appropriate area as directed Every 2 (Two) Hours As Needed for Mild Pain . 1 tube 5   • LORazepam (ATIVAN) 1 MG tablet TAKE 1/2 TABLET BY MOUTH TWO TIMES A DAY AS NEEDED FOR ANXIETY OR PANIC 90 tablet 0   • losartan (COZAAR) 50 MG tablet Take 1 tablet by mouth Daily. 30 tablet 5   • nystatin-triamcinolone (MYCOLOG) 921675-9.1 UNIT/GM-% ointment Apply  topically to the appropriate area as directed Every 12 (Twelve) Hours. Apply topically to area of rash twice daily 30 bottle 2   • pantoprazole (PROTONIX) 40 MG EC tablet TAKE ONE TABLET BY MOUTH EVERY DAY 30 tablet 5   •  "PROCTOZONE-HC 2.5 % rectal cream JAQUELIN TO RECTUM 2-4 TIMES DAILY FOR UP TO 2 WEEKS  1   • risperiDONE (risperDAL) 2 MG tablet TAKE ONE TABLET BY MOUTH EVERY NIGHT 30 tablet 5   • simvastatin (ZOCOR) 20 MG tablet TAKE 1 TABLET BY MOUTH EVERY NIGHT. 30 tablet 5   • topiramate (TOPAMAX) 100 MG tablet Take 1 tablet by mouth 2 (Two) Times a Day. 60 tablet 5   • triamcinolone (KENALOG) 0.1 % cream Apply  topically 2 (Two) Times a Day. 80 g 1   • triamcinolone (KENALOG) 0.1 % ointment APPLY TO RASH TWO TIMES A DAY  2   • varenicline (CHANTIX NOLVIA) 0.5 MG X 11 & 1 MG X 42 tablet Take 0.5 mg one daily on days 1-3 and and 0.5 mg twice daily on days 4-7.Then 1 mg twice daily for a total of 12 weeks. 53 tablet 0   • vitamin B-12 (CYANOCOBALAMIN) 1000 MCG tablet TAKE 1 TABLET BY MOUTH DAILY. 30 tablet 5   • Zoster Vac Recomb Adjuvanted (SHINGRIX) 50 MCG reconstituted suspension Inject 50 mcg into the appropriate muscle as directed by prescriber See Admin Instructions. 1 each 0   • Fluticasone-Umeclidin-Vilant (TRELEGY ELLIPTA) 100-62.5-25 MCG/INH aerosol powder  Inhale 1 each Daily for 30 days. 1 each 8   • ipratropium-albuterol (DUO-NEB) 0.5-2.5 mg/3 ml nebulizer Take 3 mL by nebulization 4 (Four) Times a Day. 120 mL 11   • montelukast (SINGULAIR) 10 MG tablet Take 1 tablet by mouth Every Night. 30 tablet 11     No current facility-administered medications for this visit.        Allergies:  Allergies   Allergen Reactions   • Sulfa Antibiotics        Vitals:  /86   Pulse 95   Temp 98 °F (36.7 °C)   Ht 157.5 cm (62\")   Wt 113 kg (250 lb)   LMP  (LMP Unknown)   SpO2 95%   BMI 45.73 kg/m²     Imaging:    Imaging Results (most recent)     None          Pulmonary Functions Testing Results:    No results found for: FEV1, FVC, GHC9EOB, TLC, DLCO    Results for orders placed or performed during the hospital encounter of 03/25/19   Adult Transthoracic Echo Complete W/ Cont if Necessary Per Protocol   Result Value Ref Range    " BSA 2.1 m^2    IVSd 0.99 cm    IVSs 1.3 cm    LVIDd 4.8 cm    LVIDs 3.1 cm    LVPWd 1.1 cm    BH CV ECHO BLANCA - LVPWS 1.7 cm    IVS/LVPW 0.89     FS 35.1 %    EDV(Teich) 105.9 ml    ESV(Teich) 37.8 ml    EF(Teich) 64.3 %    EDV(cubed) 108.4 ml    ESV(cubed) 29.7 ml    EF(cubed) 72.6 %    % IVS thick 31.9 %    % LVPW thick 58.2 %    LV mass(C)d 178.8 grams    LV mass(C)dI 85.1 grams/m^2    LV mass(C)s 168.9 grams    LV mass(C)sI 80.4 grams/m^2    SV(Teich) 68.1 ml    SI(Teich) 32.4 ml/m^2    SV(cubed) 78.7 ml    SI(cubed) 37.5 ml/m^2    Ao root diam 3.3 cm    Ao root area 8.4 cm^2    ACS 2.2 cm    LA dimension 2.3 cm    LA/Ao 0.7     LVOT diam 2.0 cm    LVOT area 3.3 cm^2    LVOT area(traced) 3.1 cm^2    LVLd ap4 6.4 cm    EDV(MOD-sp4) 41.0 ml    LVLs ap4 5.9 cm    ESV(MOD-sp4) 20.0 ml    EF(MOD-sp4) 51.2 %    SV(MOD-sp4) 21.0 ml    SI(MOD-sp4) 10.0 ml/m^2    Ao root area (BSA corrected) 1.6     LV Suárez Vol (BSA corrected) 19.5 ml/m^2    LV Sys Vol (BSA corrected) 9.5 ml/m^2    MV E max janie 95.8 cm/sec    MV A max janie 86.9 cm/sec    MV E/A 1.1     Ao pk janie 135.7 cm/sec    Ao max PG 7.4 mmHg    Ao V2 mean 85.7 cm/sec    Ao mean PG 3.4 mmHg    Ao V2 VTI 24.4 cm    SV(Ao) 205.0 ml    SI(Ao) 97.5 ml/m^2    PA acc slope 1,441 cm/sec^2    PA acc time 0.08 sec    TR max janie 225.3 cm/sec    RVSP(TR) 30.3 mmHg    RAP systole 10.0 mmHg    PA pr(Accel) 42.6 mmHg     CV ECHO BLANCA - BZI_BMI 45.7 kilograms/m^2     CV ECHO BLANCA - BSA(HAYCOCK) 2.3 m^2     CV ECHO BLANCA - BZI_METRIC_WEIGHT 113.4 kg     CV ECHO BLANCA - BZI_METRIC_HEIGHT 157.5 cm    Target HR (85%) 136 bpm    Max. Pred. HR (100%) 160 bpm       Objective   Physical Exam    General - well developed. Well nourished. Large body habitus. No signs of acute distress.     HEENT - pupils equally reactive to light, normal in size, no scleral icterus    Neck - supple. No thyromegaly.     Respiratory - Normal rate and effort. Breath sounds distant, but appreciated  throughout. No wheezing, crackles or rales on auscultation.     Cardiovascular - Normal S1 and S2. No S3, S4 or murmurs. No edema, pulses normal bilaterally.     GI - nontender nondistended. Active bowel sounds.    CNS - strength and sensation equal bilaterally.     Musculoskeletal - no edema. No visible joint deformity.     Psychiatric - mood good, good eye contact. Alert, and oriented.           Assessment/Plan     I have reviewed the past medical history, family history, social history, surgical history, and allergies.     Reviewed previous PFT completed on 3/25/2019.  Showed moderate obstruction with FEV1/FVC ratio 63 and FEV1 is 71%.  Diffusion capacity was mildly reduced at 69.  Bronchodilator portion was not completed.    Reviewed previous CT of the chest with PE protocol, completed on 1/24/2019. Showed no evidence of pulmonary embolus. Ground glass bilaterally which may represent mild edema. COPD changes   Showed appearance of panlobular emphysema.     CT chest completed on 3/1/2019 showed right pulmonary nodule of 2.3 mm, unchanged from previously.     Reviewed previous echo completed on 3/25/2019. Showed EF of 61-65%. No aortic valve regurgitation or stenosis. Trace mitral valve regurgitation, without stenosis. No tricuspid valve regurgitation, stenosis. No pericardial effusion.         ICD-10-CM ICD-9-CM   1. Panlobular emphysema (CMS/HCC) J43.1 492.8   2. Chronic obstructive pulmonary disease, unspecified COPD type (CMS/HCC) J44.9 496   3. Nocturnal hypoxia G47.34 327.24   4. Chronic seasonal allergic rhinitis J30.2 477.8   5. Smoker F17.200 305.1       Panlobular emphysema:   - Completed walk oximetry test.  Lowest saturation was 91%.  She does not qualify for supplemental oxygen at this time.  - Discussed that she could be performed to assess arterial oxygen level.  Patient prefers to wait ABG at this time.  - Started on Trelegy Ellipta to use instead of Anoro and Arnuity.  Instructed to use 1 puff  daily and rinse her mouth following use to avoid irritation related to the steroid.  - Continue albuterol inhaler 2 puffs every 4 hours as needed for increased shortness of breath, wheezing.  - Awaiting ABG at this time.   - Discussed previous PFT results.     - Inhaler technique discussion:  I have extensively discussed the steps.  In summary, the steps were discussed in the following order. Patient was advised to rinse the mouth after steroid inhalation to prevent fungal mucositis.  · Remove the cap from the inhaler and shake well.    · Breathe out all the way.    · Place the mouthpiece of the inhaler between your teeth and seal your lips tightly around it.    · As you start to blow in slowly, press down on the canister one time.   · Keep breathing in as slowly and deeply as you can.    · It should take about 5 seconds for you to completely breathe in.    · Hold your breath for 10 seconds(count to 10 slowly) to allow the medication to reach the airways of the lung.    · Repeat the above steps for each puff.    · Wait about 1 minute between the puffs.    · Replaced the cap on the inhaler when finished.      Nocturnal hypoxia:  -Continue supplemental oxygen at nighttime via nasal cannula on 2 L/min.  -Discussed that if significant daytime fatigue and significant snoring remain present, AutoPap machine may be attempted when patient is ready as there was previously concern for sleep apnea.  Patient wishes to not pressure therapy at this time.      Allergic rhinitis:   - Started on singulair  - Continue Flonase as needed.       Current smoker:   - She was recently prescribed Chantix by her PCP. Discussed use options, and provided educational materials for Chantix.   Recommended to start use the following Monday, and monitor weekly to attempt complete cessation by the selected date.   Patient agreed, and stated that she would look over the materials that provided frequently asked questions for Chantix.   Reviewed  possible side affects, and discussed to stop use if any negative impacts were noted that could not be tolerated.       Morbid obesity, with BMI at 45.73:   - Discussed the importance of weight loss to improve physical deconditioning, which will later improve shortness of breath noted upon exertion. Recommended to start slow, and start with non-weigh bearing exercises to build up her strength and tolerances and pulmonary tolerance. She reports that she owns a stationary bike, and will attempt to start slow, and progressively increase her activities as tolerated.         Vaccinations: up to date on vaccinations.     Patient's Body mass index is 45.73 kg/m². BMI is above normal parameters. Recommendations include: exercise counseling and nutrition counseling.        Return in about 3 months (around 8/15/2019).

## 2019-05-21 ENCOUNTER — TELEPHONE (OUTPATIENT)
Dept: PULMONOLOGY | Facility: CLINIC | Age: 59
End: 2019-05-21

## 2019-05-21 DIAGNOSIS — J43.1 PANLOBULAR EMPHYSEMA (HCC): Primary | ICD-10-CM

## 2019-05-21 PROBLEM — E66.01 MORBID OBESITY WITH BMI OF 45.0-49.9, ADULT: Status: ACTIVE | Noted: 2019-05-21

## 2019-05-21 NOTE — TELEPHONE ENCOUNTER
Called the patient to discuss that if she can hold off on her long-acting inhaler for 24 hours, and then return to the office to complete a walk oximetry test to see if oxygen drops below 88%.  She previously not qualify for a portable oxygen concentrator but would likely benefit from it.  Oxygen dropped previously to 91%.   She conveyed understanding.    Also ordered Breo and Incruse Ellipta as trilogy is not covered by insurance.  Instructed to use each inhaler once daily.  Discontinued order for trelegy.

## 2019-05-29 DIAGNOSIS — F41.8 DEPRESSION WITH ANXIETY: ICD-10-CM

## 2019-05-29 RX ORDER — LORAZEPAM 1 MG/1
0.5 TABLET ORAL 2 TIMES DAILY PRN
Qty: 90 TABLET | Refills: 0 | Status: SHIPPED | OUTPATIENT
Start: 2019-05-29 | End: 2019-06-10 | Stop reason: SDUPTHER

## 2019-06-10 ENCOUNTER — OFFICE VISIT (OUTPATIENT)
Dept: FAMILY MEDICINE CLINIC | Facility: CLINIC | Age: 59
End: 2019-06-10

## 2019-06-10 VITALS
WEIGHT: 251 LBS | RESPIRATION RATE: 12 BRPM | BODY MASS INDEX: 46.19 KG/M2 | DIASTOLIC BLOOD PRESSURE: 80 MMHG | HEIGHT: 62 IN | OXYGEN SATURATION: 96 % | SYSTOLIC BLOOD PRESSURE: 135 MMHG | TEMPERATURE: 98.1 F | HEART RATE: 98 BPM

## 2019-06-10 DIAGNOSIS — J43.1 PANLOBULAR EMPHYSEMA (HCC): ICD-10-CM

## 2019-06-10 DIAGNOSIS — E06.3 HYPOTHYROIDISM DUE TO HASHIMOTO'S THYROIDITIS: ICD-10-CM

## 2019-06-10 DIAGNOSIS — J30.2 CHRONIC SEASONAL ALLERGIC RHINITIS: ICD-10-CM

## 2019-06-10 DIAGNOSIS — Z00.00 HEALTHCARE MAINTENANCE: ICD-10-CM

## 2019-06-10 DIAGNOSIS — M15.9 GENERALIZED OSTEOARTHRITIS: ICD-10-CM

## 2019-06-10 DIAGNOSIS — N39.3 STRESS BLADDER INCONTINENCE, FEMALE: ICD-10-CM

## 2019-06-10 DIAGNOSIS — M85.80 OSTEOPENIA, UNSPECIFIED LOCATION: ICD-10-CM

## 2019-06-10 DIAGNOSIS — F17.200 SMOKER: ICD-10-CM

## 2019-06-10 DIAGNOSIS — E55.9 VITAMIN D DEFICIENCY: ICD-10-CM

## 2019-06-10 DIAGNOSIS — F41.8 DEPRESSION WITH ANXIETY: ICD-10-CM

## 2019-06-10 DIAGNOSIS — K21.9 GASTROESOPHAGEAL REFLUX DISEASE WITHOUT ESOPHAGITIS: ICD-10-CM

## 2019-06-10 DIAGNOSIS — Z12.31 ENCOUNTER FOR SCREENING MAMMOGRAM FOR BREAST CANCER: ICD-10-CM

## 2019-06-10 DIAGNOSIS — I10 ESSENTIAL HYPERTENSION: ICD-10-CM

## 2019-06-10 DIAGNOSIS — E78.2 MIXED HYPERLIPIDEMIA: Primary | ICD-10-CM

## 2019-06-10 DIAGNOSIS — R73.03 PREDIABETES: ICD-10-CM

## 2019-06-10 DIAGNOSIS — K59.09 CHRONIC CONSTIPATION: ICD-10-CM

## 2019-06-10 DIAGNOSIS — E03.8 HYPOTHYROIDISM DUE TO HASHIMOTO'S THYROIDITIS: ICD-10-CM

## 2019-06-10 LAB
25(OH)D3 SERPL-MCNC: 29 NG/ML (ref 30–100)
ALBUMIN SERPL-MCNC: 3.8 G/DL (ref 3.5–5.2)
ALBUMIN/GLOB SERPL: 1.1 G/DL
ALP SERPL-CCNC: 57 U/L (ref 39–117)
ALT SERPL W P-5'-P-CCNC: 24 U/L (ref 1–33)
ANION GAP SERPL CALCULATED.3IONS-SCNC: 11.6 MMOL/L
AST SERPL-CCNC: 15 U/L (ref 1–32)
BASOPHILS # BLD AUTO: 0.11 10*3/MM3 (ref 0–0.2)
BASOPHILS NFR BLD AUTO: 1.2 % (ref 0–1.5)
BILIRUB SERPL-MCNC: 0.2 MG/DL (ref 0.2–1.2)
BUN BLD-MCNC: 11 MG/DL (ref 8–23)
BUN/CREAT SERPL: 12.6 (ref 7–25)
CALCIUM SPEC-SCNC: 9.2 MG/DL (ref 8.6–10.5)
CHLORIDE SERPL-SCNC: 98 MMOL/L (ref 98–107)
CHOLEST SERPL-MCNC: 184 MG/DL (ref 0–200)
CO2 SERPL-SCNC: 25.4 MMOL/L (ref 22–29)
CREAT BLD-MCNC: 0.87 MG/DL (ref 0.57–1)
DEPRECATED RDW RBC AUTO: 53.4 FL (ref 37–54)
EOSINOPHIL # BLD AUTO: 0.17 10*3/MM3 (ref 0–0.4)
EOSINOPHIL NFR BLD AUTO: 1.9 % (ref 0.3–6.2)
ERYTHROCYTE [DISTWIDTH] IN BLOOD BY AUTOMATED COUNT: 14.4 % (ref 12.3–15.4)
GFR SERPL CREATININE-BSD FRML MDRD: 66 ML/MIN/1.73
GLOBULIN UR ELPH-MCNC: 3.5 GM/DL
GLUCOSE BLD-MCNC: 109 MG/DL (ref 65–99)
HBA1C MFR BLD: 5.8 % (ref 4.8–5.6)
HCT VFR BLD AUTO: 43.9 % (ref 34–46.6)
HDLC SERPL-MCNC: 40 MG/DL (ref 40–60)
HGB BLD-MCNC: 14.3 G/DL (ref 12–15.9)
IMM GRANULOCYTES # BLD AUTO: 0.02 10*3/MM3 (ref 0–0.05)
IMM GRANULOCYTES NFR BLD AUTO: 0.2 % (ref 0–0.5)
LDLC SERPL CALC-MCNC: 115 MG/DL (ref 0–100)
LDLC/HDLC SERPL: 2.87 {RATIO}
LYMPHOCYTES # BLD AUTO: 2.7 10*3/MM3 (ref 0.7–3.1)
LYMPHOCYTES NFR BLD AUTO: 30.2 % (ref 19.6–45.3)
MCH RBC QN AUTO: 32.8 PG (ref 26.6–33)
MCHC RBC AUTO-ENTMCNC: 32.6 G/DL (ref 31.5–35.7)
MCV RBC AUTO: 100.7 FL (ref 79–97)
MONOCYTES # BLD AUTO: 0.56 10*3/MM3 (ref 0.1–0.9)
MONOCYTES NFR BLD AUTO: 6.3 % (ref 5–12)
NEUTROPHILS # BLD AUTO: 5.37 10*3/MM3 (ref 1.7–7)
NEUTROPHILS NFR BLD AUTO: 60.2 % (ref 42.7–76)
NRBC BLD AUTO-RTO: 0 /100 WBC (ref 0–0.2)
PLATELET # BLD AUTO: 369 10*3/MM3 (ref 140–450)
PMV BLD AUTO: 10.3 FL (ref 6–12)
POTASSIUM BLD-SCNC: 4.6 MMOL/L (ref 3.5–5.2)
PROT SERPL-MCNC: 7.3 G/DL (ref 6–8.5)
RBC # BLD AUTO: 4.36 10*6/MM3 (ref 3.77–5.28)
SODIUM BLD-SCNC: 135 MMOL/L (ref 136–145)
TRIGL SERPL-MCNC: 147 MG/DL (ref 0–150)
TSH SERPL DL<=0.05 MIU/L-ACNC: 1.54 MIU/ML (ref 0.27–4.2)
VLDLC SERPL-MCNC: 29.4 MG/DL (ref 5–40)
WBC NRBC COR # BLD: 8.93 10*3/MM3 (ref 3.4–10.8)

## 2019-06-10 PROCEDURE — 80061 LIPID PANEL: CPT | Performed by: GENERAL PRACTICE

## 2019-06-10 PROCEDURE — 83036 HEMOGLOBIN GLYCOSYLATED A1C: CPT | Performed by: GENERAL PRACTICE

## 2019-06-10 PROCEDURE — 99214 OFFICE O/P EST MOD 30 MIN: CPT | Performed by: GENERAL PRACTICE

## 2019-06-10 PROCEDURE — 80050 GENERAL HEALTH PANEL: CPT | Performed by: GENERAL PRACTICE

## 2019-06-10 PROCEDURE — 82306 VITAMIN D 25 HYDROXY: CPT | Performed by: GENERAL PRACTICE

## 2019-06-10 RX ORDER — LORAZEPAM 1 MG/1
0.5 TABLET ORAL 2 TIMES DAILY PRN
Qty: 90 TABLET | Refills: 2 | Status: SHIPPED | OUTPATIENT
Start: 2019-06-10 | End: 2019-06-10 | Stop reason: SDUPTHER

## 2019-06-10 RX ORDER — LORAZEPAM 1 MG/1
0.5 TABLET ORAL 2 TIMES DAILY PRN
Qty: 90 TABLET | Refills: 2 | Status: SHIPPED | OUTPATIENT
Start: 2019-06-10 | End: 2019-09-10 | Stop reason: SDUPTHER

## 2019-06-10 NOTE — PROGRESS NOTES
Subjective   Lisa Hill is a 59 y.o. female.     History of Present Illness     COPD  The patient reports that her SOB and cough have remained stable. She states that these symptoms occur at any time during the day or night. She reports that her symptoms become worse with activity, exposure to cold air and environmental allergens. Her symptoms are reduced with rest and inhaled inhaled medication. She is using supplemental oxygen at night but remains unconvinced that it helps at all. The patient states she also has nasal congestion. She is following the treatment plan, including medications as directed. She continues to smoke about 1 pack per day. Low dose CT of the chest performed on 3/1/2019 was reported as showing a stable 2.3 mm right lung nodule, and moderate COPD.  She was advised to have her next study in 1 year.  She continues to be followed by pulmonology and will undergo a reassessment with Dr. Su on 8/27/2019    Dyslipidemia  Compliance with treatment has been fair. The patient exercises occasionally. She is currently being prescribed the following medication for her dyslipidemia - simvastatin. Patient denies side effects associated with her medications.  She has had no recent labs but is fasting today    Hypothyroidism  Patient presents for evaluation of thyroid function. Symptoms consist of weight gain, constipation. The symptoms are moderate.  The problem has been unchanged.  Previous thyroid studies include TSH. The hypothyroidism is due to Hashimoto's disease.     Depression  Onset was a number of years ago. Current symptoms include: depression, nervousness, anhedonia, difficulty concentrating, fatigue and impaired memory. Patient denies recurrent thoughts of death or suicidal thoughts. Risk factors: history of seasonal affective disorder.  Current medication includes escitalopram 10 daily,  bupropion 300 daily, risperdone 2 nightly, and lorazepam 0.5 twice a day.  She has done somewhat  better since resuming the former.  She denies any side effects    Low Back Pain  She continues to have intermittent low back pain.  There has been no change in the quality nor any new associated symptoms. There is no history of any weakness, numbness, tingling, or any change in her bowel/bladder control.  There is no history of any fever, chills, night sweats or weight loss.  When she sits down the pain resolves within minutes and she denies any problem at night.  She's had similar pain on and off for years.  She has been using a TENS unit for several hours daily and continues to feel that it helps    The following portions of the patient's history were reviewed and updated as appropriate: allergies, current medications, past medical history, past social history and problem list.    Review of Systems   Constitutional: Positive for fatigue. Negative for appetite change, chills, fever and unexpected weight change.   HENT: Negative for congestion, ear pain, postnasal drip, rhinorrhea, sneezing, sore throat and voice change.    Eyes: Negative for visual disturbance.   Respiratory: Positive for cough, shortness of breath and wheezing.    Cardiovascular: Positive for leg swelling. Negative for chest pain and palpitations.   Gastrointestinal: Positive for constipation. Negative for abdominal pain, anal bleeding, blood in stool, diarrhea, nausea and vomiting.   Endocrine: Negative for polydipsia.   Genitourinary: Negative for difficulty urinating, dysuria, frequency, hematuria, menstrual problem, pelvic pain, urgency, vaginal bleeding, vaginal discharge and vaginal pain.        Incontinence with coughing, sneezing, or lifting   Musculoskeletal: Positive for arthralgias and back pain. Negative for joint swelling, myalgias and neck pain.   Skin: Negative for color change.   Neurological: Negative for tremors, weakness, numbness and headaches.   Psychiatric/Behavioral: Positive for decreased concentration, dysphoric mood and  sleep disturbance (chronic-intermittent). Negative for suicidal ideas.     Objective   Physical Exam   Constitutional: She is oriented to person, place, and time. No distress.   Bright, in good spirits.  No apparent distress.  No pallor, jaundice, cyanosis or diaphoresis   HENT:   Head: Atraumatic.   Right Ear: Tympanic membrane, external ear and ear canal normal.   Left Ear: Tympanic membrane, external ear and ear canal normal.   Nose: Nose normal.   Mouth/Throat: Oropharynx is clear and moist. Mucous membranes are not pale and not cyanotic.   Eyes: EOM are normal. Pupils are equal, round, and reactive to light. No scleral icterus.   Neck: No JVD present. Carotid bruit is not present. No tracheal deviation present. No thyromegaly present.   Cardiovascular: Normal rate, regular rhythm, S1 normal, S2 normal and intact distal pulses. Exam reveals no gallop, no S3 and no S4.   No murmur heard.  Pulmonary/Chest: No accessory muscle usage or stridor. No respiratory distress. She has decreased breath sounds. She has wheezes (diffuse-mild).   Hyperinflation of the chest   Abdominal: Soft. Normal aorta and bowel sounds are normal. She exhibits no distension, no abdominal bruit and no mass. There is no hepatosplenomegaly. There is no tenderness. No hernia.   Musculoskeletal: She exhibits no deformity.        Lumbar back: She exhibits tenderness (bilateral lumbar paraspinal muscle tenderness). She exhibits normal range of motion, no bony tenderness and no deformity.   Negative straight leg raise.     Vascular Status -  Her right foot exhibits abnormal foot edema (trace). Her left foot exhibits abnormal foot edema (trace).  Lymphadenopathy:        Head (right side): No submandibular adenopathy present.        Head (left side): No submandibular adenopathy present.     She has no cervical adenopathy.   Neurological: She is alert and oriented to person, place, and time. She has normal reflexes. She displays normal reflexes. No  cranial nerve deficit. She exhibits normal muscle tone. Coordination normal.   Skin: Skin is warm and dry. No rash noted. She is not diaphoretic. No cyanosis. No pallor. Nails show no clubbing.   Psychiatric: She has a normal mood and affect. Her speech is normal and behavior is normal. Thought content normal. Cognition and memory are normal. She expresses no suicidal ideation.     Assessment/Plan   Problems Addressed this Visit        Cardiovascular and Mediastinum    Mixed hyperlipidemia   Encouraged to continue to work on her diet and exercise plan.  Continue current medication  Updated labs drawn.    Relevant Orders    Lipid Panel    Essential hypertension   Hypertension: marginal. Evidence of target organ damage: none.   As above  Continue current medication    Relevant Orders    CBC & Differential    Comprehensive Metabolic Panel    CBC Auto Differential       Respiratory    Panlobular emphysema (CMS/HCC)   COPD is worsening.  Reminded of the importance of smoking cessation  Encouraged to remain as active as symptoms allow for  Follow up with pulmonology     Chronic seasonal allergic rhinitis       Digestive    Vitamin D deficiency  Continue maintenance supplementation with monitoring.    Relevant Orders    Vitamin D 25 Hydroxy    Gastroesophageal reflux disease without esophagitis    Chronic constipation       Endocrine    Hypothyroidism due to Hashimoto's thyroiditis  Clinically euthyroid.  Continue current medication.    Relevant Orders    TSH       Musculoskeletal and Integument    Osteopenia    Generalized osteoarthritis  Reminded regarding symptomatic treatment.   Plain films of the lumbar spine arranged    Relevant Orders    XR Spine Lumbar 2 or 3 View       Genitourinary    Stress bladder incontinence, female       Other    Prediabetes  Will continue to monitor    Relevant Orders    Hemoglobin A1c    Depression with anxiety  Stable.  Supportive therapy.   Continue current medication.    Relevant  Medications    LORazepam (ATIVAN) 1 MG tablet    Smoker    Healthcare maintenance  Will arrange an updated mammogram  Hepatitis a #2 will be administered at return along with a flu shot    Relevant Orders    Mammo Screening Digital Tomosynthesis Bilateral With CAD

## 2019-06-11 DIAGNOSIS — E55.9 VITAMIN D DEFICIENCY: ICD-10-CM

## 2019-06-11 RX ORDER — MELATONIN
2000 DAILY
Qty: 60 TABLET | Refills: 5 | Status: SHIPPED | OUTPATIENT
Start: 2019-06-11 | End: 2019-12-26

## 2019-06-12 ENCOUNTER — TELEPHONE (OUTPATIENT)
Dept: FAMILY MEDICINE CLINIC | Facility: CLINIC | Age: 59
End: 2019-06-12

## 2019-06-12 NOTE — TELEPHONE ENCOUNTER
Pt is aware.   ----- Message from Scott Chavez MD sent at 6/12/2019  8:20 AM EDT -----  K - she needs to resume unless it bothers her in some way    ----- Message -----  From: Yesenia Cruz MA  Sent: 6/11/2019   2:57 PM  To: Scott Chavez MD    Patient called back and stated that she has not been taking her cholesterol medicine.

## 2019-06-27 DIAGNOSIS — E03.9 ACQUIRED HYPOTHYROIDISM: ICD-10-CM

## 2019-06-27 RX ORDER — LEVOTHYROXINE SODIUM 175 UG/1
TABLET ORAL
Qty: 30 TABLET | Refills: 5 | Status: SHIPPED | OUTPATIENT
Start: 2019-06-27 | End: 2019-12-26

## 2019-07-02 ENCOUNTER — OFFICE VISIT (OUTPATIENT)
Dept: FAMILY MEDICINE CLINIC | Facility: CLINIC | Age: 59
End: 2019-07-02

## 2019-07-02 DIAGNOSIS — R73.03 PREDIABETES: ICD-10-CM

## 2019-07-02 DIAGNOSIS — E55.9 VITAMIN D DEFICIENCY: ICD-10-CM

## 2019-07-02 DIAGNOSIS — J43.1 PANLOBULAR EMPHYSEMA (HCC): ICD-10-CM

## 2019-07-02 DIAGNOSIS — L23.89 ALLERGIC CONTACT DERMATITIS DUE TO OTHER AGENTS: ICD-10-CM

## 2019-07-02 DIAGNOSIS — E78.2 MIXED HYPERLIPIDEMIA: Primary | ICD-10-CM

## 2019-07-02 DIAGNOSIS — F41.8 DEPRESSION WITH ANXIETY: ICD-10-CM

## 2019-07-02 DIAGNOSIS — I10 ESSENTIAL HYPERTENSION: ICD-10-CM

## 2019-07-02 DIAGNOSIS — E06.3 HYPOTHYROIDISM DUE TO HASHIMOTO'S THYROIDITIS: ICD-10-CM

## 2019-07-02 DIAGNOSIS — E03.8 HYPOTHYROIDISM DUE TO HASHIMOTO'S THYROIDITIS: ICD-10-CM

## 2019-07-02 DIAGNOSIS — F17.200 SMOKER: ICD-10-CM

## 2019-07-02 PROCEDURE — 99214 OFFICE O/P EST MOD 30 MIN: CPT | Performed by: GENERAL PRACTICE

## 2019-07-02 RX ORDER — BETAMETHASONE DIPROPIONATE 0.5 MG/G
CREAM TOPICAL 2 TIMES DAILY
Qty: 15 G | Refills: 0 | Status: SHIPPED | OUTPATIENT
Start: 2019-07-02 | End: 2019-12-11

## 2019-07-02 NOTE — PROGRESS NOTES
Kay Hill is a 59 y.o. female.     History of Present Illness     Rash  Returns with an approximate 1 week history of an area of redness and itching over her left anterior chest.  This has not occurred elsewhere and has been unassociated with any other symptoms.  There is no history of any cold or flulike symptoms and she denies any fever or chills.  She denies any soaps, creams, detergents etc. and has not been around any pets or plants.  No one else in the house has had a rash    COPD  The patient reports that her SOB and cough have remained stable. She states that these symptoms occur at any time during the day or night. She reports that her symptoms become worse with activity, exposure to cold air and environmental allergens. Her symptoms are reduced with rest and inhaled inhaled medication. She is using supplemental oxygen at night but remains unconvinced that it helps at all. The patient states she also has nasal congestion. She is following the treatment plan, including medications as directed. She continues to smoke about 1 pack per day. Low dose CT of the chest performed on 3/1/2019 was reported as showing a stable 2.3 mm right lung nodule, and moderate COPD.  She was advised to have her next study in 1 year.  She continues to be followed by pulmonology and will undergo a reassessment with Dr. Su on 8/27/2019    Dyslipidemia  Compliance with treatment has been fair. The patient exercises occasionally. She is currently being prescribed the following medication for her dyslipidemia - simvastatin. Patient denies side effects associated with her medications.    Lab Results   Component Value Date    CHOL 184 06/10/2019    CHLPL 141 02/05/2016    TRIG 147 06/10/2019    HDL 40 06/10/2019     (H) 06/10/2019     Hypothyroidism  Patient presents for evaluation of thyroid function. Symptoms consist of weight gain, constipation. The symptoms are moderate.  The problem has been unchanged.   Previous thyroid studies include TSH. The hypothyroidism is due to Hashimoto's disease.   Lab Results   Component Value Date    TSH 1.540 06/10/2019     Depression  Onset was a number of years ago. Current symptoms include: depression, nervousness, anhedonia, difficulty concentrating, fatigue and impaired memory. Patient denies recurrent thoughts of death or suicidal thoughts. Risk factors: history of seasonal affective disorder.  Current medication includes escitalopram 10 daily,  bupropion 300 daily, risperdone 2 nightly, and lorazepam 0.5 twice a day.  She has done somewhat better since resuming the former.  She denies any side effects    Low Back Pain  She continues to have intermittent low back pain.  There has been no change in the quality nor any new associated symptoms. There is no history of any weakness, numbness, tingling, or any change in her bowel/bladder control.  There is no history of any fever, chills, night sweats or weight loss.  When she sits down the pain resolves within minutes and she denies any problem at night.  She's had similar pain on and off for years.  She has been using a TENS unit for several hours daily and continues to feel that it helps    Labs  Most recent fasting glucose 109 with an A1c of 5.8. Vitamin D 29.  She was asked to increase her vitamin D to 2000 IU daily    The following portions of the patient's history were reviewed and updated as appropriate: allergies, current medications, past medical history, past social history and problem list.    Review of Systems   Constitutional: Positive for fatigue. Negative for appetite change, chills, fever and unexpected weight change.   HENT: Negative for congestion, ear pain, postnasal drip, rhinorrhea, sneezing, sore throat and voice change.    Eyes: Negative for visual disturbance.   Respiratory: Positive for cough, shortness of breath and wheezing.    Cardiovascular: Positive for leg swelling. Negative for chest pain and  palpitations.   Gastrointestinal: Positive for constipation. Negative for abdominal pain, anal bleeding, blood in stool, diarrhea, nausea and vomiting.   Endocrine: Negative for polydipsia.   Genitourinary: Negative for difficulty urinating, dysuria, frequency, hematuria, menstrual problem, pelvic pain, urgency, vaginal bleeding, vaginal discharge and vaginal pain.        Incontinence with coughing, sneezing, or lifting   Musculoskeletal: Positive for arthralgias and back pain. Negative for joint swelling, myalgias and neck pain.   Skin: Positive for rash. Negative for color change.   Neurological: Negative for tremors, weakness, numbness and headaches.   Psychiatric/Behavioral: Positive for decreased concentration, dysphoric mood and sleep disturbance (chronic-intermittent). Negative for suicidal ideas.     Objective   Physical Exam   Constitutional: She is oriented to person, place, and time. No distress.   Bright, in good spirits.  No apparent distress.  No pallor, jaundice, cyanosis or diaphoresis   HENT:   Head: Atraumatic.   Right Ear: Tympanic membrane, external ear and ear canal normal.   Left Ear: Tympanic membrane, external ear and ear canal normal.   Nose: Nose normal.   Mouth/Throat: Oropharynx is clear and moist. Mucous membranes are not pale and not cyanotic.   Eyes: EOM are normal. Pupils are equal, round, and reactive to light. No scleral icterus.   Neck: No JVD present. Carotid bruit is not present. No tracheal deviation present. No thyromegaly present.   Cardiovascular: Normal rate, regular rhythm, S1 normal, S2 normal and intact distal pulses. Exam reveals no gallop, no S3 and no S4.   No murmur heard.  Pulmonary/Chest: No accessory muscle usage or stridor. No respiratory distress. She has decreased breath sounds. She has wheezes (diffuse-mild).   Hyperinflation of the chest   Musculoskeletal: She exhibits no deformity.        Lumbar back: She exhibits tenderness (bilateral lumbar paraspinal  muscle tenderness). She exhibits normal range of motion, no bony tenderness and no deformity.   Negative straight leg raise.     Vascular Status -  Her right foot exhibits abnormal foot edema (trace). Her left foot exhibits abnormal foot edema (trace).  Lymphadenopathy:        Head (right side): No submandibular adenopathy present.        Head (left side): No submandibular adenopathy present.     She has no cervical adenopathy.   Neurological: She is alert and oriented to person, place, and time. No cranial nerve deficit. Coordination normal.   Skin: Skin is warm and dry. Rash (approximate 4 cm x 10 cm area of poorly demarcated blanchable erythema left upper anterior chest) noted. She is not diaphoretic. No cyanosis. No pallor. Nails show no clubbing.   Psychiatric: She has a normal mood and affect. Her speech is normal and behavior is normal. Thought content normal. Cognition and memory are normal. She expresses no suicidal ideation.     Assessment/Plan   Problems Addressed this Visit        Cardiovascular and Mediastinum    Mixed hyperlipidemia   Encouraged to continue to work on her diet and exercise plan.  Continue current medication    Essential hypertension  As above.       Respiratory    Panlobular emphysema (CMS/HCC)   COPD is worsening.  Reminded of the importance of smoking cessation  Encouraged to remain as active as symptoms allow for  Continue current medication  Follow up with pulmonology        Digestive    Vitamin D deficiency  Continue supplementation with monitoring.       Endocrine    Hypothyroidism due to Hashimoto's thyroiditis  Clinically and bio-chemically euthyroid.  Continue current medication.       Musculoskeletal and Integument    Allergic contact dermatitis due to other agents  Advised regarding skin care  Topical corticosteroid  Encouraged to report if any worse, any new symptoms, or if not resolving over the next several weeks    Relevant Medications    betamethasone dipropionate  (DIPROLENE) 0.05 % cream       Other    Prediabetes  Will continue to monitor    Depression with anxiety  Stable.  Supportive therapy.   Continue current medication.    Smoker

## 2019-07-03 VITALS
HEIGHT: 62 IN | RESPIRATION RATE: 12 BRPM | WEIGHT: 251 LBS | HEART RATE: 90 BPM | DIASTOLIC BLOOD PRESSURE: 80 MMHG | TEMPERATURE: 98.2 F | OXYGEN SATURATION: 95 % | SYSTOLIC BLOOD PRESSURE: 125 MMHG | BODY MASS INDEX: 46.19 KG/M2

## 2019-07-31 ENCOUNTER — TELEPHONE (OUTPATIENT)
Dept: FAMILY MEDICINE CLINIC | Facility: CLINIC | Age: 59
End: 2019-07-31

## 2019-07-31 DIAGNOSIS — J44.9 COPD MIXED TYPE (HCC): Primary | ICD-10-CM

## 2019-07-31 NOTE — TELEPHONE ENCOUNTER
----- Message from Scott Chavez MD sent at 7/31/2019  2:42 PM EDT -----  Perfect - can refer to [facility] cardiopulmonary rehab regarding COPD    ----- Message -----  From: Yesenia Cruz MA  Sent: 7/31/2019   1:42 PM  To: Scott Chavez MD    Pt is aware. She said she would like to go to pulm rehab.   ----- Message -----  From: Scott Chavez MD  Sent: 7/31/2019   9:15 AM  To: Yesenia Cruz MA    I think we can monitor for now  She should think about doing pulmonary rehab at [facility]    ----- Message -----  From: Yesenia Cruz MA  Sent: 7/30/2019  11:14 AM  To: Scott Chavez MD    Patient stated that she was still having the elevated heart rate when she got up and walked around. Other than that she felt fine.   ----- Message -----  From: Scott Chavez MD  Sent: 7/30/2019   9:33 AM  To: Yesenia Cruz MA    hows she doing today    ----- Message -----  From: Yesenia Cruz MA  Sent: 7/29/2019  10:55 AM  To: Scott Chavez MD    Patient called and stated that her pulse has been running 125 when she is up walking. She wanted to know if she needed to do anything since that was up compared to what it usually is.

## 2019-08-02 DIAGNOSIS — F41.8 DEPRESSION WITH ANXIETY: ICD-10-CM

## 2019-08-02 DIAGNOSIS — R21 RASH: ICD-10-CM

## 2019-08-02 DIAGNOSIS — I10 ESSENTIAL HYPERTENSION: ICD-10-CM

## 2019-08-02 RX ORDER — LANOLIN ALCOHOL/MO/W.PET/CERES
1000 CREAM (GRAM) TOPICAL DAILY
Qty: 30 TABLET | Refills: 5 | Status: SHIPPED | OUTPATIENT
Start: 2019-08-02 | End: 2020-01-23

## 2019-08-02 RX ORDER — LIDOCAINE 50 MG/G
OINTMENT TOPICAL
Qty: 50 G | Refills: 5 | Status: SHIPPED | OUTPATIENT
Start: 2019-08-02 | End: 2020-06-12 | Stop reason: SDUPTHER

## 2019-08-02 RX ORDER — ESCITALOPRAM OXALATE 10 MG/1
10 TABLET ORAL DAILY
Qty: 30 TABLET | Refills: 5 | Status: SHIPPED | OUTPATIENT
Start: 2019-08-02 | End: 2020-01-23

## 2019-08-02 RX ORDER — LOSARTAN POTASSIUM 50 MG/1
50 TABLET ORAL DAILY
Qty: 30 TABLET | Refills: 5 | Status: SHIPPED | OUTPATIENT
Start: 2019-08-02 | End: 2020-01-23

## 2019-08-05 ENCOUNTER — HOSPITAL ENCOUNTER (OUTPATIENT)
Dept: GENERAL RADIOLOGY | Facility: HOSPITAL | Age: 59
Discharge: HOME | End: 2019-08-05

## 2019-08-05 ENCOUNTER — HOSPITAL ENCOUNTER (OUTPATIENT)
Dept: MAMMOGRAPHY | Facility: HOSPITAL | Age: 59
Discharge: HOME | End: 2019-08-05
Admitting: GENERAL PRACTICE

## 2019-08-05 DIAGNOSIS — Z12.31 ENCOUNTER FOR SCREENING MAMMOGRAM FOR BREAST CANCER: ICD-10-CM

## 2019-08-05 DIAGNOSIS — Z00.00 HEALTHCARE MAINTENANCE: ICD-10-CM

## 2019-08-05 PROCEDURE — 72100 X-RAY EXAM L-S SPINE 2/3 VWS: CPT

## 2019-08-05 PROCEDURE — 77067 SCR MAMMO BI INCL CAD: CPT | Performed by: RADIOLOGY

## 2019-08-05 PROCEDURE — 77063 BREAST TOMOSYNTHESIS BI: CPT

## 2019-08-05 PROCEDURE — 77067 SCR MAMMO BI INCL CAD: CPT

## 2019-08-05 PROCEDURE — 72100 X-RAY EXAM L-S SPINE 2/3 VWS: CPT | Performed by: RADIOLOGY

## 2019-08-05 PROCEDURE — 77063 BREAST TOMOSYNTHESIS BI: CPT | Performed by: RADIOLOGY

## 2019-08-08 ENCOUNTER — TELEPHONE (OUTPATIENT)
Dept: FAMILY MEDICINE CLINIC | Facility: CLINIC | Age: 59
End: 2019-08-08

## 2019-08-08 RX ORDER — MELOXICAM 7.5 MG/1
7.5 TABLET ORAL DAILY
Qty: 30 TABLET | Refills: 5 | Status: SHIPPED | OUTPATIENT
Start: 2019-08-08 | End: 2019-08-20

## 2019-08-08 NOTE — TELEPHONE ENCOUNTER
Pt is aware of this information.   ----- Message from Scott Chavez MD sent at 8/8/2019  9:37 AM EDT -----  Emailed script for meloxicam    ----- Message -----  From: Yesenia Cruz MA  Sent: 8/8/2019   9:11 AM  To: Scott Chavez MD    Patient called and wanted to know if there was any type of medicine you could write her for the arthritis?

## 2019-08-20 ENCOUNTER — OFFICE VISIT (OUTPATIENT)
Dept: FAMILY MEDICINE CLINIC | Facility: CLINIC | Age: 59
End: 2019-08-20

## 2019-08-20 ENCOUNTER — TELEPHONE (OUTPATIENT)
Dept: FAMILY MEDICINE CLINIC | Facility: CLINIC | Age: 59
End: 2019-08-20

## 2019-08-20 VITALS
TEMPERATURE: 98.1 F | DIASTOLIC BLOOD PRESSURE: 80 MMHG | HEART RATE: 104 BPM | SYSTOLIC BLOOD PRESSURE: 140 MMHG | HEIGHT: 62 IN | BODY MASS INDEX: 46.56 KG/M2 | WEIGHT: 253 LBS | OXYGEN SATURATION: 95 %

## 2019-08-20 DIAGNOSIS — M47.816 SPONDYLOSIS OF LUMBAR REGION WITHOUT MYELOPATHY OR RADICULOPATHY: Primary | ICD-10-CM

## 2019-08-20 DIAGNOSIS — E78.2 MIXED HYPERLIPIDEMIA: ICD-10-CM

## 2019-08-20 DIAGNOSIS — I10 ESSENTIAL HYPERTENSION: ICD-10-CM

## 2019-08-20 DIAGNOSIS — M54.31 SCIATICA OF RIGHT SIDE: ICD-10-CM

## 2019-08-20 PROCEDURE — 99214 OFFICE O/P EST MOD 30 MIN: CPT | Performed by: PHYSICIAN ASSISTANT

## 2019-08-20 PROCEDURE — 96372 THER/PROPH/DIAG INJ SC/IM: CPT | Performed by: PHYSICIAN ASSISTANT

## 2019-08-20 RX ORDER — MELOXICAM 15 MG/1
15 TABLET ORAL DAILY
Qty: 30 TABLET | Refills: 5 | Status: SHIPPED | OUTPATIENT
Start: 2019-08-20 | End: 2020-02-24 | Stop reason: SDUPTHER

## 2019-08-20 RX ORDER — METHYLPREDNISOLONE ACETATE 80 MG/ML
80 INJECTION, SUSPENSION INTRA-ARTICULAR; INTRALESIONAL; INTRAMUSCULAR; SOFT TISSUE ONCE
Status: COMPLETED | OUTPATIENT
Start: 2019-08-20 | End: 2019-08-20

## 2019-08-20 RX ADMIN — METHYLPREDNISOLONE ACETATE 80 MG: 80 INJECTION, SUSPENSION INTRA-ARTICULAR; INTRALESIONAL; INTRAMUSCULAR; SOFT TISSUE at 17:00

## 2019-08-20 NOTE — TELEPHONE ENCOUNTER
----- Message from Scott Chavez MD sent at 8/19/2019  5:58 PM EDT -----  ?luigi or one of the others    ----- Message -----  From: Vernell Dotson MA  Sent: 8/19/2019   1:21 PM  To: Scott Chavez MD    Pt wanted to know if she could come in for a shot for hip pain?

## 2019-08-21 NOTE — PROGRESS NOTES
Subjective   Lisa Hill is a 59 y.o. female.       Chief Complaint -  Back pain    History of Present Illness -      Back pain-  She complains of bilateral low back pain.  Back pain is due to osteoarthritis.  Pain is described as intermittent severe and aching.  No radiation.  Worse with standing.  Onset 3 years.  Minimal relief with mobic 7.5mg qd.    Right hip pain-  She complains of right buttock pain that radiates to the right lateral thigh and knee.  X 2 weeks.  Worse with prolonged sitting.    8/5/19 Xray lumbar spine = [facility] DDD    Hypertension-  Stable with losartan    Hyperlipidemia-improved with Zocor  Lab Results   Component Value Date    CHOL 184 06/10/2019    CHOL 135 08/06/2018    CHOL 165 11/29/2017    CHLPL 141 02/05/2016    CHLPL 133 06/22/2015    CHLPL 143 10/07/2014     Lab Results   Component Value Date    TRIG 147 06/10/2019    TRIG 143 08/06/2018    TRIG 140 11/29/2017     Lab Results   Component Value Date    HDL 40 06/10/2019    HDL 44 (L) 08/06/2018    HDL 48 (L) 11/29/2017     Lab Results   Component Value Date     (H) 06/10/2019    LDL 62 08/06/2018    LDL 89 11/29/2017           The following portions of the patient's history were reviewed and updated as appropriate: allergies, current medications, past family history, past medical history, past social history, past surgical history and problem list.    Review of Systems   Constitutional: Negative for activity change, appetite change and fever.   HENT: Negative for ear pain, sinus pressure and sore throat.    Eyes: Negative for pain and visual disturbance.   Respiratory: Negative for cough and chest tightness.    Cardiovascular: Negative for chest pain and palpitations.   Gastrointestinal: Negative for abdominal pain, constipation, diarrhea, nausea and vomiting.   Endocrine: Negative for polydipsia and polyuria.   Genitourinary: Negative for dysuria and frequency.   Musculoskeletal: Positive for arthralgias, back pain,  "gait problem and myalgias.   Skin: Negative for color change and rash.   Allergic/Immunologic: Negative for food allergies and immunocompromised state.   Neurological: Negative for dizziness, syncope and headaches.   Hematological: Negative for adenopathy. Does not bruise/bleed easily.   Psychiatric/Behavioral: Negative for hallucinations and suicidal ideas. The patient is not nervous/anxious.        /80   Pulse 104   Temp 98.1 °F (36.7 °C) (Oral)   Ht 157.5 cm (62.01\")   Wt 115 kg (253 lb)   LMP  (LMP Unknown)   SpO2 95%   BMI 46.26 kg/m²   Office Visit on 06/10/2019   Component Date Value Ref Range Status   • Glucose 06/10/2019 109* 65 - 99 mg/dL Final   • BUN 06/10/2019 11  8 - 23 mg/dL Final   • Creatinine 06/10/2019 0.87  0.57 - 1.00 mg/dL Final   • Sodium 06/10/2019 135* 136 - 145 mmol/L Final   • Potassium 06/10/2019 4.6  3.5 - 5.2 mmol/L Final   • Chloride 06/10/2019 98  98 - 107 mmol/L Final   • CO2 06/10/2019 25.4  22.0 - 29.0 mmol/L Final   • Calcium 06/10/2019 9.2  8.6 - 10.5 mg/dL Final   • Total Protein 06/10/2019 7.3  6.0 - 8.5 g/dL Final   • Albumin 06/10/2019 3.80  3.50 - 5.20 g/dL Final   • ALT (SGPT) 06/10/2019 24  1 - 33 U/L Final   • AST (SGOT) 06/10/2019 15  1 - 32 U/L Final   • Alkaline Phosphatase 06/10/2019 57  39 - 117 U/L Final   • Total Bilirubin 06/10/2019 0.2  0.2 - 1.2 mg/dL Final   • eGFR Non African Amer 06/10/2019 66  >60 mL/min/1.73 Final   • Globulin 06/10/2019 3.5  gm/dL Final   • A/G Ratio 06/10/2019 1.1  g/dL Final   • BUN/Creatinine Ratio 06/10/2019 12.6  7.0 - 25.0 Final   • Anion Gap 06/10/2019 11.6  mmol/L Final   • Total Cholesterol 06/10/2019 184  0 - 200 mg/dL Final   • Triglycerides 06/10/2019 147  0 - 150 mg/dL Final   • HDL Cholesterol 06/10/2019 40  40 - 60 mg/dL Final   • LDL Cholesterol  06/10/2019 115* 0 - 100 mg/dL Final   • VLDL Cholesterol 06/10/2019 29.4  5 - 40 mg/dL Final   • LDL/HDL Ratio 06/10/2019 2.87   Final   • TSH 06/10/2019 1.540  0.270 " - 4.200 mIU/mL Final   • Hemoglobin A1C 06/10/2019 5.80* 4.80 - 5.60 % Final   • 25 Hydroxy, Vitamin D 06/10/2019 29.0* 30.0 - 100.0 ng/ml Final   • WBC 06/10/2019 8.93  3.40 - 10.80 10*3/mm3 Final   • RBC 06/10/2019 4.36  3.77 - 5.28 10*6/mm3 Final   • Hemoglobin 06/10/2019 14.3  12.0 - 15.9 g/dL Final   • Hematocrit 06/10/2019 43.9  34.0 - 46.6 % Final   • MCV 06/10/2019 100.7* 79.0 - 97.0 fL Final   • MCH 06/10/2019 32.8  26.6 - 33.0 pg Final   • MCHC 06/10/2019 32.6  31.5 - 35.7 g/dL Final   • RDW 06/10/2019 14.4  12.3 - 15.4 % Final   • RDW-SD 06/10/2019 53.4  37.0 - 54.0 fl Final   • MPV 06/10/2019 10.3  6.0 - 12.0 fL Final   • Platelets 06/10/2019 369  140 - 450 10*3/mm3 Final   • Neutrophil % 06/10/2019 60.2  42.7 - 76.0 % Final   • Lymphocyte % 06/10/2019 30.2  19.6 - 45.3 % Final   • Monocyte % 06/10/2019 6.3  5.0 - 12.0 % Final   • Eosinophil % 06/10/2019 1.9  0.3 - 6.2 % Final   • Basophil % 06/10/2019 1.2  0.0 - 1.5 % Final   • Immature Grans % 06/10/2019 0.2  0.0 - 0.5 % Final   • Neutrophils, Absolute 06/10/2019 5.37  1.70 - 7.00 10*3/mm3 Final   • Lymphocytes, Absolute 06/10/2019 2.70  0.70 - 3.10 10*3/mm3 Final   • Monocytes, Absolute 06/10/2019 0.56  0.10 - 0.90 10*3/mm3 Final   • Eosinophils, Absolute 06/10/2019 0.17  0.00 - 0.40 10*3/mm3 Final   • Basophils, Absolute 06/10/2019 0.11  0.00 - 0.20 10*3/mm3 Final   • Immature Grans, Absolute 06/10/2019 0.02  0.00 - 0.05 10*3/mm3 Final   • nRBC 06/10/2019 0.0  0.0 - 0.2 /100 WBC Final       Physical Exam   Constitutional: She is oriented to person, place, and time. She appears well-developed and well-nourished. No distress.   HENT:   Head: Normocephalic and atraumatic.   Nose: Nose normal.   Mouth/Throat: Oropharynx is clear and moist.   Eyes: Conjunctivae and EOM are normal. Pupils are equal, round, and reactive to light.   Neck: Normal range of motion. Neck supple. No tracheal deviation present. No thyromegaly present.   Cardiovascular: Normal  rate, regular rhythm, normal heart sounds and intact distal pulses.   No murmur heard.  Pulmonary/Chest: Effort normal and breath sounds normal. No respiratory distress. She has no wheezes.   Abdominal: Soft. Bowel sounds are normal. There is no tenderness. There is no guarding.   Musculoskeletal: She exhibits tenderness. She exhibits no edema.   She is in wheelchair.  She is able to ambulate by herself but does use the office table for assistance.  Generalized tenderness noted without muscle spasm and lumbar musculature.  Tenderness is noted to right mid buttock to deep palpation   Lymphadenopathy:     She has no cervical adenopathy.   Neurological: She is alert and oriented to person, place, and time.   Skin: Skin is warm and dry. No rash noted. She is not diaphoretic.   Psychiatric: She has a normal mood and affect. Her behavior is normal.   Nursing note and vitals reviewed.      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    Sciatica of right side  Comments:  sciatica stretches taught and advised  Orders:  -     methylPREDNISolone acetate (DEPO-medrol) injection 80 mg    Essential hypertension  Comments:  Continue losartan    Mixed hyperlipidemia  Comments:  Continue Zocor      She was advised to go to physical therapy but states she is unable to due to transportation since her  works regularly and she is not currently driving.  She was taught home exercises for sciatica and low back stretches.             This document has been electronically signed by:  Kerry Bravo PA-C

## 2019-08-30 ENCOUNTER — TELEPHONE (OUTPATIENT)
Dept: PULMONOLOGY | Facility: CLINIC | Age: 59
End: 2019-08-30

## 2019-08-30 DIAGNOSIS — G47.33 OBSTRUCTIVE SLEEP APNEA SYNDROME: Primary | ICD-10-CM

## 2019-08-30 NOTE — TELEPHONE ENCOUNTER
Returned patient's call she requested to decrease CPAP settings to 4 cm as she reported that the current pressure 5 cm was too strong for her to tolerate.  Improper settings had resulted in inability to recently use the machine.  She had attempted several times to tolerate this, but was not able to.  I called [facility] to confirm current setting at 5 cm.  As this is correct, we will decrease the setting to 4 cm.  Discussed to attempt use for at least 3 weeks to see if improvement is noted.  If not, she will inform us if this is still too uncomfortable to tolerate.  We will order a titration study if needed if settings remain in inappropriate to find the correct pressure setting that will be most beneficial to her.    She conveyed understanding and appreciated the assistance.

## 2019-09-03 ENCOUNTER — HOSPITAL ENCOUNTER (OUTPATIENT)
Dept: GENERAL RADIOLOGY | Facility: HOSPITAL | Age: 59
Discharge: HOME | End: 2019-09-03
Admitting: NURSE PRACTITIONER

## 2019-09-03 DIAGNOSIS — K21.9 GASTROESOPHAGEAL REFLUX DISEASE WITHOUT ESOPHAGITIS: ICD-10-CM

## 2019-09-03 DIAGNOSIS — E78.2 MIXED HYPERLIPIDEMIA: ICD-10-CM

## 2019-09-03 DIAGNOSIS — M25.551 RIGHT HIP PAIN: Primary | ICD-10-CM

## 2019-09-03 PROCEDURE — 73502 X-RAY EXAM HIP UNI 2-3 VIEWS: CPT | Performed by: RADIOLOGY

## 2019-09-03 PROCEDURE — 73502 X-RAY EXAM HIP UNI 2-3 VIEWS: CPT

## 2019-09-03 RX ORDER — PANTOPRAZOLE SODIUM 40 MG/1
TABLET, DELAYED RELEASE ORAL
Qty: 30 TABLET | Refills: 5 | Status: SHIPPED | OUTPATIENT
Start: 2019-09-03 | End: 2020-02-24 | Stop reason: SDUPTHER

## 2019-09-03 RX ORDER — BUPROPION HYDROCHLORIDE 300 MG/1
300 TABLET ORAL EVERY MORNING
Qty: 30 TABLET | Refills: 5 | Status: SHIPPED | OUTPATIENT
Start: 2019-09-03 | End: 2020-02-24 | Stop reason: SDUPTHER

## 2019-09-03 RX ORDER — SIMVASTATIN 20 MG
20 TABLET ORAL NIGHTLY
Qty: 30 TABLET | Refills: 5 | Status: SHIPPED | OUTPATIENT
Start: 2019-09-03 | End: 2020-02-24 | Stop reason: SDUPTHER

## 2019-09-03 NOTE — TELEPHONE ENCOUNTER
----- Message from NATALIE Mendenhall sent at 9/3/2019 11:59 AM EDT -----  I have placed an order  ----- Message -----  From: Yesenia Cruz MA  Sent: 9/3/2019  11:35 AM  To: NATALIE Mendenhall    Patient wants a x-ray of her right hip.

## 2019-09-10 ENCOUNTER — OFFICE VISIT (OUTPATIENT)
Dept: FAMILY MEDICINE CLINIC | Facility: CLINIC | Age: 59
End: 2019-09-10

## 2019-09-10 DIAGNOSIS — M15.9 GENERALIZED OSTEOARTHRITIS: ICD-10-CM

## 2019-09-10 DIAGNOSIS — E06.3 HYPOTHYROIDISM DUE TO HASHIMOTO'S THYROIDITIS: ICD-10-CM

## 2019-09-10 DIAGNOSIS — E66.01 MORBID OBESITY WITH BMI OF 45.0-49.9, ADULT (HCC): ICD-10-CM

## 2019-09-10 DIAGNOSIS — Z00.00 HEALTHCARE MAINTENANCE: ICD-10-CM

## 2019-09-10 DIAGNOSIS — Z23 ENCOUNTER FOR IMMUNIZATION: ICD-10-CM

## 2019-09-10 DIAGNOSIS — F17.200 SMOKER: ICD-10-CM

## 2019-09-10 DIAGNOSIS — K21.9 GASTROESOPHAGEAL REFLUX DISEASE WITHOUT ESOPHAGITIS: ICD-10-CM

## 2019-09-10 DIAGNOSIS — K59.09 CHRONIC CONSTIPATION: ICD-10-CM

## 2019-09-10 DIAGNOSIS — E03.8 HYPOTHYROIDISM DUE TO HASHIMOTO'S THYROIDITIS: ICD-10-CM

## 2019-09-10 DIAGNOSIS — J43.1 PANLOBULAR EMPHYSEMA (HCC): ICD-10-CM

## 2019-09-10 DIAGNOSIS — R73.03 PREDIABETES: ICD-10-CM

## 2019-09-10 DIAGNOSIS — E55.9 VITAMIN D DEFICIENCY: ICD-10-CM

## 2019-09-10 DIAGNOSIS — J30.2 CHRONIC SEASONAL ALLERGIC RHINITIS: ICD-10-CM

## 2019-09-10 DIAGNOSIS — M54.59 MECHANICAL LOW BACK PAIN: ICD-10-CM

## 2019-09-10 DIAGNOSIS — M85.80 OSTEOPENIA, UNSPECIFIED LOCATION: ICD-10-CM

## 2019-09-10 DIAGNOSIS — F41.8 DEPRESSION WITH ANXIETY: ICD-10-CM

## 2019-09-10 DIAGNOSIS — I10 ESSENTIAL HYPERTENSION: ICD-10-CM

## 2019-09-10 DIAGNOSIS — E78.2 MIXED HYPERLIPIDEMIA: Primary | ICD-10-CM

## 2019-09-10 PROCEDURE — 99214 OFFICE O/P EST MOD 30 MIN: CPT | Performed by: GENERAL PRACTICE

## 2019-09-10 PROCEDURE — 90674 CCIIV4 VAC NO PRSV 0.5 ML IM: CPT | Performed by: GENERAL PRACTICE

## 2019-09-10 PROCEDURE — 90471 IMMUNIZATION ADMIN: CPT | Performed by: GENERAL PRACTICE

## 2019-09-10 RX ORDER — LORAZEPAM 1 MG/1
0.5 TABLET ORAL 2 TIMES DAILY PRN
Qty: 90 TABLET | Refills: 2 | Status: SHIPPED | OUTPATIENT
Start: 2019-09-10 | End: 2019-12-10 | Stop reason: SDUPTHER

## 2019-09-10 NOTE — PROGRESS NOTES
Subjective   Lisa Hill is a 59 y.o. female.     History of Present Illness     COPD  The patient reports that her SOB and cough have remained stable. She states that these symptoms occur at any time during the day or night. She reports that her symptoms become worse with activity, exposure to cold air and environmental allergens. Her symptoms are reduced with rest and inhaled inhaled medication. She is using supplemental oxygen at night but remains unconvinced that it helps at all. The patient states she also has nasal congestion. She is following the treatment plan, including medications as directed. She continues to smoke about 1 pack per day. Low dose CT of the chest performed on 3/1/2019 was reported as showing a stable 2.3 mm right lung nodule, and moderate COPD.  She was advised to have her next study in 1 year.  She continues to be followed by pulmonology but rescheduled her appointment with Dr. Su to 11/6/19.  She has yet to start pulmonary rehab    Dyslipidemia  Compliance with treatment has been fair. The patient exercises occasionally. She is currently being prescribed the following medication for her dyslipidemia - simvastatin. Patient denies side effects associated with her medications.      Hypothyroidism  Patient presents for evaluation of thyroid function. Symptoms consist of weight gain, constipation. The symptoms are moderate.  The problem has been unchanged.  Previous thyroid studies include TSH. The hypothyroidism is due to Hashimoto's disease.     Depression  Onset was a number of years ago. Current symptoms include: depression, nervousness, anhedonia, difficulty concentrating, fatigue and impaired memory. Patient denies recurrent thoughts of death or suicidal thoughts. Risk factors: history of seasonal affective disorder.  Current medication includes escitalopram 10 daily,  bupropion 300 daily, risperdone 2 nightly, and lorazepam 0.5 twice a day.  She has done fairly well since last  here.  She denies any side effects    Low Back Pain  She continues to have intermittent low back pain.  There has been no change in the quality nor any new associated symptoms. There is no history of any weakness, numbness, tingling, or any change in her bowel/bladder control.  There is no history of any fever, chills, night sweats or weight loss.  When she sits down the pain resolves within minutes and she denies any problem at night.  She's had similar pain on and off for years.  She has been using a TENS unit for several hours daily and continues to feel that it helps    The following portions of the patient's history were reviewed and updated as appropriate: allergies, current medications, past medical history, past social history and problem list.    Review of Systems   Constitutional: Positive for fatigue. Negative for appetite change, chills, fever and unexpected weight change.   HENT: Negative for congestion, ear pain, postnasal drip, rhinorrhea, sneezing, sore throat and voice change.    Eyes: Negative for visual disturbance.   Respiratory: Positive for cough, shortness of breath and wheezing.    Cardiovascular: Positive for leg swelling. Negative for chest pain and palpitations.   Gastrointestinal: Positive for constipation. Negative for abdominal pain, anal bleeding, blood in stool, diarrhea, nausea and vomiting.   Endocrine: Negative for polydipsia.   Genitourinary: Negative for difficulty urinating, dysuria, frequency, hematuria, menstrual problem, pelvic pain, urgency, vaginal bleeding, vaginal discharge and vaginal pain.        Incontinence with coughing, sneezing, or lifting   Musculoskeletal: Positive for arthralgias and back pain. Negative for joint swelling, myalgias and neck pain.   Skin: Negative for color change and rash.   Neurological: Negative for tremors, weakness, numbness and headaches.   Psychiatric/Behavioral: Positive for decreased concentration, dysphoric mood and sleep disturbance  (chronic-intermittent). Negative for suicidal ideas.     Objective   Physical Exam   Constitutional: She is oriented to person, place, and time. No distress.   Accompanied by her . Bright, in good spirits.  No apparent distress.  No pallor, jaundice, cyanosis or diaphoresis   HENT:   Head: Atraumatic.   Right Ear: Tympanic membrane, external ear and ear canal normal.   Left Ear: Tympanic membrane, external ear and ear canal normal.   Nose: Nose normal.   Mouth/Throat: Oropharynx is clear and moist. Mucous membranes are not pale and not cyanotic.   Eyes: EOM are normal. Pupils are equal, round, and reactive to light. No scleral icterus.   Neck: No JVD present. Carotid bruit is not present. No tracheal deviation present. No thyromegaly present.   Cardiovascular: Normal rate, regular rhythm, S1 normal, S2 normal and intact distal pulses. Exam reveals no gallop, no S3 and no S4.   No murmur heard.  Pulmonary/Chest: No accessory muscle usage or stridor. No respiratory distress. She has decreased breath sounds. She has wheezes (diffuse-mild).   Hyperinflation of the chest   Abdominal: Soft. Normal aorta and bowel sounds are normal. She exhibits no distension, no abdominal bruit and no mass. There is no hepatosplenomegaly. There is no tenderness. No hernia.   Musculoskeletal: She exhibits no deformity.        Lumbar back: She exhibits tenderness (bilateral lumbar paraspinal muscle tenderness). She exhibits normal range of motion, no bony tenderness and no deformity.   Negative straight leg raise.     Vascular Status -  Her right foot exhibits abnormal foot edema (trace). Her left foot exhibits abnormal foot edema (trace).  Lymphadenopathy:        Head (right side): No submandibular adenopathy present.        Head (left side): No submandibular adenopathy present.     She has no cervical adenopathy.   Neurological: She is alert and oriented to person, place, and time. She has normal reflexes. She displays normal  reflexes. No cranial nerve deficit. She exhibits normal muscle tone. Coordination normal.   Skin: Skin is warm and dry. No rash noted. She is not diaphoretic. No cyanosis. No pallor. Nails show no clubbing.   Psychiatric: She has a normal mood and affect. Her speech is normal and behavior is normal. Thought content normal. Cognition and memory are normal. She expresses no suicidal ideation.     Assessment/Plan   Problems Addressed this Visit        Cardiovascular and Mediastinum    Mixed hyperlipidemia  Encouraged to continue to work on her diet and exercise plan.  Continue current medication  Updated labs will be arranged at return.    Essential hypertension  As above.   Continue current medication.       Respiratory    Panlobular emphysema (CMS/[facility])  Reminded of the importance of smoking cessation  Continue current medication and supplemental oxygen  Follow-up with pulmonary rehab and pulmonology    Chronic seasonal allergic rhinitis       Digestive    Vitamin D deficiency  Continue supplementation with monitoring.    Gastroesophageal reflux disease without esophagitis    Chronic constipation    Morbid obesity with BMI of 45.0-49.9, adult (CMS/[facility])       Endocrine    Hypothyroidism due to Hashimoto's thyroiditis  Clinically euthyroid.  Continue current medication.       Nervous and Auditory    Mechanical low back pain  Reminded regarding symptomatic treatment.   Continue current medication       Musculoskeletal and Integument    Osteopenia    Generalized osteoarthritis       Other    Prediabetes  Will continue to monitor    Depression with anxiety  Stable.  Supportive therapy.   Continue current medication.  Encouraged to report if any worse or if any new symptoms or concerns.    Relevant Medications    LORazepam (ATIVAN) 1 MG tablet    Smoker    Encounter for immunization    Relevant Orders    Flucelvax Quad=>4Years (PFS) (Completed)    Healthcare maintenance  Recommended a flu shot  Prescription written for  hepatitis A #2    Relevant Orders    Flucelvax Quad=>4Years (PFS) (Completed)

## 2019-09-11 VITALS
BODY MASS INDEX: 46.01 KG/M2 | TEMPERATURE: 97.7 F | WEIGHT: 250 LBS | DIASTOLIC BLOOD PRESSURE: 75 MMHG | OXYGEN SATURATION: 96 % | HEIGHT: 62 IN | RESPIRATION RATE: 14 BRPM | SYSTOLIC BLOOD PRESSURE: 125 MMHG | HEART RATE: 99 BPM

## 2019-09-11 PROBLEM — L23.89 ALLERGIC CONTACT DERMATITIS DUE TO OTHER AGENTS: Status: RESOLVED | Noted: 2019-07-02 | Resolved: 2019-09-11

## 2019-09-11 PROBLEM — G47.34 NOCTURNAL HYPOXIA: Status: RESOLVED | Noted: 2019-05-15 | Resolved: 2019-09-11

## 2019-09-26 ENCOUNTER — OFFICE VISIT (OUTPATIENT)
Dept: FAMILY MEDICINE CLINIC | Facility: CLINIC | Age: 59
End: 2019-09-26

## 2019-09-26 VITALS
OXYGEN SATURATION: 90 % | WEIGHT: 249 LBS | SYSTOLIC BLOOD PRESSURE: 150 MMHG | DIASTOLIC BLOOD PRESSURE: 70 MMHG | BODY MASS INDEX: 45.82 KG/M2 | TEMPERATURE: 98.1 F | HEART RATE: 79 BPM | HEIGHT: 62 IN

## 2019-09-26 DIAGNOSIS — E06.3 HYPOTHYROIDISM DUE TO HASHIMOTO'S THYROIDITIS: ICD-10-CM

## 2019-09-26 DIAGNOSIS — E66.01 MORBID OBESITY (HCC): ICD-10-CM

## 2019-09-26 DIAGNOSIS — E03.8 HYPOTHYROIDISM DUE TO HASHIMOTO'S THYROIDITIS: ICD-10-CM

## 2019-09-26 DIAGNOSIS — F41.8 DEPRESSION WITH ANXIETY: ICD-10-CM

## 2019-09-26 DIAGNOSIS — E78.2 MIXED HYPERLIPIDEMIA: ICD-10-CM

## 2019-09-26 DIAGNOSIS — I10 ESSENTIAL HYPERTENSION: ICD-10-CM

## 2019-09-26 DIAGNOSIS — R10.13 EPIGASTRIC PAIN: Primary | ICD-10-CM

## 2019-09-26 LAB
ALBUMIN SERPL-MCNC: 4.3 G/DL (ref 3.5–5.2)
ALBUMIN/GLOB SERPL: 1.3 G/DL
ALP SERPL-CCNC: 70 U/L (ref 39–117)
ALT SERPL W P-5'-P-CCNC: 20 U/L (ref 1–33)
AMYLASE SERPL-CCNC: 28 U/L (ref 28–100)
ANION GAP SERPL CALCULATED.3IONS-SCNC: 12 MMOL/L (ref 5–15)
AST SERPL-CCNC: 12 U/L (ref 1–32)
BASOPHILS # BLD AUTO: 0.08 10*3/MM3 (ref 0–0.2)
BASOPHILS NFR BLD AUTO: 0.8 % (ref 0–1.5)
BILIRUB SERPL-MCNC: 0.3 MG/DL (ref 0.2–1.2)
BUN BLD-MCNC: 16 MG/DL (ref 8–23)
BUN/CREAT SERPL: 15 (ref 7–25)
CALCIUM SPEC-SCNC: 9.3 MG/DL (ref 8.6–10.5)
CHLORIDE SERPL-SCNC: 95 MMOL/L (ref 98–107)
CO2 SERPL-SCNC: 27 MMOL/L (ref 22–29)
CREAT BLD-MCNC: 1.07 MG/DL (ref 0.57–1)
DEPRECATED RDW RBC AUTO: 45.1 FL (ref 37–54)
EOSINOPHIL # BLD AUTO: 0.15 10*3/MM3 (ref 0–0.4)
EOSINOPHIL NFR BLD AUTO: 1.5 % (ref 0.3–6.2)
ERYTHROCYTE [DISTWIDTH] IN BLOOD BY AUTOMATED COUNT: 12.8 % (ref 12.3–15.4)
GFR SERPL CREATININE-BSD FRML MDRD: 52 ML/MIN/1.73
GLOBULIN UR ELPH-MCNC: 3.4 GM/DL
GLUCOSE BLD-MCNC: 96 MG/DL (ref 65–99)
HCT VFR BLD AUTO: 42.6 % (ref 34–46.6)
HGB BLD-MCNC: 14.7 G/DL (ref 12–15.9)
IMM GRANULOCYTES # BLD AUTO: 0.06 10*3/MM3 (ref 0–0.05)
IMM GRANULOCYTES NFR BLD AUTO: 0.6 % (ref 0–0.5)
LIPASE SERPL-CCNC: 28 U/L (ref 13–60)
LYMPHOCYTES # BLD AUTO: 2.91 10*3/MM3 (ref 0.7–3.1)
LYMPHOCYTES NFR BLD AUTO: 28.9 % (ref 19.6–45.3)
MCH RBC QN AUTO: 33.2 PG (ref 26.6–33)
MCHC RBC AUTO-ENTMCNC: 34.5 G/DL (ref 31.5–35.7)
MCV RBC AUTO: 96.2 FL (ref 79–97)
MONOCYTES # BLD AUTO: 0.77 10*3/MM3 (ref 0.1–0.9)
MONOCYTES NFR BLD AUTO: 7.6 % (ref 5–12)
NEUTROPHILS # BLD AUTO: 6.1 10*3/MM3 (ref 1.7–7)
NEUTROPHILS NFR BLD AUTO: 60.6 % (ref 42.7–76)
NRBC BLD AUTO-RTO: 0 /100 WBC (ref 0–0.2)
PLATELET # BLD AUTO: 382 10*3/MM3 (ref 140–450)
PMV BLD AUTO: 9.8 FL (ref 6–12)
POTASSIUM BLD-SCNC: 4.9 MMOL/L (ref 3.5–5.2)
PROT SERPL-MCNC: 7.7 G/DL (ref 6–8.5)
RBC # BLD AUTO: 4.43 10*6/MM3 (ref 3.77–5.28)
SODIUM BLD-SCNC: 134 MMOL/L (ref 136–145)
T4 FREE SERPL-MCNC: 1.69 NG/DL (ref 0.93–1.7)
TSH SERPL DL<=0.05 MIU/L-ACNC: 1.04 UIU/ML (ref 0.27–4.2)
WBC NRBC COR # BLD: 10.07 10*3/MM3 (ref 3.4–10.8)

## 2019-09-26 PROCEDURE — 84439 ASSAY OF FREE THYROXINE: CPT | Performed by: PHYSICIAN ASSISTANT

## 2019-09-26 PROCEDURE — 99214 OFFICE O/P EST MOD 30 MIN: CPT | Performed by: PHYSICIAN ASSISTANT

## 2019-09-26 PROCEDURE — 80050 GENERAL HEALTH PANEL: CPT | Performed by: PHYSICIAN ASSISTANT

## 2019-09-26 PROCEDURE — 86677 HELICOBACTER PYLORI ANTIBODY: CPT | Performed by: PHYSICIAN ASSISTANT

## 2019-09-26 PROCEDURE — 82150 ASSAY OF AMYLASE: CPT | Performed by: PHYSICIAN ASSISTANT

## 2019-09-26 PROCEDURE — 83690 ASSAY OF LIPASE: CPT | Performed by: PHYSICIAN ASSISTANT

## 2019-09-26 RX ORDER — RISPERIDONE 2 MG/1
TABLET ORAL
Qty: 30 TABLET | Refills: 5 | Status: SHIPPED | OUTPATIENT
Start: 2019-09-26 | End: 2020-03-18

## 2019-09-26 NOTE — PATIENT INSTRUCTIONS

## 2019-09-26 NOTE — PROGRESS NOTES
Subjective   Lisa Hill is a 59 y.o. female.       Chief Complaint -  Abdominal pain    History of Present Illness -      Abdominal pain-  She complains of epigastric and RUQ abd pain.  Onset x 6 months.  Pian is described as mild sharp and does not radiate.  Minimal relief with protonix.    Obesity-  She complains of obesity.  She has not been exercising regularly.  She reports avoid fried fatty foods.  She is on wellbutrin and topamax with minimal appetite suppression.  Blood pressure elevated today so not candidate for adipex today.  She drinks pop.  Not at goal    Hashimoto's thyroiditis- stable with Synthroid 175 mcg daily  Lab Results   Component Value Date    TSH 1.540 06/10/2019     Hyperlipidemia-  Improved with Zocor  Lab Results   Component Value Date    CHOL 184 06/10/2019    CHOL 135 08/06/2018    CHOL 165 11/29/2017    CHLPL 141 02/05/2016    CHLPL 133 06/22/2015    CHLPL 143 10/07/2014     Lab Results   Component Value Date    TRIG 147 06/10/2019    TRIG 143 08/06/2018    TRIG 140 11/29/2017     Lab Results   Component Value Date    HDL 40 06/10/2019    HDL 44 (L) 08/06/2018    HDL 48 (L) 11/29/2017     Lab Results   Component Value Date     (H) 06/10/2019    LDL 62 08/06/2018    LDL 89 11/29/2017     Hypertension- not at goal today.  She does take losartan for her blood pressure.  She is not a candidate for Adipex at this time due to hypertension.      The following portions of the patient's history were reviewed and updated as appropriate: allergies, current medications, past family history, past medical history, past social history, past surgical history and problem list.    Review of Systems   Constitutional: Negative for activity change, appetite change and fever.   HENT: Negative for ear pain, sinus pressure and sore throat.    Eyes: Negative for pain and visual disturbance.   Respiratory: Negative for cough and chest tightness.    Cardiovascular: Negative for chest pain and  "palpitations.   Gastrointestinal: Positive for abdominal distention and abdominal pain. Negative for constipation, diarrhea, nausea and vomiting.   Endocrine: Negative for polydipsia and polyuria.   Genitourinary: Negative for dysuria and frequency.   Musculoskeletal: Negative for back pain and myalgias.   Skin: Negative for color change and rash.   Allergic/Immunologic: Negative for food allergies and immunocompromised state.   Neurological: Negative for dizziness, syncope and headaches.   Hematological: Negative for adenopathy. Does not bruise/bleed easily.   Psychiatric/Behavioral: Negative for hallucinations and suicidal ideas. The patient is not nervous/anxious.        /70   Pulse 79   Temp 98.1 °F (36.7 °C) (Oral)   Ht 157.5 cm (62.01\")   Wt 113 kg (249 lb)   LMP  (LMP Unknown)   SpO2 90%   BMI 45.53 kg/m²   Office Visit on 06/10/2019   Component Date Value Ref Range Status   • Glucose 06/10/2019 109* 65 - 99 mg/dL Final   • BUN 06/10/2019 11  8 - 23 mg/dL Final   • Creatinine 06/10/2019 0.87  0.57 - 1.00 mg/dL Final   • Sodium 06/10/2019 135* 136 - 145 mmol/L Final   • Potassium 06/10/2019 4.6  3.5 - 5.2 mmol/L Final   • Chloride 06/10/2019 98  98 - 107 mmol/L Final   • CO2 06/10/2019 25.4  22.0 - 29.0 mmol/L Final   • Calcium 06/10/2019 9.2  8.6 - 10.5 mg/dL Final   • Total Protein 06/10/2019 7.3  6.0 - 8.5 g/dL Final   • Albumin 06/10/2019 3.80  3.50 - 5.20 g/dL Final   • ALT (SGPT) 06/10/2019 24  1 - 33 U/L Final   • AST (SGOT) 06/10/2019 15  1 - 32 U/L Final   • Alkaline Phosphatase 06/10/2019 57  39 - 117 U/L Final   • Total Bilirubin 06/10/2019 0.2  0.2 - 1.2 mg/dL Final   • eGFR Non African Amer 06/10/2019 66  >60 mL/min/1.73 Final   • Globulin 06/10/2019 3.5  gm/dL Final   • A/G Ratio 06/10/2019 1.1  g/dL Final   • BUN/Creatinine Ratio 06/10/2019 12.6  7.0 - 25.0 Final   • Anion Gap 06/10/2019 11.6  mmol/L Final   • Total Cholesterol 06/10/2019 184  0 - 200 mg/dL Final   • Triglycerides " 06/10/2019 147  0 - 150 mg/dL Final   • HDL Cholesterol 06/10/2019 40  40 - 60 mg/dL Final   • LDL Cholesterol  06/10/2019 115* 0 - 100 mg/dL Final   • VLDL Cholesterol 06/10/2019 29.4  5 - 40 mg/dL Final   • LDL/HDL Ratio 06/10/2019 2.87   Final   • TSH 06/10/2019 1.540  0.270 - 4.200 mIU/mL Final   • Hemoglobin A1C 06/10/2019 5.80* 4.80 - 5.60 % Final   • 25 Hydroxy, Vitamin D 06/10/2019 29.0* 30.0 - 100.0 ng/ml Final   • WBC 06/10/2019 8.93  3.40 - 10.80 10*3/mm3 Final   • RBC 06/10/2019 4.36  3.77 - 5.28 10*6/mm3 Final   • Hemoglobin 06/10/2019 14.3  12.0 - 15.9 g/dL Final   • Hematocrit 06/10/2019 43.9  34.0 - 46.6 % Final   • MCV 06/10/2019 100.7* 79.0 - 97.0 fL Final   • MCH 06/10/2019 32.8  26.6 - 33.0 pg Final   • MCHC 06/10/2019 32.6  31.5 - 35.7 g/dL Final   • RDW 06/10/2019 14.4  12.3 - 15.4 % Final   • RDW-SD 06/10/2019 53.4  37.0 - 54.0 fl Final   • MPV 06/10/2019 10.3  6.0 - 12.0 fL Final   • Platelets 06/10/2019 369  140 - 450 10*3/mm3 Final   • Neutrophil % 06/10/2019 60.2  42.7 - 76.0 % Final   • Lymphocyte % 06/10/2019 30.2  19.6 - 45.3 % Final   • Monocyte % 06/10/2019 6.3  5.0 - 12.0 % Final   • Eosinophil % 06/10/2019 1.9  0.3 - 6.2 % Final   • Basophil % 06/10/2019 1.2  0.0 - 1.5 % Final   • Immature Grans % 06/10/2019 0.2  0.0 - 0.5 % Final   • Neutrophils, Absolute 06/10/2019 5.37  1.70 - 7.00 10*3/mm3 Final   • Lymphocytes, Absolute 06/10/2019 2.70  0.70 - 3.10 10*3/mm3 Final   • Monocytes, Absolute 06/10/2019 0.56  0.10 - 0.90 10*3/mm3 Final   • Eosinophils, Absolute 06/10/2019 0.17  0.00 - 0.40 10*3/mm3 Final   • Basophils, Absolute 06/10/2019 0.11  0.00 - 0.20 10*3/mm3 Final   • Immature Grans, Absolute 06/10/2019 0.02  0.00 - 0.05 10*3/mm3 Final   • nRBC 06/10/2019 0.0  0.0 - 0.2 /100 WBC Final       Physical Exam   Constitutional: She is oriented to person, place, and time. No distress.   Obese pleasant lady   HENT:   Head: Normocephalic and atraumatic.   Nose: Nose normal.    Mouth/Throat: Oropharynx is clear and moist.   Eyes: Conjunctivae and EOM are normal. Pupils are equal, round, and reactive to light.   Neck: Normal range of motion. Neck supple. No tracheal deviation present. No thyromegaly present.   Cardiovascular: Normal rate, regular rhythm, normal heart sounds and intact distal pulses.   No murmur heard.  Pulmonary/Chest: Effort normal and breath sounds normal. No respiratory distress. She has no wheezes.   Abdominal: Soft. Bowel sounds are normal. She exhibits distension. There is tenderness (epigastric). There is no guarding.   Musculoskeletal: Normal range of motion. She exhibits no edema or tenderness.   Lymphadenopathy:     She has no cervical adenopathy.   Neurological: She is alert and oriented to person, place, and time.   Skin: Skin is warm and dry. No rash noted. She is not diaphoretic.   Psychiatric: She has a normal mood and affect. Her behavior is normal.   Nursing note and vitals reviewed.      Assessment/Plan     Diagnoses and all orders for this visit:    Epigastric pain  -     US Abdomen Complete; Future  -     H. Pylori IgM, Blood  -     Amylase  -     Lipase  -     CBC & Differential  -     CBC Auto Differential    Hypothyroidism due to Hashimoto's thyroiditis  Comments:  Continue Synthroid 175 mcg daily  Orders:  -     TSH  -     T4, Free    Mixed hyperlipidemia  Comments:  Continue Zocor and advised low-cholesterol diet  Orders:  -     Comprehensive Metabolic Panel    Essential hypertension  Comments:  Continue losartan and advised her to decrease sodium in diet (and stop drinking pop)    Morbid obesity (CMS/HCC)  Comments:  Advised to stop pop, exercise as tolerated, and start 2500 calorie a day or less diet.                     This document has been electronically signed by:  Kerry Bravo PA-C

## 2019-09-27 LAB — H PYLORI IGM SER-ACNC: <9 UNITS (ref 0–8.9)

## 2019-10-03 ENCOUNTER — TELEPHONE (OUTPATIENT)
Dept: FAMILY MEDICINE CLINIC | Facility: CLINIC | Age: 59
End: 2019-10-03

## 2019-10-03 NOTE — TELEPHONE ENCOUNTER
I called lisa informed lab work was unremarkable. Lisa said she was feeling better                  ----- Message from ANGEL Chaves sent at 10/3/2019  8:20 AM EDT -----  Please inform the patient that her lab work was unremarkable.  How she feeling?

## 2019-10-04 ENCOUNTER — APPOINTMENT (OUTPATIENT)
Dept: ULTRASOUND IMAGING | Facility: HOSPITAL | Age: 59
End: 2019-10-04

## 2019-11-06 ENCOUNTER — OFFICE VISIT (OUTPATIENT)
Dept: PULMONOLOGY | Facility: CLINIC | Age: 59
End: 2019-11-06

## 2019-11-06 VITALS
HEART RATE: 84 BPM | OXYGEN SATURATION: 98 % | DIASTOLIC BLOOD PRESSURE: 79 MMHG | SYSTOLIC BLOOD PRESSURE: 144 MMHG | BODY MASS INDEX: 45.82 KG/M2 | WEIGHT: 249 LBS | HEIGHT: 62 IN

## 2019-11-06 DIAGNOSIS — N39.3 STRESS BLADDER INCONTINENCE, FEMALE: ICD-10-CM

## 2019-11-06 DIAGNOSIS — F17.210 CIGARETTE NICOTINE DEPENDENCE WITHOUT COMPLICATION: ICD-10-CM

## 2019-11-06 DIAGNOSIS — J30.1 SEASONAL ALLERGIC RHINITIS DUE TO POLLEN: ICD-10-CM

## 2019-11-06 DIAGNOSIS — J44.1 COPD WITH ACUTE EXACERBATION (HCC): ICD-10-CM

## 2019-11-06 DIAGNOSIS — R91.1 PULMONARY NODULE, RIGHT: ICD-10-CM

## 2019-11-06 DIAGNOSIS — G47.33 OBSTRUCTIVE SLEEP APNEA: Primary | ICD-10-CM

## 2019-11-06 PROCEDURE — 99214 OFFICE O/P EST MOD 30 MIN: CPT | Performed by: PHYSICIAN ASSISTANT

## 2019-11-06 PROCEDURE — 94664 DEMO&/EVAL PT USE INHALER: CPT | Performed by: PHYSICIAN ASSISTANT

## 2019-11-06 PROCEDURE — 99406 BEHAV CHNG SMOKING 3-10 MIN: CPT | Performed by: PHYSICIAN ASSISTANT

## 2019-11-06 RX ORDER — MONTELUKAST SODIUM 10 MG/1
10 TABLET ORAL NIGHTLY
Qty: 30 TABLET | Refills: 11 | Status: SHIPPED | OUTPATIENT
Start: 2019-11-06 | End: 2020-07-07 | Stop reason: SDUPTHER

## 2019-11-06 RX ORDER — PREDNISONE 20 MG/1
40 TABLET ORAL DAILY
Qty: 10 TABLET | Refills: 0 | Status: SHIPPED | OUTPATIENT
Start: 2019-11-06 | End: 2019-12-10

## 2019-11-06 RX ORDER — IPRATROPIUM BROMIDE AND ALBUTEROL SULFATE 2.5; .5 MG/3ML; MG/3ML
3 SOLUTION RESPIRATORY (INHALATION)
Qty: 120 ML | Refills: 11 | Status: SHIPPED | OUTPATIENT
Start: 2019-11-06 | End: 2020-07-07 | Stop reason: SDUPTHER

## 2019-11-06 RX ORDER — ALBUTEROL SULFATE 90 UG/1
2 AEROSOL, METERED RESPIRATORY (INHALATION) EVERY 4 HOURS PRN
Qty: 18 G | Refills: 5 | Status: SHIPPED | OUTPATIENT
Start: 2019-11-06 | End: 2020-07-07 | Stop reason: SDUPTHER

## 2019-11-06 RX ORDER — FLUTICASONE PROPIONATE 50 MCG
SPRAY, SUSPENSION (ML) NASAL
Qty: 16 G | Refills: 5 | Status: SHIPPED | OUTPATIENT
Start: 2019-11-06 | End: 2020-06-15

## 2019-11-06 NOTE — PROGRESS NOTES
Interval history since last visit: persistent dyspnea on exertion    Recent hospitalizations: none    Investigations (imaging, PFT's, labs, sleep study, record requests, etc.)    Have you had the Influenza Vaccine? yes    Would you like to receive this Vaccine today? no    Have you had the Pneumonia Vaccine?  yes   Would you like to receive this Vaccine today? no    Subjective    Lisa Hill presents for the following Emphysema      History of Present Illness     Ms. Hill presents today for panlobular emphysema, obstructive sleep apnea, allergic rhinitis and current smoking.   She previously noticed increased smothering with use of CPAP on 5 cm. Since the pervious visit, order was placed for lower setting change if available. As this was not completed, she was unable to tolerate use and the machine was collected due to resulting non-compliance.  After discussion since last visit, she would like to try use of the CPAP again if the setting can be lowered.  She is aware of the possible benefits of use and notices that she has episodes of apnea when sleeping.  She also admits to frequent headaches, and thus remains symptomatic.  She continues to note shortness of breath upon exertion.  She reports that it feels as phlegm is stuck in her throat. However, the inhalers have assisted with phlegm production and typically resulting in increased coughing directly following use.  She is currently using Breo and Incruse Ellipta..  To see if any of her changing well continue to maintain symptoms with decreased coughing.  This is possibly related to the powder dose inhalers causing irritation.  She continues to use Singulair and Flonase for allergic rhinitis.  She continues to smoke 1 pack/day but is interested in cessation.  She reports that she was not able to previously tolerate nicotine patches.  She is worried of starting Chantix as her brother noted GI side effects.  She is also concerned of worsening dreams or  possible sleepwalking with use.         Review of Systems   Constitutional: Negative for chills and fever.   Respiratory: Positive for cough, shortness of breath and wheezing.    Cardiovascular: Negative for chest pain and palpitations.   Neurological: Positive for headaches. Negative for dizziness and light-headedness.       Active Problems:  Problem List Items Addressed This Visit     None          Past Medical History:  Past Medical History:   Diagnosis Date   • Anxiety    • Arthritis    • COPD (chronic obstructive pulmonary disease) (CMS/HCC)    • Depression    • Disease of thyroid gland     GRAVES DISEASE   • Elevated cholesterol    • GERD (gastroesophageal reflux disease)    • History of transfusion    • Hyperlipidemia    • Hypertension        Family History:  Family History   Problem Relation Age of Onset   • Kidney disease Mother    • Diabetes Mother    • Hypertension Father    • Breast cancer Maternal Aunt        Social History:  Social History     Tobacco Use   • Smoking status: Current Every Day Smoker     Packs/day: 1.00     Years: 43.00     Pack years: 43.00     Types: Cigarettes   • Smokeless tobacco: Never Used   • Tobacco comment: cessation has been discussed   Substance Use Topics   • Alcohol use: No       Current Medications:  Current Outpatient Medications   Medication Sig Dispense Refill   • albuterol sulfate HFA (VENTOLIN HFA) 108 (90 Base) MCG/ACT inhaler Inhale 2 puffs Every 4 (Four) Hours As Needed for Wheezing. 18 g 5   • betamethasone dipropionate (DIPROLENE) 0.05 % cream Apply  topically to the appropriate area as directed 2 (Two) Times a Day. 15 g 0   • buPROPion XL (WELLBUTRIN XL) 300 MG 24 hr tablet TAKE 1 TABLET BY MOUTH EVERY MORNING. 30 tablet 5   • busPIRone (BUSPAR) 15 MG tablet Take 1 tablet by mouth 3 (Three) Times a Day. 90 tablet 1   • cholecalciferol (VITAMIN D3) 1000 units tablet Take 2 tablets by mouth Daily. 60 tablet 5   • conjugated estrogens (PREMARIN) 0.625 MG/GM  vaginal cream Insert  into the vagina 2 (Two) Times a Week. 30 g 5   • escitalopram (LEXAPRO) 10 MG tablet TAKE 1 TABLET BY MOUTH DAILY. 30 tablet 5   • fluticasone (FLONASE) 50 MCG/ACT nasal spray INSTILL 2 SPRAYS IN EACH NOSTRIL DAILY 16 g 5   • Fluticasone Furoate-Vilanterol (BREO ELLIPTA) 200-25 MCG/INH inhaler Inhale 1 puff Daily. 1 inhaler 8   • hydrocortisone (PROCTOZONE-HC) 2.5 % rectal cream JAQUELIN TO RECTUM 2-4 TIMES DAILY FOR UP TO 2 WEEKS 1 each 0   • ipratropium-albuterol (DUO-NEB) 0.5-2.5 mg/3 ml nebulizer Take 3 mL by nebulization 4 (Four) Times a Day. 120 mL 11   • ketoconazole (NIZORAL) 2 % shampoo Apply  topically 2 (Two) Times a Week. 120 mL 5   • levothyroxine (SYNTHROID, LEVOTHROID) 175 MCG tablet TAKE 1 TABLET BY MOUTH DAILY 30 tablet 5   • lidocaine (XYLOCAINE) 5 % ointment APPLY TO AFFECTED AREA EVERY 2 HOURS AS NEEDED FOR MILD PAIN 50 g 5   • LORazepam (ATIVAN) 1 MG tablet Take 0.5 tablets by mouth 2 (Two) Times a Day As Needed for Anxiety. 90 tablet 2   • losartan (COZAAR) 50 MG tablet TAKE 1 TABLET BY MOUTH DAILY. 30 tablet 5   • meloxicam (MOBIC) 15 MG tablet Take 1 tablet by mouth Daily. 30 tablet 5   • montelukast (SINGULAIR) 10 MG tablet Take 1 tablet by mouth Every Night. 30 tablet 11   • nystatin-triamcinolone (MYCOLOG) 902983-3.1 UNIT/GM-% ointment Apply  topically to the appropriate area as directed Every 12 (Twelve) Hours. Apply topically to area of rash twice daily 30 bottle 2   • pantoprazole (PROTONIX) 40 MG EC tablet TAKE ONE TABLET BY MOUTH EVERY DAY 30 tablet 5   • PROCTOZONE-HC 2.5 % rectal cream USE RECTALLY 2-4 TIMES DAILY FOR 2 WEEKS 30 g 0   • risperiDONE (risperDAL) 2 MG tablet TAKE ONE TABLET BY MOUTH EVERY NIGHT 30 tablet 5   • simvastatin (ZOCOR) 20 MG tablet TAKE 1 TABLET BY MOUTH EVERY NIGHT. 30 tablet 5   • topiramate (TOPAMAX) 100 MG tablet Take 1 tablet by mouth 2 (Two) Times a Day. 60 tablet 5   • triamcinolone (KENALOG) 0.1 % cream Apply  topically 2 (Two) Times  "a Day. 80 g 1   • Umeclidinium Bromide 62.5 MCG/INH aerosol powder  Inhale 1 puff Daily. 1 each 10   • vitamin B-12 (CYANOCOBALAMIN) 1000 MCG tablet TAKE 1 TABLET BY MOUTH DAILY. 30 tablet 5   • Zoster Vac Recomb Adjuvanted (SHINGRIX) 50 MCG reconstituted suspension Inject 50 mcg into the appropriate muscle as directed by prescriber See Admin Instructions. 1 each 0     No current facility-administered medications for this visit.        Allergies:  Allergies   Allergen Reactions   • Sulfa Antibiotics        Vitals:  /79   Pulse 84   Ht 157.5 cm (62\")   Wt 113 kg (249 lb)   LMP  (LMP Unknown)   SpO2 98%   BMI 45.54 kg/m²     Imaging:    Imaging Results (Most Recent)     None          Pulmonary Functions Testing Results:    No results found for: FEV1, FVC, ETB9TQA, TLC, DLCO    Results for orders placed or performed in visit on 09/26/19   H. Pylori IgM, Blood   Result Value Ref Range    H. Pylori, IgM <9.0 0.0 - 8.9 units   Amylase   Result Value Ref Range    Amylase 28 28 - 100 U/L   Lipase   Result Value Ref Range    Lipase 28 13 - 60 U/L   TSH   Result Value Ref Range    TSH 1.040 0.270 - 4.200 uIU/mL   T4, Free   Result Value Ref Range    Free T4 1.69 0.93 - 1.70 ng/dL   Comprehensive Metabolic Panel   Result Value Ref Range    Glucose 96 65 - 99 mg/dL    BUN 16 8 - 23 mg/dL    Creatinine 1.07 (H) 0.57 - 1.00 mg/dL    Sodium 134 (L) 136 - 145 mmol/L    Potassium 4.9 3.5 - 5.2 mmol/L    Chloride 95 (L) 98 - 107 mmol/L    CO2 27.0 22.0 - 29.0 mmol/L    Calcium 9.3 8.6 - 10.5 mg/dL    Total Protein 7.7 6.0 - 8.5 g/dL    Albumin 4.30 3.50 - 5.20 g/dL    ALT (SGPT) 20 1 - 33 U/L    AST (SGOT) 12 1 - 32 U/L    Alkaline Phosphatase 70 39 - 117 U/L    Total Bilirubin 0.3 0.2 - 1.2 mg/dL    eGFR Non African Amer 52 (L) >60 mL/min/1.73    Globulin 3.4 gm/dL    A/G Ratio 1.3 g/dL    BUN/Creatinine Ratio 15.0 7.0 - 25.0    Anion Gap 12.0 5.0 - 15.0 mmol/L   CBC Auto Differential   Result Value Ref Range    WBC " 10.07 3.40 - 10.80 10*3/mm3    RBC 4.43 3.77 - 5.28 10*6/mm3    Hemoglobin 14.7 12.0 - 15.9 g/dL    Hematocrit 42.6 34.0 - 46.6 %    MCV 96.2 79.0 - 97.0 fL    MCH 33.2 (H) 26.6 - 33.0 pg    MCHC 34.5 31.5 - 35.7 g/dL    RDW 12.8 12.3 - 15.4 %    RDW-SD 45.1 37.0 - 54.0 fl    MPV 9.8 6.0 - 12.0 fL    Platelets 382 140 - 450 10*3/mm3    Neutrophil % 60.6 42.7 - 76.0 %    Lymphocyte % 28.9 19.6 - 45.3 %    Monocyte % 7.6 5.0 - 12.0 %    Eosinophil % 1.5 0.3 - 6.2 %    Basophil % 0.8 0.0 - 1.5 %    Immature Grans % 0.6 (H) 0.0 - 0.5 %    Neutrophils, Absolute 6.10 1.70 - 7.00 10*3/mm3    Lymphocytes, Absolute 2.91 0.70 - 3.10 10*3/mm3    Monocytes, Absolute 0.77 0.10 - 0.90 10*3/mm3    Eosinophils, Absolute 0.15 0.00 - 0.40 10*3/mm3    Basophils, Absolute 0.08 0.00 - 0.20 10*3/mm3    Immature Grans, Absolute 0.06 (H) 0.00 - 0.05 10*3/mm3    nRBC 0.0 0.0 - 0.2 /100 WBC       Objective   Physical Exam   Constitutional: She is oriented to person, place, and time. She appears well-developed and well-nourished. No distress.   HENT:   Head: Normocephalic and atraumatic.   Nose: Nose normal.   Eyes: EOM are normal. Pupils are equal, round, and reactive to light.   Cardiovascular: Normal rate, regular rhythm, S1 normal, S2 normal and normal heart sounds.   No murmur heard.  Pulmonary/Chest: Effort normal.   Bilateral air entry positive and appreciated throughout.  Diffuse expiratory wheezing, bilateral.   No rhonchi, or crackles.   Musculoskeletal: Normal range of motion. She exhibits edema (Trace lower extremity edema bilaterally. ).   Neurological: She is alert and oriented to person, place, and time.   Skin: Skin is warm and dry. She is not diaphoretic.   Psychiatric: She has a normal mood and affect. Her behavior is normal.   Vitals reviewed.         Assessment/Plan      I have reviewed the past medical history, family history, social history, surgical history, and allergies.     I reviewed the images of the CT chest  completed on 3/1/2019.  This is notable for COPD changes with 2.3 mm right solid lung nodule.    I reviewed the PFT from 3/25/2019.  Mild obstruction noted with mildly reduced DLCO.    Walk oximetry test completed at the previous visit but did not qualify for supplemental oxygen at that time.    I reviewed the echocardiogram from 3/26/2019.  This showed EF of 61 to 65%.  Trace mitral valve regurgitation present.  No other valvular abnormality noted.  No pericardial effusion.    Sleep study was completed on 9/15/2018.  This showed consistent mild HERBERT.      ICD-10-CM ICD-9-CM   1. Obstructive sleep apnea G47.33 327.23   2. COPD with acute exacerbation (CMS/[facility]) J44.1 491.21   3. Stress bladder incontinence, female N39.3 625.6   4. Seasonal allergic rhinitis due to pollen J30.1 477.0   5. Pulmonary nodule, right R91.1 793.11   6. Cigarette nicotine dependence without complication F17.210 305.1          Obstructive sleep apnea:   · Patient wants to use CPAP machine again if settings can be lowered to 4 cm pressure.  She was unable to previously tolerate the 5 cm pressure setting. As she was unable to use the machine it was collected.  Prefers to await titration study at this time if the pressure can be further lowered.   · Ordered for CPAP machine on 4 cm    Management obstructive sleep apnea also includes lifestyle modifications including weight loss, avoidance of alcohol, sedated, caffeine especially before bedtime, allowing adequate sleep time, and body position during sleep such as side versus back. 10% weight loss can result in a 50% improvement of the apnea-hypopnea index.  Untreated sleep apnea can lead to cardiovascular/metabolic disorder, neurocognitive deficit, daytime sleepiness, motor vehicle accidents, depression, mood disorders and reduced quality of life.  Patient understands the risk of untreated obstructive sleep apnea and benefit benefits of the treatment. Recommended at least 4 hours of use per night  for 70% of every month (approximately 21 out of 30 days) per CMS guidelines.         COPD with exacerbation:   · Currently has difficulty tolerating powder inhalers of Breo and Incruse due to increased coughing directly following use (which is intolerable due to incontinence).   · Ordered for Spiriva Respimat 2.5 mcg for 1-2 times use daily   Provided sample.   Reviewed inhaler technique  · Ordered for Dulera 200-5 mcg/act for 2 puffs twice daily use.   Recommended oral rinsing following use to avoid oral candidiasis.  · Order for prednisone 40 mg daily for 5 days  awaiting antibiotics due to no other signs of ongoing infection.  · Continue albuterol as needed.  Ordered refills  · Continue DuoNeb as needed.  Ordered refills  · Awaiting walk test today. Saturation noted at 98% on room air.          Allergic rhinitis:  · Continue Flonase.  Order refills  · Continue Singulair.  Ordered refills        Pulmonary nodule:   CT completed in March 2019 showed 2.3 mm nodule of the right lung.  · Planning to repeat CT scan in March 2020      Nicotine cigarette use without complication:   Smoking cessation counseling:  Ms. Hanks is a current cigarette user. Notable for 43 year use with approximately 1 pack/day use. Currently using 1 packs per day.     We discussed consequences of smoking namely cardiovascular/metabolic, lung cancer, mouth cancer, pulmonary disorder including COPD and reduced quality of life.  Patient is understanding of the risks involved in smoking.  Patient also understands the benefits of quitting, including slowed progression of COPD.     Patient expresses that she is willing to quit at this time.   She is concerned with chantix use due to side effects. She is unable to recall her previously difficulty with the patches, but believes a side effect was noted.     She is willing to try nicotine gum.   I have ordered nicotine gum 4 mg pieces of 1 pack was ordered with refills.   I recommend a goal cessation  date of 1 year. Educational material provided.     During this visit I spent 6 minutes counseling the patient regarding the smoking cessation.          Vaccinations: reported up to date on influenza and pneumonia vaccination.     Patient's Body mass index is 45.54 kg/m². BMI is above normal parameters. Recommendations include: nutrition counseling.      - Inhaler technique demonstration/discussion:  I have extensively discussed the steps.  In summary, the steps were discussed in the following order. Patient was advised to rinse the mouth after steroid inhalation to prevent fungal mucositis.  · Remove the cap from the inhaler and shake well.    · Breathe out all the way.    · Place the mouthpiece of the inhaler between your teeth and seal your lips tightly around it.    · As you start to blow in slowly, press down on the canister one time.   · Keep breathing in as slowly and deeply as you can.    · It should take about 5 seconds for you to completely breathe in.    · Hold your breath for 10 seconds(count to 10 slowly) to allow the medication to reach the airways of the lung.    · Repeat the above steps for each puff.    · Wait about 1 minute between the puffs.    · Replaced the cap on the inhaler when finished.      Return in about 6 months (around 5/6/2020), or as needed.

## 2019-11-12 NOTE — PROGRESS NOTES
Called patient but was unable to reach her.   She had contacted the office this morning to inform us that the current inhalers were not covered by insurance. She was previously on Breo and Incruse ellipta at the last visit, but stated these were not working as well and increasing cough, which worsened stress incontinence.     We ordered spiriva respimat 2.5 and dulera 200 at the previous visit. As I have been unable to contact her a second time, I am assuming both are not covered and am ordering to resume Incruse ellipta and trying Advair /21 until a prior authorization can be obtained for Spiriva respimat 2.5 and Dulera 200.     I have replaced the order from Spiriva respimat and Dulera 200.   Clothing

## 2019-12-10 ENCOUNTER — OFFICE VISIT (OUTPATIENT)
Dept: FAMILY MEDICINE CLINIC | Facility: CLINIC | Age: 59
End: 2019-12-10

## 2019-12-10 DIAGNOSIS — F41.8 DEPRESSION WITH ANXIETY: ICD-10-CM

## 2019-12-10 DIAGNOSIS — J30.9 CHRONIC ALLERGIC RHINITIS: ICD-10-CM

## 2019-12-10 DIAGNOSIS — E55.9 VITAMIN D DEFICIENCY: ICD-10-CM

## 2019-12-10 DIAGNOSIS — M15.9 GENERALIZED OSTEOARTHRITIS: ICD-10-CM

## 2019-12-10 DIAGNOSIS — N39.3 STRESS BLADDER INCONTINENCE, FEMALE: ICD-10-CM

## 2019-12-10 DIAGNOSIS — G47.33 OBSTRUCTIVE SLEEP APNEA: ICD-10-CM

## 2019-12-10 DIAGNOSIS — E03.8 HYPOTHYROIDISM DUE TO HASHIMOTO'S THYROIDITIS: ICD-10-CM

## 2019-12-10 DIAGNOSIS — E06.3 HYPOTHYROIDISM DUE TO HASHIMOTO'S THYROIDITIS: ICD-10-CM

## 2019-12-10 DIAGNOSIS — J44.9 MIXED TYPE COPD (CHRONIC OBSTRUCTIVE PULMONARY DISEASE) (HCC): ICD-10-CM

## 2019-12-10 DIAGNOSIS — M85.80 OSTEOPENIA, UNSPECIFIED LOCATION: ICD-10-CM

## 2019-12-10 DIAGNOSIS — I10 ESSENTIAL HYPERTENSION: ICD-10-CM

## 2019-12-10 DIAGNOSIS — K21.9 GASTROESOPHAGEAL REFLUX DISEASE WITHOUT ESOPHAGITIS: ICD-10-CM

## 2019-12-10 DIAGNOSIS — K59.09 CHRONIC CONSTIPATION: ICD-10-CM

## 2019-12-10 DIAGNOSIS — F17.200 SMOKER: ICD-10-CM

## 2019-12-10 DIAGNOSIS — Z00.00 HEALTHCARE MAINTENANCE: ICD-10-CM

## 2019-12-10 DIAGNOSIS — R73.03 PREDIABETES: ICD-10-CM

## 2019-12-10 DIAGNOSIS — E78.2 MIXED HYPERLIPIDEMIA: Primary | ICD-10-CM

## 2019-12-10 PROCEDURE — 99214 OFFICE O/P EST MOD 30 MIN: CPT | Performed by: GENERAL PRACTICE

## 2019-12-10 RX ORDER — LORAZEPAM 1 MG/1
0.5 TABLET ORAL 2 TIMES DAILY PRN
Qty: 90 TABLET | Refills: 2 | Status: SHIPPED | OUTPATIENT
Start: 2019-12-10 | End: 2020-03-13 | Stop reason: SDUPTHER

## 2019-12-10 NOTE — PROGRESS NOTES
Subjective   Lisa Hill is a 59 y.o. female.     History of Present Illness     COPD  The patient reports that her SOB and cough have remained stable. She states that these symptoms occur at any time during the day or night. She reports that her symptoms become worse with activity, exposure to cold air and environmental allergens. Her symptoms are reduced with rest and inhaled inhaled medication. She is using supplemental oxygen at night but remains unconvinced that it helps at all. The patient states she also has nasal congestion. She is following the treatment plan, including medications as directed. She continues to smoke about 1 pack per day. Low dose CT of the chest performed on 3/1/2019 was reported as showing a stable 2.3 mm right lung nodule, and moderate COPD.  She was advised to have her next study in 1 year.  She continues to be followed by pulmonology and underwent a reassessment on 11/6/2019 with a change to Spiriva and Breo.  She has yet to start pulmonary rehab    Dyslipidemia  Compliance with treatment has been fair. The patient exercises occasionally. She is currently being prescribed the following medication for her dyslipidemia - simvastatin. Patient denies side effects associated with her medications.      Hypothyroidism  Patient presents for evaluation of thyroid function. Symptoms consist of weight gain, constipation. The symptoms are moderate.  The problem has been unchanged.  Previous thyroid studies include TSH. The hypothyroidism is due to Hashimoto's disease.   Lab Results   Component Value Date    TSH 1.040 09/26/2019     Depression  Onset was a number of years ago. Current symptoms include: depression, nervousness, anhedonia, difficulty concentrating, fatigue and impaired memory. Patient denies recurrent thoughts of death or suicidal thoughts. Risk factors: history of seasonal affective disorder.  Current medication includes escitalopram 10 daily,  bupropion 300 daily, risperdone 2  nightly, and lorazepam 0.5 twice a day.  She has continued to do quite well since last here.  She denies any side effects    Low Back Pain  She continues to have intermittent low back pain.  There has been no change in the quality nor any new associated symptoms. There is no history of any weakness, numbness, tingling, or any change in her bowel/bladder control.  There is no history of any fever, chills, night sweats or weight loss.  When she sits down the pain resolves within minutes and she denies any problem at night.  She's had similar pain on and off for years.  She has been using a TENS unit for several hours daily and continues to feel that it helps    The following portions of the patient's history were reviewed and updated as appropriate: allergies, current medications, past medical history, past social history and problem list.    Review of Systems   Constitutional: Positive for fatigue. Negative for appetite change, chills, fever and unexpected weight change.   HENT: Negative for congestion, ear pain, postnasal drip, rhinorrhea, sneezing, sore throat and voice change.    Eyes: Negative for visual disturbance.   Respiratory: Positive for cough, shortness of breath and wheezing.    Cardiovascular: Positive for leg swelling. Negative for chest pain and palpitations.   Gastrointestinal: Positive for constipation. Negative for abdominal pain, anal bleeding, blood in stool, diarrhea, nausea and vomiting.   Genitourinary: Negative for difficulty urinating, dysuria, frequency, hematuria and urgency.        Incontinence with coughing, sneezing, or lifting   Musculoskeletal: Positive for arthralgias and back pain. Negative for joint swelling, myalgias and neck pain.   Skin: Negative for rash.   Neurological: Negative for weakness, numbness and headaches.   Psychiatric/Behavioral: Positive for decreased concentration, dysphoric mood and sleep disturbance (chronic-intermittent). Negative for suicidal ideas.      Objective   Physical Exam   Constitutional: She is oriented to person, place, and time. No distress.   Accompanied by her . Bright, in good spirits.  No apparent distress.  No pallor, jaundice, cyanosis or diaphoresis   HENT:   Head: Atraumatic.   Right Ear: Tympanic membrane, external ear and ear canal normal.   Left Ear: Tympanic membrane, external ear and ear canal normal.   Nose: Nose normal.   Mouth/Throat: Oropharynx is clear and moist. Mucous membranes are not pale and not cyanotic.   Eyes: Pupils are equal, round, and reactive to light. EOM are normal. No scleral icterus.   Neck: No JVD present. Carotid bruit is not present. No tracheal deviation present. No thyromegaly present.   Cardiovascular: Normal rate, regular rhythm, S1 normal, S2 normal and intact distal pulses. Exam reveals no gallop, no S3 and no S4.   No murmur heard.  Pulmonary/Chest: No accessory muscle usage or stridor. No respiratory distress. She has decreased breath sounds. She has wheezes (diffuse-mild).   Hyperinflation of the chest   Musculoskeletal: She exhibits no deformity.        Lumbar back: She exhibits tenderness (bilateral lumbar paraspinal muscle tenderness). She exhibits normal range of motion, no bony tenderness and no deformity.   Negative straight leg raise.     Vascular Status -  Her right foot exhibits abnormal foot edema (trace). Her left foot exhibits abnormal foot edema (trace).  Lymphadenopathy:        Head (right side): No submandibular adenopathy present.        Head (left side): No submandibular adenopathy present.     She has no cervical adenopathy.   Neurological: She is alert and oriented to person, place, and time. No cranial nerve deficit. She exhibits normal muscle tone. Coordination normal.   Skin: Skin is warm and dry. No rash noted. She is not diaphoretic. No cyanosis. No pallor. Nails show no clubbing.   Psychiatric: She has a normal mood and affect. Her speech is normal and behavior is normal.  Thought content normal. Cognition and memory are normal. She expresses no suicidal ideation.     Assessment/Plan   Problems Addressed this Visit        Cardiovascular and Mediastinum    Mixed hyperlipidemia   Encouraged to continue to work on her diet and exercise plan.  Continue current medication  Updated labs will be arranged at return.    Essential hypertension   Hypertension: at goal. Evidence of target organ damage: none.   As above  Continue current medication       Respiratory    Chronic allergic rhinitis    Obstructive sleep apnea    Mixed type COPD (chronic obstructive pulmonary disease) (CMS/[facility])   COPD is worsening.  Reminded of the importance of smoking cessation  Encouraged to remain as active as symptoms allow for  Continue current medication  Follow up with pulmonology   We will try to finalize an appointment for pulmonary rehab    Relevant Medications    Fluticasone Furoate-Vilanterol (BREO ELLIPTA) 200-25 MCG/INH inhaler    Other Relevant Orders    Ambulatory Referral to Pulmonary Rehab (Completed)       Digestive    Vitamin D deficiency  Continue supplementation with monitoring.    Gastroesophageal reflux disease without esophagitis    Chronic constipation       Endocrine    Hypothyroidism due to Hashimoto's thyroiditis  Clinically and bio-chemically euthyroid.  Continue current medication.       Musculoskeletal and Integument    Osteopenia    Generalized osteoarthritis       Genitourinary    Stress bladder incontinence, female       Other    Prediabetes  Will continue to monitor    Depression with anxiety  Stable.  Supportive therapy.   Continue current medication.    Relevant Medications    LORazepam (ATIVAN) 1 MG tablet    Healthcare maintenance  We will discuss an updated Pap smear at her return    Smoker

## 2019-12-11 ENCOUNTER — TELEPHONE (OUTPATIENT)
Dept: FAMILY MEDICINE CLINIC | Facility: CLINIC | Age: 59
End: 2019-12-11

## 2019-12-11 VITALS
DIASTOLIC BLOOD PRESSURE: 76 MMHG | WEIGHT: 258 LBS | RESPIRATION RATE: 14 BRPM | TEMPERATURE: 98.7 F | BODY MASS INDEX: 47.48 KG/M2 | HEART RATE: 97 BPM | SYSTOLIC BLOOD PRESSURE: 128 MMHG | HEIGHT: 62 IN | OXYGEN SATURATION: 95 %

## 2019-12-11 NOTE — TELEPHONE ENCOUNTER
----- Message from Scott Chavez MD sent at 12/11/2019 11:53 AM EST -----  OK - thanks    ----- Message -----  From: Yesenia Cruz MA  Sent: 12/11/2019  11:40 AM EST  To: Scott Chavez MD    It covered on her insurance. Elian said that pulmonology wrote her out a prescription last month and she picked it up.   ----- Message -----  From: Scott Chavez MD  Sent: 12/11/2019  11:18 AM EST  To: Yesenia Cruz MA    Need to try to pa spiriva  Dx - copd  Has tried a bunch of meds including incruse    ----- Message -----  From: Yesenia Cruz MA  Sent: 12/11/2019  11:09 AM EST  To: Scott Chavez MD    Patient called back with the inhalers he is on. She is using Spiriva, and breo.

## 2019-12-19 ENCOUNTER — TELEPHONE (OUTPATIENT)
Dept: FAMILY MEDICINE CLINIC | Facility: CLINIC | Age: 59
End: 2019-12-19

## 2019-12-19 NOTE — TELEPHONE ENCOUNTER
----- Message from Scott Chavez MD sent at 12/17/2019 12:59 PM EST -----  She should call pulmonology - I refilled the meds that they started her on - if they're not covered I would like to leave it up to them as to where to go next    ----- Message -----  From: Vernell Dotson MA  Sent: 12/17/2019  11:25 AM EST  To: Scott Chavez MD    Lisa called and said her Breo & Spiriva are not covered... Should I try to PA? (You only wrote Breo)     Covered Alternates for breo - fluticasone-salmeterol or Wixela    Covered Alternates for spiriva - Atrovent and Incruse Ellipta

## 2019-12-23 DIAGNOSIS — E03.9 ACQUIRED HYPOTHYROIDISM: ICD-10-CM

## 2019-12-23 DIAGNOSIS — E55.9 VITAMIN D DEFICIENCY: ICD-10-CM

## 2019-12-26 RX ORDER — LEVOTHYROXINE SODIUM 175 UG/1
TABLET ORAL
Qty: 30 TABLET | Refills: 5 | Status: SHIPPED | OUTPATIENT
Start: 2019-12-26 | End: 2020-06-15

## 2019-12-26 RX ORDER — MELATONIN
2000 DAILY
Qty: 60 TABLET | Refills: 5 | Status: SHIPPED | OUTPATIENT
Start: 2019-12-26 | End: 2020-06-15

## 2020-01-23 DIAGNOSIS — F41.8 DEPRESSION WITH ANXIETY: ICD-10-CM

## 2020-01-23 DIAGNOSIS — I10 ESSENTIAL HYPERTENSION: ICD-10-CM

## 2020-01-23 RX ORDER — LOSARTAN POTASSIUM 50 MG/1
50 TABLET ORAL DAILY
Qty: 30 TABLET | Refills: 5 | Status: SHIPPED | OUTPATIENT
Start: 2020-01-23 | End: 2020-07-07 | Stop reason: SDUPTHER

## 2020-01-23 RX ORDER — LANOLIN ALCOHOL/MO/W.PET/CERES
1000 CREAM (GRAM) TOPICAL DAILY
Qty: 30 TABLET | Refills: 5 | Status: SHIPPED | OUTPATIENT
Start: 2020-01-23 | End: 2020-07-07 | Stop reason: SDUPTHER

## 2020-01-23 RX ORDER — ESCITALOPRAM OXALATE 10 MG/1
10 TABLET ORAL DAILY
Qty: 30 TABLET | Refills: 5 | Status: SHIPPED | OUTPATIENT
Start: 2020-01-23 | End: 2020-07-07 | Stop reason: SDUPTHER

## 2020-02-10 DIAGNOSIS — R21 RASH: ICD-10-CM

## 2020-02-10 RX ORDER — NYSTATIN 100000 U/G
OINTMENT TOPICAL
Qty: 15 G | Refills: 2 | OUTPATIENT
Start: 2020-02-10

## 2020-02-12 RX ORDER — NYSTATIN 100000 U/G
OINTMENT TOPICAL 2 TIMES DAILY
Qty: 15 G | Refills: 0 | Status: SHIPPED | OUTPATIENT
Start: 2020-02-12 | End: 2020-12-29

## 2020-02-24 DIAGNOSIS — E78.2 MIXED HYPERLIPIDEMIA: ICD-10-CM

## 2020-02-24 DIAGNOSIS — M47.816 SPONDYLOSIS OF LUMBAR REGION WITHOUT MYELOPATHY OR RADICULOPATHY: ICD-10-CM

## 2020-02-24 DIAGNOSIS — K21.9 GASTROESOPHAGEAL REFLUX DISEASE WITHOUT ESOPHAGITIS: ICD-10-CM

## 2020-02-24 RX ORDER — BUPROPION HYDROCHLORIDE 300 MG/1
300 TABLET ORAL EVERY MORNING
Qty: 30 TABLET | Refills: 5 | Status: SHIPPED | OUTPATIENT
Start: 2020-02-24 | End: 2020-07-07 | Stop reason: SDUPTHER

## 2020-02-24 RX ORDER — MELOXICAM 15 MG/1
15 TABLET ORAL DAILY
Qty: 30 TABLET | Refills: 5 | Status: SHIPPED | OUTPATIENT
Start: 2020-02-24 | End: 2020-02-27 | Stop reason: SDUPTHER

## 2020-02-24 RX ORDER — PANTOPRAZOLE SODIUM 40 MG/1
40 TABLET, DELAYED RELEASE ORAL DAILY
Qty: 30 TABLET | Refills: 5 | Status: SHIPPED | OUTPATIENT
Start: 2020-02-24 | End: 2020-07-07 | Stop reason: SDUPTHER

## 2020-02-24 RX ORDER — SIMVASTATIN 20 MG
20 TABLET ORAL NIGHTLY
Qty: 30 TABLET | Refills: 5 | Status: SHIPPED | OUTPATIENT
Start: 2020-02-24 | End: 2020-07-07 | Stop reason: SDUPTHER

## 2020-02-27 DIAGNOSIS — M47.816 SPONDYLOSIS OF LUMBAR REGION WITHOUT MYELOPATHY OR RADICULOPATHY: ICD-10-CM

## 2020-02-27 RX ORDER — MELOXICAM 15 MG/1
15 TABLET ORAL DAILY
Qty: 30 TABLET | Refills: 5 | Status: SHIPPED | OUTPATIENT
Start: 2020-02-27 | End: 2020-07-07 | Stop reason: SDUPTHER

## 2020-03-04 ENCOUNTER — TELEPHONE (OUTPATIENT)
Dept: FAMILY MEDICINE CLINIC | Facility: CLINIC | Age: 60
End: 2020-03-04

## 2020-03-04 NOTE — TELEPHONE ENCOUNTER
Pt is aware of this information.     ----- Message from Scott Chavez MD sent at 3/4/2020 10:16 AM EST -----  Yes    ----- Message -----  From: Yesenia Cruz MA  Sent: 3/3/2020   3:18 PM EST  To: Scott Chavez MD    Patient wants to know if she can take vitamin c and vitamin e on top of what else she takes.

## 2020-03-13 ENCOUNTER — OFFICE VISIT (OUTPATIENT)
Dept: FAMILY MEDICINE CLINIC | Facility: CLINIC | Age: 60
End: 2020-03-13

## 2020-03-13 DIAGNOSIS — F17.200 SMOKER: ICD-10-CM

## 2020-03-13 DIAGNOSIS — Z00.00 HEALTHCARE MAINTENANCE: ICD-10-CM

## 2020-03-13 DIAGNOSIS — E03.8 HYPOTHYROIDISM DUE TO HASHIMOTO'S THYROIDITIS: ICD-10-CM

## 2020-03-13 DIAGNOSIS — K21.9 GASTROESOPHAGEAL REFLUX DISEASE WITHOUT ESOPHAGITIS: ICD-10-CM

## 2020-03-13 DIAGNOSIS — J43.1 PANLOBULAR EMPHYSEMA (HCC): ICD-10-CM

## 2020-03-13 DIAGNOSIS — K59.09 CHRONIC CONSTIPATION: ICD-10-CM

## 2020-03-13 DIAGNOSIS — J30.9 CHRONIC ALLERGIC RHINITIS: ICD-10-CM

## 2020-03-13 DIAGNOSIS — M15.9 GENERALIZED OSTEOARTHRITIS: ICD-10-CM

## 2020-03-13 DIAGNOSIS — R73.03 PREDIABETES: ICD-10-CM

## 2020-03-13 DIAGNOSIS — G47.33 OBSTRUCTIVE SLEEP APNEA: ICD-10-CM

## 2020-03-13 DIAGNOSIS — E55.9 VITAMIN D DEFICIENCY: ICD-10-CM

## 2020-03-13 DIAGNOSIS — I10 ESSENTIAL HYPERTENSION: ICD-10-CM

## 2020-03-13 DIAGNOSIS — N39.3 STRESS BLADDER INCONTINENCE, FEMALE: ICD-10-CM

## 2020-03-13 DIAGNOSIS — E78.2 MIXED HYPERLIPIDEMIA: Primary | ICD-10-CM

## 2020-03-13 DIAGNOSIS — F41.8 DEPRESSION WITH ANXIETY: ICD-10-CM

## 2020-03-13 DIAGNOSIS — M85.80 OSTEOPENIA, UNSPECIFIED LOCATION: ICD-10-CM

## 2020-03-13 DIAGNOSIS — M54.59 MECHANICAL LOW BACK PAIN: ICD-10-CM

## 2020-03-13 DIAGNOSIS — E06.3 HYPOTHYROIDISM DUE TO HASHIMOTO'S THYROIDITIS: ICD-10-CM

## 2020-03-13 PROCEDURE — 99214 OFFICE O/P EST MOD 30 MIN: CPT | Performed by: GENERAL PRACTICE

## 2020-03-13 RX ORDER — LORAZEPAM 1 MG/1
0.5 TABLET ORAL 2 TIMES DAILY PRN
Qty: 90 TABLET | Refills: 2 | Status: SHIPPED | OUTPATIENT
Start: 2020-03-13 | End: 2020-06-12 | Stop reason: SDUPTHER

## 2020-03-13 RX ORDER — ACETAMINOPHEN 500 MG
1000 TABLET ORAL 4 TIMES DAILY
Qty: 240 TABLET | Refills: 5 | Status: SHIPPED | OUTPATIENT
Start: 2020-03-13 | End: 2020-07-07 | Stop reason: SDUPTHER

## 2020-03-13 NOTE — PROGRESS NOTES
Subjective   Lisa Hill is a 59 y.o. female.     History of Present Illness     Low Back Pain  She complains of increased low back pain.  She denies any recent strain or trauma nor any change in her activities.  There has been no change in the quality of her discomfort nor any new associated symptoms. There is no history of any weakness, numbness, tingling, or any change in her bowel/bladder control.  There is no history of any fever, chills, night sweats or weight loss.  When she sits down the pain generally resolves within minutes and she denies any problem at night. She has been using a TENS unit for several hours daily and continues to feel that it helps.  She also feels that meloxicam helps but admits that she is using OTC ibuprofen with it on and off    COPD  The patient reports that her SOB and cough have remained stable. She states that these symptoms occur at any time during the day or night. She reports that her symptoms become worse with activity, exposure to cold air and environmental allergens. Her symptoms are reduced with rest and inhaled inhaled medication. She is using supplemental oxygen at night but remains unconvinced that it helps at all. The patient states she also has nasal congestion. She is following the treatment plan, including medications as directed. She continues to smoke about 1 pack per day. Low dose CT of the chest performed on 3/1/2019 was reported as showing a stable 2.3 mm right lung nodule, and moderate COPD.  She was advised to have her next study in 1 year.  She continues to be followed by pulmonology and will undergo reassessment on 5/19/2020.  She has yet to start pulmonary rehab    Dyslipidemia  Compliance with treatment has been fair. The patient exercises occasionally. She is currently being prescribed the following medication for her dyslipidemia - simvastatin. Patient denies side effects associated with her medications.  She has had no recent  labs    Hypothyroidism  Patient presents for evaluation of thyroid function. Symptoms consist of weight gain, constipation. The symptoms are moderate.  The problem has been unchanged.  Previous thyroid studies include TSH. The hypothyroidism is due to Hashimoto's disease.    Depression  Onset was a number of years ago. Current symptoms include: depression, nervousness, anhedonia, difficulty concentrating, fatigue and impaired memory. Patient denies recurrent thoughts of death or suicidal thoughts. Risk factors: history of seasonal affective disorder.  Current medication includes escitalopram 10 daily,  bupropion 300 daily, risperdone 2 nightly, and lorazepam 0.5 twice a day.  She has continued to do quite well since last here.  She denies any side effects    The following portions of the patient's history were reviewed and updated as appropriate: allergies, current medications, past medical history, past social history and problem list.    Review of Systems   Constitutional: Positive for fatigue. Negative for appetite change, chills, fever and unexpected weight change.   HENT: Negative for congestion, ear pain, postnasal drip, rhinorrhea, sneezing, sore throat and voice change.    Eyes: Negative for visual disturbance.   Respiratory: Positive for cough, shortness of breath and wheezing.    Cardiovascular: Positive for leg swelling. Negative for chest pain and palpitations.   Gastrointestinal: Positive for constipation. Negative for abdominal pain, anal bleeding, blood in stool, diarrhea, nausea and vomiting.   Genitourinary: Negative for difficulty urinating, dysuria, frequency, hematuria and urgency.        Incontinence with coughing, sneezing, or lifting   Musculoskeletal: Positive for arthralgias and back pain. Negative for joint swelling, myalgias and neck pain.   Skin: Negative for rash.   Neurological: Negative for weakness, numbness and headaches.   Psychiatric/Behavioral: Positive for decreased  concentration, dysphoric mood and sleep disturbance (chronic-intermittent). Negative for suicidal ideas.     Objective   Physical Exam   Constitutional: She is oriented to person, place, and time. No distress.   Accompanied by her . Bright, in good spirits.  No apparent distress.  No pallor, jaundice, cyanosis or diaphoresis   HENT:   Head: Atraumatic.   Right Ear: Tympanic membrane, external ear and ear canal normal.   Left Ear: Tympanic membrane, external ear and ear canal normal.   Nose: Nose normal.   Mouth/Throat: Oropharynx is clear and moist. Mucous membranes are not pale and not cyanotic.   Eyes: Pupils are equal, round, and reactive to light. EOM are normal. No scleral icterus.   Neck: No JVD present. Carotid bruit is not present. No tracheal deviation present. No thyromegaly present.   Cardiovascular: Normal rate, regular rhythm, S1 normal, S2 normal and intact distal pulses. Exam reveals no gallop, no S3 and no S4.   No murmur heard.  Pulmonary/Chest: No accessory muscle usage or stridor. No respiratory distress. She has decreased breath sounds. She has wheezes (diffuse-mild).   Hyperinflation of the chest   Musculoskeletal: She exhibits no deformity.        Right hip: She exhibits normal range of motion and no tenderness.        Left hip: She exhibits normal range of motion and no tenderness.        Lumbar back: She exhibits tenderness (bilateral lumbar paraspinal muscle tenderness). She exhibits normal range of motion, no bony tenderness and no deformity.   Negative straight leg raise.     Vascular Status -  Her right foot exhibits abnormal foot edema (trace). Her left foot exhibits abnormal foot edema (trace).  Lymphadenopathy:        Head (right side): No submandibular adenopathy present.        Head (left side): No submandibular adenopathy present.     She has no cervical adenopathy.   Neurological: She is alert and oriented to person, place, and time. No cranial nerve deficit. She exhibits  normal muscle tone. Coordination normal.   Skin: Skin is warm and dry. No rash noted. She is not diaphoretic. No cyanosis. No pallor. Nails show no clubbing.   Psychiatric: She has a normal mood and affect. Her speech is normal and behavior is normal. Thought content normal. Cognition and memory are normal. She expresses no suicidal ideation.     Assessment/Plan   Problems Addressed this Visit        Cardiovascular and Mediastinum    Mixed hyperlipidemia   Encouraged to continue to work on her diet and exercise plan.  Continue current medication  Scheduled for updated labs     Relevant Orders    Lipid Panel    Essential hypertension   Hypertension: at goal. Evidence of target organ damage: none.   As above  Continue current medication    Relevant Orders    CBC & Differential    Comprehensive Metabolic Panel       Respiratory    Panlobular emphysema (CMS/HCC)   COPD is worsening.  Reminded of the importance of smoking cessation  Encouraged to remain as active as symptoms allow for  Continue current medication  Follow-up with pulmonology  Will discuss another referral to pulmonary rehab at her return later this spring    Chronic allergic rhinitis    Obstructive sleep apnea       Digestive    Vitamin D deficiency  Continue supplementation with monitoring.    Relevant Orders    Vitamin D 25 Hydroxy    Gastroesophageal reflux disease without esophagitis    Chronic constipation       Endocrine    Hypothyroidism due to Hashimoto's thyroiditis    Relevant Orders    TSH       Nervous and Auditory    Mechanical low back pain  Reminded regarding symptomatic treatment.   Instructed to avoid any OTC NSAIDs while on meloxicam  Reviewed further options going forward and agreed on the addition of acetaminophen  Encouraged to report if any worse or if any new symptoms or concerns.  Relevant Medications   acetaminophen (TYLENOL) 500 MG tablet          Musculoskeletal and Integument    Osteopenia    Generalized osteoarthritis     Genitourinary   Stress bladder incontinence, female      Other   Prediabetes  As above.  Will continue to monitor   Relevant Orders   Hemoglobin A1c   Depression with anxiety  Stable.  Supportive therapy.   Continue current medication.   Relevant Medications   LORazepam (ATIVAN) 1 MG tablet   Healthcare maintenance  Patient would like to defer her low-dose CT of the chest to the time of her mammogram in August  Will arrange an updated Pap smear with NATALIE Comer   Smoker  Reminded of the importance of smoking cessation

## 2020-03-14 VITALS
DIASTOLIC BLOOD PRESSURE: 68 MMHG | HEIGHT: 62 IN | TEMPERATURE: 98.6 F | HEART RATE: 97 BPM | WEIGHT: 260 LBS | OXYGEN SATURATION: 93 % | SYSTOLIC BLOOD PRESSURE: 122 MMHG | BODY MASS INDEX: 47.84 KG/M2 | RESPIRATION RATE: 14 BRPM

## 2020-03-18 DIAGNOSIS — F41.8 DEPRESSION WITH ANXIETY: ICD-10-CM

## 2020-03-18 RX ORDER — LORAZEPAM 1 MG/1
TABLET ORAL
Qty: 60 TABLET | Refills: 2 | OUTPATIENT
Start: 2020-03-18

## 2020-03-18 RX ORDER — RISPERIDONE 2 MG/1
TABLET ORAL
Qty: 30 TABLET | Refills: 5 | Status: SHIPPED | OUTPATIENT
Start: 2020-03-18 | End: 2020-07-07 | Stop reason: SDUPTHER

## 2020-04-08 RX ORDER — PENICILLIN V POTASSIUM 250 MG/1
250 TABLET ORAL 4 TIMES DAILY
Qty: 40 TABLET | Refills: 0 | Status: SHIPPED | OUTPATIENT
Start: 2020-04-08 | End: 2020-06-13

## 2020-05-19 ENCOUNTER — OFFICE VISIT (OUTPATIENT)
Dept: PULMONOLOGY | Facility: CLINIC | Age: 60
End: 2020-05-19

## 2020-05-19 VITALS
HEIGHT: 62 IN | DIASTOLIC BLOOD PRESSURE: 76 MMHG | TEMPERATURE: 98.4 F | WEIGHT: 261 LBS | HEART RATE: 93 BPM | OXYGEN SATURATION: 97 % | SYSTOLIC BLOOD PRESSURE: 148 MMHG | BODY MASS INDEX: 48.03 KG/M2

## 2020-05-19 DIAGNOSIS — F17.200 CURRENT SMOKER: ICD-10-CM

## 2020-05-19 DIAGNOSIS — J43.2 CENTRILOBULAR EMPHYSEMA (HCC): ICD-10-CM

## 2020-05-19 DIAGNOSIS — R91.1 PULMONARY NODULE: ICD-10-CM

## 2020-05-19 DIAGNOSIS — G47.33 OSA (OBSTRUCTIVE SLEEP APNEA): ICD-10-CM

## 2020-05-19 DIAGNOSIS — Z87.891 PERSONAL HISTORY OF NICOTINE DEPENDENCE: Primary | ICD-10-CM

## 2020-05-19 PROCEDURE — 99213 OFFICE O/P EST LOW 20 MIN: CPT | Performed by: INTERNAL MEDICINE

## 2020-05-19 NOTE — PROGRESS NOTES
Subjective    Lisa Hill presents for the following Emphysema  .    History of Present Illness     Ms. Hill is a very pleasant 60-year-old female with a past medical history of COPD, obstructive sleep apnea, current smoking, pulmonary nodule with COPD/HERBERT overlap syndrome.  She is currently on albuterol inhaler, duo nebs, Spiriva inhaler.  Her insurance does not cover for Labe/inhaled steroid inhaler.  She is intermittently adherent to her sleep apnea machine.  She reports that her shortness of breath is slightly worse as compared to her shortness of breath 6 months ago.  She reports her shortness of breath symptom does come and go.  She denies any chest pain wheezing coughing, fever or chills.  Shortness of breath increases with activity and decreased on rest.  She denies any night sweats or weight loss.  She denies any morning headaches or excessive daytime sleepiness but complains of fatigue.         Review of system  ROS  General: Negative for fatigue or fever  Psychological: Negative for anxiety or depression  Ophthalmic: Negative for blurry vision or double vision  ENT: Negative for epistaxis or hearing changes  Allergy and immunology: Negative for hives or nasal congestion  Hematological and lymphatic: Negative for jaundice or night sweats  Endocrine: Negative for lethargy, temperature intolerance  Respiratory: Intermittent worsening of shortness of breath.  Negative for cough.  Negative for wheezing.  Cardiovascular: Negative for chest pain.  Positive for dyspnea on exertion  Gastrointestinal: No abdominal pain, no change in bowel habits  Musculoskeletal: Negative for joint swelling and joint pain  Neurological: No TIA or stroke symptoms  Dermatological: Negative for acne or eczema      Past Medical History:  Past Medical History:   Diagnosis Date   • Anxiety    • Arthritis    • Cigarette nicotine dependence without complication 9/8/2016   • COPD (chronic obstructive pulmonary disease) (CMS/[facility])    •  Depression    • Disease of thyroid gland     GRAVES DISEASE   • Elevated cholesterol    • GERD (gastroesophageal reflux disease)    • History of transfusion    • Hyperlipidemia    • Hypertension        Family History:  Family History   Problem Relation Age of Onset   • Kidney disease Mother    • Diabetes Mother    • Hypertension Father    • Breast cancer Maternal Aunt        Social History:  Social History     Tobacco Use   • Smoking status: Current Every Day Smoker     Packs/day: 1.50     Years: 43.00     Pack years: 64.50     Types: Cigarettes   • Smokeless tobacco: Never Used   • Tobacco comment: cessation has been discussed   Substance Use Topics   • Alcohol use: No       Current Medications:  Current Outpatient Medications   Medication Sig Dispense Refill   • acetaminophen (TYLENOL) 500 MG tablet Take 2 tablets by mouth 4 (Four) Times a Day. 240 tablet 5   • albuterol sulfate HFA (VENTOLIN HFA) 108 (90 Base) MCG/ACT inhaler Inhale 2 puffs Every 4 (Four) Hours As Needed for Wheezing. 18 g 5   • buPROPion XL (WELLBUTRIN XL) 300 MG 24 hr tablet Take 1 tablet by mouth Every Morning. 30 tablet 5   • busPIRone (BUSPAR) 15 MG tablet Take 1 tablet by mouth 3 (Three) Times a Day. 90 tablet 1   • cholecalciferol (VITAMIN D3) 25 MCG (1000 UT) tablet TAKE 2 TABLETS BY MOUTH DAILY. 60 tablet 5   • conjugated estrogens (PREMARIN) 0.625 MG/GM vaginal cream Insert  into the vagina 2 (Two) Times a Week. 30 g 5   • escitalopram (LEXAPRO) 10 MG tablet TAKE 1 TABLET BY MOUTH DAILY. 30 tablet 5   • fluticasone (FLONASE) 50 MCG/ACT nasal spray INSTILL 2 SPRAYS IN EACH NOSTRIL DAILY 16 g 5   • ipratropium-albuterol (DUO-NEB) 0.5-2.5 mg/3 ml nebulizer Take 3 mL by nebulization 4 (Four) Times a Day. 120 mL 11   • ketoconazole (NIZORAL) 2 % shampoo Apply  topically 2 (Two) Times a Week. 120 mL 5   • levothyroxine (SYNTHROID, LEVOTHROID) 175 MCG tablet TAKE 1 TABLET BY MOUTH DAILY 30 tablet 5   • lidocaine (XYLOCAINE) 5 % ointment APPLY  "TO AFFECTED AREA EVERY 2 HOURS AS NEEDED FOR MILD PAIN 50 g 5   • LORazepam (ATIVAN) 1 MG tablet Take 0.5 tablets by mouth 2 (Two) Times a Day As Needed for Anxiety. 90 tablet 2   • losartan (COZAAR) 50 MG tablet TAKE 1 TABLET BY MOUTH DAILY. 30 tablet 5   • meloxicam (MOBIC) 15 MG tablet Take 1 tablet by mouth Daily. 30 tablet 5   • montelukast (SINGULAIR) 10 MG tablet Take 1 tablet by mouth Every Night. 30 tablet 11   • nicotine polacrilex (NICORETTE) 4 MG gum Chew 1 each As Needed for Smoking Cessation. 1 each 8   • nystatin (MYCOSTATIN) 376490 UNIT/GM ointment Apply  topically to the appropriate area as directed 2 (Two) Times a Day. 15 g 0   • pantoprazole (PROTONIX) 40 MG EC tablet Take 1 tablet by mouth Daily. 30 tablet 5   • PROCTOZONE-HC 2.5 % rectal cream USE RECTALLY 2-4 TIMES DAILY FOR 2 WEEKS 30 g 0   • risperiDONE (risperDAL) 2 MG tablet TAKE ONE TABLET BY MOUTH EVERY NIGHT 30 tablet 5   • simvastatin (ZOCOR) 20 MG tablet Take 1 tablet by mouth Every Night. 30 tablet 5   • topiramate (TOPAMAX) 100 MG tablet Take 1 tablet by mouth 2 (Two) Times a Day. 60 tablet 5   • triamcinolone (KENALOG) 0.1 % cream Apply  topically 2 (Two) Times a Day. 80 g 1   • vitamin B-12 (CYANOCOBALAMIN) 1000 MCG tablet TAKE 1 TABLET BY MOUTH DAILY. 30 tablet 5   • penicillin v potassium (VEETID) 250 MG tablet Take 1 tablet by mouth 4 (Four) Times a Day. 40 tablet 0   • tiotropium bromide-olodaterol (Stiolto Respimat) 2.5-2.5 MCG/ACT aerosol solution inhaler Inhale 2 puffs Daily. 1 inhaler 11   • Zoster Vac Recomb Adjuvanted (SHINGRIX) 50 MCG reconstituted suspension Inject 50 mcg into the appropriate muscle as directed by prescriber See Admin Instructions. 1 each 0     No current facility-administered medications for this visit.        Allergies:  Allergies   Allergen Reactions   • Sulfa Antibiotics        Vitals:  /76   Pulse 93   Temp 98.4 °F (36.9 °C) (Temporal)   Ht 157.5 cm (62\")   Wt 118 kg (261 lb)   LMP  (LMP " Unknown)   SpO2 97%   BMI 47.74 kg/m²     Results for orders placed or performed in visit on 09/26/19   H. Pylori IgM, Blood   Result Value Ref Range    H. Pylori, IgM <9.0 0.0 - 8.9 units   Amylase   Result Value Ref Range    Amylase 28 28 - 100 U/L   Lipase   Result Value Ref Range    Lipase 28 13 - 60 U/L   TSH   Result Value Ref Range    TSH 1.040 0.270 - 4.200 uIU/mL   T4, Free   Result Value Ref Range    Free T4 1.69 0.93 - 1.70 ng/dL   Comprehensive Metabolic Panel   Result Value Ref Range    Glucose 96 65 - 99 mg/dL    BUN 16 8 - 23 mg/dL    Creatinine 1.07 (H) 0.57 - 1.00 mg/dL    Sodium 134 (L) 136 - 145 mmol/L    Potassium 4.9 3.5 - 5.2 mmol/L    Chloride 95 (L) 98 - 107 mmol/L    CO2 27.0 22.0 - 29.0 mmol/L    Calcium 9.3 8.6 - 10.5 mg/dL    Total Protein 7.7 6.0 - 8.5 g/dL    Albumin 4.30 3.50 - 5.20 g/dL    ALT (SGPT) 20 1 - 33 U/L    AST (SGOT) 12 1 - 32 U/L    Alkaline Phosphatase 70 39 - 117 U/L    Total Bilirubin 0.3 0.2 - 1.2 mg/dL    eGFR Non African Amer 52 (L) >60 mL/min/1.73    Globulin 3.4 gm/dL    A/G Ratio 1.3 g/dL    BUN/Creatinine Ratio 15.0 7.0 - 25.0    Anion Gap 12.0 5.0 - 15.0 mmol/L   CBC Auto Differential   Result Value Ref Range    WBC 10.07 3.40 - 10.80 10*3/mm3    RBC 4.43 3.77 - 5.28 10*6/mm3    Hemoglobin 14.7 12.0 - 15.9 g/dL    Hematocrit 42.6 34.0 - 46.6 %    MCV 96.2 79.0 - 97.0 fL    MCH 33.2 (H) 26.6 - 33.0 pg    MCHC 34.5 31.5 - 35.7 g/dL    RDW 12.8 12.3 - 15.4 %    RDW-SD 45.1 37.0 - 54.0 fl    MPV 9.8 6.0 - 12.0 fL    Platelets 382 140 - 450 10*3/mm3    Neutrophil % 60.6 42.7 - 76.0 %    Lymphocyte % 28.9 19.6 - 45.3 %    Monocyte % 7.6 5.0 - 12.0 %    Eosinophil % 1.5 0.3 - 6.2 %    Basophil % 0.8 0.0 - 1.5 %    Immature Grans % 0.6 (H) 0.0 - 0.5 %    Neutrophils, Absolute 6.10 1.70 - 7.00 10*3/mm3    Lymphocytes, Absolute 2.91 0.70 - 3.10 10*3/mm3    Monocytes, Absolute 0.77 0.10 - 0.90 10*3/mm3    Eosinophils, Absolute 0.15 0.00 - 0.40 10*3/mm3    Basophils,  Absolute 0.08 0.00 - 0.20 10*3/mm3    Immature Grans, Absolute 0.06 (H) 0.00 - 0.05 10*3/mm3    nRBC 0.0 0.0 - 0.2 /100 WBC       Objective   Physical Exam:  Physical Exam  General Appearance:  In no acute distress and comfortable.    HEENT: Normocephalic, atraumatic, normal right and the left external ear.  Normal nose.  Lungs:  Normal effort and normal respiratory rate.  Negative for rales.  Negative for wheezes.  Negative for rhonchi.    Heart: Normal rate.  Regular rhythm.  S1 normal and S2 normal.    Abdomen: Abdomen is soft.  Bowel sounds are normal.   There is no abdominal tenderness.     Extremities: Normal range of motion.  Negative for dependent edema    Pulses: Distal pulses are intact.  There are no decreased pulses.    Neurological: Patient is alert and oriented to person, place and time.  Normal strength.    Pupils:  Pupils are equal, round, and reactive to light.    Skin:  Warm and dry.        I have reviewed the past medical history, past surgical history, family history and social history of the patient.        Labs:  Lab Results   Component Value Date    PHART 7.431 01/24/2018    SCW1QOW 36.1 01/24/2018    PO2ART 86.1 01/24/2018    LTA7ULN 23.5 01/24/2018    BASEEXCESS -0.4 01/24/2018    X5BRPSIA 97.0 01/24/2018     Lab Results   Component Value Date    GLUCOSE 96 09/26/2019    CALCIUM 9.3 09/26/2019     (L) 09/26/2019    K 4.9 09/26/2019    CO2 27.0 09/26/2019    CL 95 (L) 09/26/2019    BUN 16 09/26/2019    CREATININE 1.07 (H) 09/26/2019    EGFRIFAFRI  09/02/2016      Comment:      <15 Indicative of kidney failure.    EGFRIFNONA 52 (L) 09/26/2019    BCR 15.0 09/26/2019    ANIONGAP 12.0 09/26/2019     Lab Results   Component Value Date    WBC 10.07 09/26/2019    HGB 14.7 09/26/2019    HCT 42.6 09/26/2019    MCV 96.2 09/26/2019     09/26/2019         Imaging  No Images in the past 120 days found..      Assessment/Plan    Diagnosis Plan   1. Personal history of nicotine dependence  CT  Chest Low Dose Wo     2. Centrilobular emphysema (CMS/HCC)  tiotropium bromide-olodaterol (Stiolto Respimat) 2.5-2.5 MCG/ACT aerosol solution inhaler  Continue with duo nebs and rescue inhaler.  Samples were given to the patient.   3. Pulmonary nodule   low-dose CT scan of the chest to reevaluate the pulmonary nodule.   4. Current smoker   smoking cessation counseling performed   5. HERBERT (obstructive sleep apnea)   sleep apnea counseling was performed.  Patient was educated on positive airway pressure therapy.        Sleep apnea counseling  Patient was educated on positive airway pressure treatment.  As per CMS guidelines, more than 4 hours on 70% of observed nights is considered adherence. Patient was strongly encouraged to use CPAP as much as possible during sleep as more CPAP use is equal to more benefit. The patient was extensively educated on the consequences of untreated obstructive sleep apnea namely cardiovascular/metabolic disorder, neurocognitive deficit, daytime sleepiness, motor vehicle accidents, depression, mood disorders and reduced quality of life.  At the end of conversation, the patient voices understanding of the disease process and treatment modality.  Patient also understands the risk of untreated obstructive sleep apnea and benefit benefits of the treatment.      Follow up in 3 months and as needed.

## 2020-06-04 ENCOUNTER — HOSPITAL ENCOUNTER (OUTPATIENT)
Dept: ULTRASOUND IMAGING | Facility: HOSPITAL | Age: 60
Discharge: HOME | End: 2020-06-04

## 2020-06-04 ENCOUNTER — HOSPITAL ENCOUNTER (OUTPATIENT)
Dept: CT IMAGING | Facility: HOSPITAL | Age: 60
Discharge: HOME | End: 2020-06-04
Admitting: INTERNAL MEDICINE

## 2020-06-04 DIAGNOSIS — Z87.891 PERSONAL HISTORY OF NICOTINE DEPENDENCE: ICD-10-CM

## 2020-06-04 DIAGNOSIS — R10.13 EPIGASTRIC PAIN: ICD-10-CM

## 2020-06-04 PROCEDURE — 76705 ECHO EXAM OF ABDOMEN: CPT | Performed by: RADIOLOGY

## 2020-06-04 PROCEDURE — 76700 US EXAM ABDOM COMPLETE: CPT

## 2020-06-04 PROCEDURE — G0297 LDCT FOR LUNG CA SCREEN: HCPCS

## 2020-06-04 PROCEDURE — 71250 CT THORAX DX C-: CPT | Performed by: RADIOLOGY

## 2020-06-05 ENCOUNTER — TELEPHONE (OUTPATIENT)
Dept: FAMILY MEDICINE CLINIC | Facility: CLINIC | Age: 60
End: 2020-06-05

## 2020-06-05 ENCOUNTER — TELEPHONE (OUTPATIENT)
Dept: PULMONOLOGY | Facility: CLINIC | Age: 60
End: 2020-06-05

## 2020-06-05 PROBLEM — R91.1 PULMONARY NODULE, RIGHT: Status: RESOLVED | Noted: 2019-11-06 | Resolved: 2020-06-05

## 2020-06-05 NOTE — TELEPHONE ENCOUNTER
----- Message from ANGEL Chaves sent at 6/5/2020  3:11 PM EDT -----  Inform the patient that ultrasound of the abdomen revealed fatty liver disease.  I recommend a low-cholesterol diet and weight loss.      Spoke with the pt and told results

## 2020-06-08 ENCOUNTER — PROCEDURE VISIT (OUTPATIENT)
Dept: FAMILY MEDICINE CLINIC | Facility: CLINIC | Age: 60
End: 2020-06-08

## 2020-06-08 VITALS
TEMPERATURE: 97.1 F | HEIGHT: 62 IN | OXYGEN SATURATION: 92 % | SYSTOLIC BLOOD PRESSURE: 138 MMHG | WEIGHT: 259 LBS | HEART RATE: 105 BPM | BODY MASS INDEX: 47.66 KG/M2 | DIASTOLIC BLOOD PRESSURE: 76 MMHG

## 2020-06-08 DIAGNOSIS — E06.3 HYPOTHYROIDISM DUE TO HASHIMOTO'S THYROIDITIS: ICD-10-CM

## 2020-06-08 DIAGNOSIS — R73.03 PREDIABETES: ICD-10-CM

## 2020-06-08 DIAGNOSIS — E03.8 HYPOTHYROIDISM DUE TO HASHIMOTO'S THYROIDITIS: ICD-10-CM

## 2020-06-08 DIAGNOSIS — E55.9 VITAMIN D DEFICIENCY: ICD-10-CM

## 2020-06-08 DIAGNOSIS — I10 ESSENTIAL HYPERTENSION: ICD-10-CM

## 2020-06-08 DIAGNOSIS — Z01.419 ENCOUNTER FOR GYNECOLOGICAL EXAMINATION WITH PAPANICOLAOU SMEAR OF CERVIX: ICD-10-CM

## 2020-06-08 DIAGNOSIS — E78.2 MIXED HYPERLIPIDEMIA: ICD-10-CM

## 2020-06-08 LAB
25(OH)D3 SERPL-MCNC: 46.9 NG/ML (ref 30–100)
ALBUMIN SERPL-MCNC: 4 G/DL (ref 3.5–5.2)
ALBUMIN/GLOB SERPL: 1.1 G/DL
ALP SERPL-CCNC: 49 U/L (ref 39–117)
ALT SERPL W P-5'-P-CCNC: 25 U/L (ref 1–33)
ANION GAP SERPL CALCULATED.3IONS-SCNC: 13.8 MMOL/L (ref 5–15)
AST SERPL-CCNC: 20 U/L (ref 1–32)
BASOPHILS # BLD AUTO: 0.09 10*3/MM3 (ref 0–0.2)
BASOPHILS NFR BLD AUTO: 0.9 % (ref 0–1.5)
BILIRUB SERPL-MCNC: 0.3 MG/DL (ref 0.2–1.2)
BUN BLD-MCNC: 13 MG/DL (ref 8–23)
BUN/CREAT SERPL: 16.3 (ref 7–25)
CALCIUM SPEC-SCNC: 9.8 MG/DL (ref 8.6–10.5)
CHLORIDE SERPL-SCNC: 96 MMOL/L (ref 98–107)
CHOLEST SERPL-MCNC: 139 MG/DL (ref 0–200)
CO2 SERPL-SCNC: 25.2 MMOL/L (ref 22–29)
CREAT BLD-MCNC: 0.8 MG/DL (ref 0.57–1)
DEPRECATED RDW RBC AUTO: 46.4 FL (ref 37–54)
EOSINOPHIL # BLD AUTO: 0.21 10*3/MM3 (ref 0–0.4)
EOSINOPHIL NFR BLD AUTO: 2.1 % (ref 0.3–6.2)
ERYTHROCYTE [DISTWIDTH] IN BLOOD BY AUTOMATED COUNT: 12.8 % (ref 12.3–15.4)
GFR SERPL CREATININE-BSD FRML MDRD: 73 ML/MIN/1.73
GLOBULIN UR ELPH-MCNC: 3.5 GM/DL
GLUCOSE BLD-MCNC: 127 MG/DL (ref 65–99)
HBA1C MFR BLD: 6.3 % (ref 4.8–5.6)
HCT VFR BLD AUTO: 43 % (ref 34–46.6)
HDLC SERPL-MCNC: 36 MG/DL (ref 40–60)
HGB BLD-MCNC: 15.1 G/DL (ref 12–15.9)
IMM GRANULOCYTES # BLD AUTO: 0.05 10*3/MM3 (ref 0–0.05)
IMM GRANULOCYTES NFR BLD AUTO: 0.5 % (ref 0–0.5)
LDLC SERPL CALC-MCNC: 58 MG/DL (ref 0–100)
LDLC/HDLC SERPL: 1.62 {RATIO}
LYMPHOCYTES # BLD AUTO: 3 10*3/MM3 (ref 0.7–3.1)
LYMPHOCYTES NFR BLD AUTO: 29.5 % (ref 19.6–45.3)
MCH RBC QN AUTO: 34.2 PG (ref 26.6–33)
MCHC RBC AUTO-ENTMCNC: 35.1 G/DL (ref 31.5–35.7)
MCV RBC AUTO: 97.5 FL (ref 79–97)
MONOCYTES # BLD AUTO: 0.58 10*3/MM3 (ref 0.1–0.9)
MONOCYTES NFR BLD AUTO: 5.7 % (ref 5–12)
NEUTROPHILS # BLD AUTO: 6.24 10*3/MM3 (ref 1.7–7)
NEUTROPHILS NFR BLD AUTO: 61.3 % (ref 42.7–76)
NRBC BLD AUTO-RTO: 0 /100 WBC (ref 0–0.2)
PLATELET # BLD AUTO: 380 10*3/MM3 (ref 140–450)
PMV BLD AUTO: 10.3 FL (ref 6–12)
POTASSIUM BLD-SCNC: 4.7 MMOL/L (ref 3.5–5.2)
PROT SERPL-MCNC: 7.5 G/DL (ref 6–8.5)
RBC # BLD AUTO: 4.41 10*6/MM3 (ref 3.77–5.28)
SODIUM BLD-SCNC: 135 MMOL/L (ref 136–145)
TRIGL SERPL-MCNC: 224 MG/DL (ref 0–150)
TSH SERPL DL<=0.05 MIU/L-ACNC: 4.4 UIU/ML (ref 0.27–4.2)
VLDLC SERPL-MCNC: 44.8 MG/DL (ref 5–40)
WBC NRBC COR # BLD: 10.17 10*3/MM3 (ref 3.4–10.8)

## 2020-06-08 PROCEDURE — 83036 HEMOGLOBIN GLYCOSYLATED A1C: CPT | Performed by: GENERAL PRACTICE

## 2020-06-08 PROCEDURE — 80050 GENERAL HEALTH PANEL: CPT | Performed by: GENERAL PRACTICE

## 2020-06-08 PROCEDURE — 82306 VITAMIN D 25 HYDROXY: CPT | Performed by: GENERAL PRACTICE

## 2020-06-08 PROCEDURE — 99396 PREV VISIT EST AGE 40-64: CPT | Performed by: NURSE PRACTITIONER

## 2020-06-08 PROCEDURE — 80061 LIPID PANEL: CPT | Performed by: GENERAL PRACTICE

## 2020-06-08 NOTE — PATIENT INSTRUCTIONS
Cholesterol Content in Foods  Cholesterol is a waxy, fat-like substance that helps to carry fat in the blood. The body needs cholesterol in small amounts, but too much cholesterol can cause damage to the arteries and heart.  Most people should eat less than 200 milligrams (mg) of cholesterol a day.  Foods with cholesterol    Cholesterol is found in animal-based foods, such as meat, seafood, and dairy. Generally, low-fat dairy and lean meats have less cholesterol than full-fat dairy and fatty meats. The milligrams of cholesterol per serving (mg per serving) of common cholesterol-containing foods are listed below.  Meat and other proteins  · Egg -- one large whole egg has 186 mg.  · Veal shank -- 4 oz has 141 mg.  · Lean ground turkey (93% lean) -- 4 oz has 118 mg.  · Fat-trimmed lamb loin -- 4 oz has 106 mg.  · Lean ground beef (90% lean) -- 4 oz has 100 mg.  · Lobster -- 3.5 oz has 90 mg.  · Pork loin chops -- 4 oz has 86 mg.  · Canned salmon -- 3.5 oz has 83 mg.  · Fat-trimmed beef top loin -- 4 oz has 78 mg.  · Frankfurter -- 1 eli (3.5 oz) has 77 mg.  · Crab -- 3.5 oz has 71 mg.  · Roasted chicken without skin, white meat -- 4 oz has 66 mg.  · Light bologna -- 2 oz has 45 mg.  · Deli-cut turkey -- 2 oz has 31 mg.  · Canned tuna -- 3.5 oz has 31 mg.  · Ribeiro -- 1 oz has 29 mg.  · Oysters and mussels (raw) -- 3.5 oz has 25 mg.  · Mackerel -- 1 oz has 22 mg.  · Trout -- 1 oz has 20 mg.  · Pork sausage -- 1 link (1 oz) has 17 mg.  · Darden -- 1 oz has 16 mg.  · Tilapia -- 1 oz has 14 mg.  Dairy  · Soft-serve ice cream -- ½ cup (4 oz) has 103 mg.  · Whole-milk yogurt -- 1 cup (8 oz) has 29 mg.  · Cheddar cheese -- 1 oz has 28 mg.  · American cheese -- 1 oz has 28 mg.  · Whole milk -- 1 cup (8 oz) has 23 mg.  · 2% milk -- 1 cup (8 oz) has 18 mg.  · Cream cheese -- 1 tablespoon (Tbsp) has 15 mg.  · Cottage cheese -- ½ cup (4 oz) has 14 mg.  · Low-fat (1%) milk -- 1 cup (8 oz) has 10 mg.  · Sour cream -- 1 Tbsp has 8.5  mg.  · Low-fat yogurt -- 1 cup (8 oz) has 8 mg.  · Nonfat Greek yogurt -- 1 cup (8 oz) has 7 mg.  · Half-and-half cream -- 1 Tbsp has 5 mg.  Fats and oils  · Cod liver oil -- 1 tablespoon (Tbsp) has 82 mg.  · Butter -- 1 Tbsp has 15 mg.  · Lard -- 1 Tbsp has 14 mg.  · Ribeiro grease -- 1 Tbsp has 14 mg.  · Mayonnaise -- 1 Tbsp has 5-10 mg.  · Margarine -- 1 Tbsp has 3-10 mg.  Exact amounts of cholesterol in these foods may vary depending on specific ingredients and brands.  Foods without cholesterol  Most plant-based foods do not have cholesterol unless you combine them with a food that has cholesterol. Foods without cholesterol include:  · Grains and cereals.  · Vegetables.  · Fruits.  · Vegetable oils, such as olive, canola, and sunflower oil.  · Legumes, such as peas, beans, and lentils.  · Nuts and seeds.  · Egg whites.  Summary  · The body needs cholesterol in small amounts, but too much cholesterol can cause damage to the arteries and heart.  · Most people should eat less than 200 milligrams (mg) of cholesterol a day.  This information is not intended to replace advice given to you by your health care provider. Make sure you discuss any questions you have with your health care provider.  Document Released: 08/14/2018 Document Revised: 11/30/2018 Document Reviewed: 08/14/2018  [employer] Patient Education © 2020 [employer]  Preventing High Cholesterol  Cholesterol is a white, waxy substance similar to fat that the human body needs to help build cells. The liver makes all the cholesterol that a person's body needs. Having high cholesterol (hypercholesterolemia) increases a person's risk for heart disease and stroke. Extra (excess) cholesterol comes from the food the person eats.  High cholesterol can often be prevented with diet and lifestyle changes. If you already have high cholesterol, you can control it with diet and lifestyle changes and with medicine.  How can high cholesterol affect me?  If you have high  cholesterol, deposits (plaques) may build up on the walls of your arteries. The arteries are the blood vessels that carry blood away from your heart.  Plaques make the arteries narrower and stiffer. This can limit or block blood flow and cause blood clots to form. Blood clots:  · Are tiny balls of cells that form in your blood.  · Can move to the heart or brain, causing a heart attack or stroke.  Plaques in arteries greatly increase your risk for heart attack and stroke.Making diet and lifestyle changes can reduce your risk for these conditions that may threaten your life.  What can increase my risk?  This condition is more likely to develop in people who:  · Eat foods that are high in saturated fat or cholesterol. Saturated fat is mostly found in:  ? Foods that contain animal fat, such as red meat and some dairy products.  ? Certain fatty foods made from plants, such as tropical oils.  · Are overweight.  · Are not getting enough exercise.  · Have a family history of high cholesterol.  What actions can I take to prevent this?  Nutrition    · Eat less saturated fat.  · Avoid trans fats (partially hydrogenated oils). These are often found in margarine and in some baked goods, fried foods, and snacks bought in packages.  · Avoid precooked or cured meat, such as sausages or meat loaves.  · Avoid foods and drinks that have added sugars.  · Eat more fruits, vegetables, and whole grains.  · Choose healthy sources of protein, such as fish, poultry, lean cuts of red meat, beans, peas, lentils, and nuts.  · Choose healthy sources of fat, such as:  ? Nuts.  ? Vegetable oils, especially olive oil.  ? Fish that have healthy fats (omega-3 fatty acids), such as mackerel or salmon.  The items listed above may not be a complete list of recommended foods and beverages. Contact a dietitian for more information.  Lifestyle  · Lose weight if you are overweight. Losing 5-10 lb (2.3-4.5 kg) can help prevent or control high cholesterol. It  can also lower your risk for diabetes and high blood pressure. Ask your health care provider to help you with a diet and exercise plan to lose weight safely.  · Do not use any products that contain nicotine or tobacco, such as cigarettes, e-cigarettes, and chewing tobacco. If you need help quitting, ask your health care provider.  · Limit your alcohol intake.  ? Do not drink alcohol if:  § Your health care provider tells you not to drink.  § You are pregnant, may be pregnant, or are planning to become pregnant.  ? If you drink alcohol:  § Limit how much you use to:  § 0-1 drink a day for women.  § 0-2 drinks a day for men.  § Be aware of how much alcohol is in your drink. In the [place] one drink equals one 12 oz bottle of beer (355 mL), one 5 oz glass of wine (148 mL), or one 1½ oz glass of hard liquor (44 mL).  Activity    · Get enough exercise. Each week, do at least 150 minutes of exercise that takes a medium level of effort (moderate-intensity exercise).  ? This is exercise that:  § Makes your heart beat faster and makes you breathe harder than usual.  § Allows you to still be able to talk.  ? You could exercise in short sessions several times a day or longer sessions a few times a week. For example, on 5 days each week, you could walk fast or ride your bike 3 times a day for 10 minutes each time.  · Do exercises as told by your health care provider.  Medicines  · In addition to diet and lifestyle changes, your health care provider may recommend medicines to help lower cholesterol. This may be a medicine to lower the amount of cholesterol your liver makes. You may need medicine if:  ? Diet and lifestyle changes do not lower your cholesterol enough.  ? You have high cholesterol and other risk factors for heart disease or stroke.  · Take over-the-counter and prescription medicines only as told by your health care provider.  General information  · Manage your risk factors for high cholesterol. Talk with your health  care provider about all your risk factors and how to lower your risk.  · Manage other conditions that you have, such as diabetes or high blood pressure (hypertension).  · Have blood tests to check your cholesterol levels at regular points in time as told by your health care provider.  · Keep all follow-up visits as told by your health care provider. This is important.  Where to find more information  · American Heart Association: www.heart.org  · National Heart, Lung, and [employer] [place]: www.nhlbi.nih.gov  Summary  · High cholesterol increases your risk for heart disease and stroke. By keeping your cholesterol level low, you can reduce your risk for these conditions.  · High cholesterol can often be prevented with diet and lifestyle changes.  · Work with your health care provider to manage your risk factors, and have your blood tested regularly.  This information is not intended to replace advice given to you by your health care provider. Make sure you discuss any questions you have with your health care provider.  Document Released: 01/01/2017 Document Revised: 04/10/2020 Document Reviewed: 08/26/2017  [employer] Patient Education © 2020 [employer]      Mindfulness-Based Stress Reduction  Mindfulness-based stress reduction (MBSR) is a program that helps people learn to practice mindfulness. Mindfulness is the practice of intentionally paying attention to the present moment. It can be learned and practiced through techniques such as education, breathing exercises, meditation, and yoga. MBSR includes several mindfulness techniques in one program.  MBSR works best when you understand the treatment, are willing to try new things, and can commit to spending time practicing what you learn. MBSR training may include learning about:  · How your emotions, thoughts, and reactions affect your body.  · New ways to respond to things that cause negative thoughts to start (triggers).  · How to notice your thoughts and let go of  them.  · Practicing awareness of everyday things that you normally do without thinking.  · The techniques and goals of different types of meditation.  What are the benefits of MBSR?  MBSR can have many benefits, which include helping you to:  · Develop self-awareness. This refers to knowing and understanding yourself.  · Learn skills and attitudes that help you to participate in your own health care.  · Learn new ways to care for yourself.  · Be more accepting about how things are, and let things go.  · Be less judgmental and approach things with an open mind.  · Be patient with yourself and trust yourself more.  MBSR has also been shown to:  · Reduce negative emotions, such as depression and anxiety.  · Improve memory and focus.  · Change how you sense and approach pain.  · Boost your body's ability to fight infections.  · Help you connect better with other people.  · Improve your sense of well-being.  Follow these instructions at home:    · Find a local in-person or online MBSR program.  · Set aside some time regularly for mindfulness practice.  · Find a mindfulness practice that works best for you. This may include one or more of the following:  ? Meditation. Meditation involves focusing your mind on a certain thought or activity.  ? Breathing awareness exercises. These help you to stay present by focusing on your breath.  ? Body scan. For this practice, you lie down and pay attention to each part of your body from head to toe. You can identify tension and soreness and intentionally relax parts of your body.  ? Yoga. Yoga involves stretching and breathing, and it can improve your ability to move and be flexible. It can also provide an experience of testing your body's limits, which can help you release stress.  ? Mindful eating. This way of eating involves focusing on the taste, texture, color, and smell of each bite of food. Because this slows down eating and helps you feel full sooner, it can be an important  part of a weight-loss plan.  · Find a podcast or recording that provides guidance for breathing awareness, body scan, or meditation exercises. You can listen to these any time when you have a free moment to rest without distractions.  · Follow your treatment plan as told by your health care provider. This may include taking regular medicines and making changes to your diet or lifestyle as recommended.  How to practice mindfulness  To do a basic awareness exercise:  · Find a comfortable place to sit.  · Pay attention to the present moment. Observe your thoughts, feelings, and surroundings just as they are.  · Avoid placing judgment on yourself, your feelings, or your surroundings. Make note of any judgment that comes up, and let it go.  · Your mind may wander, and that is okay. Make note of when your thoughts drift, and return your attention to the present moment.  To do basic mindfulness meditation:  · Find a comfortable place to sit. This may include a stable chair or a firm floor cushion.  ? Sit upright with your back straight. Let your arms fall next to your side with your hands resting on your legs.  ? If sitting in a chair, rest your feet flat on the floor.  ? If sitting on a cushion, cross your legs in front of you.  · Keep your head in a neutral position with your chin dropped slightly. Relax your jaw and rest the tip of your tongue on the roof of your mouth. Drop your gaze to the floor. You can close your eyes if you like.  · Breathe normally and pay attention to your breath. Feel the air moving in and out of your nose. Feel your belly expanding and relaxing with each breath.  · Your mind may wander, and that is okay. Make note of when your thoughts drift, and return your attention to your breath.  · Avoid placing judgment on yourself, your feelings, or your surroundings. Make note of any judgment or feelings that come up, let them go, and bring your attention back to your breath.  · When you are ready, lift  your gaze or open your eyes. Pay attention to how your body feels after the meditation.  Where to find more information  You can find more information about MBSR from:  · Your health care provider.  · Community-based meditation centers or programs.  · Programs offered near you.  Summary  · Mindfulness-based stress reduction (MBSR) is a program that teaches you how to intentionally pay attention to the present moment. It is used with other treatments to help you cope better with daily stress, emotions, and pain.  · MBSR focuses on developing self-awareness, which allows you to respond to life stress without judgment or negative emotions.  · MBSR programs may involve learning different mindfulness practices, such as breathing exercises, meditation, yoga, body scan, or mindful eating. Find a mindfulness practice that works best for you, and set aside time for it on a regular basis.  This information is not intended to replace advice given to you by your health care provider. Make sure you discuss any questions you have with your health care provider.  Document Released: 04/26/2018 Document Revised: 11/30/2018 Document Reviewed: 04/26/2018  [employer] Patient Education © 2020 [employer]

## 2020-06-08 NOTE — PROGRESS NOTES
"Subjective   Lisa Hill is a 60 y.o. female.     Chief Complaint   Patient presents with   • GYN visit       History of Present Illness     Well woman exam- patient is here for well woman exam.  She reports her last pap was > 3 years ago.  She denies any female concerns.  No pain or bleeding.  She does have dryness due to hysterectomy.  She reports hysterectomy in 1996 due to Fibroids.  She reports she does not use any treatment for vaginal dryness.  No breast pain or changes.  She reports she does have some generalized intermittent discomfort in her right breast behind the nipple but \"my test always come out ok\".  she has not ever noted any lumps.    Vitamin D deficiency-chronic and ongoing.  Patient is presently taking vitamin D 50,000 units supplement.  No negative side effects of medication are reported.  Vitamin D level is stable with medication use.  She is due for updated labs  Hyperlipidemia-chronic and ongoing.  Patient is presently taking simvastatin 20 mg patient denies any negative side effects of cholesterol medication.  No reported myalgia or myopathies.  Hypertension-chronic and ongoing.  Patient is presently taking losartan 50 mg.  She denies any negative side effects.  She denies any chest pain or palpitations.  No headache or dizziness    The following portions of the patient's history were reviewed and updated as appropriate: CC, ROS, allergies, current medications, past family history, past medical history, past social history, past surgical history and problem list.    Review of Systems   Constitutional: Negative for appetite change, fatigue and unexpected weight change.   HENT: Negative for congestion, ear pain, nosebleeds, postnasal drip, rhinorrhea, sore throat, trouble swallowing and voice change.    Eyes: Negative for photophobia, pain and visual disturbance.   Respiratory: Positive for cough and shortness of breath (Not more than baseline). Negative for chest tightness and wheezing.  " "  Cardiovascular: Negative for chest pain and palpitations.   Gastrointestinal: Negative for abdominal pain, blood in stool, constipation, diarrhea and nausea.   Endocrine: Negative for cold intolerance and polydipsia.   Genitourinary: Negative for difficulty urinating, flank pain, frequency, hematuria, vaginal bleeding, vaginal discharge and vaginal pain.   Musculoskeletal: Positive for arthralgias and back pain. Negative for gait problem, joint swelling and myalgias.   Skin: Negative for color change and rash.   Allergic/Immunologic: Negative.    Neurological: Negative for dizziness, syncope, numbness and headaches.   Hematological: Negative.    Psychiatric/Behavioral: Positive for dysphoric mood. Negative for sleep disturbance and suicidal ideas. The patient is not nervous/anxious.    All other systems reviewed and are negative.      Objective     /76 (BP Location: Left arm, Patient Position: Sitting, Cuff Size: Adult)   Pulse 105   Temp 97.1 °F (36.2 °C) (Temporal)   Ht 157.5 cm (62\")   Wt 117 kg (259 lb)   LMP  (LMP Unknown)   SpO2 92%   BMI 47.37 kg/m²     Physical Exam   Constitutional: She is oriented to person, place, and time. She appears well-developed and well-nourished. No distress.   HENT:   Head: Normocephalic and atraumatic.   Right Ear: External ear normal.   Left Ear: External ear normal.   Oropharynx not examined.  Patient is presently wearing a face covering/mask due to COVID-19 pandemic.   Eyes: Pupils are equal, round, and reactive to light. EOM are normal. No scleral icterus.   Neck: Normal range of motion. Neck supple. No JVD present. No thyromegaly present.   Cardiovascular: Normal rate, regular rhythm and normal heart sounds.   Pulmonary/Chest: Effort normal. She has wheezes (scattered, fine in upper lobes). Right breast exhibits tenderness (lower portion of breast near sternal border). Right breast exhibits no inverted nipple, no mass, no nipple discharge and no skin change. " Left breast exhibits no inverted nipple, no mass, no nipple discharge, no skin change and no tenderness.   Abdominal: Soft. Bowel sounds are normal. There is no tenderness.   Genitourinary: Rectal exam shows no external hemorrhoid. Pelvic exam was performed with patient supine. There is no rash, tenderness, lesion or injury on the right labia. There is no rash, tenderness, lesion or injury on the left labia. Right adnexum displays no mass and no fullness. Left adnexum displays no mass, no tenderness and no fullness. There is tenderness in the vagina. No vaginal discharge found.   Genitourinary Comments: Cervix, uterus, ovaries surgically absent   Musculoskeletal:        Lumbar back: She exhibits tenderness.   Gait slow and cautious.  Mild nonpitting edema in ankles and feet bilaterally   Neurological: She is alert and oriented to person, place, and time. No cranial nerve deficit. Coordination normal.   Skin: Skin is warm and dry.   Psychiatric: She has a normal mood and affect. Her behavior is normal. Judgment and thought content normal.   Vitals reviewed.      Assessment/Plan         PHQ-9 Depression Screening  Little interest or pleasure in doing things? 3   Feeling down, depressed, or hopeless? 3   Trouble falling or staying asleep, or sleeping too much? 1   Feeling tired or having little energy? 2   Poor appetite or overeating? 0   Feeling bad about yourself - or that you are a failure or have let yourself or your family down? 1   Trouble concentrating on things, such as reading the newspaper or watching television? 1   Moving or speaking so slowly that other people could have noticed? Or the opposite - being so fidgety or restless that you have been moving around a lot more than usual? 0   Thoughts that you would be better off dead, or of hurting yourself in some way? 0   PHQ-9 Total Score 11   If you checked off any problems, how difficult have these problems made it for you to do your work, take care of  things at home, or get along with other people? Somewhat difficult     Patient is on treatment for depression.  She reports she does feel her symptoms are improving.     Problem List Items Addressed This Visit        Cardiovascular and Mediastinum    Mixed hyperlipidemia    Current Assessment & Plan     Continue simvastatin 20 mg.  Labs today for evaluation         Essential hypertension    Current Assessment & Plan     Continue medications as directed.  Report any blood pressure elevations.         Relevant Orders    CBC Auto Differential (Completed)       Digestive    Vitamin D deficiency    Current Assessment & Plan     Labs today for evaluation.  Continue supplement            Endocrine    Hypothyroidism due to Hashimoto's thyroiditis    Prediabetes          Patient's Body mass index is 47.37 kg/m². BMI is above normal parameters. Recommendations include: nutrition counseling.    Smoking cessation encouraged.    Understands disease processes and need for medications.  Understands reasons for urgent and emergent care.  Patient (& family) verbalized agreement for treatment plan.   Emotional support and active listening provided.  Patient provided time to verbalize feelings.  Counseling provided today including importance of good nutrition, exercise as tolerated, dental health, stress reduction and mental health. Importance of immunizations discussed.   Appropriate screenings based on gender (paps, breast exam, mammogram, PSA, colon screens, etc).     Counseled on safe sex practices and STD prevention.   Counseling regarding gun and water safety, domestic violence, and seatbelt use.      Pap and breast exam done.  Patient to obtain labs ordered per PCP today.    Mammogram not due until August.    RTC PRN., Keep upcoming follow up.             This document has been electronically signed by:  NATALIE Mendenhall FNP-C Dragon disclaimer:  Much of this encounter note is an electronic transcription/translation of  spoken language to printed text. The electronic translation of spoken language may permit erroneous, or at times, nonsensical words or phrases to be inadvertently transcribed; Although I have reviewed the note for such errors, some may still exist.

## 2020-06-12 ENCOUNTER — OFFICE VISIT (OUTPATIENT)
Dept: FAMILY MEDICINE CLINIC | Facility: CLINIC | Age: 60
End: 2020-06-12

## 2020-06-12 DIAGNOSIS — J30.9 CHRONIC ALLERGIC RHINITIS: ICD-10-CM

## 2020-06-12 DIAGNOSIS — R21 RASH: ICD-10-CM

## 2020-06-12 DIAGNOSIS — K21.9 GASTROESOPHAGEAL REFLUX DISEASE WITHOUT ESOPHAGITIS: ICD-10-CM

## 2020-06-12 DIAGNOSIS — E55.9 VITAMIN D DEFICIENCY: ICD-10-CM

## 2020-06-12 DIAGNOSIS — I10 ESSENTIAL HYPERTENSION: ICD-10-CM

## 2020-06-12 DIAGNOSIS — E66.01 MORBID OBESITY WITH BMI OF 45.0-49.9, ADULT (HCC): ICD-10-CM

## 2020-06-12 DIAGNOSIS — M54.59 MECHANICAL LOW BACK PAIN: ICD-10-CM

## 2020-06-12 DIAGNOSIS — F41.8 DEPRESSION WITH ANXIETY: ICD-10-CM

## 2020-06-12 DIAGNOSIS — E03.8 HYPOTHYROIDISM DUE TO HASHIMOTO'S THYROIDITIS: ICD-10-CM

## 2020-06-12 DIAGNOSIS — E06.3 HYPOTHYROIDISM DUE TO HASHIMOTO'S THYROIDITIS: ICD-10-CM

## 2020-06-12 DIAGNOSIS — M85.80 OSTEOPENIA, UNSPECIFIED LOCATION: ICD-10-CM

## 2020-06-12 DIAGNOSIS — M15.9 GENERALIZED OSTEOARTHRITIS: ICD-10-CM

## 2020-06-12 DIAGNOSIS — J43.1 PANLOBULAR EMPHYSEMA (HCC): ICD-10-CM

## 2020-06-12 DIAGNOSIS — N39.3 STRESS BLADDER INCONTINENCE, FEMALE: ICD-10-CM

## 2020-06-12 DIAGNOSIS — K59.09 CHRONIC CONSTIPATION: ICD-10-CM

## 2020-06-12 DIAGNOSIS — Z00.00 HEALTHCARE MAINTENANCE: ICD-10-CM

## 2020-06-12 DIAGNOSIS — I25.10 CORONARY ARTERY CALCIFICATION SEEN ON CT SCAN: ICD-10-CM

## 2020-06-12 DIAGNOSIS — Z12.31 ENCOUNTER FOR SCREENING MAMMOGRAM FOR BREAST CANCER: ICD-10-CM

## 2020-06-12 DIAGNOSIS — F17.200 SMOKER: ICD-10-CM

## 2020-06-12 DIAGNOSIS — G47.33 OBSTRUCTIVE SLEEP APNEA: ICD-10-CM

## 2020-06-12 DIAGNOSIS — R73.03 PREDIABETES: ICD-10-CM

## 2020-06-12 DIAGNOSIS — E78.2 MIXED HYPERLIPIDEMIA: Primary | ICD-10-CM

## 2020-06-12 PROCEDURE — 99442 PR PHYS/QHP TELEPHONE EVALUATION 11-20 MIN: CPT | Performed by: GENERAL PRACTICE

## 2020-06-12 RX ORDER — LORAZEPAM 1 MG/1
0.5 TABLET ORAL 2 TIMES DAILY PRN
Qty: 90 TABLET | Refills: 2 | Status: SHIPPED | OUTPATIENT
Start: 2020-06-12 | End: 2020-07-07 | Stop reason: SDUPTHER

## 2020-06-12 RX ORDER — LIDOCAINE 50 MG/G
OINTMENT TOPICAL 2 TIMES DAILY PRN
Qty: 50 G | Refills: 5 | Status: SHIPPED | OUTPATIENT
Start: 2020-06-12 | End: 2020-07-07 | Stop reason: SDUPTHER

## 2020-06-12 NOTE — PROGRESS NOTES
Subjective   Lisa Hill is a 60 y.o. female.     History of Present Illness     This visit has been rescheduled as a phone visit to comply with patient safety concerns in accordance with CDC recommendations. Total time of discussion was 17 minutes.    You have chosen to receive care through a telephone visit. Do you consent to use a telephone visit for your medical care today? Yes    Low Back Pain  She complains of persistent low back pain. There has been no change in the quality of her discomfort nor any new associated symptoms. There is no history of any weakness, numbness, tingling, or any change in her bowel/bladder control.  There is no history of any fever, chills, night sweats or weight loss.  When she sits down the pain generally resolves within minutes and she denies any problem at night. She continues to use her TENS unit for several hours daily and feels that it helps.  She also feels that meloxicam helps.  Plain films of the lumbar spine performed on 8/5/2019 were reported as showing degenerative disc disease as well as atherosclerotic calcification of the aorta    COPD  The patient reports that her SOB and cough have remained stable. She states that these symptoms occur at any time during the day or night. She reports that her symptoms become worse with activity, exposure to cold air and environmental allergens. Her symptoms are reduced with rest and inhaled inhaled medication. She is using supplemental oxygen at night but remains unconvinced that it helps at all. The patient states she also has nasal congestion. She is following the treatment plan, including medications as directed. She continues to smoke about 1 pack per day.  She underwent an updated low dose CT of the chest on 6/4/2020 with evidence of moderate COPD, stable small lung nodules, and mild coronary artery calcifications.  She was advised to have her next study in 1 year.  She continues to be followed by pulmonology and underwent a  reassessment on 5/19/2020.  She will return again on 8/19/2020    Dyslipidemia  Compliance with treatment has been fair. The patient exercises occasionally. She is currently being prescribed the following medication for her dyslipidemia - simvastatin. Patient denies side effects associated with her   Lab Results   Component Value Date    CHOL 139 06/08/2020    CHLPL 141 02/05/2016    TRIG 224 (H) 06/08/2020    HDL 36 (L) 06/08/2020    LDL 58 06/08/2020     Hypothyroidism  Patient presents for evaluation of thyroid function. Symptoms consist of weight gain, constipation. The symptoms are moderate.  The problem has been unchanged.  Previous thyroid studies include TSH. The hypothyroidism is due to Hashimoto's disease.  Lab Results   Component Value Date    TSH 4.400 (H) 06/08/2020     Depression  Onset was a number of years ago. Current symptoms include: depression, nervousness, anhedonia, difficulty concentrating, fatigue and impaired memory. Patient denies recurrent thoughts of death or suicidal thoughts. Risk factors: history of seasonal affective disorder.  Current medication includes escitalopram 10 daily,  bupropion 300 daily, risperdone 2 nightly, and lorazepam 0.5 twice a day.  She has continued to do quite well since last here.  She denies any side effects    Labs  Lab Results   Component Value Date    GLUCOSE 127 (H) 06/08/2020    BUN 13 06/08/2020    CREATININE 0.80 06/08/2020    EGFRIFNONA 73 06/08/2020    EGFRIFAFRI  09/02/2016      Comment:      <15 Indicative of kidney failure.    BCR 16.3 06/08/2020    K 4.7 06/08/2020    CO2 25.2 06/08/2020    CALCIUM 9.8 06/08/2020    ALBUMIN 4.00 06/08/2020    LABIL2 1.3 (L) 02/05/2016    AST 20 06/08/2020    ALT 25 06/08/2020     Lab Results   Component Value Date    HGBA1C 6.30 (H) 06/08/2020     The following portions of the patient's history were reviewed and updated as appropriate: allergies, current medications, past medical history, past social history and  problem list.    Review of Systems   Constitutional: Positive for fatigue. Negative for appetite change, chills, fever and unexpected weight change.   HENT: Negative for congestion, ear pain, postnasal drip, rhinorrhea, sneezing, sore throat and voice change.    Eyes: Negative for visual disturbance.   Respiratory: Positive for cough, shortness of breath and wheezing.    Cardiovascular: Positive for leg swelling. Negative for chest pain and palpitations.   Gastrointestinal: Positive for constipation. Negative for abdominal pain, anal bleeding, blood in stool, diarrhea, nausea and vomiting.   Genitourinary: Negative for difficulty urinating, dysuria, frequency, hematuria and urgency.        Incontinence with coughing, sneezing, or lifting   Musculoskeletal: Positive for arthralgias and back pain. Negative for joint swelling and myalgias.   Skin: Negative for rash.   Neurological: Positive for headaches (when stressed). Negative for weakness and numbness.   Psychiatric/Behavioral: Positive for decreased concentration, dysphoric mood and sleep disturbance (chronic-intermittent). Negative for suicidal ideas.     Objective   Physical Exam   Constitutional:   Alert and oriented.  Bright and in fair spirits.  No apparent distress or shortness of breath     Assessment/Plan   Problems Addressed this Visit        Cardiovascular and Mediastinum    Coronary artery calcification on CT   Advised of the importance of risk factor modification with an emphasis on tobacco cessation  Essential hypertension  Encouraged to continue to work on her diet and exercise plan.  Continue current medication    Mixed hyperlipidemia   As above.   Continue current medication.       Respiratory    Chronic allergic rhinitis    Obstructive sleep apnea    Panlobular emphysema (CMS/HCC)   COPD is worsening.  Reminded of the importance of smoking cessation  Encouraged to remain as active as symptoms allow for  Continue current medication  Follow up with  pulmonology        Digestive    Chronic constipation    Gastroesophageal reflux disease without esophagitis    Morbid obesity with BMI of 45.0-49.9, adult (CMS/[facility])    Vitamin D deficiency  Continue supplementation with monitoring.       Endocrine    Hypothyroidism due to Hashimoto's thyroiditis  Clinically euthyroid.  Continue current medication.    Prediabetes  As above.  Will continue to monitor       Nervous and Auditory    Mechanical low back pain  Reminded regarding symptomatic treatment.   Continue current medication       Musculoskeletal and Integument    Generalized osteoarthritis    Osteopenia       Genitourinary    Stress bladder incontinence, female       Other    Depression with anxiety  Stable.  Supportive therapy.   Continue current medication.    Relevant Medications    LORazepam (ATIVAN) 1 MG tablet    Encounter for screening mammogram for breast cancer    Relevant Orders    Mammo Screening Digital Tomosynthesis Bilateral With CAD    Healthcare maintenance  We will arrange an updated mammogram    Relevant Orders    Mammo Screening Digital Tomosynthesis Bilateral With CAD    Smoker

## 2020-06-13 PROBLEM — I25.10 CORONARY ARTERY CALCIFICATION SEEN ON CT SCAN: Status: ACTIVE | Noted: 2020-06-13

## 2020-06-15 DIAGNOSIS — E03.9 ACQUIRED HYPOTHYROIDISM: ICD-10-CM

## 2020-06-15 DIAGNOSIS — J30.1 SEASONAL ALLERGIC RHINITIS DUE TO POLLEN: ICD-10-CM

## 2020-06-15 DIAGNOSIS — E55.9 VITAMIN D DEFICIENCY: ICD-10-CM

## 2020-06-15 RX ORDER — FLUTICASONE PROPIONATE 50 MCG
SPRAY, SUSPENSION (ML) NASAL
Qty: 16 G | Refills: 5 | Status: SHIPPED | OUTPATIENT
Start: 2020-06-15 | End: 2020-07-07 | Stop reason: SDUPTHER

## 2020-06-15 RX ORDER — LEVOTHYROXINE SODIUM 175 UG/1
TABLET ORAL
Qty: 30 TABLET | Refills: 5 | Status: SHIPPED | OUTPATIENT
Start: 2020-06-15 | End: 2020-07-07 | Stop reason: SDUPTHER

## 2020-06-15 RX ORDER — MELATONIN
2000 DAILY
Qty: 60 TABLET | Refills: 5 | Status: SHIPPED | OUTPATIENT
Start: 2020-06-15 | End: 2020-07-07 | Stop reason: SDUPTHER

## 2020-06-17 DIAGNOSIS — Z01.419 ENCOUNTER FOR GYNECOLOGICAL EXAMINATION WITH PAPANICOLAOU SMEAR OF CERVIX: Primary | ICD-10-CM

## 2020-06-22 ENCOUNTER — TELEPHONE (OUTPATIENT)
Dept: FAMILY MEDICINE CLINIC | Facility: CLINIC | Age: 60
End: 2020-06-22

## 2020-06-22 NOTE — TELEPHONE ENCOUNTER
Pt is aware of this information.     ----- Message from NATALIE Mendenhall sent at 6/22/2020  9:37 AM EDT -----  They are normal.  I think I resulted it   ----- Message -----  From: Yesenia Cruz MA  Sent: 6/22/2020   9:15 AM EDT  To: NATALIE Mendenhall    Pt called requesting the results to her pap?

## 2020-07-06 ENCOUNTER — OFFICE VISIT (OUTPATIENT)
Dept: FAMILY MEDICINE CLINIC | Facility: CLINIC | Age: 60
End: 2020-07-06

## 2020-07-06 VITALS
WEIGHT: 260 LBS | BODY MASS INDEX: 47.84 KG/M2 | SYSTOLIC BLOOD PRESSURE: 138 MMHG | TEMPERATURE: 98.4 F | DIASTOLIC BLOOD PRESSURE: 88 MMHG | HEIGHT: 62 IN | HEART RATE: 92 BPM | OXYGEN SATURATION: 93 %

## 2020-07-06 DIAGNOSIS — L98.9 SKIN LESION: Primary | ICD-10-CM

## 2020-07-06 PROCEDURE — 99213 OFFICE O/P EST LOW 20 MIN: CPT | Performed by: NURSE PRACTITIONER

## 2020-07-06 RX ORDER — TIOTROPIUM BROMIDE INHALATION SPRAY 3.12 UG/1
SPRAY, METERED RESPIRATORY (INHALATION)
COMMUNITY
Start: 2020-06-15 | End: 2020-08-19

## 2020-07-06 NOTE — PROGRESS NOTES
"Subjective   Lisa Hill is a 60 y.o. female.     Chief Complaint   Patient presents with   • Feet itching       History of Present Illness     Skin lesions on feet-she reports she has noted some lesions on her bilateral feet.  Has been there about a year but seems to be progressive.  She reports they do not open and are not burning or stinging.  Reports \"itching\".  She reports she has not put any treatment on the areas.  She reports she has several areas on her abdomne and back she would like to have checked.  No history of skin cancer.  She would like to have a Derm appt.    Decreased hearing-patient is interested in a hearing assessment due to [place].  She reports she is concerned about not being able to cover the cost of the devices even if she were ordered them.  She reports she would like to be referred for any financial assistance if possible.      The following portions of the patient's history were reviewed and updated as appropriate: CC, ROS, allergies, current medications, past family history, past medical history, past social history, past surgical history and problem list.      Review of Systems   Constitutional: Negative for appetite change, fatigue and unexpected weight change.   HENT: Negative for congestion, ear pain, nosebleeds, postnasal drip, rhinorrhea, sore throat, trouble swallowing and voice change.    Eyes: Negative for photophobia, pain and visual disturbance.   Respiratory: Negative for cough, chest tightness, shortness of breath and wheezing.    Cardiovascular: Negative for chest pain and palpitations.   Gastrointestinal: Negative for abdominal pain, blood in stool, constipation, diarrhea and nausea.   Endocrine: Negative for cold intolerance and polydipsia.   Genitourinary: Negative for difficulty urinating, flank pain, frequency and hematuria.   Musculoskeletal: Negative for arthralgias, back pain, gait problem, joint swelling and myalgias.   Skin: Positive for color change. Negative " "for rash.   Allergic/Immunologic: Negative.    Neurological: Negative for dizziness, syncope, numbness and headaches.   Hematological: Negative.    Psychiatric/Behavioral: Negative for dysphoric mood, sleep disturbance and suicidal ideas. The patient is not nervous/anxious.    All other systems reviewed and are negative.      Objective     /88   Pulse 92   Temp 98.4 °F (36.9 °C) (Temporal)   Ht 157.5 cm (62\")   Wt 118 kg (260 lb)   LMP  (LMP Unknown)   SpO2 93%   BMI 47.55 kg/m²     Physical Exam   Constitutional: She is oriented to person, place, and time. She appears well-developed and well-nourished. No distress.   HENT:   Head: Normocephalic and atraumatic.   Right Ear: External ear normal.   Left Ear: External ear normal.   Oropharynx not examined.  Patient is presently wearing a face covering/mask due to COVID-19 pandemic.   Eyes: Pupils are equal, round, and reactive to light. EOM are normal. No scleral icterus.   Neck: Normal range of motion. Neck supple. No JVD present. No thyromegaly present.   Cardiovascular: Normal rate, regular rhythm and normal heart sounds.   Pulmonary/Chest: Effort normal and breath sounds normal.   Abdominal: Soft. Bowel sounds are normal. There is no tenderness.   Neurological: She is alert and oriented to person, place, and time. No cranial nerve deficit. Coordination normal.   Skin: Skin is warm and dry.   Multiple scattered scaling lesions noted on patient's feet mostly near arches.  Areas dry.  Multiple areas of sun spots also noted on BLE and BUE.   Psychiatric: She has a normal mood and affect. Her behavior is normal. Judgment and thought content normal.   Vitals reviewed.      Assessment/Plan     Problem List Items Addressed This Visit     None      Visit Diagnoses     Skin lesion    -  Primary    Relevant Medications    clobetasol (Temovate) 0.05 % ointment    Other Relevant Orders    Ambulatory Referral to Dermatology (Completed)          Understands disease " processes and need for medications.  Understands reasons for urgent and emergent care.  Patient (& family) verbalized agreement for treatment plan.   Emotional support and active listening provided.  Patient provided time to verbalize feelings.    Clobetasol ordered to apply to skin lesions.  Referral to dermatology made today.    RTC PRN.  Keep scheduled follow-up with PCP.              This document has been electronically signed by:  NATALIE Mendenhall FNP-C Dragon disclaimer:  Much of this encounter note is an electronic transcription/translation of spoken language to printed text. The electronic translation of spoken language may permit erroneous, or at times, nonsensical words or phrases to be inadvertently transcribed; Although I have reviewed the note for such errors, some may still exist.

## 2020-07-07 DIAGNOSIS — E78.2 MIXED HYPERLIPIDEMIA: ICD-10-CM

## 2020-07-07 DIAGNOSIS — K21.9 GASTROESOPHAGEAL REFLUX DISEASE WITHOUT ESOPHAGITIS: ICD-10-CM

## 2020-07-07 DIAGNOSIS — M15.9 GENERALIZED OSTEOARTHRITIS: ICD-10-CM

## 2020-07-07 DIAGNOSIS — E03.9 ACQUIRED HYPOTHYROIDISM: ICD-10-CM

## 2020-07-07 DIAGNOSIS — R21 RASH: ICD-10-CM

## 2020-07-07 DIAGNOSIS — J30.1 SEASONAL ALLERGIC RHINITIS DUE TO POLLEN: ICD-10-CM

## 2020-07-07 DIAGNOSIS — I10 ESSENTIAL HYPERTENSION: ICD-10-CM

## 2020-07-07 DIAGNOSIS — E55.9 VITAMIN D DEFICIENCY: ICD-10-CM

## 2020-07-07 DIAGNOSIS — R10.2 VAGINAL PAIN: ICD-10-CM

## 2020-07-07 DIAGNOSIS — F41.8 DEPRESSION WITH ANXIETY: ICD-10-CM

## 2020-07-07 DIAGNOSIS — L21.9 SEBORRHEIC DERMATITIS OF SCALP: ICD-10-CM

## 2020-07-07 DIAGNOSIS — F17.210 CIGARETTE NICOTINE DEPENDENCE WITHOUT COMPLICATION: ICD-10-CM

## 2020-07-07 DIAGNOSIS — M54.59 MECHANICAL LOW BACK PAIN: ICD-10-CM

## 2020-07-07 DIAGNOSIS — J44.1 COPD WITH ACUTE EXACERBATION (HCC): ICD-10-CM

## 2020-07-07 DIAGNOSIS — M47.816 SPONDYLOSIS OF LUMBAR REGION WITHOUT MYELOPATHY OR RADICULOPATHY: ICD-10-CM

## 2020-07-07 DIAGNOSIS — J43.2 CENTRILOBULAR EMPHYSEMA (HCC): ICD-10-CM

## 2020-07-08 RX ORDER — HYDROCORTISONE 25 MG/G
CREAM TOPICAL 2 TIMES DAILY
Qty: 30 G | Refills: 0 | OUTPATIENT
Start: 2020-07-08

## 2020-07-08 RX ORDER — LEVOTHYROXINE SODIUM 175 UG/1
175 TABLET ORAL DAILY
Qty: 30 TABLET | Refills: 5 | Status: SHIPPED | OUTPATIENT
Start: 2020-07-08 | End: 2021-03-27 | Stop reason: SDUPTHER

## 2020-07-08 RX ORDER — ESCITALOPRAM OXALATE 10 MG/1
10 TABLET ORAL DAILY
Qty: 30 TABLET | Refills: 5 | Status: SHIPPED | OUTPATIENT
Start: 2020-07-08 | End: 2021-01-25

## 2020-07-08 RX ORDER — NYSTATIN 100000 U/G
OINTMENT TOPICAL 2 TIMES DAILY
Qty: 15 G | Refills: 0 | OUTPATIENT
Start: 2020-07-08

## 2020-07-08 RX ORDER — PANTOPRAZOLE SODIUM 40 MG/1
40 TABLET, DELAYED RELEASE ORAL DAILY
Qty: 30 TABLET | Refills: 5 | Status: SHIPPED | OUTPATIENT
Start: 2020-07-08 | End: 2021-02-22

## 2020-07-08 RX ORDER — LORAZEPAM 1 MG/1
0.5 TABLET ORAL 2 TIMES DAILY PRN
Qty: 90 TABLET | Refills: 2 | Status: SHIPPED | OUTPATIENT
Start: 2020-07-08 | End: 2021-01-04 | Stop reason: SDUPTHER

## 2020-07-08 RX ORDER — MELOXICAM 15 MG/1
15 TABLET ORAL DAILY
Qty: 30 TABLET | Refills: 5 | Status: SHIPPED | OUTPATIENT
Start: 2020-07-08 | End: 2021-07-21

## 2020-07-08 RX ORDER — TIOTROPIUM BROMIDE INHALATION SPRAY 3.12 UG/1
2 SPRAY, METERED RESPIRATORY (INHALATION)
Qty: 4 G | Refills: 1 | OUTPATIENT
Start: 2020-07-08

## 2020-07-08 RX ORDER — KETOCONAZOLE 20 MG/ML
SHAMPOO TOPICAL 2 TIMES WEEKLY
Qty: 120 ML | Refills: 5 | Status: SHIPPED | OUTPATIENT
Start: 2020-07-09 | End: 2021-11-15

## 2020-07-08 RX ORDER — TOPIRAMATE 100 MG/1
100 TABLET, FILM COATED ORAL 2 TIMES DAILY
Qty: 60 TABLET | Refills: 5 | OUTPATIENT
Start: 2020-07-08

## 2020-07-08 RX ORDER — SIMVASTATIN 20 MG
20 TABLET ORAL NIGHTLY
Qty: 30 TABLET | Refills: 5 | Status: SHIPPED | OUTPATIENT
Start: 2020-07-08 | End: 2021-02-22

## 2020-07-08 RX ORDER — ALBUTEROL SULFATE 90 UG/1
2 AEROSOL, METERED RESPIRATORY (INHALATION) EVERY 4 HOURS PRN
Qty: 18 G | Refills: 5 | Status: SHIPPED | OUTPATIENT
Start: 2020-07-08 | End: 2020-08-19 | Stop reason: SDUPTHER

## 2020-07-08 RX ORDER — MONTELUKAST SODIUM 10 MG/1
10 TABLET ORAL NIGHTLY
Qty: 30 TABLET | Refills: 5 | Status: SHIPPED | OUTPATIENT
Start: 2020-07-08 | End: 2021-05-25

## 2020-07-08 RX ORDER — LANOLIN ALCOHOL/MO/W.PET/CERES
1000 CREAM (GRAM) TOPICAL DAILY
Qty: 30 TABLET | Refills: 5 | Status: SHIPPED | OUTPATIENT
Start: 2020-07-08 | End: 2021-01-25

## 2020-07-08 RX ORDER — TRIAMCINOLONE ACETONIDE 1 MG/G
CREAM TOPICAL 2 TIMES DAILY
Qty: 80 G | Refills: 1 | OUTPATIENT
Start: 2020-07-08

## 2020-07-08 RX ORDER — RISPERIDONE 2 MG/1
2 TABLET ORAL
Qty: 30 TABLET | Refills: 5 | Status: SHIPPED | OUTPATIENT
Start: 2020-07-08 | End: 2021-03-23

## 2020-07-08 RX ORDER — LIDOCAINE 50 MG/G
OINTMENT TOPICAL 2 TIMES DAILY PRN
Qty: 50 G | Refills: 5 | Status: SHIPPED | OUTPATIENT
Start: 2020-07-08 | End: 2021-11-15

## 2020-07-08 RX ORDER — MELATONIN
2000 DAILY
Qty: 60 TABLET | Refills: 5 | Status: SHIPPED | OUTPATIENT
Start: 2020-07-08 | End: 2021-06-21

## 2020-07-08 RX ORDER — FLUTICASONE PROPIONATE 50 MCG
SPRAY, SUSPENSION (ML) NASAL
Qty: 16 G | Refills: 5 | Status: SHIPPED | OUTPATIENT
Start: 2020-07-08 | End: 2021-07-21

## 2020-07-08 RX ORDER — LOSARTAN POTASSIUM 50 MG/1
50 TABLET ORAL DAILY
Qty: 30 TABLET | Refills: 5 | Status: SHIPPED | OUTPATIENT
Start: 2020-07-08 | End: 2021-01-25

## 2020-07-08 RX ORDER — CLOBETASOL PROPIONATE 0.5 MG/G
OINTMENT TOPICAL 2 TIMES DAILY
Qty: 60 G | Refills: 2 | Status: SHIPPED | OUTPATIENT
Start: 2020-07-08 | End: 2021-11-15

## 2020-07-08 RX ORDER — BUPROPION HYDROCHLORIDE 300 MG/1
300 TABLET ORAL EVERY MORNING
Qty: 30 TABLET | Refills: 5 | Status: SHIPPED | OUTPATIENT
Start: 2020-07-08 | End: 2021-02-22

## 2020-07-08 RX ORDER — BUSPIRONE HYDROCHLORIDE 15 MG/1
15 TABLET ORAL 3 TIMES DAILY
Qty: 90 TABLET | Refills: 5 | OUTPATIENT
Start: 2020-07-08

## 2020-07-08 RX ORDER — IPRATROPIUM BROMIDE AND ALBUTEROL SULFATE 2.5; .5 MG/3ML; MG/3ML
3 SOLUTION RESPIRATORY (INHALATION)
Qty: 120 ML | Refills: 11 | Status: SHIPPED | OUTPATIENT
Start: 2020-07-08 | End: 2020-12-28

## 2020-07-08 RX ORDER — ACETAMINOPHEN 500 MG
1000 TABLET ORAL 4 TIMES DAILY
Qty: 240 TABLET | Refills: 5 | Status: SHIPPED | OUTPATIENT
Start: 2020-07-08 | End: 2021-06-21

## 2020-07-12 RX ORDER — CLOBETASOL PROPIONATE 0.5 MG/G
OINTMENT TOPICAL 2 TIMES DAILY
Qty: 60 G | Refills: 0
Start: 2020-07-12 | End: 2020-12-29

## 2020-07-27 DIAGNOSIS — L21.9 SEBORRHEIC DERMATITIS OF SCALP: ICD-10-CM

## 2020-07-27 RX ORDER — TRIAMCINOLONE ACETONIDE 1 MG/G
CREAM TOPICAL 2 TIMES DAILY PRN
Qty: 80 G | Refills: 0 | Status: SHIPPED | OUTPATIENT
Start: 2020-07-27 | End: 2020-08-17 | Stop reason: SDUPTHER

## 2020-08-06 ENCOUNTER — HOSPITAL ENCOUNTER (OUTPATIENT)
Dept: MAMMOGRAPHY | Facility: HOSPITAL | Age: 60
End: 2020-08-06

## 2020-08-17 DIAGNOSIS — L21.9 SEBORRHEIC DERMATITIS OF SCALP: ICD-10-CM

## 2020-08-17 RX ORDER — BUSPIRONE HYDROCHLORIDE 15 MG/1
TABLET ORAL
Qty: 90 TABLET | Refills: 5 | Status: SHIPPED | OUTPATIENT
Start: 2020-08-17 | End: 2020-12-29 | Stop reason: SDUPTHER

## 2020-08-17 RX ORDER — TRIAMCINOLONE ACETONIDE 1 MG/G
CREAM TOPICAL 2 TIMES DAILY PRN
Qty: 80 G | Refills: 0 | Status: SHIPPED | OUTPATIENT
Start: 2020-08-17 | End: 2020-12-29

## 2020-08-19 ENCOUNTER — OFFICE VISIT (OUTPATIENT)
Dept: PULMONOLOGY | Facility: CLINIC | Age: 60
End: 2020-08-19

## 2020-08-19 VITALS
DIASTOLIC BLOOD PRESSURE: 70 MMHG | SYSTOLIC BLOOD PRESSURE: 172 MMHG | TEMPERATURE: 100 F | HEART RATE: 98 BPM | BODY MASS INDEX: 46.6 KG/M2 | HEIGHT: 63 IN | OXYGEN SATURATION: 94 % | WEIGHT: 263 LBS

## 2020-08-19 DIAGNOSIS — J43.2 CENTRILOBULAR EMPHYSEMA (HCC): ICD-10-CM

## 2020-08-19 DIAGNOSIS — G47.33 OBSTRUCTIVE SLEEP APNEA: ICD-10-CM

## 2020-08-19 DIAGNOSIS — J44.1 COPD WITH ACUTE EXACERBATION (HCC): Primary | ICD-10-CM

## 2020-08-19 DIAGNOSIS — F17.210 CIGARETTE NICOTINE DEPENDENCE WITHOUT COMPLICATION: ICD-10-CM

## 2020-08-19 DIAGNOSIS — I89.8 CALCIFIED LYMPH NODES: ICD-10-CM

## 2020-08-19 PROCEDURE — 99214 OFFICE O/P EST MOD 30 MIN: CPT | Performed by: PHYSICIAN ASSISTANT

## 2020-08-19 RX ORDER — ALBUTEROL SULFATE 90 UG/1
2 AEROSOL, METERED RESPIRATORY (INHALATION) EVERY 4 HOURS PRN
Qty: 18 G | Refills: 5 | Status: SHIPPED | OUTPATIENT
Start: 2020-08-19 | End: 2021-06-21

## 2020-08-19 RX ORDER — DOXYCYCLINE HYCLATE 100 MG/1
100 CAPSULE ORAL 2 TIMES DAILY
Qty: 10 CAPSULE | Refills: 0 | Status: SHIPPED | OUTPATIENT
Start: 2020-08-19 | End: 2020-08-24

## 2020-08-19 RX ORDER — PREDNISONE 20 MG/1
40 TABLET ORAL DAILY
Qty: 10 TABLET | Refills: 0 | Status: SHIPPED | OUTPATIENT
Start: 2020-08-19 | End: 2020-09-29

## 2020-08-19 RX ORDER — GUAIFENESIN 600 MG/1
600 TABLET, EXTENDED RELEASE ORAL 2 TIMES DAILY
Qty: 20 TABLET | Refills: 0 | Status: SHIPPED | OUTPATIENT
Start: 2020-08-19 | End: 2020-08-29

## 2020-08-19 NOTE — PROGRESS NOTES
Have you had the Influenza Vaccine? yes    Would you like to receive this Vaccine today? no    Have you had the Pneumonia Vaccine?  yes   Would you like to receive this Vaccine today? no    Are you a current smoker? yes   How much? 1 PPD  Quit date? n/a    Subjective    Lisa Hill presents for the following Sleep Apnea and Emphysema      History of Present Illness     Follow-up today completed for emphysema, obstructive sleep apnea, previously notable pulmonary nodule that has resolved, stable calcified lymphadenopathy, and current cigarette nicotine dependence.   Since the previous visit, she reports having some increased shortness of breath over the past 3-4 days. She has also noted brown sputum coloration, but no hemoptysis. No subjective fever noted. She expresses concern that her breathing has been gradually declining over time.  No loss of taste or known sick contacts.  She also admits to increase frequency of intermittent wheezing.  She has continued on Stiolto for daily use.  We had progress her inhaler therapy last time from Spiriva to Stiolto.  She denies recent exacerbations within the last several months, and has not required oral antibiotics or steroids in the last several months.   She continues on use of AutoPap, but has some difficulty tolerating use of the machine and reports that she is still adjusting and trying to become comfortable with full facial mask.  Since the previous visit, she has also completed a low-dose CT chest which showed stability of the calcified lymphadenopathy.  Previously noted pulmonary nodule had resolved.  She continues to smoke approximately 1 pack/day at this time.  She is not interested in smoking cessation at this time.       Review of Systems   Constitutional: Positive for fatigue. Negative for activity change and unexpected weight change.   HENT: Negative for congestion, postnasal drip and rhinorrhea.    Respiratory: Positive for cough, shortness of breath and  wheezing. Negative for apnea and chest tightness.    Cardiovascular: Negative for chest pain and palpitations.   Gastrointestinal: Negative for nausea.   Allergic/Immunologic: Negative for environmental allergies.   Psychiatric/Behavioral: Negative for agitation and confusion.       Active Problems:  Problem List Items Addressed This Visit     None          Past Medical History:  Past Medical History:   Diagnosis Date   • Anxiety    • Arthritis    • Cigarette nicotine dependence without complication 9/8/2016   • COPD (chronic obstructive pulmonary disease) (CMS/HCC)    • Coronary artery calcification seen on CT scan 6/13/2020   • Depression    • Disease of thyroid gland     GRAVES DISEASE   • Elevated cholesterol    • GERD (gastroesophageal reflux disease)    • History of transfusion    • Hyperlipidemia    • Hypertension        Family History:  Family History   Problem Relation Age of Onset   • Kidney disease Mother    • Diabetes Mother    • Hypertension Father    • Breast cancer Maternal Aunt        Social History:  Social History     Tobacco Use   • Smoking status: Current Every Day Smoker     Packs/day: 1.00     Years: 43.00     Pack years: 43.00     Types: Cigarettes   • Smokeless tobacco: Never Used   • Tobacco comment: cessation has been discussed   Substance Use Topics   • Alcohol use: No       Current Medications:  Current Outpatient Medications   Medication Sig Dispense Refill   • acetaminophen (TYLENOL) 500 MG tablet Take 2 tablets by mouth 4 (Four) Times a Day. 240 tablet 5   • albuterol sulfate HFA (Ventolin HFA) 108 (90 Base) MCG/ACT inhaler Inhale 2 puffs Every 4 (Four) Hours As Needed for Wheezing. 18 g 5   • buPROPion XL (WELLBUTRIN XL) 300 MG 24 hr tablet Take 1 tablet by mouth Every Morning. 30 tablet 5   • busPIRone (BUSPAR) 15 MG tablet TAKE 1 TABLET BY MOUTH THREE TIMES A DAY 90 tablet 5   • cholecalciferol (VITAMIN D3) 25 MCG (1000 UT) tablet Take 2 tablets by mouth Daily. 60 tablet 5   •  clobetasol (TEMOVATE) 0.05 % ointment Apply  topically to the appropriate area as directed 2 (Two) Times a Day. 60 g 2   • conjugated estrogens (Premarin) 0.625 MG/GM vaginal cream Insert  into the vagina 2 (Two) Times a Week. 30 g 5   • escitalopram (LEXAPRO) 10 MG tablet Take 1 tablet by mouth Daily. 30 tablet 5   • fluticasone (FLONASE) 50 MCG/ACT nasal spray USE 2 SPRAYS IN EACH NOSTRIL ONCE DAILY 16 g 5   • ipratropium-albuterol (DUO-NEB) 0.5-2.5 mg/3 ml nebulizer Take 3 mL by nebulization 4 (Four) Times a Day. 120 mL 11   • ketoconazole (NIZORAL) 2 % shampoo Apply  topically to the appropriate area as directed 2 (Two) Times a Week. 120 mL 5   • levothyroxine (SYNTHROID, LEVOTHROID) 175 MCG tablet Take 1 tablet by mouth Daily. 30 tablet 5   • lidocaine (XYLOCAINE) 5 % ointment Apply  topically to the appropriate area as directed 2 (Two) Times a Day As Needed for Mild Pain . 50 g 5   • LORazepam (ATIVAN) 1 MG tablet Take 0.5 tablets by mouth 2 (Two) Times a Day As Needed for Anxiety. 90 tablet 2   • losartan (COZAAR) 50 MG tablet Take 1 tablet by mouth Daily. 30 tablet 5   • meloxicam (Mobic) 15 MG tablet Take 1 tablet by mouth Daily. 30 tablet 5   • montelukast (SINGULAIR) 10 MG tablet Take 1 tablet by mouth Every Night. 30 tablet 5   • nicotine polacrilex (NICORETTE) 4 MG gum Chew 1 each As Needed for Smoking Cessation. 1 each 8   • nystatin (MYCOSTATIN) 623104 UNIT/GM ointment Apply  topically to the appropriate area as directed 2 (Two) Times a Day. 15 g 0   • pantoprazole (PROTONIX) 40 MG EC tablet Take 1 tablet by mouth Daily. 30 tablet 5   • PROCTOZONE-HC 2.5 % rectal cream APPLY TO RECTUM 2-4 TIMES DAILY FOR 2 WEEKS 30 g 0   • risperiDONE (risperDAL) 2 MG tablet Take 1 tablet by mouth every night at bedtime. 30 tablet 5   • simvastatin (ZOCOR) 20 MG tablet Take 1 tablet by mouth Every Night. 30 tablet 5   • SPIRIVA RESPIMAT 2.5 MCG/ACT aerosol solution inhaler      • tiotropium bromide-olodaterol (Stiolto  "Respimat) 2.5-2.5 MCG/ACT aerosol solution inhaler Inhale 2 puffs Daily. 1 inhaler 5   • topiramate (TOPAMAX) 100 MG tablet Take 1 tablet by mouth 2 (Two) Times a Day. 60 tablet 5   • triamcinolone (KENALOG) 0.1 % cream Apply  topically to the appropriate area as directed 2 (Two) Times a Day As Needed for Irritation. 80 g 0   • vitamin B-12 (CYANOCOBALAMIN) 1000 MCG tablet Take 1 tablet by mouth Daily. 30 tablet 5   • WIXELA INHUB 250-50 MCG/DOSE DISKUS INHALE 1 PUFF BY MOUTH TWO TIMES A DAY 60 each 0   • Zoster Vac Recomb Adjuvanted (SHINGRIX) 50 MCG reconstituted suspension Inject 50 mcg into the appropriate muscle as directed by prescriber See Admin Instructions. 1 each 0   • clobetasol (Temovate) 0.05 % ointment Apply  topically to the appropriate area as directed 2 (Two) Times a Day. 60 g 0     No current facility-administered medications for this visit.        Allergies:  Allergies   Allergen Reactions   • Sulfa Antibiotics        Vitals:  /70   Pulse 98   Temp 100 °F (37.8 °C)   Ht 158.8 cm (62.5\")   Wt 119 kg (263 lb)   LMP  (LMP Unknown)   SpO2 94%   BMI 47.34 kg/m²   Repeat temperature obtained during the visit improved to 98.7 F  Repeat blood pressure improved 140/82      Imaging:    Imaging Results (Most Recent)     None          Pulmonary Functions Testing Results:    No results found for: FEV1, FVC, DWN8JCI, TLC, DLCO    Results for orders placed or performed in visit on 06/08/20   Comprehensive Metabolic Panel   Result Value Ref Range    Glucose 127 (H) 65 - 99 mg/dL    BUN 13 8 - 23 mg/dL    Creatinine 0.80 0.57 - 1.00 mg/dL    Sodium 135 (L) 136 - 145 mmol/L    Potassium 4.7 3.5 - 5.2 mmol/L    Chloride 96 (L) 98 - 107 mmol/L    CO2 25.2 22.0 - 29.0 mmol/L    Calcium 9.8 8.6 - 10.5 mg/dL    Total Protein 7.5 6.0 - 8.5 g/dL    Albumin 4.00 3.50 - 5.20 g/dL    ALT (SGPT) 25 1 - 33 U/L    AST (SGOT) 20 1 - 32 U/L    Alkaline Phosphatase 49 39 - 117 U/L    Total Bilirubin 0.3 0.2 - 1.2 " mg/dL    eGFR Non African Amer 73 >60 mL/min/1.73    Globulin 3.5 gm/dL    A/G Ratio 1.1 g/dL    BUN/Creatinine Ratio 16.3 7.0 - 25.0    Anion Gap 13.8 5.0 - 15.0 mmol/L   Lipid Panel   Result Value Ref Range    Total Cholesterol 139 0 - 200 mg/dL    Triglycerides 224 (H) 0 - 150 mg/dL    HDL Cholesterol 36 (L) 40 - 60 mg/dL    LDL Cholesterol  58 0 - 100 mg/dL    VLDL Cholesterol 44.8 (H) 5 - 40 mg/dL    LDL/HDL Ratio 1.62    Hemoglobin A1c   Result Value Ref Range    Hemoglobin A1C 6.30 (H) 4.80 - 5.60 %   TSH   Result Value Ref Range    TSH 4.400 (H) 0.270 - 4.200 uIU/mL   Vitamin D 25 Hydroxy   Result Value Ref Range    25 Hydroxy, Vitamin D 46.9 30.0 - 100.0 ng/ml   CBC Auto Differential   Result Value Ref Range    WBC 10.17 3.40 - 10.80 10*3/mm3    RBC 4.41 3.77 - 5.28 10*6/mm3    Hemoglobin 15.1 12.0 - 15.9 g/dL    Hematocrit 43.0 34.0 - 46.6 %    MCV 97.5 (H) 79.0 - 97.0 fL    MCH 34.2 (H) 26.6 - 33.0 pg    MCHC 35.1 31.5 - 35.7 g/dL    RDW 12.8 12.3 - 15.4 %    RDW-SD 46.4 37.0 - 54.0 fl    MPV 10.3 6.0 - 12.0 fL    Platelets 380 140 - 450 10*3/mm3    Neutrophil % 61.3 42.7 - 76.0 %    Lymphocyte % 29.5 19.6 - 45.3 %    Monocyte % 5.7 5.0 - 12.0 %    Eosinophil % 2.1 0.3 - 6.2 %    Basophil % 0.9 0.0 - 1.5 %    Immature Grans % 0.5 0.0 - 0.5 %    Neutrophils, Absolute 6.24 1.70 - 7.00 10*3/mm3    Lymphocytes, Absolute 3.00 0.70 - 3.10 10*3/mm3    Monocytes, Absolute 0.58 0.10 - 0.90 10*3/mm3    Eosinophils, Absolute 0.21 0.00 - 0.40 10*3/mm3    Basophils, Absolute 0.09 0.00 - 0.20 10*3/mm3    Immature Grans, Absolute 0.05 0.00 - 0.05 10*3/mm3    nRBC 0.0 0.0 - 0.2 /100 WBC       Objective   Physical Exam   Constitutional: She is oriented to person, place, and time. She appears well-developed and well-nourished. No distress.   HENT:   Head: Normocephalic and atraumatic.   Mouth/Throat: No oropharyngeal exudate.   Eyes: Pupils are equal, round, and reactive to light. EOM are normal.   Cardiovascular: Normal  rate, regular rhythm, S1 normal, S2 normal and normal heart sounds.   No murmur heard.  Pulmonary/Chest: Effort normal.   Bilateral air entry positive and appreciated throughout.  Wheezing noted through the lung fields bilaterally. No rhonchi, crackles.    Musculoskeletal: Normal range of motion. She exhibits no edema.   Neurological: She is alert and oriented to person, place, and time.   Skin: Skin is warm and dry. She is not diaphoretic.   Psychiatric: She has a normal mood and affect. Her behavior is normal.   Vitals reviewed.      Assessment/Plan      I have reviewed the past medical history, family history, social history, surgical history, and allergies.     I reviewed the CT report and imaging from 6/8/2020.  I agree with the interpretation of persistent emphysematous changes (centrilobular and panlobular), with stable calcified lymph nodes as compared to the imaging from 15 months prior.  Previously noted to 2 mm right-sided nodule (on image 62 of 2019 CT) was no longer visible.     Reviewed the PFT from March 2019.  Suggestive of mild obstruction with mildly reduced DLCO.  Air-trapping indicated.  bronchodilator portion was not completed.    In lab sleep study previously completed on September 15, 2018.  Notable for mild HERBERT.    I reviewed the echo from March 2019.  Trace mitral regurgitation without other significant valvular abnormalities.  EF noted at 61 to 65%.        ICD-10-CM ICD-9-CM   1. COPD with acute exacerbation (CMS/[facility]) J44.1 491.21   2. Centrilobular emphysema (CMS/[facility]) J43.2 492.8   3. Obstructive sleep apnea G47.33 327.23   4. Calcified lymph nodes I89.8 457.8   5. Cigarette nicotine dependence without complication F17.210 305.1          COPD with acute exacerbation:  · On albuterol inhaler as needed  · On DuoNeb treatments as needed  · Continue Stiolto 2 puffs daily.   However patient reports noting overall worsening of symptoms at baseline.  · Progressed therapy, added Arnuity Ellipta 1  puff daily 200 mcg  Reminded to rinse orally following use to avoid oral candidiasis.  · Ordered 5-day course of oral prednisone 20 mg twice daily  · Ordered 5-day course of doxycycline  · Ordered Mucinex tablets for as needed use with chest congestion to assist with phlegm production.  · Ordered repeat PFT to be completed in the next several weeks.  · Ordered chest x-ray  · We will discuss alpha-1 testing at the next visit  · We will repeat the walk oximetry test at the next visit due to acute exacerbation.  Saturation noted today at 94% on room air.      No fever noted upon repeat temperature check while in office.  She denied any previous known her subjective fever.  No known sick contacts.  Discussed  for symptom persistence or worsening to seek ED evaluation recommended COVID-19 testing.  She was agreeable with this and preferred to await COVID-19 testing at this time unless further symptom worsening was noted.      Obstructive sleep apnea:   · Patient attempt to use positive airway pressure nightly is recommended.  · Order sent to [employer] so she may try nasal only mask to see if tolerance improves.  Previously today was reviewed and showed only mild sleep apnea, and thus would be appropriate to try nasal only mask.       Calcified Lymph nodes:  · Per imaging, appears stable over the last year (15 months), remains measuring 8 mm.  · Plan to repeat low-dose CT chest in 1 year, June 2021  This can be ordered at a visit closer to that time.      Cigarette nicotine dependence:  · Patient continues to smoke approximately 1 pack/day.  · History significant for 43-year use of approximately 1 pack/day.  she is uninterested in smoking cessation at this time.  Counseling was limited today.  · We will continue with low-dose CT imaging.  She will qualify for repeat imaging and June to June 2021      Vaccinations: Pneumonia vaccinations and influenza vaccinations are up to date.   Recommend updated influenza  vaccination once available.     Patient's Body mass index is 47.34 kg/m². BMI is above normal parameters. Recommendations include: nutrition counseling.      Return in about 3 months (around 11/19/2020), or as needed.

## 2020-08-20 PROBLEM — I89.8 CALCIFIED LYMPH NODES: Status: ACTIVE | Noted: 2020-08-20

## 2020-08-31 RX ORDER — GUAIFENESIN 600 MG/1
600 TABLET, EXTENDED RELEASE ORAL 2 TIMES DAILY
Qty: 60 TABLET | Refills: 3 | Status: SHIPPED | OUTPATIENT
Start: 2020-08-31 | End: 2020-09-30

## 2020-09-14 ENCOUNTER — HOSPITAL ENCOUNTER (OUTPATIENT)
Dept: MAMMOGRAPHY | Facility: HOSPITAL | Age: 60
Discharge: HOME | End: 2020-09-14
Admitting: GENERAL PRACTICE

## 2020-09-14 DIAGNOSIS — Z12.31 ENCOUNTER FOR SCREENING MAMMOGRAM FOR BREAST CANCER: ICD-10-CM

## 2020-09-14 DIAGNOSIS — Z00.00 HEALTHCARE MAINTENANCE: ICD-10-CM

## 2020-09-14 PROCEDURE — 77067 SCR MAMMO BI INCL CAD: CPT

## 2020-09-14 PROCEDURE — 77063 BREAST TOMOSYNTHESIS BI: CPT | Performed by: RADIOLOGY

## 2020-09-14 PROCEDURE — 77067 SCR MAMMO BI INCL CAD: CPT | Performed by: RADIOLOGY

## 2020-09-14 PROCEDURE — 77063 BREAST TOMOSYNTHESIS BI: CPT

## 2020-09-21 ENCOUNTER — OFFICE VISIT (OUTPATIENT)
Dept: FAMILY MEDICINE CLINIC | Facility: CLINIC | Age: 60
End: 2020-09-21

## 2020-09-21 VITALS
HEIGHT: 63 IN | DIASTOLIC BLOOD PRESSURE: 62 MMHG | HEART RATE: 114 BPM | OXYGEN SATURATION: 94 % | TEMPERATURE: 97.8 F | RESPIRATION RATE: 16 BRPM | BODY MASS INDEX: 46.6 KG/M2 | WEIGHT: 263 LBS | SYSTOLIC BLOOD PRESSURE: 124 MMHG

## 2020-09-21 DIAGNOSIS — J30.9 CHRONIC ALLERGIC RHINITIS: ICD-10-CM

## 2020-09-21 DIAGNOSIS — K21.9 GASTROESOPHAGEAL REFLUX DISEASE WITHOUT ESOPHAGITIS: ICD-10-CM

## 2020-09-21 DIAGNOSIS — M54.59 MECHANICAL LOW BACK PAIN: ICD-10-CM

## 2020-09-21 DIAGNOSIS — K59.09 CHRONIC CONSTIPATION: ICD-10-CM

## 2020-09-21 DIAGNOSIS — E03.8 HYPOTHYROIDISM DUE TO HASHIMOTO'S THYROIDITIS: ICD-10-CM

## 2020-09-21 DIAGNOSIS — F17.200 SMOKER: ICD-10-CM

## 2020-09-21 DIAGNOSIS — I10 ESSENTIAL HYPERTENSION: ICD-10-CM

## 2020-09-21 DIAGNOSIS — I25.10 CORONARY ARTERY CALCIFICATION SEEN ON CT SCAN: ICD-10-CM

## 2020-09-21 DIAGNOSIS — N39.3 STRESS BLADDER INCONTINENCE, FEMALE: ICD-10-CM

## 2020-09-21 DIAGNOSIS — M15.9 GENERALIZED OSTEOARTHRITIS: ICD-10-CM

## 2020-09-21 DIAGNOSIS — E55.9 VITAMIN D DEFICIENCY: ICD-10-CM

## 2020-09-21 DIAGNOSIS — E78.2 MIXED HYPERLIPIDEMIA: Primary | ICD-10-CM

## 2020-09-21 DIAGNOSIS — M85.80 OSTEOPENIA, UNSPECIFIED LOCATION: ICD-10-CM

## 2020-09-21 DIAGNOSIS — E06.3 HYPOTHYROIDISM DUE TO HASHIMOTO'S THYROIDITIS: ICD-10-CM

## 2020-09-21 DIAGNOSIS — E66.01 MORBID OBESITY WITH BMI OF 45.0-49.9, ADULT (HCC): ICD-10-CM

## 2020-09-21 DIAGNOSIS — R73.03 PREDIABETES: ICD-10-CM

## 2020-09-21 DIAGNOSIS — F41.8 DEPRESSION WITH ANXIETY: ICD-10-CM

## 2020-09-21 DIAGNOSIS — Z23 ENCOUNTER FOR IMMUNIZATION: ICD-10-CM

## 2020-09-21 DIAGNOSIS — J43.1 PANLOBULAR EMPHYSEMA (HCC): ICD-10-CM

## 2020-09-21 DIAGNOSIS — G47.33 OBSTRUCTIVE SLEEP APNEA: ICD-10-CM

## 2020-09-21 DIAGNOSIS — Z00.00 HEALTHCARE MAINTENANCE: ICD-10-CM

## 2020-09-21 PROBLEM — J44.1 COPD WITH ACUTE EXACERBATION (HCC): Status: RESOLVED | Noted: 2019-11-06 | Resolved: 2020-09-21

## 2020-09-21 PROCEDURE — 99214 OFFICE O/P EST MOD 30 MIN: CPT | Performed by: GENERAL PRACTICE

## 2020-09-21 PROCEDURE — 90686 IIV4 VACC NO PRSV 0.5 ML IM: CPT | Performed by: GENERAL PRACTICE

## 2020-09-21 PROCEDURE — 90471 IMMUNIZATION ADMIN: CPT | Performed by: GENERAL PRACTICE

## 2020-09-23 ENCOUNTER — PRIOR AUTHORIZATION (OUTPATIENT)
Dept: FAMILY MEDICINE CLINIC | Facility: CLINIC | Age: 60
End: 2020-09-23

## 2020-09-29 DIAGNOSIS — J43.1 PANLOBULAR EMPHYSEMA (HCC): Primary | ICD-10-CM

## 2020-10-13 ENCOUNTER — TELEPHONE (OUTPATIENT)
Dept: FAMILY MEDICINE CLINIC | Facility: CLINIC | Age: 60
End: 2020-10-13

## 2020-10-13 ENCOUNTER — LAB (OUTPATIENT)
Dept: FAMILY MEDICINE CLINIC | Facility: CLINIC | Age: 60
End: 2020-10-13

## 2020-10-13 NOTE — TELEPHONE ENCOUNTER
----- Message from Scott Chavez MD sent at 10/13/2020  9:30 AM EDT -----  He doesn't need any at present - I was going to arrange labs again at her return  ----- Message -----  From: Yesenia Cruz MA  Sent: 10/13/2020   8:46 AM EDT  To: Scott Chavez MD    Patient called in and said that you mentioned getting some additional labs done for her thyroid. I don't see any orders in the system could you add them please?

## 2020-10-19 ENCOUNTER — TELEPHONE (OUTPATIENT)
Dept: FAMILY MEDICINE CLINIC | Facility: CLINIC | Age: 60
End: 2020-10-19

## 2020-10-19 DIAGNOSIS — M54.59 MECHANICAL LOW BACK PAIN: Primary | ICD-10-CM

## 2020-10-19 DIAGNOSIS — M15.9 GENERALIZED OSTEOARTHRITIS: ICD-10-CM

## 2020-10-19 NOTE — TELEPHONE ENCOUNTER
----- Message from Scott Chavez MD sent at 10/19/2020 10:59 AM EDT -----  Referral placed in epic - please forward  Thanks  ----- Message -----  From: Yesenia Cruz MA  Sent: 10/19/2020  10:47 AM EDT  To: Scott Chavez MD    [facility] pt  ----- Message -----  From: Scott Chavez MD  Sent: 10/19/2020  10:23 AM EDT  To: Yesenia Cruz MA    Sure - she have a preference where?  ----- Message -----  From: Yesenia Cruz MA  Sent: 10/19/2020  10:11 AM EDT  To: Scott Chavez MD    Pt called and wanted to know if you could refer her to physical therapy for her back?

## 2020-11-24 ENCOUNTER — OFFICE VISIT (OUTPATIENT)
Dept: PULMONOLOGY | Facility: CLINIC | Age: 60
End: 2020-11-24

## 2020-11-24 DIAGNOSIS — G47.33 OBSTRUCTIVE SLEEP APNEA: ICD-10-CM

## 2020-11-24 DIAGNOSIS — F17.210 CIGARETTE NICOTINE DEPENDENCE WITHOUT COMPLICATION: ICD-10-CM

## 2020-11-24 DIAGNOSIS — G47.34 NOCTURNAL HYPOXIA: ICD-10-CM

## 2020-11-24 DIAGNOSIS — J43.1 PANLOBULAR EMPHYSEMA (HCC): Primary | ICD-10-CM

## 2020-11-24 PROCEDURE — 99406 BEHAV CHNG SMOKING 3-10 MIN: CPT | Performed by: PHYSICIAN ASSISTANT

## 2020-11-24 PROCEDURE — 99442 PR PHYS/QHP TELEPHONE EVALUATION 11-20 MIN: CPT | Performed by: PHYSICIAN ASSISTANT

## 2020-11-24 RX ORDER — NICOTINE 21 MG/24HR
1 PATCH, TRANSDERMAL 24 HOURS TRANSDERMAL EVERY 24 HOURS
Qty: 1 EACH | Refills: 8 | Status: SHIPPED | OUTPATIENT
Start: 2020-11-24 | End: 2020-12-24

## 2020-11-24 NOTE — PROGRESS NOTES
You have chosen to receive care through a telephone visit. Do you consent to use a telephone visit for your medical care today? Yes      Interval history since last visit: stable symptoms, switched inhaler due to irritation.     Recent hospitalizations: none    Investigations (imaging, PFT's, labs, sleep study, record requests, etc.)    Have you had the Influenza Vaccine? yes    Would you like to receive this Vaccine today? no    Have you had the Pneumonia Vaccine?  yes   Would you like to receive this Vaccine today? no    Subjective    Lisa Hill presents for the following emphysema, obstructive sleep apnea, cigarette dependence.  .    History of Present Illness     Patient presents today for follow up of emphysema, obstructive sleep apnea, cigarette dependence..   She reports stable symptoms at this time. Switched inhalers from arnuity/stiolto as she was having mouth irritation.  No acute worsening of symptoms at this time.  Using Breztri sample provided by PCP. No oral irritation noted thus far.   No longer using her sleep machine as she could not tolerate use. Using supplemental oxygen at nighttime.   Continues to smoke approximately 1 pack/day, sometimes more or less depending on the day.  She does wish to try nicotine patches for assistance with cessation.  She was previously tried on Nicorette gum.      Review of Systems   Constitutional: Negative for activity change, chills and fever.   HENT: Negative for congestion and rhinorrhea.    Respiratory: Negative for cough, shortness of breath and wheezing.    Cardiovascular: Negative for chest pain and palpitations.   Gastrointestinal: Negative for nausea.   Neurological: Negative for dizziness and light-headedness.   Psychiatric/Behavioral: Negative for agitation.        Active Problems:  Problem List Items Addressed This Visit        Respiratory    Panlobular emphysema (CMS/HCC) - Primary    Relevant Medications    Budeson-Glycopyrrol-Formoterol (Breztri  Aerosphere) 160-9-4.8 MCG/ACT aerosol inhaler    Obstructive sleep apnea    Nocturnal hypoxia       Other    Cigarette nicotine dependence without complication    Relevant Medications    nicotine (NICODERM CQ) 21 MG/24HR patch    nicotine polacrilex (NICORETTE) 4 MG gum          Past Medical History:  Past Medical History:   Diagnosis Date   • Anxiety    • Arthritis    • Cigarette nicotine dependence without complication 9/8/2016   • COPD (chronic obstructive pulmonary disease) (CMS/HCC)    • Coronary artery calcification seen on CT scan 6/13/2020   • Depression    • Disease of thyroid gland     GRAVES DISEASE   • Elevated cholesterol    • GERD (gastroesophageal reflux disease)    • History of transfusion    • Hyperlipidemia    • Hypertension        Family History:  Family History   Problem Relation Age of Onset   • Kidney disease Mother    • Diabetes Mother    • Hypertension Father    • Breast cancer Maternal Aunt        Social History:  Social History     Tobacco Use   • Smoking status: Current Every Day Smoker     Packs/day: 1.00     Years: 43.00     Pack years: 43.00     Types: Cigarettes   • Smokeless tobacco: Never Used   • Tobacco comment: cessation has been discussed   Substance Use Topics   • Alcohol use: No       Current Medications:  Current Outpatient Medications   Medication Sig Dispense Refill   • acetaminophen (TYLENOL) 500 MG tablet Take 2 tablets by mouth 4 (Four) Times a Day. 240 tablet 5   • albuterol sulfate HFA (Ventolin HFA) 108 (90 Base) MCG/ACT inhaler Inhale 2 puffs Every 4 (Four) Hours As Needed for Wheezing. 18 g 5   • Budeson-Glycopyrrol-Formoterol (Breztri Aerosphere) 160-9-4.8 MCG/ACT aerosol inhaler Inhale 2 puffs 2 (Two) Times a Day. 1 each 8   • buPROPion XL (WELLBUTRIN XL) 300 MG 24 hr tablet Take 1 tablet by mouth Every Morning. 30 tablet 5   • busPIRone (BUSPAR) 15 MG tablet TAKE 1 TABLET BY MOUTH THREE TIMES A DAY 90 tablet 5   • cholecalciferol (VITAMIN D3) 25 MCG (1000 UT)  tablet Take 2 tablets by mouth Daily. 60 tablet 5   • clobetasol (TEMOVATE) 0.05 % ointment Apply  topically to the appropriate area as directed 2 (Two) Times a Day. 60 g 2   • clobetasol (Temovate) 0.05 % ointment Apply  topically to the appropriate area as directed 2 (Two) Times a Day. 60 g 0   • conjugated estrogens (Premarin) 0.625 MG/GM vaginal cream Insert  into the vagina 2 (Two) Times a Week. 30 g 5   • escitalopram (LEXAPRO) 10 MG tablet Take 1 tablet by mouth Daily. 30 tablet 5   • fluticasone (FLONASE) 50 MCG/ACT nasal spray USE 2 SPRAYS IN EACH NOSTRIL ONCE DAILY 16 g 5   • ipratropium-albuterol (DUO-NEB) 0.5-2.5 mg/3 ml nebulizer Take 3 mL by nebulization 4 (Four) Times a Day. 120 mL 11   • ketoconazole (NIZORAL) 2 % shampoo Apply  topically to the appropriate area as directed 2 (Two) Times a Week. 120 mL 5   • levothyroxine (SYNTHROID, LEVOTHROID) 175 MCG tablet Take 1 tablet by mouth Daily. 30 tablet 5   • lidocaine (XYLOCAINE) 5 % ointment Apply  topically to the appropriate area as directed 2 (Two) Times a Day As Needed for Mild Pain . 50 g 5   • LORazepam (ATIVAN) 1 MG tablet Take 0.5 tablets by mouth 2 (Two) Times a Day As Needed for Anxiety. 90 tablet 2   • losartan (COZAAR) 50 MG tablet Take 1 tablet by mouth Daily. 30 tablet 5   • meloxicam (Mobic) 15 MG tablet Take 1 tablet by mouth Daily. 30 tablet 5   • montelukast (SINGULAIR) 10 MG tablet Take 1 tablet by mouth Every Night. 30 tablet 5   • nicotine (NICODERM CQ) 21 MG/24HR patch Place 1 patch on the skin as directed by provider Daily for 30 days. 1 each 8   • nicotine polacrilex (NICORETTE) 4 MG gum Chew 1 each As Needed for Smoking Cessation for up to 30 days. 1 each 8   • nystatin (MYCOSTATIN) 174869 UNIT/GM ointment Apply  topically to the appropriate area as directed 2 (Two) Times a Day. 15 g 0   • pantoprazole (PROTONIX) 40 MG EC tablet Take 1 tablet by mouth Daily. 30 tablet 5   • PROCTOZONE-HC 2.5 % rectal cream APPLY TO RECTUM 2-4  TIMES DAILY FOR 2 WEEKS 30 g 0   • risperiDONE (risperDAL) 2 MG tablet Take 1 tablet by mouth every night at bedtime. 30 tablet 5   • simvastatin (ZOCOR) 20 MG tablet Take 1 tablet by mouth Every Night. 30 tablet 5   • topiramate (TOPAMAX) 100 MG tablet Take 1 tablet by mouth 2 (Two) Times a Day. 60 tablet 5   • triamcinolone (KENALOG) 0.1 % cream Apply  topically to the appropriate area as directed 2 (Two) Times a Day As Needed for Irritation. 80 g 0   • vitamin B-12 (CYANOCOBALAMIN) 1000 MCG tablet Take 1 tablet by mouth Daily. 30 tablet 5   • Zoster Vac Recomb Adjuvanted (SHINGRIX) 50 MCG reconstituted suspension Inject 50 mcg into the appropriate muscle as directed by prescriber See Admin Instructions. 1 each 0     No current facility-administered medications for this visit.        Allergies:  Allergies   Allergen Reactions   • Sulfa Antibiotics        Vitals:  LMP  (LMP Unknown)     Imaging:    Imaging Results (Most Recent)     None          Pulmonary Functions Testing Results:    No results found for: FEV1, FVC, FDW6ZUB, TLC, DLCO    Results for orders placed or performed in visit on 06/08/20   Comprehensive Metabolic Panel    Specimen: Blood   Result Value Ref Range    Glucose 127 (H) 65 - 99 mg/dL    BUN 13 8 - 23 mg/dL    Creatinine 0.80 0.57 - 1.00 mg/dL    Sodium 135 (L) 136 - 145 mmol/L    Potassium 4.7 3.5 - 5.2 mmol/L    Chloride 96 (L) 98 - 107 mmol/L    CO2 25.2 22.0 - 29.0 mmol/L    Calcium 9.8 8.6 - 10.5 mg/dL    Total Protein 7.5 6.0 - 8.5 g/dL    Albumin 4.00 3.50 - 5.20 g/dL    ALT (SGPT) 25 1 - 33 U/L    AST (SGOT) 20 1 - 32 U/L    Alkaline Phosphatase 49 39 - 117 U/L    Total Bilirubin 0.3 0.2 - 1.2 mg/dL    eGFR Non African Amer 73 >60 mL/min/1.73    Globulin 3.5 gm/dL    A/G Ratio 1.1 g/dL    BUN/Creatinine Ratio 16.3 7.0 - 25.0    Anion Gap 13.8 5.0 - 15.0 mmol/L   Lipid Panel    Specimen: Blood   Result Value Ref Range    Total Cholesterol 139 0 - 200 mg/dL    Triglycerides 224 (H) 0 - 150  mg/dL    HDL Cholesterol 36 (L) 40 - 60 mg/dL    LDL Cholesterol  58 0 - 100 mg/dL    VLDL Cholesterol 44.8 (H) 5 - 40 mg/dL    LDL/HDL Ratio 1.62    Hemoglobin A1c    Specimen: Blood   Result Value Ref Range    Hemoglobin A1C 6.30 (H) 4.80 - 5.60 %   TSH    Specimen: Blood   Result Value Ref Range    TSH 4.400 (H) 0.270 - 4.200 uIU/mL   Vitamin D 25 Hydroxy    Specimen: Blood   Result Value Ref Range    25 Hydroxy, Vitamin D 46.9 30.0 - 100.0 ng/ml   CBC Auto Differential    Specimen: Blood   Result Value Ref Range    WBC 10.17 3.40 - 10.80 10*3/mm3    RBC 4.41 3.77 - 5.28 10*6/mm3    Hemoglobin 15.1 12.0 - 15.9 g/dL    Hematocrit 43.0 34.0 - 46.6 %    MCV 97.5 (H) 79.0 - 97.0 fL    MCH 34.2 (H) 26.6 - 33.0 pg    MCHC 35.1 31.5 - 35.7 g/dL    RDW 12.8 12.3 - 15.4 %    RDW-SD 46.4 37.0 - 54.0 fl    MPV 10.3 6.0 - 12.0 fL    Platelets 380 140 - 450 10*3/mm3    Neutrophil % 61.3 42.7 - 76.0 %    Lymphocyte % 29.5 19.6 - 45.3 %    Monocyte % 5.7 5.0 - 12.0 %    Eosinophil % 2.1 0.3 - 6.2 %    Basophil % 0.9 0.0 - 1.5 %    Immature Grans % 0.5 0.0 - 0.5 %    Neutrophils, Absolute 6.24 1.70 - 7.00 10*3/mm3    Lymphocytes, Absolute 3.00 0.70 - 3.10 10*3/mm3    Monocytes, Absolute 0.58 0.10 - 0.90 10*3/mm3    Eosinophils, Absolute 0.21 0.00 - 0.40 10*3/mm3    Basophils, Absolute 0.09 0.00 - 0.20 10*3/mm3    Immature Grans, Absolute 0.05 0.00 - 0.05 10*3/mm3    nRBC 0.0 0.0 - 0.2 /100 WBC       Objective   Physical Exam    Physical exam not completed due to telephone encounter.    Assessment/Plan      I have reviewed the past medical history, family history, social history, surgical history, and allergies.     Reviewed the previous CT imaging, notable for stable calcified lymph node, moderate COPD changes.     Reviewed the previous PFT from 2019, notable for mild obstruction, mildly reduced DLCO.     Reviewed the previous echo from 2019.       ICD-10-CM ICD-9-CM   1. Panlobular emphysema (CMS/[facility])  J43.1 492.8   2.  Cigarette nicotine dependence without complication  F17.210 305.1   3. Obstructive sleep apnea  G47.33 327.23   4. Nocturnal hypoxia  G47.34 327.24        COPD, emphysema:  · On albuterol inhaler as needed  · On DuoNeb treatments as needed  · On Breztri sample by PCP.   Ordered refills and will apply for PA as needed.   Arnuity 200 mcg and Stiolto caused mouth irritation.   Reminded to rinse orally following use to avoid oral candidiasis.  · Ordered repeat PFT to be completed in the next several weeks.  · Ordered chest x-ray  · We will discuss alpha-1 testing at the next visit  · We will repeat the walk oximetry test at the next visit           Obstructive sleep apnea/Nocturnal hypoxia:   · Could not tolerate positive airway pressure therapy  · Currently using nasal cannula 2 L/min at nighttime.         Calcified Lymph nodes:  · Per imaging, appears stable over the last year (15 months), remains measuring 8 mm.  · Plan to repeat low-dose CT chest in 1 year, June 2021  This will be ordered at a visit closer to that time.         Cigarette nicotine dependence:  · Patient continues to smoke approximately 1 pack/day.  History significant for 43-year use of approximately 1 pack/day.  She is aware of the risks of continued use (including worsening respiratory function, increased risk of MI), and possible benefits of cessation including slowed progression of COPD.   · She is interested in smoking cessation.  Ordered high-dose nicotine patches and Nicorette gum.   Counseled on use and recommended reduced use if not complete cessation goal by the next visit.   Counseling completed for 5 minutes.     · We will complete low-dose CT imaging, will order at the next visit for June 2021          Vaccinations: Pneumonia and influenza vaccinations are up-to-date.       Return in about 6 months (around 5/24/2021), or as needed.         This visit has been rescheduled as a phone visit to comply with patient safety concerns in  accordance with CDC recommendations. Total time of discussion was 14 minutes.

## 2020-12-21 ENCOUNTER — OFFICE VISIT (OUTPATIENT)
Dept: FAMILY MEDICINE CLINIC | Facility: CLINIC | Age: 60
End: 2020-12-21

## 2020-12-21 DIAGNOSIS — M15.9 GENERALIZED OSTEOARTHRITIS: ICD-10-CM

## 2020-12-21 DIAGNOSIS — E06.3 HYPOTHYROIDISM DUE TO HASHIMOTO'S THYROIDITIS: ICD-10-CM

## 2020-12-21 DIAGNOSIS — I25.10 CORONARY ARTERY CALCIFICATION SEEN ON CT SCAN: ICD-10-CM

## 2020-12-21 DIAGNOSIS — I10 ESSENTIAL HYPERTENSION: ICD-10-CM

## 2020-12-21 DIAGNOSIS — R73.03 PREDIABETES: ICD-10-CM

## 2020-12-21 DIAGNOSIS — K59.09 CHRONIC CONSTIPATION: ICD-10-CM

## 2020-12-21 DIAGNOSIS — J43.1 PANLOBULAR EMPHYSEMA (HCC): ICD-10-CM

## 2020-12-21 DIAGNOSIS — K21.9 GASTROESOPHAGEAL REFLUX DISEASE WITHOUT ESOPHAGITIS: ICD-10-CM

## 2020-12-21 DIAGNOSIS — N39.3 STRESS BLADDER INCONTINENCE, FEMALE: ICD-10-CM

## 2020-12-21 DIAGNOSIS — M54.59 MECHANICAL LOW BACK PAIN: ICD-10-CM

## 2020-12-21 DIAGNOSIS — E66.01 MORBID OBESITY WITH BMI OF 45.0-49.9, ADULT (HCC): ICD-10-CM

## 2020-12-21 DIAGNOSIS — F41.8 DEPRESSION WITH ANXIETY: ICD-10-CM

## 2020-12-21 DIAGNOSIS — G62.9 PERIPHERAL POLYNEUROPATHY: ICD-10-CM

## 2020-12-21 DIAGNOSIS — G47.33 OBSTRUCTIVE SLEEP APNEA: ICD-10-CM

## 2020-12-21 DIAGNOSIS — J30.9 CHRONIC ALLERGIC RHINITIS: ICD-10-CM

## 2020-12-21 DIAGNOSIS — E55.9 VITAMIN D DEFICIENCY: ICD-10-CM

## 2020-12-21 DIAGNOSIS — F45.0 SOMATIZATION DISORDER: ICD-10-CM

## 2020-12-21 DIAGNOSIS — E78.2 MIXED HYPERLIPIDEMIA: Primary | ICD-10-CM

## 2020-12-21 DIAGNOSIS — F17.200 SMOKER: ICD-10-CM

## 2020-12-21 DIAGNOSIS — M85.80 OSTEOPENIA, UNSPECIFIED LOCATION: ICD-10-CM

## 2020-12-21 DIAGNOSIS — Z00.00 HEALTHCARE MAINTENANCE: ICD-10-CM

## 2020-12-21 DIAGNOSIS — E03.8 HYPOTHYROIDISM DUE TO HASHIMOTO'S THYROIDITIS: ICD-10-CM

## 2020-12-21 PROCEDURE — 99214 OFFICE O/P EST MOD 30 MIN: CPT | Performed by: GENERAL PRACTICE

## 2020-12-21 NOTE — PROGRESS NOTES
Subjective   Lisa Hill is a 60 y.o. female.     History of Present Illness     COPD  She feels that her SOB and cough are at baseline.  There is no history of any chest pain or hemoptysis.  She continues to have intermittent nasal congestion but denies any other upper respiratory tract symptoms, fever or chills. She reports that her symptoms become worse with activity, exposure to cold air and environmental allergens. Her symptoms are reduced with rest, supplemental oxygen, and inhaled inhaled medication.  She has found breztri to be more effective than anything else thus far. She continues to smoke about 1 pack per day.  She underwent an updated low dose CT of the chest on 6/4/2020 with evidence of moderate COPD, stable small lung nodules, and mild coronary artery calcifications.  She was advised to have her next study in 1 year.  She continues to be followed by pulmonology and is underwent a reassessment on 11/24/2020 with no changes made in her management    Dyslipidemia  Compliance with treatment has been fair. The patient exercises occasionally. She remains on simvastatin with no apparent side effects    Hypothyroidism  Patient presents for evaluation of thyroid function. Symptoms consist of weight gain, constipation. The symptoms are moderate.  The problem has been unchanged.  Previous thyroid studies include TSH. The hypothyroidism is due to Hashimoto's disease.    Low Back Pain  She complains of persistent low back pain. There has been no change in the quality of her discomfort nor any new associated symptoms. There is no history of any weakness, numbness, tingling, or any change in her bowel/bladder control.  There is no history of any fever, chills, night sweats or weight loss.  When she sits down the pain generally resolves within minutes and she denies any problem at night. She continues to use her TENS unit for several hours daily and feels that it helps.  She also feels that meloxicam helps.   Plain films of the lumbar spine performed on 8/5/2019 were reported as showing degenerative disc disease as well as atherosclerotic calcification of the aorta    Depression  Onset was a number of years ago. Current symptoms include: depression, nervousness, anhedonia, difficulty concentrating, fatigue and impaired memory. Patient denies recurrent thoughts of death or suicidal thoughts. Risk factors: history of seasonal affective disorder.  Current medication includes escitalopram 10 daily,  bupropion 300 daily, risperdone 2 nightly, and lorazepam 0.5 twice a day.  She has generally done worse in the winter but feels that she is managing fairly well so far     The following portions of the patient's history were reviewed and updated as appropriate: allergies, current medications, past medical history, past social history and problem list.    Review of Systems   Constitutional: Positive for fatigue. Negative for appetite change, chills, fever and unexpected weight change.   HENT: Negative for congestion, ear pain, postnasal drip, rhinorrhea, sneezing, sore throat and voice change.    Eyes: Negative for visual disturbance.   Respiratory: Positive for cough, shortness of breath and wheezing.    Cardiovascular: Positive for leg swelling. Negative for chest pain and palpitations.   Gastrointestinal: Positive for constipation. Negative for abdominal pain, anal bleeding, blood in stool, diarrhea, nausea and vomiting.   Genitourinary: Negative for difficulty urinating, dysuria, frequency, hematuria and urgency.        Incontinence with coughing, sneezing, or lifting   Musculoskeletal: Positive for arthralgias and back pain. Negative for joint swelling and myalgias.   Skin: Negative for rash.   Neurological: Positive for headaches (when stressed). Negative for weakness and numbness.   Psychiatric/Behavioral: Positive for decreased concentration, dysphoric mood and sleep disturbance. Negative for suicidal ideas.     Objective    Physical Exam  Constitutional:       General: She is not in acute distress.     Appearance: Normal appearance. She is well-developed. She is not diaphoretic.      Comments: Accompanied by her .  Bright and in fair spirits.  Climbed onto the exam table with minimal assistance.  No apparent distress.   HENT:      Head: Atraumatic.      Right Ear: Tympanic membrane, ear canal and external ear normal.      Left Ear: Tympanic membrane, ear canal and external ear normal.   Eyes:      Conjunctiva/sclera: Conjunctivae normal.   Neck:      Thyroid: No thyroid mass or thyromegaly.      Vascular: No carotid bruit or JVD.      Trachea: Trachea normal. No tracheal deviation.   Cardiovascular:      Rate and Rhythm: Normal rate and regular rhythm.      Heart sounds: Normal heart sounds, S1 normal and S2 normal. No murmur. No gallop.    Pulmonary:      Effort: Pulmonary effort is normal.      Breath sounds: Decreased air movement present. Examination of the right-lower field reveals decreased breath sounds. Examination of the left-lower field reveals decreased breath sounds. Decreased breath sounds and wheezing (diffuse - moderate) present. No rales.      Comments: Pulmonary hyperinflation  Abdominal:      General: Bowel sounds are normal. There is no distension or abdominal bruit.      Palpations: Abdomen is soft. There is no hepatomegaly, splenomegaly or mass.      Tenderness: There is no abdominal tenderness.      Hernia: No hernia is present.   Musculoskeletal:      Right lower leg: No edema.      Left lower leg: No edema.   Lymphadenopathy:      Head:      Right side of head: No submental, submandibular, tonsillar, preauricular, posterior auricular or occipital adenopathy.      Left side of head: No submental, submandibular, tonsillar, preauricular, posterior auricular or occipital adenopathy.      Cervical: No cervical adenopathy.      Upper Body:      Right upper body: No supraclavicular adenopathy.      Left upper  body: No supraclavicular adenopathy.   Skin:     General: Skin is warm.      Coloration: Skin is not cyanotic, jaundiced or pale.      Findings: No rash.      Nails: There is no clubbing.     Neurological:      Mental Status: She is alert and oriented to person, place, and time.      Cranial Nerves: No cranial nerve deficit.      Sensory: Sensory deficit (decreased vibration sense both feet) present.      Motor: No tremor.      Coordination: Coordination normal.      Gait: Gait normal.   Psychiatric:         Speech: Speech normal.         Behavior: Behavior normal.         Thought Content: Thought content normal.       Assessment/Plan   Problems Addressed this Visit        Cardiovascular and Mediastinum    Coronary artery calcification seen on CT scan  Reminded regarding risk factor modification with an emphasis on tobacco cessation.  Continue current medication    Essential hypertension   Hypertension: at goal. Evidence of target organ damage: coronary artery calcifications on previous imaging.  Encouraged to continue to work on diet and exercise plan.   Continue current medication    Relevant Orders    CBC & Differential (Completed)    Comprehensive Metabolic Panel (Completed)    Mixed hyperlipidemia  As above.   Continue current medication.  Updated labs drawn.    Relevant Orders    Lipid Panel (Completed)       Respiratory    Chronic allergic rhinitis    Obstructive sleep apnea    Panlobular emphysema (CMS/HCC)   COPD is worsening.  Reminded of the importance of smoking cessation  Encouraged to remain as active as symptoms allow for  Patient is doing well with breztri and will try to obtain PA  Follow up with pulmonology        Digestive    Chronic constipation    Gastroesophageal reflux disease without esophagitis    Morbid obesity with BMI of 45.0-49.9, adult (CMS/HCC)    Vitamin D deficiency       Endocrine    Hypothyroidism due to Hashimoto's thyroiditis  Clinically euthyroid.  Continue current medication.     Prediabetes  As above.  Will continue to monitor    Relevant Orders    Hemoglobin A1c (Completed)       Nervous and Auditory    Mechanical low back pain  Reminded regarding symptomatic treatment.   Continue current medication    Peripheral polyneuropathy  Will continue to monitor       Musculoskeletal and Integument    Generalized osteoarthritis    Osteopenia       Genitourinary    Stress bladder incontinence, female       Other    Depression with anxiety  Stable.  Supportive therapy.   Continue current medication.    Healthcare maintenance  Patient has already received a flu shot fall.    Smoker  Lengthy discussion regarding potential sequelae and the importance of cessation          Diagnoses       Codes Comments    Mixed hyperlipidemia    -  Primary ICD-10-CM: E78.2  ICD-9-CM: 272.2     Essential hypertension     ICD-10-CM: I10  ICD-9-CM: 401.9     Coronary artery calcification seen on CT scan     ICD-10-CM: I25.10  ICD-9-CM: 414.00     Panlobular emphysema (CMS/[facility])     ICD-10-CM: J43.1  ICD-9-CM: 492.8     Obstructive sleep apnea     ICD-10-CM: G47.33  ICD-9-CM: 327.23     Chronic allergic rhinitis     ICD-10-CM: J30.9  ICD-9-CM: 477.9     Vitamin D deficiency     ICD-10-CM: E55.9  ICD-9-CM: 268.9     Morbid obesity with BMI of 45.0-49.9, adult (CMS/[facility])     ICD-10-CM: E66.01, Z68.42  ICD-9-CM: 278.01, V85.42     Gastroesophageal reflux disease without esophagitis     ICD-10-CM: K21.9  ICD-9-CM: 530.81     Chronic constipation     ICD-10-CM: K59.09  ICD-9-CM: 564.00     Prediabetes     ICD-10-CM: R73.03  ICD-9-CM: 790.29     Hypothyroidism due to Hashimoto's thyroiditis     ICD-10-CM: E03.8, E06.3  ICD-9-CM: 244.8, 245.2     Mechanical low back pain     ICD-10-CM: M54.5  ICD-9-CM: 724.2     Generalized osteoarthritis     ICD-10-CM: M15.9  ICD-9-CM: 715.00     Osteopenia, unspecified location     ICD-10-CM: M85.80  ICD-9-CM: 733.90     Stress bladder incontinence, female     ICD-10-CM: N39.3  ICD-9-CM: 625.6      Somatization disorder     ICD-10-CM: F45.0  ICD-9-CM: 300.81     Smoker     ICD-10-CM: F17.200  ICD-9-CM: 305.1     Healthcare maintenance     ICD-10-CM: Z00.00  ICD-9-CM: V70.0     Depression with anxiety     ICD-10-CM: F41.8  ICD-9-CM: 300.4     Peripheral polyneuropathy     ICD-10-CM: G62.9  ICD-9-CM: 356.9

## 2020-12-22 ENCOUNTER — LAB (OUTPATIENT)
Dept: FAMILY MEDICINE CLINIC | Facility: CLINIC | Age: 60
End: 2020-12-22

## 2020-12-22 DIAGNOSIS — K59.09 CHRONIC CONSTIPATION: ICD-10-CM

## 2020-12-22 DIAGNOSIS — I10 ESSENTIAL HYPERTENSION: ICD-10-CM

## 2020-12-22 DIAGNOSIS — R73.03 PREDIABETES: ICD-10-CM

## 2020-12-22 DIAGNOSIS — E78.2 MIXED HYPERLIPIDEMIA: ICD-10-CM

## 2020-12-22 DIAGNOSIS — J30.9 CHRONIC ALLERGIC RHINITIS: ICD-10-CM

## 2020-12-22 LAB
ALBUMIN SERPL-MCNC: 3.7 G/DL (ref 3.5–5.2)
ALBUMIN/GLOB SERPL: 1 G/DL
ALP SERPL-CCNC: 59 U/L (ref 39–117)
ALT SERPL W P-5'-P-CCNC: 24 U/L (ref 1–33)
ANION GAP SERPL CALCULATED.3IONS-SCNC: 13.2 MMOL/L (ref 5–15)
AST SERPL-CCNC: 16 U/L (ref 1–32)
BASOPHILS # BLD AUTO: 0.11 10*3/MM3 (ref 0–0.2)
BASOPHILS NFR BLD AUTO: 1 % (ref 0–1.5)
BILIRUB SERPL-MCNC: 0.3 MG/DL (ref 0–1.2)
BUN SERPL-MCNC: 15 MG/DL (ref 8–23)
BUN/CREAT SERPL: 15.8 (ref 7–25)
CALCIUM SPEC-SCNC: 9.1 MG/DL (ref 8.6–10.5)
CHLORIDE SERPL-SCNC: 95 MMOL/L (ref 98–107)
CHOLEST SERPL-MCNC: 155 MG/DL (ref 0–200)
CO2 SERPL-SCNC: 26.8 MMOL/L (ref 22–29)
CREAT SERPL-MCNC: 0.95 MG/DL (ref 0.57–1)
DEPRECATED RDW RBC AUTO: 43.6 FL (ref 37–54)
EOSINOPHIL # BLD AUTO: 0.2 10*3/MM3 (ref 0–0.4)
EOSINOPHIL NFR BLD AUTO: 1.8 % (ref 0.3–6.2)
ERYTHROCYTE [DISTWIDTH] IN BLOOD BY AUTOMATED COUNT: 12.2 % (ref 12.3–15.4)
GFR SERPL CREATININE-BSD FRML MDRD: 60 ML/MIN/1.73
GLOBULIN UR ELPH-MCNC: 3.6 GM/DL
GLUCOSE SERPL-MCNC: 160 MG/DL (ref 65–99)
HBA1C MFR BLD: 7.7 % (ref 4.8–5.6)
HCT VFR BLD AUTO: 43.3 % (ref 34–46.6)
HDLC SERPL-MCNC: 39 MG/DL (ref 40–60)
HGB BLD-MCNC: 14.9 G/DL (ref 12–15.9)
IMM GRANULOCYTES # BLD AUTO: 0.03 10*3/MM3 (ref 0–0.05)
IMM GRANULOCYTES NFR BLD AUTO: 0.3 % (ref 0–0.5)
LDLC SERPL CALC-MCNC: 84 MG/DL (ref 0–100)
LDLC/HDLC SERPL: 2.03 {RATIO}
LYMPHOCYTES # BLD AUTO: 3.69 10*3/MM3 (ref 0.7–3.1)
LYMPHOCYTES NFR BLD AUTO: 32.5 % (ref 19.6–45.3)
MCH RBC QN AUTO: 33.6 PG (ref 26.6–33)
MCHC RBC AUTO-ENTMCNC: 34.4 G/DL (ref 31.5–35.7)
MCV RBC AUTO: 97.5 FL (ref 79–97)
MONOCYTES # BLD AUTO: 0.72 10*3/MM3 (ref 0.1–0.9)
MONOCYTES NFR BLD AUTO: 6.3 % (ref 5–12)
NEUTROPHILS NFR BLD AUTO: 58.1 % (ref 42.7–76)
NEUTROPHILS NFR BLD AUTO: 6.59 10*3/MM3 (ref 1.7–7)
NRBC BLD AUTO-RTO: 0 /100 WBC (ref 0–0.2)
PLATELET # BLD AUTO: 355 10*3/MM3 (ref 140–450)
PMV BLD AUTO: 10.4 FL (ref 6–12)
POTASSIUM SERPL-SCNC: 4.4 MMOL/L (ref 3.5–5.2)
PROT SERPL-MCNC: 7.3 G/DL (ref 6–8.5)
RBC # BLD AUTO: 4.44 10*6/MM3 (ref 3.77–5.28)
SODIUM SERPL-SCNC: 135 MMOL/L (ref 136–145)
TRIGL SERPL-MCNC: 185 MG/DL (ref 0–150)
VLDLC SERPL-MCNC: 32 MG/DL (ref 5–40)
WBC # BLD AUTO: 11.34 10*3/MM3 (ref 3.4–10.8)

## 2020-12-22 PROCEDURE — 85025 COMPLETE CBC W/AUTO DIFF WBC: CPT | Performed by: GENERAL PRACTICE

## 2020-12-22 PROCEDURE — 80061 LIPID PANEL: CPT | Performed by: GENERAL PRACTICE

## 2020-12-22 PROCEDURE — 83036 HEMOGLOBIN GLYCOSYLATED A1C: CPT | Performed by: GENERAL PRACTICE

## 2020-12-22 PROCEDURE — 80053 COMPREHEN METABOLIC PANEL: CPT | Performed by: GENERAL PRACTICE

## 2020-12-22 PROCEDURE — 36415 COLL VENOUS BLD VENIPUNCTURE: CPT | Performed by: GENERAL PRACTICE

## 2020-12-23 VITALS
OXYGEN SATURATION: 96 % | BODY MASS INDEX: 49.1 KG/M2 | WEIGHT: 272.8 LBS | HEART RATE: 90 BPM | TEMPERATURE: 97.5 F | DIASTOLIC BLOOD PRESSURE: 72 MMHG | SYSTOLIC BLOOD PRESSURE: 125 MMHG | RESPIRATION RATE: 16 BRPM

## 2020-12-23 PROBLEM — G62.9 PERIPHERAL POLYNEUROPATHY: Status: ACTIVE | Noted: 2020-12-23

## 2020-12-28 DIAGNOSIS — J44.1 COPD WITH ACUTE EXACERBATION (HCC): ICD-10-CM

## 2020-12-28 RX ORDER — IPRATROPIUM BROMIDE AND ALBUTEROL SULFATE 2.5; .5 MG/3ML; MG/3ML
SOLUTION RESPIRATORY (INHALATION)
Qty: 360 ML | Refills: 11 | Status: SHIPPED | OUTPATIENT
Start: 2020-12-28 | End: 2021-07-22 | Stop reason: SDUPTHER

## 2020-12-29 ENCOUNTER — OFFICE VISIT (OUTPATIENT)
Dept: FAMILY MEDICINE CLINIC | Facility: CLINIC | Age: 60
End: 2020-12-29

## 2020-12-29 DIAGNOSIS — F41.8 DEPRESSION WITH ANXIETY: Chronic | ICD-10-CM

## 2020-12-29 DIAGNOSIS — E11.65 CONTROLLED TYPE 2 DIABETES MELLITUS WITH HYPERGLYCEMIA, WITHOUT LONG-TERM CURRENT USE OF INSULIN (HCC): Primary | Chronic | ICD-10-CM

## 2020-12-29 DIAGNOSIS — I10 ESSENTIAL HYPERTENSION: Chronic | ICD-10-CM

## 2020-12-29 DIAGNOSIS — E78.2 MIXED HYPERLIPIDEMIA: Chronic | ICD-10-CM

## 2020-12-29 PROCEDURE — 99214 OFFICE O/P EST MOD 30 MIN: CPT | Performed by: NURSE PRACTITIONER

## 2020-12-29 RX ORDER — LANCETS 28 GAUGE
1 EACH MISCELLANEOUS DAILY
Qty: 100 EACH | Refills: 3 | Status: SHIPPED | OUTPATIENT
Start: 2020-12-29 | End: 2021-09-10 | Stop reason: SDUPTHER

## 2020-12-29 RX ORDER — BUSPIRONE HYDROCHLORIDE 15 MG/1
15 TABLET ORAL 3 TIMES DAILY
Qty: 90 TABLET | Refills: 2 | Status: SHIPPED | OUTPATIENT
Start: 2020-12-29 | End: 2021-08-17

## 2020-12-29 NOTE — PROGRESS NOTES
History of Present Illness     Lisa Hill is a 60 y.o. female who presents to the clinic today pertaining to her newly diagnosed diabetes. In addition, she has HTN and Dyslipidemia.      Diabetes  She has type 2 diabetes mellitus. Associated symptoms include blurred vision, fatigue, polydipsia, polyuria and visual change. Risk factors for coronary artery disease include diabetes mellitus, dyslipidemia, hypertension, obesity, post-menopausal and sedentary lifestyle. When asked about current treatments, none were reported. An ACE inhibitor/angiotensin II receptor blocker is being taken.  Lab Results   Component Value Date    HGBA1C 7.70 (H) 12/22/2020     Hypertension  The current episode started more than 1 year ago. Associated symptoms include blurred vision, malaise/fatigue and peripheral edema. Risk factors for coronary artery disease include diabetes mellitus, dyslipidemia, obesity, post-menopausal state and sedentary lifestyle. Current antihypertension treatment includes angiotensin blockers.   Lab Results   Component Value Date    GLUCOSE 160 (H) 12/22/2020    BUN 15 12/22/2020    CREATININE 0.95 12/22/2020    EGFRIFNONA 60 (L) 12/22/2020    BCR 15.8 12/22/2020    K 4.4 12/22/2020    CO2 26.8 12/22/2020    CALCIUM 9.1 12/22/2020    ALBUMIN 3.70 12/22/2020    AST 16 12/22/2020    ALT 24 12/22/2020     Dyslipidemia  Current antihyperlipidemic treatment includes statins. Risk factors for coronary artery disease include diabetes mellitus, dyslipidemia, hypertension, obesity, post-menopausal and a sedentary lifestyle.   Lab Results   Component Value Date    CHOL 155 12/22/2020    TRIG 185 (H) 12/22/2020    HDL 39 (L) 12/22/2020    LDL 84 12/22/2020     Vitals:    12/29/20 1545 12/29/20 1715   BP: 156/70 144/70   BP Location: Right arm Right arm   Patient Position: Sitting Sitting   Cuff Size: Adult Adult   Pulse: 101 90   Resp:  14   Temp: 97.3 °F (36.3 °C) 97.3 °F (36.3 °C)   TempSrc: Temporal Temporal  "  SpO2: 97% 97%   Weight: 124 kg (273 lb) 124 kg (273 lb)   Height: 158.8 cm (62.52\") 158.8 cm (62.5\")        The following portions of the patient's history were reviewed and updated as appropriate: allergies, current medications, past family history, past medical history, past social history, past surgical history and problem list.    Review of Systems   Constitutional: Positive for appetite change, fatigue and malaise/fatigue. Negative for activity change, chills, diaphoresis, fever, unexpected weight gain and unexpected weight loss.   HENT: Positive for congestion and hearing loss.    Eyes: Positive for blurred vision and visual disturbance.   Respiratory: Positive for cough. Negative for shortness of breath and wheezing.    Cardiovascular: Positive for leg swelling. Negative for chest pain and palpitations.   Gastrointestinal: Positive for constipation and GERD. Negative for nausea and vomiting.   Endocrine: Positive for heat intolerance, polydipsia and polyuria.   Musculoskeletal: Positive for arthralgias.   Skin: Negative for color change and skin lesions.   Neurological: Positive for dizziness, numbness and headache.   Hematological: Bruises/bleeds easily.   Psychiatric/Behavioral: Positive for sleep disturbance.      Physical Exam  Vitals signs reviewed.   Constitutional:       General: She is not in acute distress.     Appearance: She is well-developed.      Comments: Pleasant; in good spirits. is with her. Both wearing appropriate face covering   HENT:      Head: Normocephalic.      Nose: Nose normal.   Eyes:      General: No scleral icterus.        Right eye: No discharge.         Left eye: No discharge.      Conjunctiva/sclera: Conjunctivae normal.   Neck:      Musculoskeletal: Neck supple.      Thyroid: No thyromegaly.      Vascular: No JVD.   Cardiovascular:      Rate and Rhythm: Normal rate and regular rhythm.      Heart sounds: Normal heart sounds. No murmur. No friction rub.   Pulmonary:     "  Effort: Pulmonary effort is normal. No respiratory distress.      Breath sounds: Normal breath sounds. No wheezing or rales.   Abdominal:      General: Bowel sounds are normal. There is no distension.      Palpations: Abdomen is soft.      Tenderness: There is no abdominal tenderness. There is no guarding or rebound.   Musculoskeletal:      Right lower leg: Edema present.      Left lower leg: Edema present.   Lymphadenopathy:      Cervical: No cervical adenopathy.   Skin:     General: Skin is warm and dry.      Capillary Refill: Capillary refill takes less than 2 seconds.      Findings: No erythema or rash.   Neurological:      Mental Status: She is alert and oriented to person, place, and time.   Psychiatric:         Mood and Affect: Mood normal.         Speech: Speech normal.         Behavior: Behavior is cooperative.         Thought Content: Thought content normal.       Assessment/Plan     Problems Addressed this Visit        Cardiac and Vasculature    Mixed hyperlipidemia    Essential hypertension       Mental Health    Depression with anxiety    Relevant Medications    busPIRone (BUSPAR) 15 MG tablet      Other Visit Diagnoses     Controlled type 2 diabetes mellitus with hyperglycemia, without long-term current use of insulin (CMS/[facility])  (Chronic)   -  Primary    Findings and recommendations discussed with Lisa and her . Diabetes self management counseling provided.     Relevant Medications    glucose blood test strip    Lancets Ultra Fine misc    metFORMIN (GLUCOPHAGE) 500 MG tablet      Diagnoses       Codes Comments    Controlled type 2 diabetes mellitus with hyperglycemia, without long-term current use of insulin (CMS/[facility])    -  Primary ICD-10-CM: E11.65  ICD-9-CM: 250.80, 790.29 Findings and recommendations discussed with Lisa and her . Diabetes self management counseling provided.     Essential hypertension     ICD-10-CM: I10  ICD-9-CM: 401.9 Cardiovascular risk reduction modifications  reviewed    Mixed hyperlipidemia     ICD-10-CM: E78.2  ICD-9-CM: 272.2 Continue Zocor    Depression with anxiety     ICD-10-CM: F41.8  ICD-9-CM: 300.4 Refill for Buspar requested        Findings and recommendations discussed with Lisa.  Diabetes self management counseling provided including nutrition recommendations considering her food preferences. Focused on portion sizes and meal schedule. She currently has two meals a day with contain high fat content.  She reports her blood sugars have been above 180 mg/dL and will start her on Metformin 500 mg daily with supper for one week. If she is tolerating this and blood sugars continue to be above 150 mg/dL, she will increase to twice daily. Provided her with written material for her to reference at home. Encouraged her if she has any questions to feel free to give me a call. She will f/u in four weeks; sooner if problems/concerns occur.       This document has been electronically signed by NATALIE Manjarrez FNP-BC, ANUPAMA  December 29, 2020 14:25 EST

## 2020-12-29 NOTE — PATIENT INSTRUCTIONS
"Type 2 Diabetes Mellitus, Self Care  Exercises To Do While Sitting    Exercises that you do while sitting (chair exercises) can give you many of the same benefits as full exercise. Benefits include strengthening your heart, burning calories, and keeping muscles and joints healthy. Exercise can also improve your mood and help with depression and anxiety.  You may benefit from chair exercises if you are unable to do standing exercises because of:  · Diabetic foot pain.  · Obesity.  · Illness.  · Arthritis.  · Recovery from surgery or injury.  · Breathing problems.  · Balance problems.  · Another type of disability.  Before starting chair exercises, check with your health care provider or a physical therapist to find out how much exercise you can tolerate and which exercises are safe for you. If your health care provider approves:  · Start out slowly and build up over time. Aim to work up to about 10-20 minutes for each exercise session.  · Make exercise part of your daily routine.  · Drink water when you exercise. Do not wait until you are thirsty. Drink every 10-15 minutes.  · Stop exercising right away if you have pain, nausea, shortness of breath, or dizziness.  · If you are exercising in a wheelchair, make sure to lock the wheels.  · Ask your health care provider whether you can do jermain chi or yoga. Many positions in these mind-body exercises can be modified to do while seated.  Warm-up  Before starting other exercises:  1. Sit up as straight as you can. Have your knees bent at 90 degrees, which is the shape of the capital letter \"L.\" Keep your feet flat on the floor.  2. Sit at the front edge of your chair, if you can.  3. Pull in (tighten) the muscles in your abdomen and stretch your spine and neck as straight as you can. Hold this position for a few minutes.  4. Breathe in and out evenly. Try to concentrate on your breathing, and relax your mind.  Stretching  Exercise A: Arm stretch  1. Hold your arms out " "straight in front of your body.  2. Bend your hands at the wrist with your fingers pointing up, as if signaling someone to stop. Notice the slight tension in your forearms as you hold the position.  3. Keeping your arms out and your hands bent, rotate your hands outward as far as you can and hold this stretch. Aim to have your thumbs pointing up and your pinkie fingers pointing down.  Slowly repeat arm stretches for one minute as tolerated.  Exercise B: Leg stretch  1. If you can move your legs, try to \"draw\" letters on the floor with the toes of your foot. Write your name with one foot.  2. Write your name with the toes of your other foot.  Slowly repeat the movements for one minute as tolerated.  Exercise C: Reach for the mikal  1. Reach your hands as far over your head as you can to stretch your spine.  2. Move your hands and arms as if you are climbing a rope.  Slowly repeat the movements for one minute as tolerated.  Range of motion exercises  Exercise A: Shoulder roll  1. Let your arms hang loosely at your sides.  2. Lift just your shoulders up toward your ears, then let them relax back down.  3. When your shoulders feel loose, rotate your shoulders in backward and forward circles.  Do shoulder rolls slowly for one minute as tolerated.  Exercise B: March in place  1. As if you are marching, pump your arms and lift your legs up and down. Lift your knees as high as you can.  ? If you are unable to lift your knees, just pump your arms and move your ankles and feet up and down.  March in place for one minute as tolerated.  Exercise C: Seated jumping jacks  1. Let your arms hang down straight.  2. Keeping your arms straight, lift them up over your head. Aim to point your fingers to the ceiling.  3. While you lift your arms, straighten your legs and slide your heels along the floor to your sides, as wide as you can.  4. As you bring your arms back down to your sides, slide your legs back together.  ? If you are unable " to use your legs, just move your arms.  Slowly repeat seated jumping jacks for one minute as tolerated.  Strengthening exercises  Exercise A: Shoulder squeeze  1. Hold your arms straight out from your body to your sides, with your elbows bent and your fists pointed at the ceiling.  2. Keeping your arms in the bent position, move them forward so your elbows and forearms meet in front of your face.  3. Open your arms back out as wide as you can with your elbows still bent, until you feel your shoulder blades squeezing together. Hold for 5 seconds.  Slowly repeat the movements forward and backward for one minute as tolerated.  Contact a health care provider if you:  · Had to stop exercising due to any of the following:  ? Pain.  ? Nausea.  ? Shortness of breath.  ? Dizziness.  ? Fatigue.  · Have significant pain or soreness after exercising.  Get help right away if you have:  · Chest pain.  · Difficulty breathing.  These symptoms may represent a serious problem that is an emergency. Do not wait to see if the symptoms will go away. Get medical help right away. Call your local emergency services (911 in the [place] Do not drive yourself to the hospital.  This information is not intended to replace advice given to you by your health care provider. Make sure you discuss any questions you have with your health care provider.  Document Revised: 04/09/2020 Document Reviewed: 10/31/2018  [employer] Patient Education © 2020 [employer]    Caring for yourself after you have been diagnosed with type 2 diabetes (type 2 diabetes mellitus) means keeping your blood sugar (glucose) under control with a balance of:  · Nutrition.  · Exercise.  · Lifestyle changes.  · Medicines or insulin, if necessary.  · Support from your team of health care providers and others.  The following information explains what you need to know to manage your diabetes at home.  What are the risks?  Having diabetes can put you at risk for other long-term  (chronic) conditions, such as heart disease and kidney disease. Your health care provider may prescribe medicines to help prevent complications from diabetes. These medicines may include:  · Aspirin.  · Medicine to lower cholesterol.  · Medicine to control blood pressure.  How to monitor blood glucose    · Check your blood glucose every day, as often as told by your health care provider.  · Have your A1c (hemoglobin A1c) level checked two or more times a year, or as often as told by your health care provider.  Your health care provider will set individualized treatment goals for you. Generally, the goal of treatment is to maintain the following blood glucose levels:  · Before meals (preprandial):  mg/dL (4.4-7.2 mmol/L).  · After meals (postprandial): below 180 mg/dL (10 mmol/L).  · A1c level: less than 7%.  How to manage hyperglycemia and hypoglycemia  Hyperglycemia symptoms  Hyperglycemia, also called high blood glucose, occurs when blood glucose is too high. Make sure you know the early signs of hyperglycemia, such as:  · Increased thirst.  · Hunger.  · Feeling very tired.  · Needing to urinate more often than usual.  · Blurry vision.  Hypoglycemia symptoms  Hypoglycemia, also called low blood glucose, occurs with a blood glucose level at or below 70 mg/dL (3.9 mmol/L). The risk for hypoglycemia increases during or after exercise, during sleep, during illness, and when skipping meals or not eating for a long time (fasting).  It is important to know the symptoms of hypoglycemia and treat it right away. Always have a 15-gram rapid-acting carbohydrate snack with you to treat low blood glucose. Family members and close friends should also know the symptoms and should understand how to treat hypoglycemia, in case you are not able to treat yourself. Symptoms may include:  · Hunger.  · Anxiety.  · Sweating and feeling clammy.  · Confusion.  · Dizziness or feeling  light-headed.  · Sleepiness.  · Nausea.  · Increased heart rate.  · Headache.  · Blurry vision.  · Irritability.  · A change in coordination.  · Tingling or numbness around the mouth, lips, or tongue.  · Restless sleep.  · Fainting.  · Seizure.  Treating hypoglycemia  If you are alert and able to swallow safely, follow the 15:15 rule:  · Take 15 grams of a rapid-acting carbohydrate. Talk with your health care provider about how much you should take.  · Rapid-acting options include:  ? Glucose pills (take 15 grams).  ? 6-8 pieces of hard candy.  ? 4-6 oz (120-150 mL) of fruit juice.  ? 4-6 oz (120-150 mL) of regular (not diet) soda.  ? 1 Tbsp (15 mL) honey or sugar.  · Check your blood glucose 15 minutes after you take the carbohydrate.  · If the repeat blood glucose level is still at or below 70 mg/dL (3.9 mmol/L), take 15 grams of a carbohydrate again.  · If your blood glucose level does not increase above 70 mg/dL (3.9 mmol/L) after 3 tries, seek emergency medical care.  · After your blood glucose level returns to normal, eat a meal or a snack within 1 hour.  Treating severe hypoglycemia  Severe hypoglycemia is when your blood glucose level is at or below 54 mg/dL (3 mmol/L). Severe hypoglycemia is an emergency. Do not wait to see if the symptoms will go away. Get medical help right away. Call your local emergency services (911 in the [place]  If you have severe hypoglycemia and you cannot eat or drink, you may need an injection of glucagon. A family member or close friend should learn how to check your blood glucose and how to give you a glucagon injection. Ask your health care provider if you need to have an emergency glucagon injection kit available.  Severe hypoglycemia may need to be treated in a hospital. The treatment may include getting glucose through an IV. You may also need treatment for the cause of your hypoglycemia.  Follow these instructions at home:  Take diabetes medicines as told  · If your health  care provider prescribed insulin or diabetes medicines, take them every day.  · Do not run out of insulin or other diabetes medicines that you take. Plan ahead so you always have these available.  · If you use insulin, adjust your dosage based on how physically active you are and what foods you eat. Your health care provider will tell you how to adjust your dosage.  Make healthy food choices    The things that you eat and drink affect your blood glucose and your insulin dosage. Making good choices helps to control your diabetes and prevent other health problems. A healthy meal plan includes eating lean proteins, complex carbohydrates, fresh fruits and vegetables, low-fat dairy products, and healthy fats.  Make an appointment to see a diet and nutrition specialist (registered dietitian) to help you create an eating plan that is right for you. Make sure that you:  · Follow instructions from your health care provider about eating or drinking restrictions.  · Drink enough fluid to keep your urine pale yellow.  · Keep a record of the carbohydrates that you eat. Do this by reading food labels and learning the standard serving sizes of foods.  · Follow your sick day plan whenever you cannot eat or drink as usual. Make this plan in advance with your health care provider.    Stay active  Exercise regularly, as told by your health care provider. This may include:  · Stretching and doing strength exercises, such as yoga or weightlifting, 2 or more times a week.  · Doing 150 minutes or more of moderate-intensity or vigorous-intensity exercise each week. This could be brisk walking, biking, or water aerobics.  ? Spread out your activity over 3 or more days of the week.  ? Do not go more than 2 days in a row without doing some kind of physical activity.  When you start a new exercise or activity, work with your health care provider to adjust your insulin, medicines, or food intake as needed.  Make healthy lifestyle choices  · Do  not use any tobacco products, such as cigarettes, chewing tobacco, and e-cigarettes. If you need help quitting, ask your health care provider.  · If your health care provider says that alcohol is safe for you, limit alcohol intake to no more than 1 drink per day for nonpregnant women and 2 drinks per day for men. One drink equals 12 oz of beer (355 mL), 5 oz of wine (148 mL), or 1½ oz of hard liquor (44 mL).  · Learn to manage stress. If you need help with this, ask your health care provider.  Care for your body    · Keep your immunizations up to date. In addition to getting vaccinations as told by your health care provider, it is recommended that you get vaccinated against the following illnesses:  ? The flu (influenza). Get a flu shot every year.  ? Pneumonia.  ? Hepatitis B.  · Schedule an eye exam soon after your diagnosis, and then one time every year after that.  · Check your skin and feet every day for cuts, bruises, redness, blisters, or sores. Schedule a foot exam with your health care provider once every year.  · Brush your teeth and gums two times a day, and floss one or more times a day. Visit your dentist one or more times every 6 months.  · Maintain a healthy weight.  General instructions  · Take over-the-counter and prescription medicines only as told by your health care provider.  · Share your diabetes management plan with people in your workplace, school, and household.  · Carry a medical alert card or wear medical alert jewelry.  · Keep all follow-up visits as told by your health care provider. This is important.  Questions to ask your health care provider  · Do I need to meet with a diabetes educator?  · Where can I find a support group for people with diabetes?  Where to find more information  For more information about diabetes, visit:  · American Diabetes Association (ADA): www.diabetes.org  · American Association of Diabetes Educators (AADE): www.diabeteseducator.org  Summary  · Caring for  yourself after you have been diagnosed with (type 2 diabetes mellitus) means keeping your blood sugar (glucose) under control with a balance of nutrition, exercise, lifestyle changes, and medicine.  · Check your blood glucose every day, as often as told by your health care provider.  · Having diabetes can put you at risk for other long-term (chronic) conditions, such as heart disease and kidney disease. Your health care provider may prescribe medicines to help prevent complications from diabetes.  · Keep all follow-up visits as told by your health care provider. This is important.  This information is not intended to replace advice given to you by your health care provider. Make sure you discuss any questions you have with your health care provider.  Document Revised: 06/10/2019 Document Reviewed: 01/20/2017  [employer] Patient Education © 2020 [employer]

## 2020-12-30 VITALS
RESPIRATION RATE: 14 BRPM | BODY MASS INDEX: 48.37 KG/M2 | HEART RATE: 90 BPM | TEMPERATURE: 97.3 F | DIASTOLIC BLOOD PRESSURE: 70 MMHG | HEIGHT: 63 IN | WEIGHT: 273 LBS | SYSTOLIC BLOOD PRESSURE: 144 MMHG | OXYGEN SATURATION: 97 %

## 2021-01-04 DIAGNOSIS — F41.8 DEPRESSION WITH ANXIETY: ICD-10-CM

## 2021-01-04 RX ORDER — LORAZEPAM 1 MG/1
0.5 TABLET ORAL 2 TIMES DAILY PRN
Qty: 90 TABLET | Refills: 2 | Status: SHIPPED | OUTPATIENT
Start: 2021-01-04 | End: 2021-07-12

## 2021-01-11 ENCOUNTER — TELEPHONE (OUTPATIENT)
Dept: FAMILY MEDICINE CLINIC | Facility: CLINIC | Age: 61
End: 2021-01-11

## 2021-01-11 NOTE — TELEPHONE ENCOUNTER
----- Message from Scott Chavez MD sent at 1/6/2021  4:51 PM EST -----  Perfecto!  Please let her know  Thanks  ----- Message -----  From: Vernell Dotson MA  Sent: 1/6/2021   4:47 PM EST  To: Scott Chavez MD    Approved     ----- Message -----  From: Scott Chavez MD  Sent: 12/23/2020  11:11 AM EST  To: Vernell Dotson MA    Needs PA on breztri  Has done better with this then any other medication so far for her COPD  She did fairly well with trelegy but probably does not have the inspiratory capacity to use it any longer

## 2021-01-18 ENCOUNTER — OFFICE VISIT (OUTPATIENT)
Dept: FAMILY MEDICINE CLINIC | Facility: CLINIC | Age: 61
End: 2021-01-18

## 2021-01-18 VITALS
TEMPERATURE: 96.9 F | WEIGHT: 272 LBS | DIASTOLIC BLOOD PRESSURE: 70 MMHG | HEIGHT: 63 IN | BODY MASS INDEX: 48.2 KG/M2 | OXYGEN SATURATION: 94 % | SYSTOLIC BLOOD PRESSURE: 125 MMHG | HEART RATE: 100 BPM

## 2021-01-18 DIAGNOSIS — E11.43 TYPE 2 DIABETES MELLITUS WITH DIABETIC AUTONOMIC NEUROPATHY, WITHOUT LONG-TERM CURRENT USE OF INSULIN (HCC): ICD-10-CM

## 2021-01-18 DIAGNOSIS — L02.412 ABSCESS OF LEFT AXILLA: Primary | ICD-10-CM

## 2021-01-18 DIAGNOSIS — I10 ESSENTIAL HYPERTENSION: ICD-10-CM

## 2021-01-18 DIAGNOSIS — M79.672 LEFT FOOT PAIN: ICD-10-CM

## 2021-01-18 LAB
BASOPHILS # BLD AUTO: 0.12 10*3/MM3 (ref 0–0.2)
BASOPHILS NFR BLD AUTO: 1.1 % (ref 0–1.5)
CHROMATIN AB SERPL-ACNC: <10 IU/ML (ref 0–14)
CRP SERPL-MCNC: 0.61 MG/DL (ref 0–0.5)
DEPRECATED RDW RBC AUTO: 42.5 FL (ref 37–54)
EOSINOPHIL # BLD AUTO: 0.25 10*3/MM3 (ref 0–0.4)
EOSINOPHIL NFR BLD AUTO: 2.2 % (ref 0.3–6.2)
ERYTHROCYTE [DISTWIDTH] IN BLOOD BY AUTOMATED COUNT: 12.2 % (ref 12.3–15.4)
ERYTHROCYTE [SEDIMENTATION RATE] IN BLOOD: 29 MM/HR (ref 0–30)
HCT VFR BLD AUTO: 44.3 % (ref 34–46.6)
HGB BLD-MCNC: 15.3 G/DL (ref 12–15.9)
IMM GRANULOCYTES # BLD AUTO: 0.05 10*3/MM3 (ref 0–0.05)
IMM GRANULOCYTES NFR BLD AUTO: 0.4 % (ref 0–0.5)
LYMPHOCYTES # BLD AUTO: 4.24 10*3/MM3 (ref 0.7–3.1)
LYMPHOCYTES NFR BLD AUTO: 37.9 % (ref 19.6–45.3)
MCH RBC QN AUTO: 33.3 PG (ref 26.6–33)
MCHC RBC AUTO-ENTMCNC: 34.5 G/DL (ref 31.5–35.7)
MCV RBC AUTO: 96.3 FL (ref 79–97)
MONOCYTES # BLD AUTO: 0.61 10*3/MM3 (ref 0.1–0.9)
MONOCYTES NFR BLD AUTO: 5.5 % (ref 5–12)
NEUTROPHILS NFR BLD AUTO: 5.91 10*3/MM3 (ref 1.7–7)
NEUTROPHILS NFR BLD AUTO: 52.9 % (ref 42.7–76)
NRBC BLD AUTO-RTO: 0 /100 WBC (ref 0–0.2)
PLATELET # BLD AUTO: 368 10*3/MM3 (ref 140–450)
PMV BLD AUTO: 10.5 FL (ref 6–12)
RBC # BLD AUTO: 4.6 10*6/MM3 (ref 3.77–5.28)
URATE SERPL-MCNC: 6.6 MG/DL (ref 2.4–5.7)
WBC # BLD AUTO: 11.18 10*3/MM3 (ref 3.4–10.8)

## 2021-01-18 PROCEDURE — 86431 RHEUMATOID FACTOR QUANT: CPT | Performed by: PHYSICIAN ASSISTANT

## 2021-01-18 PROCEDURE — 86225 DNA ANTIBODY NATIVE: CPT | Performed by: PHYSICIAN ASSISTANT

## 2021-01-18 PROCEDURE — 99214 OFFICE O/P EST MOD 30 MIN: CPT | Performed by: PHYSICIAN ASSISTANT

## 2021-01-18 PROCEDURE — 86038 ANTINUCLEAR ANTIBODIES: CPT | Performed by: PHYSICIAN ASSISTANT

## 2021-01-18 PROCEDURE — 85652 RBC SED RATE AUTOMATED: CPT | Performed by: PHYSICIAN ASSISTANT

## 2021-01-18 PROCEDURE — 84550 ASSAY OF BLOOD/URIC ACID: CPT | Performed by: PHYSICIAN ASSISTANT

## 2021-01-18 PROCEDURE — 86140 C-REACTIVE PROTEIN: CPT | Performed by: PHYSICIAN ASSISTANT

## 2021-01-18 PROCEDURE — 85025 COMPLETE CBC W/AUTO DIFF WBC: CPT | Performed by: PHYSICIAN ASSISTANT

## 2021-01-18 RX ORDER — CEPHALEXIN 500 MG/1
500 CAPSULE ORAL 3 TIMES DAILY
Qty: 30 CAPSULE | Refills: 0 | Status: SHIPPED | OUTPATIENT
Start: 2021-01-18 | End: 2021-02-24

## 2021-01-18 NOTE — PROGRESS NOTES
Subjective   Lisa Hill is a 60 y.o. female.       Chief Complaint -abscess    History of Present Illness -      Abscess-  She complains of a tender erythematous abscess in her left axillary region for the past 3 days.    Foot pain-  She complains of sudden onset of pain in her left foot approximately 2 months ago.  She reports the pain is and all of her PIP joints with associated swelling.  She reports numbness in her left great toe.  She denies any known injury.    Hypertension-controlled with losartan    Diabetes mellitus type 2-not at goal with metformin and current diet.  We did discuss the possibility of diabetic peripheral neuropathy contributing to her toe numbness.    The following portions of the patient's history were reviewed and updated as appropriate: allergies, current medications, past family history, past medical history, past social history, past surgical history and problem list.    Review of Systems   Constitutional: Negative for activity change, appetite change, fatigue and fever.   HENT: Negative for ear pain, sinus pressure and sore throat.    Eyes: Negative for pain and visual disturbance.   Respiratory: Negative for cough and chest tightness.    Cardiovascular: Negative for chest pain and palpitations.   Gastrointestinal: Negative for abdominal pain, constipation, diarrhea, nausea and vomiting.   Endocrine: Negative for polydipsia and polyuria.   Genitourinary: Negative for dysuria and frequency.   Musculoskeletal: Positive for arthralgias and joint swelling. Negative for back pain and myalgias.   Skin: Positive for color change. Negative for rash.        Abscess left axilla   Allergic/Immunologic: Negative for food allergies and immunocompromised state.   Neurological: Negative for dizziness, syncope and headaches.   Hematological: Negative for adenopathy. Does not bruise/bleed easily.   Psychiatric/Behavioral: Negative for hallucinations and suicidal ideas. The patient is not  "nervous/anxious.        /70   Pulse 100   Temp 96.9 °F (36.1 °C) (Temporal)   Ht 158.8 cm (62.5\")   Wt 123 kg (272 lb)   LMP  (LMP Unknown)   SpO2 94%   BMI 48.96 kg/m²   Lab on 12/22/2020   Component Date Value Ref Range Status   • Glucose 12/22/2020 160* 65 - 99 mg/dL Final   • BUN 12/22/2020 15  8 - 23 mg/dL Final   • Creatinine 12/22/2020 0.95  0.57 - 1.00 mg/dL Final   • Sodium 12/22/2020 135* 136 - 145 mmol/L Final   • Potassium 12/22/2020 4.4  3.5 - 5.2 mmol/L Final   • Chloride 12/22/2020 95* 98 - 107 mmol/L Final   • CO2 12/22/2020 26.8  22.0 - 29.0 mmol/L Final   • Calcium 12/22/2020 9.1  8.6 - 10.5 mg/dL Final   • Total Protein 12/22/2020 7.3  6.0 - 8.5 g/dL Final   • Albumin 12/22/2020 3.70  3.50 - 5.20 g/dL Final   • ALT (SGPT) 12/22/2020 24  1 - 33 U/L Final   • AST (SGOT) 12/22/2020 16  1 - 32 U/L Final   • Alkaline Phosphatase 12/22/2020 59  39 - 117 U/L Final   • Total Bilirubin 12/22/2020 0.3  0.0 - 1.2 mg/dL Final   • eGFR Non African Amer 12/22/2020 60* >60 mL/min/1.73 Final   • Globulin 12/22/2020 3.6  gm/dL Final   • A/G Ratio 12/22/2020 1.0  g/dL Final   • BUN/Creatinine Ratio 12/22/2020 15.8  7.0 - 25.0 Final   • Anion Gap 12/22/2020 13.2  5.0 - 15.0 mmol/L Final   • Total Cholesterol 12/22/2020 155  0 - 200 mg/dL Final   • Triglycerides 12/22/2020 185* 0 - 150 mg/dL Final   • HDL Cholesterol 12/22/2020 39* 40 - 60 mg/dL Final   • LDL Cholesterol  12/22/2020 84  0 - 100 mg/dL Final   • VLDL Cholesterol 12/22/2020 32  5 - 40 mg/dL Final   • LDL/HDL Ratio 12/22/2020 2.03   Final   • Hemoglobin A1C 12/22/2020 7.70* 4.80 - 5.60 % Final   • WBC 12/22/2020 11.34* 3.40 - 10.80 10*3/mm3 Final   • RBC 12/22/2020 4.44  3.77 - 5.28 10*6/mm3 Final   • Hemoglobin 12/22/2020 14.9  12.0 - 15.9 g/dL Final   • Hematocrit 12/22/2020 43.3  34.0 - 46.6 % Final   • MCV 12/22/2020 97.5* 79.0 - 97.0 fL Final   • MCH 12/22/2020 33.6* 26.6 - 33.0 pg Final   • MCHC 12/22/2020 34.4  31.5 - 35.7 g/dL Final "   • RDW 12/22/2020 12.2* 12.3 - 15.4 % Final   • RDW-SD 12/22/2020 43.6  37.0 - 54.0 fl Final   • MPV 12/22/2020 10.4  6.0 - 12.0 fL Final   • Platelets 12/22/2020 355  140 - 450 10*3/mm3 Final   • Neutrophil % 12/22/2020 58.1  42.7 - 76.0 % Final   • Lymphocyte % 12/22/2020 32.5  19.6 - 45.3 % Final   • Monocyte % 12/22/2020 6.3  5.0 - 12.0 % Final   • Eosinophil % 12/22/2020 1.8  0.3 - 6.2 % Final   • Basophil % 12/22/2020 1.0  0.0 - 1.5 % Final   • Immature Grans % 12/22/2020 0.3  0.0 - 0.5 % Final   • Neutrophils, Absolute 12/22/2020 6.59  1.70 - 7.00 10*3/mm3 Final   • Lymphocytes, Absolute 12/22/2020 3.69* 0.70 - 3.10 10*3/mm3 Final   • Monocytes, Absolute 12/22/2020 0.72  0.10 - 0.90 10*3/mm3 Final   • Eosinophils, Absolute 12/22/2020 0.20  0.00 - 0.40 10*3/mm3 Final   • Basophils, Absolute 12/22/2020 0.11  0.00 - 0.20 10*3/mm3 Final   • Immature Grans, Absolute 12/22/2020 0.03  0.00 - 0.05 10*3/mm3 Final   • nRBC 12/22/2020 0.0  0.0 - 0.2 /100 WBC Final       Physical Exam  Vitals signs and nursing note reviewed.   Constitutional:       Appearance: Normal appearance. She is well-developed. She is obese.   HENT:      Head: Normocephalic and atraumatic.      Right Ear: Tympanic membrane normal.      Left Ear: Tympanic membrane normal.      Nose: Nose normal.      Mouth/Throat:      Mouth: Mucous membranes are moist.      Pharynx: No oropharyngeal exudate or posterior oropharyngeal erythema.   Eyes:      Extraocular Movements: Extraocular movements intact.      Conjunctiva/sclera: Conjunctivae normal.   Neck:      Musculoskeletal: Normal range of motion and neck supple.      Thyroid: No thyromegaly.      Trachea: No tracheal deviation.   Cardiovascular:      Rate and Rhythm: Normal rate and regular rhythm.      Heart sounds: Normal heart sounds. No murmur.   Pulmonary:      Effort: Pulmonary effort is normal. No respiratory distress.      Breath sounds: Normal breath sounds. No wheezing.   Abdominal:       General: Bowel sounds are normal.      Palpations: Abdomen is soft.      Tenderness: There is no abdominal tenderness. There is no guarding.   Musculoskeletal: Normal range of motion.         General: Tenderness present.      Comments: Localized tenderness noted without erythema or swelling of PIP joints of the left foot   Lymphadenopathy:      Cervical: No cervical adenopathy.   Skin:     General: Skin is warm and dry.      Findings: Erythema present. No rash.      Comments: Approximately 1 x 1 cm erythematous tender abscess left axilla   Neurological:      General: No focal deficit present.      Mental Status: She is alert and oriented to person, place, and time.   Psychiatric:         Mood and Affect: Mood normal.         Behavior: Behavior normal.         Thought Content: Thought content normal.         Assessment/Plan     Diagnoses and all orders for this visit:    1. Abscess of left axilla (Primary)  Comments:  Start Keflex  Advised conservative measures including warm compresses  Advised to follow-up if not improving within 5 days  Orders:  -     cephalexin (Keflex) 500 MG capsule; Take 1 capsule by mouth 3 (Three) Times a Day.  Dispense: 30 capsule; Refill: 0    2. Left foot pain  Comments:  Discussed differential diagnosis including peripheral neuropathy or osteoarthritis  Refer to podiatry  Lab work ordered  Orders:  -     Ambulatory Referral to Podiatry  -     CBC & Differential  -     Sedimentation Rate  -     Rheumatoid Factor  -     LORI  -     C-reactive Protein  -     Uric Acid  -     CBC Auto Differential    3. Essential hypertension  Comments:  Continue losartan    4. Type 2 diabetes mellitus with diabetic autonomic neuropathy, without long-term current use of insulin (CMS/[facility])  Comments:  Advised a more strict low carbohydrate diabetic diet and weight loss  Continue Metformin            This document has been electronically signed by:  Kerry Bravo PA-C

## 2021-01-19 LAB
ANA SER QL: POSITIVE
DSDNA AB SER-ACNC: 10 IU/ML (ref 0–9)
Lab: ABNORMAL

## 2021-01-20 DIAGNOSIS — R76.8 POSITIVE ANA (ANTINUCLEAR ANTIBODY): Primary | ICD-10-CM

## 2021-01-25 DIAGNOSIS — I10 ESSENTIAL HYPERTENSION: ICD-10-CM

## 2021-01-25 DIAGNOSIS — F41.8 DEPRESSION WITH ANXIETY: ICD-10-CM

## 2021-01-25 DIAGNOSIS — E11.65 CONTROLLED TYPE 2 DIABETES MELLITUS WITH HYPERGLYCEMIA, WITHOUT LONG-TERM CURRENT USE OF INSULIN (HCC): Chronic | ICD-10-CM

## 2021-01-25 RX ORDER — ESCITALOPRAM OXALATE 10 MG/1
10 TABLET ORAL DAILY
Qty: 30 TABLET | Refills: 5 | Status: SHIPPED | OUTPATIENT
Start: 2021-01-25 | End: 2021-08-17

## 2021-01-25 RX ORDER — LANOLIN ALCOHOL/MO/W.PET/CERES
1000 CREAM (GRAM) TOPICAL DAILY
Qty: 30 TABLET | Refills: 5 | Status: SHIPPED | OUTPATIENT
Start: 2021-01-25 | End: 2021-08-17

## 2021-01-25 RX ORDER — LOSARTAN POTASSIUM 50 MG/1
50 TABLET ORAL DAILY
Qty: 30 TABLET | Refills: 5 | Status: SHIPPED | OUTPATIENT
Start: 2021-01-25 | End: 2021-08-17

## 2021-01-28 ENCOUNTER — OFFICE VISIT (OUTPATIENT)
Dept: FAMILY MEDICINE CLINIC | Facility: CLINIC | Age: 61
End: 2021-01-28

## 2021-01-28 VITALS — BODY MASS INDEX: 48.2 KG/M2 | WEIGHT: 272 LBS | HEIGHT: 63 IN

## 2021-01-28 DIAGNOSIS — E11.65 TYPE 2 DIABETES MELLITUS WITH HYPERGLYCEMIA, WITHOUT LONG-TERM CURRENT USE OF INSULIN (HCC): Primary | Chronic | ICD-10-CM

## 2021-01-28 DIAGNOSIS — E78.2 MIXED HYPERLIPIDEMIA: ICD-10-CM

## 2021-01-28 DIAGNOSIS — I10 ESSENTIAL HYPERTENSION: ICD-10-CM

## 2021-01-28 PROCEDURE — 99442 PR PHYS/QHP TELEPHONE EVALUATION 11-20 MIN: CPT | Performed by: NURSE PRACTITIONER

## 2021-01-28 NOTE — PROGRESS NOTES
You have chosen to receive care through a telephone visit. Do you consent to use a telephone visit for your medical care today? Yes    History of Present Illness   Lisa Hill is a 60 y.o. female who presents to the clinic via phone today pertaining to her DM, type 2 which recently diagnosed. In addition, she has HTN and Dyslipidemia.    Diabetes  She has type 2 diabetes mellitus. Associated symptoms include blurred vision, fatigue, polydipsia, polyuria and visual change. Risk factors for coronary artery disease include diabetes mellitus, dyslipidemia, hypertension, obesity, post-menopausal and sedentary lifestyle. She was started on Metformin 500 mg bid starting at 500 mg and adding second dose one week later four weeks ago. She reports her fasting blood sugars continue to run between 707=545 mg/dL. An ACE inhibitor/angiotensin II receptor blocker is being taken.  Lab Results   Component Value Date    HGBA1C 7.70 (H) 12/22/2020     Hypertension  The current episode started more than 1 year ago. Associated symptoms include blurred vision, malaise/fatigue and peripheral edema. Risk factors for coronary artery disease include diabetes mellitus, dyslipidemia, obesity, post-menopausal state and sedentary lifestyle. Current antihypertension treatment includes angiotensin blockers.   Lab Results   Component Value Date    GLUCOSE 160 (H) 12/22/2020    BUN 15 12/22/2020    CREATININE 0.95 12/22/2020    EGFRIFNONA 60 (L) 12/22/2020    BCR 15.8 12/22/2020    K 4.4 12/22/2020    CO2 26.8 12/22/2020    CALCIUM 9.1 12/22/2020    ALBUMIN 3.70 12/22/2020    AST 16 12/22/2020    ALT 24 12/22/2020     Dyslipidemia  Current antihyperlipidemic treatment includes statins. Risk factors for coronary artery disease include diabetes mellitus, dyslipidemia, hypertension, obesity, post-menopausal and a sedentary lifestyle.   Lab Results   Component Value Date    CHOL 155 12/22/2020    TRIG 185 (H) 12/22/2020    HDL 39 (L) 12/22/2020     "LDL 84 12/22/2020     Vitals:    01/28/21 1456   Weight: 123 kg (272 lb)   Height: 158.8 cm (62.5\")      The following portions of the patient's history were reviewed and updated as appropriate: allergies, current medications, past family history, past medical history, past social history, past surgical history and problem list.    Review of Systems   Constitutional: Positive for appetite change (Decreasing) and fatigue (Improving). Negative for activity change, chills, diaphoresis, fever, unexpected weight gain and unexpected weight loss.   Eyes: Positive for blurred vision and visual disturbance.   Respiratory: Positive for cough. Negative for shortness of breath and wheezing.    Cardiovascular: Positive for leg swelling. Negative for chest pain and palpitations.   Gastrointestinal: Positive for constipation and GERD. Negative for nausea and vomiting.   Endocrine: Positive for heat intolerance, polydipsia and polyuria.   Musculoskeletal: Positive for arthralgias.   Skin: Negative for color change and skin lesions.   Neurological: Positive for numbness and headache.   Hematological: Bruises/bleeds easily.   Psychiatric/Behavioral: Positive for sleep disturbance.      Physical Exam  Constitutional:       General: She is not in acute distress.     Comments: Pleasant;    Pulmonary:      Effort: No respiratory distress.   Neurological:      Mental Status: She is alert.   Psychiatric:         Mood and Affect: Mood and affect normal.         Speech: Speech normal.         Behavior: Behavior is cooperative.         Thought Content: Thought content normal.         Cognition and Memory: Cognition normal.     Assessment/Plan     Problems Addressed this Visit        Cardiac and Vasculature    Mixed hyperlipidemia    Essential hypertension      Other Visit Diagnoses     Type 2 diabetes mellitus with hyperglycemia, without long-term current use of insulin (CMS/[facility])  (Chronic)   -  Primary    Symptoms discussed. Reviewed her " current blood glucose readings and treatment options. She will add a second Metformin with her supper dose.     Relevant Medications    metFORMIN (GLUCOPHAGE) 500 MG tablet      Diagnoses       Codes Comments    Type 2 diabetes mellitus with hyperglycemia, without long-term current use of insulin (CMS/[facility])    -  Primary ICD-10-CM: E11.65  ICD-9-CM: 250.00, 790.29 Symptoms discussed. Reviewed her current blood glucose readings and treatment options. She will add a second Metformin with her supper dose.     Essential hypertension     ICD-10-CM: I10  ICD-9-CM: 401.9 Continue Losartan     Mixed hyperlipidemia     ICD-10-CM: E78.2  ICD-9-CM: 272.2 Continue cardiovascular risk reduction modifications. Continue Simvastatin        Symptoms and blood glucose trend reviewed. Will add an additional Metformin with supper changing her regimen to 500 mg with breakfast and 1000 mg with supper. Continue cardiovascular risk reduction modifications. She will f/u with me on Feb 25 th at 8:30am.  This visit has been scheduled as a phone visit to comply with patient safety concerns in accordance with CDC recommendations. Total time of discussion was 20 minutes.      This document has been electronically signed by NATALIE Manjarrez, MYRNA-BC, ANTONIOE  January 28, 2021 14:57 EST

## 2021-01-29 ENCOUNTER — TELEPHONE (OUTPATIENT)
Dept: FAMILY MEDICINE CLINIC | Facility: CLINIC | Age: 61
End: 2021-01-29

## 2021-01-29 NOTE — TELEPHONE ENCOUNTER
Patient would like to know if she can have a prescription written for diabetic shoes. Patient would like a call back to discuss.    [phone]

## 2021-02-09 ENCOUNTER — TELEPHONE (OUTPATIENT)
Dept: FAMILY MEDICINE CLINIC | Facility: CLINIC | Age: 61
End: 2021-02-09

## 2021-02-09 NOTE — TELEPHONE ENCOUNTER
PT SAID HER METFORMIN WAS INCREASED TO TAKE TWO IN THE EVENING.  PT SAID IT IS MAKING HER SICK TO HER STOMACH AND CANNOT TOLERATE IT.  PT IS REQUESTING A CALL BACK TO DISCUSS OTHER OPTIONS.

## 2021-02-09 NOTE — TELEPHONE ENCOUNTER
Alejandra said that she has been taking 1 pill in the morning which does not bother her but in the evening she is taking 2 pills. That's when she gets really sick at her stomach.

## 2021-02-11 NOTE — TELEPHONE ENCOUNTER
Patient requested a call back. Patient stated her metFORMIN (GLUCOPHAGE) 500 MG tablet that she is currently taking 3 times a day with meals are making her very nauseous. Patient would like to know what she should to help with this.     Please call and advise. Patient call back [phone]

## 2021-02-13 DIAGNOSIS — E11.43 TYPE 2 DIABETES MELLITUS WITH DIABETIC AUTONOMIC NEUROPATHY, WITHOUT LONG-TERM CURRENT USE OF INSULIN (HCC): Primary | ICD-10-CM

## 2021-02-13 RX ORDER — EMPAGLIFLOZIN, METFORMIN HYDROCHLORIDE 5; 1000 MG/1; MG/1
1 TABLET, EXTENDED RELEASE ORAL DAILY
Qty: 30 TABLET | Refills: 5 | Status: SHIPPED | OUTPATIENT
Start: 2021-02-13 | End: 2021-07-07

## 2021-02-15 ENCOUNTER — TELEPHONE (OUTPATIENT)
Dept: FAMILY MEDICINE CLINIC | Facility: CLINIC | Age: 61
End: 2021-02-15

## 2021-02-15 NOTE — TELEPHONE ENCOUNTER
Pt is aware of this information.       ----- Message from Scott Chavez MD sent at 2/13/2021  9:55 AM EST -----  I am going to replace her Metformin with Synjardy XR  I think that the latter will be better tolerated and will provide better glucose control  It will likely require a PA however  ----- Message -----  From: Yesenia Correa MA  Sent: 2/12/2021   2:13 PM EST  To: Scott Chavez MD    Lisa called and stated that she has changed her metformin to 1 in the morning 1 at lunch and 1 at dinner but it is still killing her stomach. Is there anything else she can take?

## 2021-02-17 ENCOUNTER — TELEPHONE (OUTPATIENT)
Dept: FAMILY MEDICINE CLINIC | Facility: CLINIC | Age: 61
End: 2021-02-17

## 2021-02-18 ENCOUNTER — PRIOR AUTHORIZATION (OUTPATIENT)
Dept: FAMILY MEDICINE CLINIC | Facility: CLINIC | Age: 61
End: 2021-02-18

## 2021-02-22 DIAGNOSIS — Z23 IMMUNIZATION DUE: ICD-10-CM

## 2021-02-22 DIAGNOSIS — K21.9 GASTROESOPHAGEAL REFLUX DISEASE WITHOUT ESOPHAGITIS: ICD-10-CM

## 2021-02-22 DIAGNOSIS — E78.2 MIXED HYPERLIPIDEMIA: ICD-10-CM

## 2021-02-22 RX ORDER — BUPROPION HYDROCHLORIDE 300 MG/1
300 TABLET ORAL EVERY MORNING
Qty: 30 TABLET | Refills: 5 | Status: SHIPPED | OUTPATIENT
Start: 2021-02-22 | End: 2021-09-16

## 2021-02-22 RX ORDER — SIMVASTATIN 20 MG
TABLET ORAL
Qty: 30 TABLET | Refills: 5 | Status: SHIPPED | OUTPATIENT
Start: 2021-02-22 | End: 2021-09-16

## 2021-02-22 RX ORDER — PANTOPRAZOLE SODIUM 40 MG/1
40 TABLET, DELAYED RELEASE ORAL DAILY
Qty: 30 TABLET | Refills: 5 | Status: SHIPPED | OUTPATIENT
Start: 2021-02-22 | End: 2021-09-16

## 2021-02-24 ENCOUNTER — OFFICE VISIT (OUTPATIENT)
Dept: FAMILY MEDICINE CLINIC | Facility: CLINIC | Age: 61
End: 2021-02-24

## 2021-02-24 VITALS — BODY MASS INDEX: 48.2 KG/M2 | WEIGHT: 272 LBS | HEIGHT: 63 IN

## 2021-02-24 DIAGNOSIS — E11.40 TYPE 2 DIABETES MELLITUS WITH DIABETIC NEUROPATHY, WITHOUT LONG-TERM CURRENT USE OF INSULIN (HCC): Primary | Chronic | ICD-10-CM

## 2021-02-24 DIAGNOSIS — I10 ESSENTIAL HYPERTENSION: Chronic | ICD-10-CM

## 2021-02-24 DIAGNOSIS — E78.2 MIXED HYPERLIPIDEMIA: Chronic | ICD-10-CM

## 2021-02-24 PROBLEM — E11.49 TYPE 2 DIABETES MELLITUS WITH NEUROLOGIC COMPLICATION, WITHOUT LONG-TERM CURRENT USE OF INSULIN (HCC): Status: ACTIVE | Noted: 2021-01-18

## 2021-02-24 PROCEDURE — 99442 PR PHYS/QHP TELEPHONE EVALUATION 11-20 MIN: CPT | Performed by: NURSE PRACTITIONER

## 2021-02-24 NOTE — PROGRESS NOTES
You have chosen to receive care through a telephone visit. Do you consent to use a telephone visit for your medical care today? Yes    History of Present Illness   Lisa Hill is a 60 y.o. female who presents to the clinic via phone today pertaining to her DM, type 2 which recently was diagnosed. In addition, she has HTN and Dyslipidemia.    Diabetes  She has type 2 diabetes mellitus. Associated symptoms include blurred vision, fatigue, polydipsia, polyuria which are improving.  Risk factors for coronary artery disease include diabetes mellitus, dyslipidemia, hypertension, obesity, post-menopausal and sedentary lifestyle. She was unable to tolerate the increase in Metformin and has been prescribed Synjardy 5-1000 mg. Blood sugar last night was 135 mg evening   An ACE inhibitor/angiotensin II receptor blocker is being taken.  Lab Results   Component Value Date    HGBA1C 7.70 (H) 12/22/2020     Hypertension  The current episode started more than 1 year ago. Associated symptoms include blurred vision, malaise/fatigue and peripheral edema. Risk factors for coronary artery disease include diabetes mellitus, dyslipidemia, obesity, post-menopausal state and sedentary lifestyle. Current antihypertension treatment includes angiotensin blockers.   Lab Results   Component Value Date    GLUCOSE 160 (H) 12/22/2020    BUN 15 12/22/2020    CREATININE 0.95 12/22/2020    EGFRIFNONA 60 (L) 12/22/2020    BCR 15.8 12/22/2020    K 4.4 12/22/2020    CO2 26.8 12/22/2020    CALCIUM 9.1 12/22/2020    ALBUMIN 3.70 12/22/2020    AST 16 12/22/2020    ALT 24 12/22/2020     Dyslipidemia  Current antihyperlipidemic treatment includes statins. Risk factors for coronary artery disease include diabetes mellitus, dyslipidemia, hypertension, obesity, post-menopausal and a sedentary lifestyle.   Lab Results   Component Value Date    CHOL 155 12/22/2020    TRIG 185 (H) 12/22/2020    HDL 39 (L) 12/22/2020    LDL 84 12/22/2020     Vitals:    02/24/21  "0905   Weight: 123 kg (272 lb)   Height: 158.8 cm (62.52\")        The following portions of the patient's history were reviewed and updated as appropriate: allergies, current medications, past family history, past medical history, past social history, past surgical history and problem list.    Review of Systems   Constitutional: Positive for activity change, appetite change (Decreasing) and fatigue (Improving). Negative for chills, diaphoresis, fever, unexpected weight gain and unexpected weight loss.   Eyes: Positive for visual disturbance.   Respiratory: Positive for cough. Negative for shortness of breath and wheezing.    Cardiovascular: Negative for chest pain, palpitations and leg swelling.   Gastrointestinal: Positive for GERD. Negative for constipation, nausea and vomiting.   Endocrine: Positive for polydipsia and polyuria.   Musculoskeletal: Positive for arthralgias.   Skin: Negative for color change and skin lesions.   Neurological: Positive for numbness and headache.   Hematological: Bruises/bleeds easily.      Physical Exam  Constitutional:       General: She is not in acute distress.     Comments: Pleasant; in good spirits   Pulmonary:      Effort: No respiratory distress.   Neurological:      Mental Status: She is alert.   Psychiatric:         Mood and Affect: Mood and affect normal.         Speech: Speech normal.         Behavior: Behavior is cooperative.       Assessment/Plan     Problems Addressed this Visit        Cardiac and Vasculature    Mixed hyperlipidemia    Essential hypertension       Endocrine and Metabolic    Type 2 diabetes mellitus with neurologic complication, without long-term current use of insulin (CMS/[facility]) - Primary      Diagnoses       Codes Comments    Type 2 diabetes mellitus with diabetic neuropathy, without long-term current use of insulin (CMS/[facility])    -  Primary ICD-10-CM: E11.40  ICD-9-CM: 250.60, 357.2 Symptoms discussed. She is tolerating Synjardy very well. Discussed s/e " of Synjardy    Essential hypertension     ICD-10-CM: I10  ICD-9-CM: 401.9 Continue Cozaar    Mixed hyperlipidemia     ICD-10-CM: E78.2  ICD-9-CM: 272.2 Cardiovascular risk reduction modifications encouraged including nutrition recommendations. Continue Atorvastatin         Symptoms discussed. She is tolerating Synjardy very well. Discussed s/e of Synjardy. Cardiovascular risk reduction modifications encouraged including nutrition recommendations. Continue Atorvastatin and Cozaar. She will f/u with me on March 10th; sooner if problems/concerns occur.   This visit has been scheduled as a phone visit to comply with patient safety concerns in accordance with CDC recommendations. Total time of discussion was 15  minutes.    This document has been electronically signed by NATALIE Manjarrez, MYRNA-BC, ANUPAMA  February 24, 2021 09:40 EST

## 2021-03-10 ENCOUNTER — OFFICE VISIT (OUTPATIENT)
Dept: FAMILY MEDICINE CLINIC | Facility: CLINIC | Age: 61
End: 2021-03-10

## 2021-03-10 VITALS
HEART RATE: 98 BPM | SYSTOLIC BLOOD PRESSURE: 120 MMHG | BODY MASS INDEX: 48.2 KG/M2 | HEIGHT: 63 IN | DIASTOLIC BLOOD PRESSURE: 60 MMHG | WEIGHT: 272 LBS | OXYGEN SATURATION: 94 % | TEMPERATURE: 96.8 F

## 2021-03-10 DIAGNOSIS — E03.8 HYPOTHYROIDISM DUE TO HASHIMOTO'S THYROIDITIS: Chronic | ICD-10-CM

## 2021-03-10 DIAGNOSIS — IMO0002 DM TYPE 2, UNCONTROLLED, WITH NEUROPATHY: Primary | Chronic | ICD-10-CM

## 2021-03-10 DIAGNOSIS — K21.9 GASTROESOPHAGEAL REFLUX DISEASE WITHOUT ESOPHAGITIS: Chronic | ICD-10-CM

## 2021-03-10 DIAGNOSIS — E78.2 MIXED HYPERLIPIDEMIA: Chronic | ICD-10-CM

## 2021-03-10 DIAGNOSIS — I10 ESSENTIAL HYPERTENSION: Chronic | ICD-10-CM

## 2021-03-10 DIAGNOSIS — E06.3 HYPOTHYROIDISM DUE TO HASHIMOTO'S THYROIDITIS: Chronic | ICD-10-CM

## 2021-03-10 DIAGNOSIS — E55.9 VITAMIN D DEFICIENCY: ICD-10-CM

## 2021-03-10 PROCEDURE — 99214 OFFICE O/P EST MOD 30 MIN: CPT | Performed by: NURSE PRACTITIONER

## 2021-03-10 NOTE — PROGRESS NOTES
History of Present Illness   Lisa Hill is a 60 y.o. female who presents to the clinic today pertaining to her DM, type 2. In addition, she has HTN,  Dyslipidemia and Hypothyroidism..    Diabetes  She has type 2 diabetes mellitus. Associated symptoms include blurred vision, fatigue, polydipsia, polyuria which are improving.  Risk factors for coronary artery disease include diabetes mellitus, dyslipidemia, hypertension, obesity, post-menopausal and sedentary lifestyle. She was unable to tolerate the increase in Metformin and has been prescribed Synjardy 5-1000 mg. Blood sugar readings have been running between 160-230 mg/dL.  An ACE inhibitor/angiotensin II receptor blocker is being taken. She does have peripheral neuropathy.   Lab Results   Component Value Date    HGBA1C 7.70 (H) 12/22/2020     Hypertension  The current episode started more than 1 year ago. Associated symptoms include blurred vision, malaise/fatigue and peripheral edema. Risk factors for coronary artery disease include diabetes mellitus, dyslipidemia, obesity, post-menopausal state and sedentary lifestyle. Current antihypertension treatment includes angiotensin blockers.   Lab Results   Component Value Date    GLUCOSE 160 (H) 12/22/2020    BUN 15 12/22/2020    CREATININE 0.95 12/22/2020    EGFRIFNONA 60 (L) 12/22/2020    BCR 15.8 12/22/2020    K 4.4 12/22/2020    CO2 26.8 12/22/2020    CALCIUM 9.1 12/22/2020    ALBUMIN 3.70 12/22/2020    AST 16 12/22/2020    ALT 24 12/22/2020     Dyslipidemia  Current antihyperlipidemic treatment includes statins. Risk factors for coronary artery disease include diabetes mellitus, dyslipidemia, hypertension, obesity, post-menopausal and a sedentary lifestyle.   Lab Results   Component Value Date    CHOL 155 12/22/2020    TRIG 185 (H) 12/22/2020    HDL 39 (L) 12/22/2020    LDL 84 12/22/2020     Vitals:    03/10/21 0823   BP: 120/60   BP Location: Right arm   Patient Position: Sitting   Cuff Size: Adult  "  Pulse: 98   Temp: 96.8 °F (36 °C)   TempSrc: Temporal   SpO2: 94%   Weight: 123 kg (272 lb)   Height: 158.8 cm (62.52\")        The following portions of the patient's history were reviewed and updated as appropriate: allergies, current medications, past family history, past medical history, past social history, past surgical history and problem list.    Review of Systems   Constitutional: Positive for appetite change (Decreasing) and fatigue (Improving). Negative for activity change, chills, diaphoresis, fever, unexpected weight gain and unexpected weight loss.   Eyes: Positive for blurred vision and visual disturbance.   Respiratory: Positive for cough. Negative for shortness of breath and wheezing.    Cardiovascular: Positive for leg swelling. Negative for chest pain and palpitations.   Gastrointestinal: Positive for GERD. Negative for constipation, nausea and vomiting.   Endocrine: Positive for heat intolerance, polydipsia and polyuria. Negative for cold intolerance and polyphagia.   Musculoskeletal: Positive for arthralgias.   Skin: Negative for color change and skin lesions.   Neurological: Positive for numbness and headache.   Hematological: Bruises/bleeds easily.      Physical Exam  Vitals reviewed.   Constitutional:       General: She is not in acute distress.     Appearance: She is well-developed.      Comments: Pleasant; in good spirits. Wearing appropriate face mask.   HENT:      Head: Normocephalic.      Nose: Nose normal.   Eyes:      General: No scleral icterus.        Right eye: No discharge.         Left eye: No discharge.      Conjunctiva/sclera: Conjunctivae normal.   Neck:      Thyroid: No thyromegaly.      Vascular: No JVD.   Cardiovascular:      Rate and Rhythm: Normal rate and regular rhythm.      Heart sounds: Normal heart sounds. No murmur. No friction rub.   Pulmonary:      Effort: Pulmonary effort is normal. No respiratory distress.      Breath sounds: Decreased breath sounds present. No " wheezing, rhonchi or rales.   Abdominal:      General: Bowel sounds are normal. There is no distension.      Palpations: Abdomen is soft.      Tenderness: There is no abdominal tenderness. There is no guarding or rebound.   Musculoskeletal:      Cervical back: Neck supple.      Right lower leg: Edema present.      Left lower leg: Edema present.   Feet:      Right foot:      Protective Sensation: 10 sites tested. 10 sites sensed.      Toenail Condition: Right toenails are normal.      Left foot:      Protective Sensation: 10 sites tested. 10 sites sensed.      Toenail Condition: Left toenails are normal.      Comments: Left great toe edema and tenderness. Will be seeing Podiatry soon for evaluation  Lymphadenopathy:      Cervical: No cervical adenopathy.   Skin:     General: Skin is warm and dry.      Capillary Refill: Capillary refill takes less than 2 seconds.      Findings: No erythema or rash.   Neurological:      Mental Status: She is alert and oriented to person, place, and time.   Psychiatric:         Mood and Affect: Mood normal.         Speech: Speech normal.         Behavior: Behavior normal. Behavior is cooperative.         Thought Content: Thought content normal.         Cognition and Memory: Cognition normal.         Judgment: Judgment normal.       Assessment/Plan     Problems Addressed this Visit        Cardiac and Vasculature    Mixed hyperlipidemia    Relevant Orders    Comprehensive Metabolic Panel    TSH    Lipid Panel    Essential hypertension    Relevant Orders    Comprehensive Metabolic Panel    TSH    Lipid Panel       Endocrine and Metabolic    Vitamin D deficiency    Relevant Orders    Vitamin D 25 Hydroxy    Hypothyroidism due to Hashimoto's thyroiditis    Relevant Orders    TSH    DM type 2, uncontrolled, with neuropathy (CMS/HCC) - Primary    Relevant Orders    Ambulatory Referral for Diabetic Eye Exam-Optometry    Comprehensive Metabolic Panel    TSH    Lipid Panel    Hemoglobin A1c     MicroAlbumin, Urine, Random - Urine, Clean Catch       Gastrointestinal Abdominal     Gastroesophageal reflux disease without esophagitis    Relevant Orders    CBC & Differential      Diagnoses       Codes Comments    DM type 2, uncontrolled, with neuropathy (CMS/HCC)    -  Primary ICD-10-CM: E11.40, E11.65  ICD-9-CM: 250.62, 357.2 Discussed importance of glycemic control to avoid additional complications or worsening     Essential hypertension     ICD-10-CM: I10  ICD-9-CM: 401.9 Well controlled Continue ARB    Mixed hyperlipidemia     ICD-10-CM: E78.2  ICD-9-CM: 272.2 Cardiovascular risk reduction modifications reinforced     Hypothyroidism due to Hashimoto's thyroiditis     ICD-10-CM: E03.8, E06.3  ICD-9-CM: 244.8, 245.2 TSH level ordered    Gastroesophageal reflux disease without esophagitis     ICD-10-CM: K21.9  ICD-9-CM: 530.81 Stable. Continue Protonix    Vitamin D deficiency     ICD-10-CM: E55.9  ICD-9-CM: 268.9 Vitamin D level ordered        Findings and recommendations discussed with Lisa. Lifestyle modifications reinforced including nutrition and activity recommendations.Discussed importance of glycemic control to avoid additional complications or worsening  She will follow up with Dr Chavez later this month. Labs ordered for her to have done prior to her visit with Dr Chavez.         This document has been electronically signed by NATALIE Manjarrez, MYRNA-BC, ANTONIOE  March 10, 2021 08:22 EST

## 2021-03-10 NOTE — PATIENT INSTRUCTIONS
Type 2 Diabetes Mellitus, Self Care, Adult  When you have type 2 diabetes (type 2 diabetes mellitus), you must make sure your blood sugar (glucose) stays in a healthy range. You can do this with:  · Nutrition.  · Exercise.  · Lifestyle changes.  · Medicines or insulin, if needed.  · Support from your doctors and others.  How to stay aware of blood sugar    · Check your blood sugar level every day, as often as told.  · Have your A1c (hemoglobin A1c) level checked two or more times a year. Have it checked more often if your doctor tells you to.  Your doctor will set personal treatment goals for you. Generally, you should have these blood sugar levels:  · Before meals (preprandial):  mg/dL (4.4-7.2 mmol/L).  · After meals (postprandial): below 180 mg/dL (10 mmol/L).  · A1c level: less than 7%.  How to manage high and low blood sugar  Signs of high blood sugar  High blood sugar is called hyperglycemia. Know the signs of high blood sugar. Signs may include:  · Feeling:  ? Thirsty.  ? Hungry.  ? Very tired.  · Needing to pee (urinate) more than usual.  · Blurry vision.  Signs of low blood sugar  Low blood sugar is called hypoglycemia. This is when blood sugar is at or below 70 mg/dL (3.9 mmol/L). Signs may include:  · Feeling:  ? Hungry.  ? Worried or nervous (anxious).  ? Sweaty and clammy.  ? Confused.  ? Dizzy.  ? Sleepy.  ? Sick to your stomach (nauseous).  · Having:  ? A fast heartbeat.  ? A headache.  ? A change in your vision.  ? Jerky movements that you cannot control (seizure).  ? Tingling or no feeling (numbness) around your mouth, lips, or tongue.  · Having trouble with:  ? Moving (coordination).  ? Sleeping.  ? Passing out (fainting).  ? Getting upset easily (irritability).  Treating low blood sugar  To treat low blood sugar, eat or drink something sugary right away. If you can think clearly and swallow safely, follow the 15:15 rule:  · Take 15 grams of a fast-acting carb (carbohydrate). Talk with your  doctor about how much you should take.  · Some fast-acting carbs are:  ? Sugar tablets (glucose pills). Take 3-4 pills.  ? 6-8 pieces of hard candy.  ? 4-6 oz (120-150 mL) of fruit juice.  ? 4-6 oz (120-150 mL) of regular (not diet) soda.  ? 1 Tbsp (15 mL) honey or sugar.  · Check your blood sugar 15 minutes after you take the carb.  · If your blood sugar is still at or below 70 mg/dL (3.9 mmol/L), take 15 grams of a carb again.  · If your blood sugar does not go above 70 mg/dL (3.9 mmol/L) after 3 tries, get help right away.  · After your blood sugar goes back to normal, eat a meal or a snack within 1 hour.  Treating very low blood sugar  If your blood sugar is at or below 54 mg/dL (3 mmol/L), you have very low blood sugar (severe hypoglycemia). This is an emergency. Do not wait to see if the symptoms will go away. Get medical help right away. Call your local emergency services (911 in the [place]  If you have very low blood sugar and you cannot eat or drink, you may need a glucagon shot (injection). A family member or friend should learn how to check your blood sugar and how to give you a glucagon shot. Ask your doctor if you need to have a glucagon shot kit at home.  Follow these instructions at home:  Medicine  · Take insulin and diabetes medicines as told.  · If your doctor says you should take more or less insulin and medicines, do this exactly as told.  · Do not run out of insulin or medicines.  Having diabetes can raise your risk for other long-term conditions. These include heart disease and kidney disease. Your doctor may prescribe medicines to help you not have these problems.  Food    · Make healthy food choices. These include:  ? Chicken, fish, egg whites, and beans.  ? Oats, whole wheat, bulgur, brown rice, quinoa, and millet.  ? Fresh fruits and vegetables.  ? Low-fat dairy products.  ? Nuts, avocado, olive oil, and canola oil.  · Meet with a  (dietitian). He or she can help you make an  eating plan that is right for you.  · Follow instructions from your doctor about what you cannot eat or drink.  · Drink enough fluid to keep your pee (urine) pale yellow.  · Keep track of carbs that you eat. Do this by reading food labels and learning food serving sizes.  · Follow your sick day plan when you cannot eat or drink normally. Make this plan with your doctor so it is ready to use.  Activity  · Exercise 3 or more times a week.  · Do not go more than 2 days without exercising.  · Talk with your doctor before you start a new exercise. Your doctor may need to tell you to change:  ? How much insulin or medicines you take.  ? How much food you eat.  Lifestyle  · Do not use any tobacco products. These include cigarettes, chewing tobacco, and e-cigarettes. If you need help quitting, ask your doctor.  · Ask your doctor how much alcohol is safe for you.  · Learn to deal with stress. If you need help with this, ask your doctor.  Body care    · Stay up to date with your shots (immunizations).  · Have your eyes and feet checked by a doctor as often as told.  · Check your skin and feet every day. Check for cuts, bruises, redness, blisters, or sores.  · Brush your teeth and gums two times a day. Floss one or more times a day.  · Go to the dentist one or more times every 6 months.  · Stay at a healthy weight.  General instructions  · Take over-the-counter and prescription medicines only as told by your doctor.  · Share your diabetes care plan with:  ? Your work or school.  ? People you live with.  · Carry a card or wear jewelry that says you have diabetes.  · Keep all follow-up visits as told by your doctor. This is important.  Questions to ask your doctor  · Do I need to meet with a diabetes educator?  · Where can I find a support group for people with diabetes?  Where to find more information  To learn more about diabetes, visit:  · American Diabetes Association: www.diabetes.org  · American Association of Diabetes  Educators: www.diabeteseducator.org  Summary  · When you have type 2 diabetes, you must make sure your blood sugar (glucose) stays in a healthy range.  · Check your blood sugar every day, as often as told.  · Having diabetes can raise your risk for other conditions. Your doctor may prescribe medicines to help you not have these problems.  · Keep all follow-up visits as told by your doctor. This is important.  This information is not intended to replace advice given to you by your health care provider. Make sure you discuss any questions you have with your health care provider.  Document Revised: 06/10/2019 Document Reviewed: 01/20/2017  [employer] Patient Education © 2020 [employer]

## 2021-03-18 ENCOUNTER — LAB (OUTPATIENT)
Dept: FAMILY MEDICINE CLINIC | Facility: CLINIC | Age: 61
End: 2021-03-18

## 2021-03-18 DIAGNOSIS — IMO0002 DM TYPE 2, UNCONTROLLED, WITH NEUROPATHY: Chronic | ICD-10-CM

## 2021-03-18 DIAGNOSIS — E06.3 HYPOTHYROIDISM DUE TO HASHIMOTO'S THYROIDITIS: Chronic | ICD-10-CM

## 2021-03-18 DIAGNOSIS — K21.9 GASTROESOPHAGEAL REFLUX DISEASE WITHOUT ESOPHAGITIS: ICD-10-CM

## 2021-03-18 DIAGNOSIS — E55.9 VITAMIN D DEFICIENCY: ICD-10-CM

## 2021-03-18 DIAGNOSIS — E03.8 HYPOTHYROIDISM DUE TO HASHIMOTO'S THYROIDITIS: Chronic | ICD-10-CM

## 2021-03-18 DIAGNOSIS — E78.2 MIXED HYPERLIPIDEMIA: Chronic | ICD-10-CM

## 2021-03-18 DIAGNOSIS — I10 ESSENTIAL HYPERTENSION: Chronic | ICD-10-CM

## 2021-03-18 LAB
25(OH)D3 SERPL-MCNC: 59.4 NG/ML
ALBUMIN SERPL-MCNC: 3.6 G/DL (ref 3.5–5.2)
ALBUMIN UR-MCNC: <1.2 MG/DL
ALBUMIN/GLOB SERPL: 1.1 G/DL
ALP SERPL-CCNC: 54 U/L (ref 39–117)
ALT SERPL W P-5'-P-CCNC: 30 U/L (ref 1–33)
ANION GAP SERPL CALCULATED.3IONS-SCNC: 10.7 MMOL/L (ref 5–15)
AST SERPL-CCNC: 18 U/L (ref 1–32)
BASOPHILS # BLD AUTO: 0.11 10*3/MM3 (ref 0–0.2)
BASOPHILS NFR BLD AUTO: 1 % (ref 0–1.5)
BILIRUB SERPL-MCNC: 0.2 MG/DL (ref 0–1.2)
BUN SERPL-MCNC: 17 MG/DL (ref 8–23)
BUN/CREAT SERPL: 14.5 (ref 7–25)
CALCIUM SPEC-SCNC: 9.1 MG/DL (ref 8.6–10.5)
CHLORIDE SERPL-SCNC: 97 MMOL/L (ref 98–107)
CHOLEST SERPL-MCNC: 124 MG/DL (ref 0–200)
CO2 SERPL-SCNC: 29.3 MMOL/L (ref 22–29)
CREAT SERPL-MCNC: 1.17 MG/DL (ref 0.57–1)
DEPRECATED RDW RBC AUTO: 44.6 FL (ref 37–54)
EOSINOPHIL # BLD AUTO: 0.26 10*3/MM3 (ref 0–0.4)
EOSINOPHIL NFR BLD AUTO: 2.5 % (ref 0.3–6.2)
ERYTHROCYTE [DISTWIDTH] IN BLOOD BY AUTOMATED COUNT: 12.8 % (ref 12.3–15.4)
GFR SERPL CREATININE-BSD FRML MDRD: 47 ML/MIN/1.73
GLOBULIN UR ELPH-MCNC: 3.3 GM/DL
GLUCOSE SERPL-MCNC: 149 MG/DL (ref 65–99)
HBA1C MFR BLD: 7.42 % (ref 4.8–5.6)
HCT VFR BLD AUTO: 42.9 % (ref 34–46.6)
HDLC SERPL-MCNC: 39 MG/DL (ref 40–60)
HGB BLD-MCNC: 14.6 G/DL (ref 12–15.9)
IMM GRANULOCYTES # BLD AUTO: 0.05 10*3/MM3 (ref 0–0.05)
IMM GRANULOCYTES NFR BLD AUTO: 0.5 % (ref 0–0.5)
LDLC SERPL CALC-MCNC: 60 MG/DL (ref 0–100)
LDLC/HDLC SERPL: 1.45 {RATIO}
LYMPHOCYTES # BLD AUTO: 3.59 10*3/MM3 (ref 0.7–3.1)
LYMPHOCYTES NFR BLD AUTO: 34.1 % (ref 19.6–45.3)
MCH RBC QN AUTO: 32.6 PG (ref 26.6–33)
MCHC RBC AUTO-ENTMCNC: 34 G/DL (ref 31.5–35.7)
MCV RBC AUTO: 95.8 FL (ref 79–97)
MONOCYTES # BLD AUTO: 0.62 10*3/MM3 (ref 0.1–0.9)
MONOCYTES NFR BLD AUTO: 5.9 % (ref 5–12)
NEUTROPHILS NFR BLD AUTO: 5.9 10*3/MM3 (ref 1.7–7)
NEUTROPHILS NFR BLD AUTO: 56 % (ref 42.7–76)
NRBC BLD AUTO-RTO: 0 /100 WBC (ref 0–0.2)
PLATELET # BLD AUTO: 389 10*3/MM3 (ref 140–450)
PMV BLD AUTO: 10.4 FL (ref 6–12)
POTASSIUM SERPL-SCNC: 4.7 MMOL/L (ref 3.5–5.2)
PROT SERPL-MCNC: 6.9 G/DL (ref 6–8.5)
RBC # BLD AUTO: 4.48 10*6/MM3 (ref 3.77–5.28)
SODIUM SERPL-SCNC: 137 MMOL/L (ref 136–145)
TRIGL SERPL-MCNC: 143 MG/DL (ref 0–150)
TSH SERPL DL<=0.05 MIU/L-ACNC: 9.06 UIU/ML (ref 0.27–4.2)
VLDLC SERPL-MCNC: 25 MG/DL (ref 5–40)
WBC # BLD AUTO: 10.53 10*3/MM3 (ref 3.4–10.8)

## 2021-03-18 PROCEDURE — 82306 VITAMIN D 25 HYDROXY: CPT | Performed by: NURSE PRACTITIONER

## 2021-03-18 PROCEDURE — 82043 UR ALBUMIN QUANTITATIVE: CPT | Performed by: NURSE PRACTITIONER

## 2021-03-18 PROCEDURE — 83036 HEMOGLOBIN GLYCOSYLATED A1C: CPT | Performed by: NURSE PRACTITIONER

## 2021-03-18 PROCEDURE — 80050 GENERAL HEALTH PANEL: CPT | Performed by: NURSE PRACTITIONER

## 2021-03-18 PROCEDURE — 80061 LIPID PANEL: CPT | Performed by: NURSE PRACTITIONER

## 2021-03-22 DIAGNOSIS — F41.8 DEPRESSION WITH ANXIETY: ICD-10-CM

## 2021-03-22 DIAGNOSIS — J43.1 PANLOBULAR EMPHYSEMA (HCC): ICD-10-CM

## 2021-03-23 RX ORDER — RISPERIDONE 2 MG/1
2 TABLET ORAL
Qty: 30 TABLET | Refills: 5 | Status: SHIPPED | OUTPATIENT
Start: 2021-03-23 | End: 2021-10-14

## 2021-03-26 ENCOUNTER — OFFICE VISIT (OUTPATIENT)
Dept: FAMILY MEDICINE CLINIC | Facility: CLINIC | Age: 61
End: 2021-03-26

## 2021-03-26 DIAGNOSIS — I25.10 CORONARY ARTERY CALCIFICATION SEEN ON CT SCAN: ICD-10-CM

## 2021-03-26 DIAGNOSIS — F41.8 DEPRESSION WITH ANXIETY: ICD-10-CM

## 2021-03-26 DIAGNOSIS — F17.200 SMOKER: ICD-10-CM

## 2021-03-26 DIAGNOSIS — I10 ESSENTIAL HYPERTENSION: ICD-10-CM

## 2021-03-26 DIAGNOSIS — E11.42 TYPE 2 DIABETES MELLITUS WITH PERIPHERAL NEUROPATHY (HCC): ICD-10-CM

## 2021-03-26 DIAGNOSIS — K21.9 GASTROESOPHAGEAL REFLUX DISEASE WITHOUT ESOPHAGITIS: ICD-10-CM

## 2021-03-26 DIAGNOSIS — E66.01 MORBID OBESITY WITH BMI OF 45.0-49.9, ADULT (HCC): ICD-10-CM

## 2021-03-26 DIAGNOSIS — M85.80 OSTEOPENIA, UNSPECIFIED LOCATION: ICD-10-CM

## 2021-03-26 DIAGNOSIS — E55.9 VITAMIN D DEFICIENCY: ICD-10-CM

## 2021-03-26 DIAGNOSIS — G47.33 OBSTRUCTIVE SLEEP APNEA: ICD-10-CM

## 2021-03-26 DIAGNOSIS — E03.8 HYPOTHYROIDISM DUE TO HASHIMOTO'S THYROIDITIS: ICD-10-CM

## 2021-03-26 DIAGNOSIS — M15.9 GENERALIZED OSTEOARTHRITIS: ICD-10-CM

## 2021-03-26 DIAGNOSIS — K59.09 CHRONIC CONSTIPATION: ICD-10-CM

## 2021-03-26 DIAGNOSIS — E03.9 ACQUIRED HYPOTHYROIDISM: ICD-10-CM

## 2021-03-26 DIAGNOSIS — E06.3 HYPOTHYROIDISM DUE TO HASHIMOTO'S THYROIDITIS: ICD-10-CM

## 2021-03-26 DIAGNOSIS — J43.1 PANLOBULAR EMPHYSEMA (HCC): ICD-10-CM

## 2021-03-26 DIAGNOSIS — Z00.00 HEALTHCARE MAINTENANCE: ICD-10-CM

## 2021-03-26 DIAGNOSIS — E78.2 MIXED HYPERLIPIDEMIA: ICD-10-CM

## 2021-03-26 DIAGNOSIS — J30.9 CHRONIC ALLERGIC RHINITIS: Primary | ICD-10-CM

## 2021-03-26 PROCEDURE — 99214 OFFICE O/P EST MOD 30 MIN: CPT | Performed by: GENERAL PRACTICE

## 2021-03-26 NOTE — PROGRESS NOTES
Subjective   Lisa Hill is a 60 y.o. female.     History of Present Illness     COPD  She feels that her SOB and cough remain at baseline.  There is no history of any chest pain or hemoptysis.  She continues to have intermittent nasal congestion but denies any other upper respiratory tract symptoms, fever or chills. She reports that her symptoms become worse with activity, exposure to cold air and environmental allergens. Her symptoms are reduced with rest, supplemental oxygen, and inhaled inhaled medication.  She has found breztri to be more effective than anything else thus far. She continues to smoke about 1 pack per day.  She underwent an updated low dose CT of the chest on 6/4/2020 with evidence of moderate COPD, stable small lung nodules, and mild coronary artery calcifications.  She was advised to have her next study in 1 year.  She continues to be followed by pulmonology and is scheduled to undergo a reassessment on 6/15/2021  Lab Results   Component Value Date    WBC 10.53 03/18/2021    HGB 14.6 03/18/2021    HCT 42.9 03/18/2021    MCV 95.8 03/18/2021     03/18/2021     Diabetes  Current symptoms include paresthesia of the feet. Patient denies visual disturbances, polydipsia, polyuria, hypoglycemia and foot ulcerations. Current treatments: metformin and SGLT2 inhibitor - empagliflozin (together as synjardy).  She is currently being followed by podiatry  Lab Results   Component Value Date    HGBA1C 7.42 (H) 03/18/2021    HGBA1C 7.70 (H) 12/22/2020    HGBA1C 6.30 (H) 06/08/2020      Lab Results   Component Value Date    MICROALBUR <1.2 03/18/2021     Dyslipidemia  Compliance with treatment has been fair. The patient exercises occasionally. She remains on simvastatin with no apparent side effects  Lab Results   Component Value Date    CHOL 124 03/18/2021    CHLPL 141 02/05/2016    TRIG 143 03/18/2021    HDL 39 (L) 03/18/2021    LDL 60 03/18/2021     Lab Results   Component Value Date    GLUCOSE 149  (H) 03/18/2021    BUN 17 03/18/2021    CREATININE 1.17 (H) 03/18/2021    EGFRIFNONA 47 (L) 03/18/2021    EGFRIFAFRI  09/02/2016      Comment:      <15 Indicative of kidney failure.    BCR 14.5 03/18/2021    K 4.7 03/18/2021    CO2 29.3 (H) 03/18/2021    CALCIUM 9.1 03/18/2021    ALBUMIN 3.60 03/18/2021    LABIL2 1.3 (L) 02/05/2016    AST 18 03/18/2021    ALT 30 03/18/2021     Hypothyroidism  Patient presents for evaluation of thyroid function. Symptoms consist of weight gain, constipation. The symptoms are moderate.  The problem has been unchanged.  Previous thyroid studies include TSH. The hypothyroidism is due to Hashimoto's disease.  Lab Results   Component Value Date    TSH 9.060 (H) 03/18/2021     Low Back Pain  She complains of persistent low back pain. There has been no change in the quality of her discomfort nor any new associated symptoms. There is no history of any weakness, numbness, tingling, or any change in her bowel/bladder control.  There is no history of any fever, chills, night sweats or weight loss.  When she sits down the pain generally resolves within minutes and she denies any problem at night. She continues to use her TENS unit for several hours daily and feels that it helps.  She also feels that meloxicam helps.  Plain films of the lumbar spine performed on 8/5/2019 were reported as showing degenerative disc disease as well as atherosclerotic calcification of the aorta    Depression  Onset was a number of years ago. Current symptoms include: depression, nervousness, anhedonia, difficulty concentrating, fatigue and impaired memory. Patient denies recurrent thoughts of death or suicidal thoughts. Risk factors: history of seasonal affective disorder.  Current medication includes escitalopram 10 daily,  bupropion 300 daily, risperdone 2 nightly, and lorazepam 0.5 twice a day.  She continues to do well at present    Labs  Most recent vitamin D 59.4    The following portions of the patient's  history were reviewed and updated as appropriate: allergies, current medications, past medical history, past social history and problem list.    Review of Systems   Constitutional: Positive for fatigue. Negative for appetite change, chills, fever and unexpected weight change.   HENT: Negative for congestion, ear pain, postnasal drip, rhinorrhea, sneezing, sore throat and voice change.    Eyes: Negative for visual disturbance.   Respiratory: Positive for cough, shortness of breath and wheezing.    Cardiovascular: Positive for leg swelling. Negative for chest pain and palpitations.   Gastrointestinal: Positive for constipation. Negative for abdominal pain, anal bleeding, blood in stool, diarrhea, nausea and vomiting.   Genitourinary: Negative for difficulty urinating, dysuria, frequency, hematuria and urgency.        Incontinence with coughing, sneezing, or lifting   Musculoskeletal: Positive for arthralgias and back pain. Negative for joint swelling and myalgias.   Skin: Negative for rash.   Neurological: Positive for numbness (and paresthesias both feet) and headaches (when stressed). Negative for weakness.   Psychiatric/Behavioral: Positive for decreased concentration, dysphoric mood and sleep disturbance. Negative for suicidal ideas.     Objective   Physical Exam  Constitutional:       General: She is not in acute distress.     Appearance: Normal appearance. She is well-developed. She is not diaphoretic.      Comments: Accompanied by her .  Bright and in fair spirits.  Climbed onto the exam table with minimal assistance.  No apparent distress.   HENT:      Head: Atraumatic.      Right Ear: Tympanic membrane, ear canal and external ear normal.      Left Ear: Tympanic membrane, ear canal and external ear normal.   Eyes:      Conjunctiva/sclera: Conjunctivae normal.   Neck:      Thyroid: No thyroid mass or thyromegaly.      Vascular: No carotid bruit or JVD.      Trachea: Trachea normal. No tracheal deviation.    Cardiovascular:      Rate and Rhythm: Normal rate and regular rhythm.      Heart sounds: Normal heart sounds, S1 normal and S2 normal. No murmur heard.   No gallop.    Pulmonary:      Effort: Pulmonary effort is normal.      Breath sounds: Decreased air movement present. Examination of the right-lower field reveals decreased breath sounds. Examination of the left-lower field reveals decreased breath sounds. Decreased breath sounds and wheezing (diffuse - moderate) present. No rales.      Comments: Pulmonary hyperinflation  Abdominal:      General: Bowel sounds are normal. There is no distension.      Palpations: There is no mass.   Musculoskeletal:      Right lower leg: No edema.      Left lower leg: No edema.   Feet:      Comments: Mild tenderness about the MTP joints  Lymphadenopathy:      Head:      Right side of head: No submental, submandibular, tonsillar, preauricular, posterior auricular or occipital adenopathy.      Left side of head: No submental, submandibular, tonsillar, preauricular, posterior auricular or occipital adenopathy.      Cervical: No cervical adenopathy.      Upper Body:      Right upper body: No supraclavicular adenopathy.      Left upper body: No supraclavicular adenopathy.   Skin:     General: Skin is warm.      Coloration: Skin is not cyanotic, jaundiced or pale.      Findings: No rash.      Nails: There is no clubbing.   Neurological:      Mental Status: She is alert and oriented to person, place, and time.      Cranial Nerves: No cranial nerve deficit.      Sensory: Sensory deficit (decreased vibration sense both feet) present.      Motor: No tremor.      Coordination: Coordination normal.      Gait: Gait normal.   Psychiatric:         Speech: Speech normal.         Behavior: Behavior normal.         Thought Content: Thought content normal.       Assessment/Plan   Problems Addressed this Visit        Allergies and Adverse Reactions    Chronic allergic rhinitis       Cardiac and  Vasculature    Coronary artery calcification seen on CT scan  Reminded regarding risk factor modification with an emphasis on tobacco cessation.  Continue current medication    Essential hypertension   Hypertension: at goal. Evidence of target organ damage: coronary artery calcifications on imaging.  Encouraged to continue to work on diet and exercise plan.   Continue current medication    Mixed hyperlipidemia  As above.   Continue current medication.       Endocrine and Metabolic    Hypothyroidism due to Hashimoto's thyroiditis  Levothyroxine will be titrated to 200 mcg daily  Will continue to monitor    Relevant Medications    levothyroxine (SYNTHROID, LEVOTHROID) 200 MCG tablet    Morbid obesity with BMI of 45.0-49.9, adult (CMS/HCC)    Type 2 diabetes mellitus with peripheral neuropathy (CMS/[facility])  Diabetes mellitus Type II, under better control.   Encouraged to continue to pursue ADA diet  Encouraged aerobic exercise.  Continue current medication  Follow up with podiatry  Updated labs will be drawn at her return.    Vitamin D deficiency       Gastrointestinal Abdominal     Chronic constipation    Gastroesophageal reflux disease without esophagitis       Health Encounters    Healthcare maintenance  Encouraged to obtain a COVID-19 immunization.  Lengthy discussion regarding the potential benefits and risks.  Patient would would like to proceed and arrangements will be made  We will plan on updating a low-dose CT of the chest along with her mammogram and DEXA scan in September       Mental Health    Depression with anxiety    Stable.  Supportive therapy.   Continue current medication.           Musculoskeletal and Injuries    Generalized osteoarthritis    Osteopenia       Pulmonary and Pneumonias    Panlobular emphysema (CMS/HCC)   COPD is worsening.  Reminded of the importance of smoking cessation  Encouraged to remain as active as symptoms allow for  Continue current medication  Follow up with pulmonology         Sleep    Obstructive sleep apnea       Tobacco    Smoker  Lengthy discussion regarding the importance of smoking cessation with respect to her quality and length of life going forward.  Reviewed options and agreed on a trial of Nicotrol inhalers if these can be obtained through her insurance    Relevant Medications    nicotine (NICOTROL) 10 MG inhaler         Diagnoses       Codes Comments    Chronic allergic rhinitis    -  Primary ICD-10-CM: J30.9  ICD-9-CM: 477.9     Mixed hyperlipidemia     ICD-10-CM: E78.2  ICD-9-CM: 272.2     Essential hypertension     ICD-10-CM: I10  ICD-9-CM: 401.9     Coronary artery calcification seen on CT scan     ICD-10-CM: I25.10  ICD-9-CM: 414.00     Vitamin D deficiency     ICD-10-CM: E55.9  ICD-9-CM: 268.9     Morbid obesity with BMI of 45.0-49.9, adult (CMS/[facility])     ICD-10-CM: E66.01, Z68.42  ICD-9-CM: 278.01, V85.42     Hypothyroidism due to Hashimoto's thyroiditis     ICD-10-CM: E03.8, E06.3  ICD-9-CM: 244.8, 245.2     Type 2 diabetes mellitus with peripheral neuropathy (CMS/[facility])     ICD-10-CM: E11.42  ICD-9-CM: 250.60, 357.2     Gastroesophageal reflux disease without esophagitis     ICD-10-CM: K21.9  ICD-9-CM: 530.81     Chronic constipation     ICD-10-CM: K59.09  ICD-9-CM: 564.00     Healthcare maintenance     ICD-10-CM: Z00.00  ICD-9-CM: V70.0     Depression with anxiety     ICD-10-CM: F41.8  ICD-9-CM: 300.4     Osteopenia, unspecified location     ICD-10-CM: M85.80  ICD-9-CM: 733.90     Generalized osteoarthritis     ICD-10-CM: M15.9  ICD-9-CM: 715.00     Panlobular emphysema (CMS/HCC)     ICD-10-CM: J43.1  ICD-9-CM: 492.8     Obstructive sleep apnea     ICD-10-CM: G47.33  ICD-9-CM: 327.23     Smoker     ICD-10-CM: F17.200  ICD-9-CM: 305.1     Acquired hypothyroidism     ICD-10-CM: E03.9  ICD-9-CM: 244.9

## 2021-03-27 VITALS
DIASTOLIC BLOOD PRESSURE: 72 MMHG | SYSTOLIC BLOOD PRESSURE: 126 MMHG | TEMPERATURE: 97.1 F | WEIGHT: 270 LBS | RESPIRATION RATE: 16 BRPM | OXYGEN SATURATION: 94 % | BODY MASS INDEX: 47.84 KG/M2 | HEIGHT: 63 IN | HEART RATE: 100 BPM

## 2021-03-27 PROBLEM — I89.8 CALCIFIED LYMPH NODES: Status: RESOLVED | Noted: 2020-08-20 | Resolved: 2021-03-27

## 2021-03-27 PROBLEM — G47.34 NOCTURNAL HYPOXIA: Status: RESOLVED | Noted: 2019-05-15 | Resolved: 2021-03-27

## 2021-03-27 PROBLEM — G62.9 PERIPHERAL POLYNEUROPATHY: Status: RESOLVED | Noted: 2020-12-23 | Resolved: 2021-03-27

## 2021-03-27 RX ORDER — LEVOTHYROXINE SODIUM 0.2 MG/1
200 TABLET ORAL DAILY
Qty: 30 TABLET | Refills: 5 | Status: SHIPPED | OUTPATIENT
Start: 2021-03-27 | End: 2021-08-20

## 2021-03-30 ENCOUNTER — TELEPHONE (OUTPATIENT)
Dept: FAMILY MEDICINE CLINIC | Facility: CLINIC | Age: 61
End: 2021-03-30

## 2021-03-30 NOTE — TELEPHONE ENCOUNTER
Spoke with pt about the following per Dr. Chavez.       ----- Message from Scott Chavez MD sent at 3/27/2021 11:05 AM EDT -----  1.  Please let patient know that I am increasing her levothyroxine from 175 to 200 mcg daily.  I have emailed a prescription for the new dose to her pharmacy  2.  Please look into whether there is any way we can get Nicotrol inhalers through her insurance or at some type of discount

## 2021-03-31 ENCOUNTER — TELEPHONE (OUTPATIENT)
Dept: FAMILY MEDICINE CLINIC | Facility: CLINIC | Age: 61
End: 2021-03-31

## 2021-03-31 DIAGNOSIS — F17.200 SMOKER: Primary | ICD-10-CM

## 2021-04-01 ENCOUNTER — TELEPHONE (OUTPATIENT)
Dept: FAMILY MEDICINE CLINIC | Facility: CLINIC | Age: 61
End: 2021-04-01

## 2021-04-01 NOTE — TELEPHONE ENCOUNTER
----- Message from Scott Chavez MD sent at 3/30/2021  3:30 PM EDT -----  WOW!  Please let her know  ----- Message -----  From: Vernell Dotson MA  Sent: 3/30/2021   2:07 PM EDT  To: Scott Chavez MD    Her insurance covers the inhalers    ----- Message -----  From: Scott Chavez MD  Sent: 3/27/2021  11:05 AM EDT  To: Vernell Dotson MA    1.  Please let patient know that I am increasing her levothyroxine from 175 to 200 mcg daily.  I have emailed a prescription for the new dose to her pharmacy  2.  Please look into whether there is any way we can get Nicotrol inhalers through her insurance or at some type of discount

## 2021-04-01 NOTE — TELEPHONE ENCOUNTER
Pharmacy notified that metformin was discontinued and patient should now be taking synjardy 5-1000 mg.     Patient also notified that she should be taking the Synjardy, not the metformin.   Patient states verbal understanding.

## 2021-04-01 NOTE — TELEPHONE ENCOUNTER
Patient wondering about diabetes medication.   She states that they gave her the wrong medication, they gave her metformin one tablet with breakfast and two with supper.

## 2021-04-09 ENCOUNTER — TELEPHONE (OUTPATIENT)
Dept: FAMILY MEDICINE CLINIC | Facility: CLINIC | Age: 61
End: 2021-04-09

## 2021-04-09 NOTE — TELEPHONE ENCOUNTER
Caller: Lisa Hill    Relationship: Self    Best call back number: [phone]    What medication are you requesting:  SATROLILAC BACK BRACE SI JOINT COMPRESSION BRACE.    What are your current symptoms: ARTHRITIS IN BACK    How long have you been experiencing symptoms:  2 YEARS    Have you had these symptoms before:    [x] Yes  [] No    Have you been treated for these symptoms before:   [x] Yes  [] No

## 2021-05-04 ENCOUNTER — TELEPHONE (OUTPATIENT)
Dept: FAMILY MEDICINE CLINIC | Facility: CLINIC | Age: 61
End: 2021-05-04

## 2021-05-04 NOTE — TELEPHONE ENCOUNTER
Spoke to Lisa, she said her arthritis doctor told her that her body was full of inflammation due to lupus. She wanted to know if it was okay to get the covid vaccine since she has this issue?

## 2021-05-04 NOTE — TELEPHONE ENCOUNTER
Caller: Lisa Hill    Relationship: Self    Best call back number: [phone]     What is the best time to reach you: ANY TIME    Who are you requesting to speak with (clinical staff, provider,  specific staff member): NURSE OR DR BEAL    Do you know the name of the person who called: SELF    What was the call regarding: COVID VACCINE    Do you require a callback: YES

## 2021-05-13 ENCOUNTER — TELEPHONE (OUTPATIENT)
Dept: FAMILY MEDICINE CLINIC | Facility: CLINIC | Age: 61
End: 2021-05-13

## 2021-05-13 NOTE — TELEPHONE ENCOUNTER
Caller: Lisa Hill    Relationship: Self    Best call back number:  [phone]      What medication are you requesting: hydrochlorothiazide 200 mg    What are your current symptoms: SWELLING AND NUMB IN BOTH FEET    How long have you been experiencing symptoms: OVER A MONTH    Have you had these symptoms before:    [x] Yes  [] No    Have you been treated for these symptoms before:   [x] Yes  [] No    If a prescription is needed, what is your preferred pharmacy and phone number:        [facility] - [place], [place] - [address] - [phone]  - [phone]   [phone]    Additional notes:    PATIENT STATED SHE HAD BEEN PRESCRIBED THIS MEDICATION OVER A MONTH AGO FROM HER ARTHRITIS SPECIALIST BUT IT IS NOT HELPING.      PLEASE ADVISE

## 2021-05-14 ENCOUNTER — TELEPHONE (OUTPATIENT)
Dept: FAMILY MEDICINE CLINIC | Facility: CLINIC | Age: 61
End: 2021-05-14

## 2021-05-14 RX ORDER — CEPHALEXIN 500 MG/1
500 CAPSULE ORAL 3 TIMES DAILY
Qty: 21 CAPSULE | Refills: 0 | Status: SHIPPED | OUTPATIENT
Start: 2021-05-14 | End: 2021-05-21

## 2021-05-14 NOTE — TELEPHONE ENCOUNTER
Hub staff attempted to follow warm transfer process and was unsuccessful     Caller: Lisa Hill    Relationship to patient: Self    Best call back number: [phone]     Patient is needing: PATIENT CALLED TO SPEAK WITH ALEJANDRO.  PATIENT STATED SHE CALLED HER.

## 2021-05-14 NOTE — TELEPHONE ENCOUNTER
Caller: Lisa Hill    Relationship: Self    Best call back number: [phone]    What medication are you requesting: EAR DROPS OR ANTIBIOTIC     What are your current symptoms: SORE THROAT AND PAIN IN EARS  How long have you been experiencing symptoms: 4-5 DAYS    Have you had these symptoms before:    [] Yes  [x] No    Have you been treated for these symptoms before:   [] Yes  [x] No    If a prescription is needed, what is your preferred pharmacy and phone number: [facility] [place], [place] - [address] - [phone] [facility] [phone] FX     Additional notes:

## 2021-05-14 NOTE — TELEPHONE ENCOUNTER
Patient said she has tried to reach out to them numerous times and is unable to get through. She wanted to see if there was anything else you could prescribe her?

## 2021-05-18 ENCOUNTER — TELEPHONE (OUTPATIENT)
Dept: FAMILY MEDICINE CLINIC | Facility: CLINIC | Age: 61
End: 2021-05-18

## 2021-05-18 NOTE — TELEPHONE ENCOUNTER
Caller: Lisa Hill    Relationship: Self    Best call back number: [phone]    What is the best time to reach you: ANY    Who are you requesting to speak with (clinical staff, provider,  specific staff member): CLINICAL    What was the call regarding: PATIENT STATES SHE WOULD LIKE TO KNOW IF SHE COULD TAKE VITAMIN B3    Do you require a callback: YES

## 2021-05-19 ENCOUNTER — PRIOR AUTHORIZATION (OUTPATIENT)
Dept: FAMILY MEDICINE CLINIC | Facility: CLINIC | Age: 61
End: 2021-05-19

## 2021-05-19 ENCOUNTER — TELEPHONE (OUTPATIENT)
Dept: FAMILY MEDICINE CLINIC | Facility: CLINIC | Age: 61
End: 2021-05-19

## 2021-05-19 NOTE — TELEPHONE ENCOUNTER
PATIENT CALLED REGARDING A LETTER SHE RECEIVED FROM DR. BEAL REGARDING risperiDONE (risperDAL) 2 MG tablet NOT BEING COVERED BY HER INSURANCE. PATIENT WOULD LIKE TO KNOW IF YOU ARE PRESCRIBING SOMETHING ELSE OR TRY TO GET THIS COVERED. PLEASE ADVISE.    Lisa Hill     [phone]

## 2021-05-24 ENCOUNTER — TELEPHONE (OUTPATIENT)
Dept: FAMILY MEDICINE CLINIC | Facility: CLINIC | Age: 61
End: 2021-05-24

## 2021-05-24 RX ORDER — FLUCONAZOLE 150 MG/1
150 TABLET ORAL DAILY PRN
Qty: 3 TABLET | Refills: 0 | Status: SHIPPED | OUTPATIENT
Start: 2021-05-24 | End: 2021-06-10 | Stop reason: SDUPTHER

## 2021-05-25 RX ORDER — MONTELUKAST SODIUM 10 MG/1
10 TABLET ORAL NIGHTLY
Qty: 30 TABLET | Refills: 5 | Status: SHIPPED | OUTPATIENT
Start: 2021-05-25 | End: 2021-11-10

## 2021-06-10 ENCOUNTER — TELEPHONE (OUTPATIENT)
Dept: FAMILY MEDICINE CLINIC | Facility: CLINIC | Age: 61
End: 2021-06-10

## 2021-06-10 RX ORDER — FLUCONAZOLE 150 MG/1
150 TABLET ORAL EVERY OTHER DAY
Qty: 5 TABLET | Refills: 0 | Status: SHIPPED | OUTPATIENT
Start: 2021-06-10 | End: 2021-07-07 | Stop reason: SDUPTHER

## 2021-06-10 NOTE — TELEPHONE ENCOUNTER
Caller: Lisa Hill    Relationship: Self    Best call back number: [phone]    What medication are you requesting: DIFFERENT MEDICATION TO TREAT YEAST INFECTION     What are your current symptoms: VAGINAL BURNING AND ITCHING     How long have you been experiencing symptoms: ALMOST THREE WEEKS     Have you had these symptoms before:    [x] Yes  [] No    Have you been treated for these symptoms before:   [x] Yes  [] No    If a prescription is needed, what is your preferred pharmacy and phone number:    [facility] - [place], [place] - [address] [place] [address] - [phone]      Additional notes: WAS PRESCRIBED A MEDICATION LAST WEEK BUT IS NOT SURE OF NAME. SYMPTOMS IMPROVED FOR A SHORT AMOUNT OF TIME BUT ARE BACK NOW.

## 2021-06-21 DIAGNOSIS — M15.9 GENERALIZED OSTEOARTHRITIS: ICD-10-CM

## 2021-06-21 DIAGNOSIS — J44.1 COPD WITH ACUTE EXACERBATION (HCC): ICD-10-CM

## 2021-06-21 DIAGNOSIS — E55.9 VITAMIN D DEFICIENCY: ICD-10-CM

## 2021-06-21 DIAGNOSIS — J43.2 CENTRILOBULAR EMPHYSEMA (HCC): ICD-10-CM

## 2021-06-21 RX ORDER — ALBUTEROL SULFATE 90 UG/1
AEROSOL, METERED RESPIRATORY (INHALATION)
Qty: 18 G | Refills: 5 | Status: SHIPPED | OUTPATIENT
Start: 2021-06-21 | End: 2021-07-22 | Stop reason: SDUPTHER

## 2021-06-21 RX ORDER — PSEUDOEPHED/ACETAMINOPH/DIPHEN 30MG-500MG
TABLET ORAL
Qty: 180 TABLET | Refills: 5 | Status: SHIPPED | OUTPATIENT
Start: 2021-06-21 | End: 2021-11-15 | Stop reason: SDUPTHER

## 2021-06-21 RX ORDER — MELATONIN
2000 DAILY
Qty: 60 TABLET | Refills: 5 | Status: SHIPPED | OUTPATIENT
Start: 2021-06-21 | End: 2021-11-15 | Stop reason: SDUPTHER

## 2021-06-21 NOTE — TELEPHONE ENCOUNTER
Refilled albuterol inhaler.   Discontinued Trelegy. Patient had reported receiving Breztri and this was better tolerated than Trelegy (less oral irritation).

## 2021-07-02 ENCOUNTER — TELEPHONE (OUTPATIENT)
Dept: FAMILY MEDICINE CLINIC | Facility: CLINIC | Age: 61
End: 2021-07-02

## 2021-07-02 NOTE — TELEPHONE ENCOUNTER
Caller: Lisa Hill    Relationship: Self    Best call back number: [phone]     What medication are you requesting: MEDICATION FOR YEAST INFECTION     What are your current symptoms: ITCHING AND BURNING IN VAGINAL AREA     How long have you been experiencing symptoms: SINCE LAST NIGHT 7/1/21    Have you had these symptoms before:    [x] Yes  [] No    Have you been treated for these symptoms before:   [x] Yes  [] No    If a prescription is needed, what is your preferred pharmacy and phone number:  [facility] [place], [place] - [address] - [phone]  - [phone]   [phone]    Additional notes: PATIENT STATES PCP STATED HE MAY NEED TO CHANGE DIABETIC MEDICATION DUE TO CAUSING YEAST INFECTIONS. PATIENT WILL NEED A CALLBACK TO DISCUSS.

## 2021-07-02 NOTE — TELEPHONE ENCOUNTER
Caller: Lisa Hill    Relationship: Self    Best call back number: [phone]    What is the best time to reach you: ANYTIME     Who are you requesting to speak with (clinical staff, provider,  specific staff member): CLINICAL STAFF    What was the call regarding: PATIENT STATES THAT SHE WAS TOLD THAT SHE MIGHT NEED TO SWITCH HER DIABETIC MEDICATIONS DUE TO GETTING YEAST INFECTIONS. PATIENT STATES THAT SHE IS SHOWING SYMPTOMS AGAIN SO SHE WOULD LIKE TO TALK TO SOMEONE ABOUT GETTING THE MEDICATION CHANGED AS WELL AS GETTING SOMETHING FOR THE YEAST INFECTION.     Do you require a callback: YES

## 2021-07-07 ENCOUNTER — TELEPHONE (OUTPATIENT)
Dept: FAMILY MEDICINE CLINIC | Facility: CLINIC | Age: 61
End: 2021-07-07

## 2021-07-07 DIAGNOSIS — E11.42 TYPE 2 DIABETES MELLITUS WITH PERIPHERAL NEUROPATHY (HCC): Primary | ICD-10-CM

## 2021-07-07 RX ORDER — FLUCONAZOLE 150 MG/1
150 TABLET ORAL EVERY OTHER DAY
Qty: 5 TABLET | Refills: 0 | Status: SHIPPED | OUTPATIENT
Start: 2021-07-07 | End: 2021-11-15

## 2021-07-07 RX ORDER — METFORMIN HYDROCHLORIDE 500 MG/1
2000 TABLET, EXTENDED RELEASE ORAL DAILY
Qty: 120 TABLET | Refills: 5 | Status: SHIPPED | OUTPATIENT
Start: 2021-07-07 | End: 2021-09-16

## 2021-07-07 RX ORDER — SEMAGLUTIDE 1.34 MG/ML
0.25 INJECTION, SOLUTION SUBCUTANEOUS WEEKLY
Qty: 1 PEN | Refills: 5 | Status: SHIPPED | OUTPATIENT
Start: 2021-07-07 | End: 2021-08-11

## 2021-07-07 NOTE — TELEPHONE ENCOUNTER
Pt is aware of this information.       ----- Message from Scott Chavez MD sent at 7/7/2021 10:38 AM EDT -----  Emailed Metformin, Ozempic, and fluconazole  If she needs instruction on how to use Ozempic an appointment with 1 of us can be arranged  ----- Message -----  From: Yesenia Correa MA  Sent: 7/7/2021  10:27 AM EDT  To: Scott Chavez MD    She stated that she was okay with this. Could you also send her in something for the yeast infection?  ----- Message -----  From: Scott Chavez MD  Sent: 7/7/2021   9:26 AM EDT  To: Yesenia Correa MA    How would she feel about starting on a once weekly injection along with Metformin?  ----- Message -----  From: Yesenia Corrae MA  Sent: 7/6/2021   1:32 PM EDT  To: Scott Chavez MD    Patient called and stated that she has another yeast infection. She said this is the 3rd one in the last couple of months. Could you change her sugar medicine to see if that will help?

## 2021-07-12 DIAGNOSIS — F41.8 DEPRESSION WITH ANXIETY: ICD-10-CM

## 2021-07-12 RX ORDER — LORAZEPAM 1 MG/1
TABLET ORAL
Qty: 90 TABLET | Refills: 2 | Status: SHIPPED | OUTPATIENT
Start: 2021-07-12 | End: 2021-11-15 | Stop reason: SDUPTHER

## 2021-07-13 ENCOUNTER — PRIOR AUTHORIZATION (OUTPATIENT)
Dept: FAMILY MEDICINE CLINIC | Facility: CLINIC | Age: 61
End: 2021-07-13

## 2021-07-15 ENCOUNTER — OFFICE VISIT (OUTPATIENT)
Dept: FAMILY MEDICINE CLINIC | Facility: CLINIC | Age: 61
End: 2021-07-15

## 2021-07-15 VITALS
DIASTOLIC BLOOD PRESSURE: 70 MMHG | RESPIRATION RATE: 15 BRPM | WEIGHT: 257 LBS | SYSTOLIC BLOOD PRESSURE: 124 MMHG | BODY MASS INDEX: 45.54 KG/M2 | TEMPERATURE: 96.4 F | HEART RATE: 108 BPM | HEIGHT: 63 IN | OXYGEN SATURATION: 96 %

## 2021-07-15 DIAGNOSIS — E55.9 VITAMIN D DEFICIENCY: ICD-10-CM

## 2021-07-15 DIAGNOSIS — Z12.31 ENCOUNTER FOR SCREENING MAMMOGRAM FOR BREAST CANCER: ICD-10-CM

## 2021-07-15 DIAGNOSIS — Z00.00 HEALTHCARE MAINTENANCE: ICD-10-CM

## 2021-07-15 DIAGNOSIS — E06.3 HYPOTHYROIDISM DUE TO HASHIMOTO'S THYROIDITIS: ICD-10-CM

## 2021-07-15 DIAGNOSIS — F41.8 DEPRESSION WITH ANXIETY: ICD-10-CM

## 2021-07-15 DIAGNOSIS — F45.0 SOMATIZATION DISORDER: ICD-10-CM

## 2021-07-15 DIAGNOSIS — M85.80 OSTEOPENIA, UNSPECIFIED LOCATION: ICD-10-CM

## 2021-07-15 DIAGNOSIS — I25.10 CORONARY ARTERY CALCIFICATION SEEN ON CT SCAN: ICD-10-CM

## 2021-07-15 DIAGNOSIS — E11.42 TYPE 2 DIABETES MELLITUS WITH PERIPHERAL NEUROPATHY (HCC): ICD-10-CM

## 2021-07-15 DIAGNOSIS — E78.2 MIXED HYPERLIPIDEMIA: ICD-10-CM

## 2021-07-15 DIAGNOSIS — N39.3 STRESS BLADDER INCONTINENCE, FEMALE: ICD-10-CM

## 2021-07-15 DIAGNOSIS — E66.01 MORBID OBESITY WITH BMI OF 45.0-49.9, ADULT (HCC): ICD-10-CM

## 2021-07-15 DIAGNOSIS — J43.1 PANLOBULAR EMPHYSEMA (HCC): ICD-10-CM

## 2021-07-15 DIAGNOSIS — M15.9 GENERALIZED OSTEOARTHRITIS: ICD-10-CM

## 2021-07-15 DIAGNOSIS — K59.09 CHRONIC CONSTIPATION: ICD-10-CM

## 2021-07-15 DIAGNOSIS — J30.9 CHRONIC ALLERGIC RHINITIS: Primary | ICD-10-CM

## 2021-07-15 DIAGNOSIS — K21.9 GASTROESOPHAGEAL REFLUX DISEASE WITHOUT ESOPHAGITIS: ICD-10-CM

## 2021-07-15 DIAGNOSIS — F17.200 SMOKER: ICD-10-CM

## 2021-07-15 DIAGNOSIS — I10 ESSENTIAL HYPERTENSION: ICD-10-CM

## 2021-07-15 DIAGNOSIS — E03.8 HYPOTHYROIDISM DUE TO HASHIMOTO'S THYROIDITIS: ICD-10-CM

## 2021-07-15 DIAGNOSIS — G47.33 OBSTRUCTIVE SLEEP APNEA: ICD-10-CM

## 2021-07-15 PROCEDURE — 99214 OFFICE O/P EST MOD 30 MIN: CPT | Performed by: GENERAL PRACTICE

## 2021-07-15 NOTE — PROGRESS NOTES
Subjective   Lisa Hill is a 61 y.o. female.     Chief Complaint  She returns for a scheduled reassessment of multiple medical problems including  COPD, type 2 diabetes mellitus, dyslipidemia, and hypothyroidism    History of Present Illness     COPD  She feels that her SOB and cough remain at baseline. She continues to have intermittent nasal congestion but there is no history of any other upper respiratory tract symptoms, chest pain, hemoptysis, fever or chills. She reports that her symptoms become worse with activity, exposure to cold air and environmental allergens. Her symptoms are reduced with rest, supplemental oxygen, and inhaled inhaled medication.  She has found breztri to be more effective than anything else thus far. She continues to smoke about 1 pack per day.  She underwent an updated low dose CT of the chest on 6/4/2020 with evidence of moderate COPD, stable small lung nodules, and mild coronary artery calcifications.  She was advised to have her next study in 1 year.  She continues to be followed by pulmonology and is scheduled to undergo a reassessment on 7/22/2021    Diabetes  Current symptoms include paresthesia of the feet. Patient denies visual disturbances, polydipsia, polyuria, hypoglycemia and foot ulcerations. Current treatments: metformin.  She stopped empagliflozin due to recurrent yeast infections.  She has been prescribed semaglutide in its place but has yet to obtain preauthorization through her insurance.  She is currently being followed by podiatry     Dyslipidemia  Compliance with treatment has been fair. The patient exercises occasionally. She remains on simvastatin with no apparent side effects    Hypothyroidism  Patient presents for evaluation of thyroid function. Symptoms consist of weight gain, constipation. The symptoms are moderate.  The problem has been unchanged.  Previous thyroid studies include TSH. The hypothyroidism is due to Hashimoto's disease.    Low Back  Pain  She complains of persistent low back pain. There has been no change in the quality of her discomfort nor any new associated symptoms. There is no history of any weakness, numbness, tingling, or any change in her bowel/bladder control.  There is no history of any fever, chills, night sweats or weight loss.  When she sits down the pain generally resolves within minutes and she denies any problem at night. She continues to use her TENS unit for several hours daily and feels that it helps.  She also feels that meloxicam helps.  Plain films of the lumbar spine performed on 8/5/2019 were reported as showing degenerative disc disease as well as atherosclerotic calcification of the aorta    Depression  Onset was a number of years ago. Current symptoms include: depression, nervousness, anhedonia, difficulty concentrating, fatigue and impaired memory. Patient denies recurrent thoughts of death or suicidal thoughts. Risk factors: history of seasonal affective disorder.  Current medication includes escitalopram 10 daily,  bupropion 300 daily, risperdone 2 nightly, and lorazepam 0.5 twice a day.  She continues to do well at present    The following portions of the patient's history were reviewed and updated as appropriate: allergies, current medications, past medical history, past social history and problem list.    Review of Systems   Constitutional: Positive for fatigue. Negative for appetite change, chills, fever and unexpected weight change.   HENT: Positive for rhinorrhea. Negative for congestion, ear pain, postnasal drip, sneezing, sore throat and voice change.    Eyes: Negative for visual disturbance.   Respiratory: Positive for cough, shortness of breath and wheezing.    Cardiovascular: Positive for leg swelling. Negative for chest pain and palpitations.   Gastrointestinal: Positive for constipation. Negative for abdominal pain, anal bleeding, blood in stool, diarrhea, nausea and vomiting.   Genitourinary: Negative  for difficulty urinating, dysuria, frequency, hematuria and urgency.        Incontinence with coughing, sneezing, or lifting   Musculoskeletal: Positive for arthralgias and back pain. Negative for joint swelling and myalgias.   Skin: Negative for rash.   Neurological: Positive for numbness (and paresthesias both feet) and headaches (when stressed). Negative for weakness.   Psychiatric/Behavioral: Positive for decreased concentration, dysphoric mood and sleep disturbance. Negative for suicidal ideas.     Objective   Physical Exam  Constitutional:       General: She is not in acute distress.     Appearance: Normal appearance. She is well-developed. She is not diaphoretic.      Comments: Accompanied by her .  Bright and in fair spirits.  Climbed onto the exam table with minimal assistance.  No apparent distress.   HENT:      Head: Atraumatic.      Right Ear: Tympanic membrane, ear canal and external ear normal.      Left Ear: Tympanic membrane, ear canal and external ear normal.   Eyes:      Conjunctiva/sclera: Conjunctivae normal.   Neck:      Thyroid: No thyroid mass or thyromegaly.      Vascular: No carotid bruit or JVD.      Trachea: Trachea normal. No tracheal deviation.   Cardiovascular:      Rate and Rhythm: Regular rhythm. Tachycardia present.      Heart sounds: Normal heart sounds, S1 normal and S2 normal. No murmur heard.   No gallop.    Pulmonary:      Effort: Pulmonary effort is normal.      Breath sounds: Decreased air movement present. Examination of the right-lower field reveals decreased breath sounds. Examination of the left-lower field reveals decreased breath sounds. Decreased breath sounds and wheezing (diffuse - moderate) present. No rales.      Comments: Pulmonary hyperinflation  Abdominal:      General: Bowel sounds are normal. There is no distension.      Palpations: There is no mass.   Musculoskeletal:      Right lower leg: No edema.      Left lower leg: No edema.   Feet:      Comments:  Mild tenderness about the MTP joints  Lymphadenopathy:      Head:      Right side of head: No submental, submandibular, tonsillar, preauricular, posterior auricular or occipital adenopathy.      Left side of head: No submental, submandibular, tonsillar, preauricular, posterior auricular or occipital adenopathy.      Cervical: No cervical adenopathy.      Upper Body:      Right upper body: No supraclavicular adenopathy.      Left upper body: No supraclavicular adenopathy.   Skin:     General: Skin is warm.      Coloration: Skin is not cyanotic, jaundiced or pale.      Findings: No rash.      Nails: There is no clubbing.   Neurological:      Mental Status: She is alert and oriented to person, place, and time.      Cranial Nerves: No cranial nerve deficit.      Sensory: Sensory deficit (decreased vibration sense both feet) present.      Motor: No tremor.      Coordination: Coordination normal.      Gait: Gait normal.   Psychiatric:         Attention and Perception: Attention normal.         Mood and Affect: Mood normal.         Speech: Speech normal.         Behavior: Behavior normal.         Thought Content: Thought content normal.       Assessment/Plan   Problems Addressed this Visit        Allergies and Adverse Reactions    Chronic allergic rhinitis        Cardiac and Vasculature    Coronary artery calcification seen on CT scan  Reminded regarding risk factor modification with an emphasis on tobacco cessation.  Continue current medication    Essential hypertension   Hypertension: at goal. Evidence of target organ damage: coronary artery calcifications on previous CT of the chest.  Encouraged to continue to work on diet and exercise plan.   Continue current medication    Mixed hyperlipidemia  As above.   Continue current medication.       Endocrine and Metabolic    Hypothyroidism due to Hashimoto's thyroiditis  Clinically euthyroid.  Continue current medication.    Morbid obesity with BMI of 45.0-49.9, adult  (CMS/HCC)    Type 2 diabetes mellitus with peripheral neuropathy (CMS/HCC)  Diabetes mellitus Type II, under fair control.   Encouraged to continue to pursue ADA diet  Encouraged aerobic exercise.  Patient provided with samples of semaglutide while awaiting preauthorization.  She was instructed as to use and administered her first dose in the office today  Updated labs will be drawn at her return.    Vitamin D deficiency       Gastrointestinal Abdominal     Chronic constipation    Gastroesophageal reflux disease without esophagitis       Genitourinary and Reproductive     Encounter for screening mammogram for breast cancer    Relevant Orders    Mammo Screening Digital Tomosynthesis Bilateral With CAD    Stress bladder incontinence, female       Health Encounters    Healthcare maintenance  Patient has received both doses of the COVID-19 vaccine.  Reminded to get a flu shot when available.  We will arrange an updated mammogram, DEXA scan, and low-dose CT of the chest    Relevant Orders    Mammo Screening Digital Tomosynthesis Bilateral With CAD    DEXA Bone Density Axial     CT Chest Low Dose Cancer Screening WO       Mental Health    Depression with anxiety  Stable.  Supportive therapy.   Continue current medication.    Somatization disorder       Musculoskeletal and Injuries    Generalized osteoarthritis    Osteopenia    Relevant Orders    DEXA Bone Density Axial       Pulmonary and Pneumonias    Panlobular emphysema (CMS/HCC)   COPD is stable.  Reminded of the importance of smoking cessation  Encouraged to remain as active as symptoms allow for  Continue current medication  Follow up with pulmonology        Sleep    Obstructive sleep apnea       Tobacco    Smoker    Relevant Orders     CT Chest Low Dose Cancer Screening WO      Diagnoses       Codes Comments    Chronic allergic rhinitis    -  Primary ICD-10-CM: J30.9  ICD-9-CM: 477.9     Mixed hyperlipidemia     ICD-10-CM: E78.2  ICD-9-CM: 272.2     Essential  hypertension     ICD-10-CM: I10  ICD-9-CM: 401.9     Coronary artery calcification seen on CT scan     ICD-10-CM: I25.10  ICD-9-CM: 414.00     Vitamin D deficiency     ICD-10-CM: E55.9  ICD-9-CM: 268.9     Type 2 diabetes mellitus with peripheral neuropathy (CMS/[facility])     ICD-10-CM: E11.42  ICD-9-CM: 250.60, 357.2     Morbid obesity with BMI of 45.0-49.9, adult (CMS/[facility])     ICD-10-CM: E66.01, Z68.42  ICD-9-CM: 278.01, V85.42     Hypothyroidism due to Hashimoto's thyroiditis     ICD-10-CM: E03.8, E06.3  ICD-9-CM: 244.8, 245.2     Gastroesophageal reflux disease without esophagitis     ICD-10-CM: K21.9  ICD-9-CM: 530.81     Chronic constipation     ICD-10-CM: K59.09  ICD-9-CM: 564.00     Stress bladder incontinence, female     ICD-10-CM: N39.3  ICD-9-CM: 625.6     Healthcare maintenance     ICD-10-CM: Z00.00  ICD-9-CM: V70.0     Somatization disorder     ICD-10-CM: F45.0  ICD-9-CM: 300.81     Depression with anxiety     ICD-10-CM: F41.8  ICD-9-CM: 300.4     Osteopenia, unspecified location     ICD-10-CM: M85.80  ICD-9-CM: 733.90     Generalized osteoarthritis     ICD-10-CM: M15.9  ICD-9-CM: 715.00     Panlobular emphysema (CMS/[facility])     ICD-10-CM: J43.1  ICD-9-CM: 492.8     Obstructive sleep apnea     ICD-10-CM: G47.33  ICD-9-CM: 327.23     Smoker     ICD-10-CM: F17.200  ICD-9-CM: 305.1     Encounter for screening mammogram for breast cancer     ICD-10-CM: Z12.31  ICD-9-CM: V76.12

## 2021-07-21 DIAGNOSIS — M47.816 SPONDYLOSIS OF LUMBAR REGION WITHOUT MYELOPATHY OR RADICULOPATHY: ICD-10-CM

## 2021-07-21 DIAGNOSIS — J30.1 SEASONAL ALLERGIC RHINITIS DUE TO POLLEN: ICD-10-CM

## 2021-07-21 RX ORDER — FLUTICASONE PROPIONATE 50 MCG
SPRAY, SUSPENSION (ML) NASAL
Qty: 16 G | Refills: 5 | Status: SHIPPED | OUTPATIENT
Start: 2021-07-21 | End: 2021-11-15 | Stop reason: SDUPTHER

## 2021-07-21 RX ORDER — MELOXICAM 15 MG/1
15 TABLET ORAL DAILY
Qty: 30 TABLET | Refills: 5 | Status: SHIPPED | OUTPATIENT
Start: 2021-07-21 | End: 2021-11-15 | Stop reason: SDUPTHER

## 2021-07-22 ENCOUNTER — OFFICE VISIT (OUTPATIENT)
Dept: PULMONOLOGY | Facility: CLINIC | Age: 61
End: 2021-07-22

## 2021-07-22 VITALS
BODY MASS INDEX: 45 KG/M2 | WEIGHT: 254 LBS | DIASTOLIC BLOOD PRESSURE: 74 MMHG | HEART RATE: 105 BPM | OXYGEN SATURATION: 94 % | SYSTOLIC BLOOD PRESSURE: 142 MMHG | HEIGHT: 63 IN | TEMPERATURE: 96.9 F

## 2021-07-22 DIAGNOSIS — J44.1 COPD WITH ACUTE EXACERBATION (HCC): ICD-10-CM

## 2021-07-22 DIAGNOSIS — F17.200 SMOKER: ICD-10-CM

## 2021-07-22 DIAGNOSIS — I89.8 CALCIFIED LYMPH NODES: ICD-10-CM

## 2021-07-22 DIAGNOSIS — G47.34 NOCTURNAL HYPOXIA: ICD-10-CM

## 2021-07-22 DIAGNOSIS — J43.1 PANLOBULAR EMPHYSEMA (HCC): Primary | ICD-10-CM

## 2021-07-22 DIAGNOSIS — J43.2 CENTRILOBULAR EMPHYSEMA (HCC): ICD-10-CM

## 2021-07-22 DIAGNOSIS — G47.33 OBSTRUCTIVE SLEEP APNEA: ICD-10-CM

## 2021-07-22 PROBLEM — M06.9 RHEUMATOID ARTHRITIS (HCC): Status: ACTIVE | Noted: 2021-07-22

## 2021-07-22 PROCEDURE — 94618 PULMONARY STRESS TESTING: CPT | Performed by: PHYSICIAN ASSISTANT

## 2021-07-22 PROCEDURE — 99214 OFFICE O/P EST MOD 30 MIN: CPT | Performed by: PHYSICIAN ASSISTANT

## 2021-07-22 RX ORDER — IPRATROPIUM BROMIDE AND ALBUTEROL SULFATE 2.5; .5 MG/3ML; MG/3ML
3 SOLUTION RESPIRATORY (INHALATION) 4 TIMES DAILY PRN
Qty: 360 ML | Refills: 8 | Status: SHIPPED | OUTPATIENT
Start: 2021-07-22 | End: 2021-11-15 | Stop reason: SDUPTHER

## 2021-07-22 RX ORDER — ALBUTEROL SULFATE 90 UG/1
2 AEROSOL, METERED RESPIRATORY (INHALATION) EVERY 4 HOURS PRN
Qty: 18 G | Refills: 5 | Status: SHIPPED | OUTPATIENT
Start: 2021-07-22 | End: 2021-11-15 | Stop reason: SDUPTHER

## 2021-07-22 NOTE — PROGRESS NOTES
"Chief Complaint  Shortness of breath    Subjective          Lisa Hill presents to [facility]  History of Present Illness     Patient presents today for follow-up of COPD (mixed panlobular and centrilobular emphysema type), obstructive sleep apnea unable to tolerate positive airway pressure therapy, nocturnal hypoxia, cigarette nicotine dependence.  She reports interval worsening of symptoms since previous visit.  She has significant difficulty with daily activities due to shortness of breath that requires frequent rest.  She notes some dizziness upon exertion.  She has not qualified for supplemental oxygen in the past for daytime/ambulatory use.   She did previously qualified for obstructive sleep apnea, but was unable to tolerate positive airway pressure therapy.  She was retested and did qualify for supplemental oxygen.  She reports using supplies at nighttime, but is unable to maintain proper positioning of the nasal cannula throughout her full duration of sleep, and thus does not always uses throughout the night.  She remains compliant with breztri inhaler.  She continues to use duo nebs and albuterol as needed, and may need refills at this time.  No significant coughing or wheezing currently.  No recently noted fever, chills.  She reports that she did recently follow with her rheumatologist of [place], who stated that her inflammatory markers were elevated.  She reports that she was recently diagnosed with diabetes and is currently on medications but not yet requiring insulin.  She notes some interval lower extremity swelling (improved currently) and pain/neuropathy.      Objective   Vital Signs:   /74 (BP Location: Right arm, Patient Position: Sitting)   Pulse 105   Temp 96.9 °F (36.1 °C)   Ht 158.8 cm (62.5\")   Wt 115 kg (254 lb)   SpO2 94%   BMI 45.72 kg/m²           Physical Exam  Vitals reviewed.   Constitutional:       General: She " is not in acute distress.     Appearance: She is well-developed. She is not diaphoretic.   HENT:      Head: Normocephalic and atraumatic.   Neck:      Thyroid: No thyromegaly.   Cardiovascular:      Rate and Rhythm: Normal rate and regular rhythm.      Heart sounds: Normal heart sounds, S1 normal and S2 normal.   Pulmonary:      Effort: Pulmonary effort is normal.      Breath sounds: No wheezing, rhonchi or rales.   Musculoskeletal:         General: Swelling (trace) present.   Skin:     General: Skin is warm and dry.   Neurological:      Mental Status: She is alert and oriented to person, place, and time.   Psychiatric:         Behavior: Behavior normal.            Result Review :   The following data was reviewed by: Katarina Wan PA-C on 07/22/2021:  CMP    CMP 12/22/20 3/18/21   Glucose 160 (A) 149 (A)   BUN 15 17   Creatinine 0.95 1.17 (A)   eGFR Non  Am 60 (A) 47 (A)   Sodium 135 (A) 137   Potassium 4.4 4.7   Chloride 95 (A) 97 (A)   Calcium 9.1 9.1   Albumin 3.70 3.60   Total Bilirubin 0.3 0.2   Alkaline Phosphatase 59 54   AST (SGOT) 16 18   ALT (SGPT) 24 30   (A) Abnormal value            CBC w/diff    CBC w/Diff 12/22/20 1/18/21 3/18/21   WBC 11.34 (A) 11.18 (A) 10.53   RBC 4.44 4.60 4.48   Hemoglobin 14.9 15.3 14.6   Hematocrit 43.3 44.3 42.9   MCV 97.5 (A) 96.3 95.8   MCH 33.6 (A) 33.3 (A) 32.6   MCHC 34.4 34.5 34.0   RDW 12.2 (A) 12.2 (A) 12.8   Platelets 355 368 389   Neutrophil Rel % 58.1 52.9 56.0   Immature Granulocyte Rel % 0.3 0.4 0.5   Lymphocyte Rel % 32.5 37.9 34.1   Monocyte Rel % 6.3 5.5 5.9   Eosinophil Rel % 1.8 2.2 2.5   Basophil Rel % 1.0 1.1 1.0   (A) Abnormal value              Data reviewed: Radiologic studies Low-dose CT chest from 2020, Cardiology studies Previous echocardiogram. and previous PFT.         Low dose CT chest June 2020:       PFT from 2019:           Assessment and Plan      Diagnoses and all orders for this visit:    1. Panlobular emphysema (CMS/HCC)  (Primary)  -     Budeson-Glycopyrrol-Formoterol (Breztri Aerosphere) 160-9-4.8 MCG/ACT aerosol inhaler; Inhale 2 puffs 2 (Two) Times a Day.  Dispense: 1 each; Refill: 8  -     ipratropium-albuterol (DUO-NEB) 0.5-2.5 mg/3 ml nebulizer; Take 3 mL by nebulization 4 (Four) Times a Day As Needed for Wheezing or Shortness of Air.  Dispense: 360 mL; Refill: 8  -     albuterol sulfate HFA (Ventolin HFA) 108 (90 Base) MCG/ACT inhaler; Inhale 2 puffs Every 4 (Four) Hours As Needed for Wheezing or Shortness of Air. for wheezing  Dispense: 18 g; Refill: 5    2. Obstructive sleep apnea    3. Smoker    4. Nocturnal hypoxia  -     Miscellaneous DME    5. COPD with acute exacerbation (CMS/HCC)  -     albuterol sulfate HFA (Ventolin HFA) 108 (90 Base) MCG/ACT inhaler; Inhale 2 puffs Every 4 (Four) Hours As Needed for Wheezing or Shortness of Air. for wheezing  Dispense: 18 g; Refill: 5    6. Centrilobular emphysema (CMS/HCC)    7. Calcified lymph nodes         COPD, emphysema:  · Continue albuterol as needed  · Continue duo nebs as needed  · Continue breztri 2 puffs twice daily  Noted more symptomatic benefit with use as compared to Trelegy Ellipta.  · ordered pulmonary function test  · Completed walk oximetry test in office.  Patient did not qualify for supplemental oxygen therapy for daytime/ambulatory use.  Saturation maintained at 90% or greater.       Obstructive sleep apnea/nocturnal hypoxia:  · Previously unable to tolerate positive airway pressure therapy.  · Compliant with nasal cannula 2 L/min at nighttime.  · Ordered facial mask for nocturnal oxygen supplies. - has some difficulty with maintaining placement of the nasal cannula.  Supplies: [facility] [place]        Calcified lymph nodes:  Per imaging, stable over the last year (15 months), remains approximately a millimeter.  · Low-dose CT chest pending, ordered by PCP        Cigarette nicotine dependence:  · Low-dose CT scan pending per PCP.  · Smoking cessation  counseling:  Mrs. Hill is a current cigarette user. Notable for 44 year use with approximately 1 pack/day use. Currently using 1 packs per day average.   She understands the possible consequences of smoking which can include cardiovascular/metabolic, lung cancer, mouth cancer, pulmonary disorder including COPD and reduced quality of life.   She also understands the benefits of quitting, including slowed progression of COPD.     She is not willing to quit at this time.   She has tried multiple methods to assist with cessation including nicotine inhaler, nicotine nasal spray, nicotine gum without success.    Smoking cessation counseling limited.      Follow Up   Return in about 6 months (around 1/22/2022), or as needed, for Next scheduled follow up.  Patient was given instructions and counseling regarding her condition or for health maintenance advice. Please see specific information pulled into the AVS if appropriate.

## 2021-08-03 ENCOUNTER — TELEPHONE (OUTPATIENT)
Dept: FAMILY MEDICINE CLINIC | Facility: CLINIC | Age: 61
End: 2021-08-03

## 2021-08-03 NOTE — TELEPHONE ENCOUNTER
Pt is aware of this information.       ----- Message from Scott Chavez MD sent at 8/3/2021  9:38 AM EDT -----  She could cut down to three daily and if she is still having problems in 3-4 days she could cut down to two daily  They are best taken after meals  ----- Message -----  From: Yesenia Correa MA  Sent: 8/3/2021   9:34 AM EDT  To: Scott Chavez MD    She said that she has been doing the 500 mg 4 times a day.  ----- Message -----  From: Scott Chavez MD  Sent: 8/3/2021   9:30 AM EDT  To: Yesenia Correa MA    How many metformin tablets is she taking daily at present?  ----- Message -----  From: Yesenia Correa MA  Sent: 8/3/2021   8:50 AM EDT  To: Scott Chavez MD    Patient called and stated that the metformin is giving her diarrhea. She has been running to the bathroom 4-5 times a day. She wanted to know if there was anything else she could try?

## 2021-08-11 ENCOUNTER — TELEPHONE (OUTPATIENT)
Dept: FAMILY MEDICINE CLINIC | Facility: CLINIC | Age: 61
End: 2021-08-11

## 2021-08-11 RX ORDER — ORAL SEMAGLUTIDE 3 MG/1
1 TABLET ORAL DAILY
Qty: 30 TABLET | Refills: 0 | Status: SHIPPED | OUTPATIENT
Start: 2021-08-11 | End: 2021-09-09 | Stop reason: SDUPTHER

## 2021-08-11 NOTE — TELEPHONE ENCOUNTER
Pt is aware of this information.     ----- Message from Scott Chavez MD sent at 8/11/2021 10:16 AM EDT -----  K -will replace with Rybelsus -I have emailed a prescription to her pharmacy  ----- Message -----  From: Yesenia Correa MA  Sent: 8/10/2021   1:08 PM EDT  To: Scott Chavez MD    Still on the 0.25.  ----- Message -----  From: Scott Chavez MD  Sent: 8/10/2021   1:03 PM EDT  To: Yesenia Correa MA    Is she still on the 0.25 mg dose or has she gone up to the 0.5?  ----- Message -----  From: Yesenia Correa MA  Sent: 8/10/2021  11:27 AM EDT  To: Scott Chavez MD    Patient called and stated that her ozempic is making her super sick at her stomach. She wanted to know what she could do.

## 2021-08-17 DIAGNOSIS — F41.8 DEPRESSION WITH ANXIETY: Chronic | ICD-10-CM

## 2021-08-17 DIAGNOSIS — I10 ESSENTIAL HYPERTENSION: ICD-10-CM

## 2021-08-17 DIAGNOSIS — E11.43 TYPE 2 DIABETES MELLITUS WITH DIABETIC AUTONOMIC NEUROPATHY, WITHOUT LONG-TERM CURRENT USE OF INSULIN (HCC): ICD-10-CM

## 2021-08-17 DIAGNOSIS — F41.8 DEPRESSION WITH ANXIETY: ICD-10-CM

## 2021-08-17 RX ORDER — LOSARTAN POTASSIUM 50 MG/1
50 TABLET ORAL DAILY
Qty: 30 TABLET | Refills: 5 | Status: SHIPPED | OUTPATIENT
Start: 2021-08-17 | End: 2021-11-15 | Stop reason: SDUPTHER

## 2021-08-17 RX ORDER — LANOLIN ALCOHOL/MO/W.PET/CERES
1000 CREAM (GRAM) TOPICAL DAILY
Qty: 30 TABLET | Refills: 5 | Status: SHIPPED | OUTPATIENT
Start: 2021-08-17 | End: 2021-11-15 | Stop reason: SDUPTHER

## 2021-08-17 RX ORDER — EMPAGLIFLOZIN, METFORMIN HYDROCHLORIDE 5; 1000 MG/1; MG/1
1 TABLET, EXTENDED RELEASE ORAL DAILY
Qty: 30 TABLET | Refills: 5 | OUTPATIENT
Start: 2021-08-17

## 2021-08-17 RX ORDER — BUSPIRONE HYDROCHLORIDE 15 MG/1
TABLET ORAL
Qty: 90 TABLET | Refills: 5 | Status: SHIPPED | OUTPATIENT
Start: 2021-08-17 | End: 2021-11-15 | Stop reason: SDUPTHER

## 2021-08-17 RX ORDER — ESCITALOPRAM OXALATE 10 MG/1
10 TABLET ORAL DAILY
Qty: 30 TABLET | Refills: 5 | Status: SHIPPED | OUTPATIENT
Start: 2021-08-17 | End: 2021-11-15 | Stop reason: SDUPTHER

## 2021-08-20 DIAGNOSIS — E03.9 ACQUIRED HYPOTHYROIDISM: ICD-10-CM

## 2021-08-20 RX ORDER — LEVOTHYROXINE SODIUM 0.2 MG/1
200 TABLET ORAL DAILY
Qty: 30 TABLET | Refills: 5 | Status: SHIPPED | OUTPATIENT
Start: 2021-08-20 | End: 2021-11-15 | Stop reason: SDUPTHER

## 2021-09-09 DIAGNOSIS — E11.65 CONTROLLED TYPE 2 DIABETES MELLITUS WITH HYPERGLYCEMIA, WITHOUT LONG-TERM CURRENT USE OF INSULIN (HCC): Chronic | ICD-10-CM

## 2021-09-09 RX ORDER — ORAL SEMAGLUTIDE 3 MG/1
1 TABLET ORAL DAILY
Qty: 30 TABLET | Refills: 0 | Status: SHIPPED | OUTPATIENT
Start: 2021-09-09 | End: 2021-09-16

## 2021-09-10 RX ORDER — LANCETS 33 GAUGE
EACH MISCELLANEOUS
Qty: 100 EACH | Refills: 5 | Status: SHIPPED | OUTPATIENT
Start: 2021-09-10

## 2021-09-14 ENCOUNTER — APPOINTMENT (OUTPATIENT)
Dept: MAMMOGRAPHY | Facility: HOSPITAL | Age: 61
End: 2021-09-14

## 2021-09-14 ENCOUNTER — APPOINTMENT (OUTPATIENT)
Dept: CT IMAGING | Facility: HOSPITAL | Age: 61
End: 2021-09-14

## 2021-09-14 ENCOUNTER — APPOINTMENT (OUTPATIENT)
Dept: BONE DENSITY | Facility: HOSPITAL | Age: 61
End: 2021-09-14

## 2021-09-16 DIAGNOSIS — E78.2 MIXED HYPERLIPIDEMIA: ICD-10-CM

## 2021-09-16 DIAGNOSIS — K21.9 GASTROESOPHAGEAL REFLUX DISEASE WITHOUT ESOPHAGITIS: ICD-10-CM

## 2021-09-16 RX ORDER — EMPAGLIFLOZIN, METFORMIN HYDROCHLORIDE 5; 1000 MG/1; MG/1
TABLET, EXTENDED RELEASE ORAL
Qty: 30 TABLET | Refills: 0 | Status: SHIPPED | OUTPATIENT
Start: 2021-09-16 | End: 2021-11-10

## 2021-09-16 RX ORDER — BUPROPION HYDROCHLORIDE 300 MG/1
300 TABLET ORAL EVERY MORNING
Qty: 30 TABLET | Refills: 5 | Status: SHIPPED | OUTPATIENT
Start: 2021-09-16 | End: 2021-11-15 | Stop reason: SDUPTHER

## 2021-09-16 RX ORDER — ORAL SEMAGLUTIDE 3 MG/1
1 TABLET ORAL DAILY
Qty: 30 TABLET | Refills: 0 | Status: SHIPPED | OUTPATIENT
Start: 2021-09-16 | End: 2021-11-10

## 2021-09-16 RX ORDER — SIMVASTATIN 20 MG
TABLET ORAL
Qty: 30 TABLET | Refills: 5 | Status: SHIPPED | OUTPATIENT
Start: 2021-09-16 | End: 2021-11-15 | Stop reason: SDUPTHER

## 2021-09-16 RX ORDER — PANTOPRAZOLE SODIUM 40 MG/1
TABLET, DELAYED RELEASE ORAL
Qty: 30 TABLET | Refills: 5 | Status: SHIPPED | OUTPATIENT
Start: 2021-09-16 | End: 2021-11-15 | Stop reason: SDUPTHER

## 2021-10-07 ENCOUNTER — TELEPHONE (OUTPATIENT)
Dept: FAMILY MEDICINE CLINIC | Facility: CLINIC | Age: 61
End: 2021-10-07

## 2021-10-07 DIAGNOSIS — E06.3 HYPOTHYROIDISM DUE TO HASHIMOTO'S THYROIDITIS: ICD-10-CM

## 2021-10-07 DIAGNOSIS — E03.8 HYPOTHYROIDISM DUE TO HASHIMOTO'S THYROIDITIS: ICD-10-CM

## 2021-10-07 DIAGNOSIS — D75.89 MACROCYTOSIS: ICD-10-CM

## 2021-10-07 DIAGNOSIS — E78.2 MIXED HYPERLIPIDEMIA: ICD-10-CM

## 2021-10-07 DIAGNOSIS — E55.9 VITAMIN D DEFICIENCY: ICD-10-CM

## 2021-10-07 DIAGNOSIS — E11.42 TYPE 2 DIABETES MELLITUS WITH PERIPHERAL NEUROPATHY (HCC): ICD-10-CM

## 2021-10-07 DIAGNOSIS — I10 ESSENTIAL HYPERTENSION: Primary | ICD-10-CM

## 2021-10-07 DIAGNOSIS — M06.9 RHEUMATOID ARTHRITIS INVOLVING MULTIPLE SITES, UNSPECIFIED WHETHER RHEUMATOID FACTOR PRESENT (HCC): ICD-10-CM

## 2021-10-07 NOTE — TELEPHONE ENCOUNTER
PATIENT IS CALLING TO ASK IF IT IS OK FOR HER TO TAKE IRON SUPPLEMENTS OVER THE COUNTER. SHE REPORTS SHE HAS BEEN HAVING SOME SHORTNESS OF BREATH, HEADACHES, FATIGUE AND WEAKNESS AND SHE READ ONLINE THAT IRON WOULD HELP. PATIENT REPORTS THAT SHE HAD SPOKEN WITH PCP ABOUT SOME OF THESE SYMPTOMS IN THE PAST. PLEASE RETURN CALL TO [facility] [phone]

## 2021-10-11 ENCOUNTER — LAB (OUTPATIENT)
Dept: FAMILY MEDICINE CLINIC | Facility: CLINIC | Age: 61
End: 2021-10-11

## 2021-10-11 DIAGNOSIS — E06.3 HYPOTHYROIDISM DUE TO HASHIMOTO'S THYROIDITIS: ICD-10-CM

## 2021-10-11 DIAGNOSIS — E03.8 HYPOTHYROIDISM DUE TO HASHIMOTO'S THYROIDITIS: ICD-10-CM

## 2021-10-11 DIAGNOSIS — D75.89 MACROCYTOSIS: ICD-10-CM

## 2021-10-11 DIAGNOSIS — E11.42 TYPE 2 DIABETES MELLITUS WITH PERIPHERAL NEUROPATHY (HCC): ICD-10-CM

## 2021-10-11 DIAGNOSIS — M06.9 RHEUMATOID ARTHRITIS INVOLVING MULTIPLE SITES, UNSPECIFIED WHETHER RHEUMATOID FACTOR PRESENT (HCC): ICD-10-CM

## 2021-10-11 DIAGNOSIS — I10 ESSENTIAL HYPERTENSION: ICD-10-CM

## 2021-10-11 DIAGNOSIS — E78.2 MIXED HYPERLIPIDEMIA: ICD-10-CM

## 2021-10-11 PROCEDURE — 80050 GENERAL HEALTH PANEL: CPT | Performed by: GENERAL PRACTICE

## 2021-10-11 PROCEDURE — 83036 HEMOGLOBIN GLYCOSYLATED A1C: CPT | Performed by: GENERAL PRACTICE

## 2021-10-11 PROCEDURE — 85014 HEMATOCRIT: CPT | Performed by: GENERAL PRACTICE

## 2021-10-11 PROCEDURE — 82747 ASSAY OF FOLIC ACID RBC: CPT | Performed by: GENERAL PRACTICE

## 2021-10-11 PROCEDURE — 86140 C-REACTIVE PROTEIN: CPT | Performed by: GENERAL PRACTICE

## 2021-10-11 PROCEDURE — 85652 RBC SED RATE AUTOMATED: CPT | Performed by: GENERAL PRACTICE

## 2021-10-11 PROCEDURE — 80061 LIPID PANEL: CPT | Performed by: GENERAL PRACTICE

## 2021-10-11 PROCEDURE — 82607 VITAMIN B-12: CPT | Performed by: GENERAL PRACTICE

## 2021-10-12 LAB
ALBUMIN SERPL-MCNC: 3.9 G/DL (ref 3.5–5.2)
ALBUMIN/GLOB SERPL: 1.3 G/DL
ALP SERPL-CCNC: 49 U/L (ref 39–117)
ALT SERPL W P-5'-P-CCNC: 35 U/L (ref 1–33)
ANION GAP SERPL CALCULATED.3IONS-SCNC: 16.1 MMOL/L (ref 5–15)
AST SERPL-CCNC: 29 U/L (ref 1–32)
BASOPHILS # BLD AUTO: 0.08 10*3/MM3 (ref 0–0.2)
BASOPHILS NFR BLD AUTO: 1.1 % (ref 0–1.5)
BILIRUB SERPL-MCNC: 0.2 MG/DL (ref 0–1.2)
BUN SERPL-MCNC: 12 MG/DL (ref 8–23)
BUN/CREAT SERPL: 12.6 (ref 7–25)
CALCIUM SPEC-SCNC: 9.2 MG/DL (ref 8.6–10.5)
CHLORIDE SERPL-SCNC: 98 MMOL/L (ref 98–107)
CHOLEST SERPL-MCNC: 133 MG/DL (ref 0–200)
CO2 SERPL-SCNC: 24.9 MMOL/L (ref 22–29)
CREAT SERPL-MCNC: 0.95 MG/DL (ref 0.57–1)
CRP SERPL-MCNC: 0.64 MG/DL (ref 0–0.5)
DEPRECATED RDW RBC AUTO: 48.3 FL (ref 37–54)
EOSINOPHIL # BLD AUTO: 0.13 10*3/MM3 (ref 0–0.4)
EOSINOPHIL NFR BLD AUTO: 1.8 % (ref 0.3–6.2)
ERYTHROCYTE [DISTWIDTH] IN BLOOD BY AUTOMATED COUNT: 13.3 % (ref 12.3–15.4)
ERYTHROCYTE [SEDIMENTATION RATE] IN BLOOD: 43 MM/HR (ref 0–30)
FOLATE BLD-MCNC: 548 NG/ML
FOLATE RBC-MCNC: 1218 NG/ML
GFR SERPL CREATININE-BSD FRML MDRD: 59 ML/MIN/1.73
GLOBULIN UR ELPH-MCNC: 3.1 GM/DL
GLUCOSE SERPL-MCNC: 113 MG/DL (ref 65–99)
HBA1C MFR BLD: 6.36 % (ref 4.8–5.6)
HCT VFR BLD AUTO: 45 % (ref 34–46.6)
HCT VFR BLD AUTO: 46.4 % (ref 34–46.6)
HDLC SERPL-MCNC: 37 MG/DL (ref 40–60)
HGB BLD-MCNC: 15.2 G/DL (ref 12–15.9)
IMM GRANULOCYTES # BLD AUTO: 0.02 10*3/MM3 (ref 0–0.05)
IMM GRANULOCYTES NFR BLD AUTO: 0.3 % (ref 0–0.5)
LDLC SERPL CALC-MCNC: 60 MG/DL (ref 0–100)
LDLC/HDLC SERPL: 1.41 {RATIO}
LYMPHOCYTES # BLD AUTO: 2.66 10*3/MM3 (ref 0.7–3.1)
LYMPHOCYTES NFR BLD AUTO: 36.3 % (ref 19.6–45.3)
MCH RBC QN AUTO: 32.2 PG (ref 26.6–33)
MCHC RBC AUTO-ENTMCNC: 32.8 G/DL (ref 31.5–35.7)
MCV RBC AUTO: 98.3 FL (ref 79–97)
MONOCYTES # BLD AUTO: 0.56 10*3/MM3 (ref 0.1–0.9)
MONOCYTES NFR BLD AUTO: 7.7 % (ref 5–12)
NEUTROPHILS NFR BLD AUTO: 3.87 10*3/MM3 (ref 1.7–7)
NEUTROPHILS NFR BLD AUTO: 52.8 % (ref 42.7–76)
NRBC BLD AUTO-RTO: 0 /100 WBC (ref 0–0.2)
PLATELET # BLD AUTO: 365 10*3/MM3 (ref 140–450)
PMV BLD AUTO: 10.9 FL (ref 6–12)
POTASSIUM SERPL-SCNC: 4.5 MMOL/L (ref 3.5–5.2)
PROT SERPL-MCNC: 7 G/DL (ref 6–8.5)
RBC # BLD AUTO: 4.72 10*6/MM3 (ref 3.77–5.28)
SODIUM SERPL-SCNC: 139 MMOL/L (ref 136–145)
TRIGL SERPL-MCNC: 220 MG/DL (ref 0–150)
TSH SERPL DL<=0.05 MIU/L-ACNC: 1.22 UIU/ML (ref 0.27–4.2)
VIT B12 BLD-MCNC: 1529 PG/ML (ref 211–946)
VLDLC SERPL-MCNC: 36 MG/DL (ref 5–40)
WBC # BLD AUTO: 7.32 10*3/MM3 (ref 3.4–10.8)

## 2021-10-14 DIAGNOSIS — F41.8 DEPRESSION WITH ANXIETY: ICD-10-CM

## 2021-10-14 RX ORDER — RISPERIDONE 2 MG/1
2 TABLET ORAL
Qty: 30 TABLET | Refills: 5 | Status: SHIPPED | OUTPATIENT
Start: 2021-10-14 | End: 2021-11-15 | Stop reason: SDUPTHER

## 2021-11-05 ENCOUNTER — HOSPITAL ENCOUNTER (OUTPATIENT)
Dept: CT IMAGING | Facility: HOSPITAL | Age: 61
Discharge: HOME | End: 2021-11-05
Admitting: GENERAL PRACTICE

## 2021-11-05 DIAGNOSIS — Z00.00 HEALTHCARE MAINTENANCE: ICD-10-CM

## 2021-11-05 DIAGNOSIS — F17.200 SMOKER: ICD-10-CM

## 2021-11-05 DIAGNOSIS — R91.1 SOLITARY PULMONARY NODULE ON LUNG CT: Primary | ICD-10-CM

## 2021-11-05 PROCEDURE — 71271 CT THORAX LUNG CANCER SCR C-: CPT

## 2021-11-05 PROCEDURE — 71271 CT THORAX LUNG CANCER SCR C-: CPT | Performed by: RADIOLOGY

## 2021-11-05 NOTE — PROGRESS NOTES
Spoke with pt about the following per Dr. Chavez. VH      -- Please let patient know that her low dose CT revealed a 7 mm (3/8 inch) nodul ein the left upper lung The radiologist feels that is was present on her last CT but has recommended a contrast study I have placed the order in epic

## 2021-11-10 RX ORDER — EMPAGLIFLOZIN, METFORMIN HYDROCHLORIDE 5; 1000 MG/1; MG/1
TABLET, EXTENDED RELEASE ORAL
Qty: 30 TABLET | Refills: 0 | Status: SHIPPED | OUTPATIENT
Start: 2021-11-10 | End: 2021-11-15 | Stop reason: SDUPTHER

## 2021-11-10 RX ORDER — MONTELUKAST SODIUM 10 MG/1
10 TABLET ORAL NIGHTLY
Qty: 30 TABLET | Refills: 5 | Status: SHIPPED | OUTPATIENT
Start: 2021-11-10 | End: 2021-11-15 | Stop reason: SDUPTHER

## 2021-11-10 RX ORDER — ORAL SEMAGLUTIDE 3 MG/1
TABLET ORAL
Qty: 30 TABLET | Refills: 0 | Status: SHIPPED | OUTPATIENT
Start: 2021-11-10 | End: 2021-11-15

## 2021-11-15 ENCOUNTER — OFFICE VISIT (OUTPATIENT)
Dept: FAMILY MEDICINE CLINIC | Facility: CLINIC | Age: 61
End: 2021-11-15

## 2021-11-15 VITALS
BODY MASS INDEX: 44.12 KG/M2 | WEIGHT: 249 LBS | DIASTOLIC BLOOD PRESSURE: 65 MMHG | SYSTOLIC BLOOD PRESSURE: 118 MMHG | OXYGEN SATURATION: 97 % | HEART RATE: 100 BPM | TEMPERATURE: 97.3 F | RESPIRATION RATE: 15 BRPM | HEIGHT: 63 IN

## 2021-11-15 DIAGNOSIS — R91.1 SOLITARY PULMONARY NODULE ON LUNG CT: ICD-10-CM

## 2021-11-15 DIAGNOSIS — J44.1 COPD WITH ACUTE EXACERBATION (HCC): ICD-10-CM

## 2021-11-15 DIAGNOSIS — E55.9 VITAMIN D DEFICIENCY: ICD-10-CM

## 2021-11-15 DIAGNOSIS — E78.2 MIXED HYPERLIPIDEMIA: ICD-10-CM

## 2021-11-15 DIAGNOSIS — K21.9 GASTROESOPHAGEAL REFLUX DISEASE WITHOUT ESOPHAGITIS: ICD-10-CM

## 2021-11-15 DIAGNOSIS — E06.3 HYPOTHYROIDISM DUE TO HASHIMOTO'S THYROIDITIS: ICD-10-CM

## 2021-11-15 DIAGNOSIS — J30.1 SEASONAL ALLERGIC RHINITIS DUE TO POLLEN: ICD-10-CM

## 2021-11-15 DIAGNOSIS — Z00.00 HEALTHCARE MAINTENANCE: ICD-10-CM

## 2021-11-15 DIAGNOSIS — N39.3 STRESS BLADDER INCONTINENCE, FEMALE: ICD-10-CM

## 2021-11-15 DIAGNOSIS — J43.1 PANLOBULAR EMPHYSEMA (HCC): ICD-10-CM

## 2021-11-15 DIAGNOSIS — J30.9 CHRONIC ALLERGIC RHINITIS: Primary | ICD-10-CM

## 2021-11-15 DIAGNOSIS — M77.41 METATARSALGIA OF BOTH FEET: ICD-10-CM

## 2021-11-15 DIAGNOSIS — F41.8 DEPRESSION WITH ANXIETY: Chronic | ICD-10-CM

## 2021-11-15 DIAGNOSIS — K59.09 CHRONIC CONSTIPATION: ICD-10-CM

## 2021-11-15 DIAGNOSIS — M15.9 GENERALIZED OSTEOARTHRITIS: ICD-10-CM

## 2021-11-15 DIAGNOSIS — E66.01 MORBID OBESITY WITH BMI OF 45.0-49.9, ADULT (HCC): ICD-10-CM

## 2021-11-15 DIAGNOSIS — M54.59 MECHANICAL LOW BACK PAIN: ICD-10-CM

## 2021-11-15 DIAGNOSIS — E03.9 ACQUIRED HYPOTHYROIDISM: ICD-10-CM

## 2021-11-15 DIAGNOSIS — M06.9 RHEUMATOID ARTHRITIS INVOLVING MULTIPLE SITES, UNSPECIFIED WHETHER RHEUMATOID FACTOR PRESENT (HCC): ICD-10-CM

## 2021-11-15 DIAGNOSIS — F41.8 DEPRESSION WITH ANXIETY: ICD-10-CM

## 2021-11-15 DIAGNOSIS — G47.33 OBSTRUCTIVE SLEEP APNEA: ICD-10-CM

## 2021-11-15 DIAGNOSIS — I10 ESSENTIAL HYPERTENSION: ICD-10-CM

## 2021-11-15 DIAGNOSIS — M85.80 OSTEOPENIA, UNSPECIFIED LOCATION: ICD-10-CM

## 2021-11-15 DIAGNOSIS — F17.200 SMOKER: ICD-10-CM

## 2021-11-15 DIAGNOSIS — M47.816 SPONDYLOSIS OF LUMBAR REGION WITHOUT MYELOPATHY OR RADICULOPATHY: ICD-10-CM

## 2021-11-15 DIAGNOSIS — E11.42 TYPE 2 DIABETES MELLITUS WITH PERIPHERAL NEUROPATHY (HCC): ICD-10-CM

## 2021-11-15 DIAGNOSIS — I25.10 CORONARY ARTERY CALCIFICATION SEEN ON CT SCAN: ICD-10-CM

## 2021-11-15 DIAGNOSIS — E03.8 HYPOTHYROIDISM DUE TO HASHIMOTO'S THYROIDITIS: ICD-10-CM

## 2021-11-15 DIAGNOSIS — M77.42 METATARSALGIA OF BOTH FEET: ICD-10-CM

## 2021-11-15 PROCEDURE — 99214 OFFICE O/P EST MOD 30 MIN: CPT | Performed by: GENERAL PRACTICE

## 2021-11-15 RX ORDER — BUSPIRONE HYDROCHLORIDE 15 MG/1
15 TABLET ORAL 3 TIMES DAILY
Qty: 90 TABLET | Refills: 5 | Status: SHIPPED | OUTPATIENT
Start: 2021-11-15 | End: 2022-06-16 | Stop reason: SDUPTHER

## 2021-11-15 RX ORDER — MONTELUKAST SODIUM 10 MG/1
10 TABLET ORAL NIGHTLY
Qty: 30 TABLET | Refills: 5 | Status: SHIPPED | OUTPATIENT
Start: 2021-11-15 | End: 2022-04-26 | Stop reason: SDUPTHER

## 2021-11-15 RX ORDER — SIMVASTATIN 20 MG
20 TABLET ORAL
Qty: 30 TABLET | Refills: 5 | Status: SHIPPED | OUTPATIENT
Start: 2021-11-15 | End: 2022-06-16 | Stop reason: SDUPTHER

## 2021-11-15 RX ORDER — ALBUTEROL SULFATE 90 UG/1
2 AEROSOL, METERED RESPIRATORY (INHALATION) EVERY 4 HOURS PRN
Qty: 18 G | Refills: 5 | Status: SHIPPED | OUTPATIENT
Start: 2021-11-15 | End: 2021-12-07

## 2021-11-15 RX ORDER — MELOXICAM 15 MG/1
15 TABLET ORAL DAILY
Qty: 30 TABLET | Refills: 5 | Status: SHIPPED | OUTPATIENT
Start: 2021-11-15 | End: 2022-06-16 | Stop reason: SDUPTHER

## 2021-11-15 RX ORDER — ORAL SEMAGLUTIDE 7 MG/1
1 TABLET ORAL DAILY
Qty: 30 TABLET | Refills: 0 | Status: SHIPPED | OUTPATIENT
Start: 2021-11-15 | End: 2021-12-13

## 2021-11-15 RX ORDER — MELATONIN
2000 DAILY
Qty: 60 TABLET | Refills: 5 | Status: SHIPPED | OUTPATIENT
Start: 2021-11-15 | End: 2021-12-07

## 2021-11-15 RX ORDER — BUDESONIDE, GLYCOPYRROLATE, AND FORMOTEROL FUMARATE 160; 9; 4.8 UG/1; UG/1; UG/1
2 AEROSOL, METERED RESPIRATORY (INHALATION) 2 TIMES DAILY
Qty: 1 EACH | Refills: 8 | Status: SHIPPED | OUTPATIENT
Start: 2021-11-15 | End: 2022-01-31

## 2021-11-15 RX ORDER — EMPAGLIFLOZIN, METFORMIN HYDROCHLORIDE 5; 1000 MG/1; MG/1
1 TABLET, EXTENDED RELEASE ORAL DAILY
Qty: 30 TABLET | Refills: 5 | Status: SHIPPED | OUTPATIENT
Start: 2021-11-15 | End: 2022-03-02

## 2021-11-15 RX ORDER — FLUTICASONE PROPIONATE 50 MCG
2 SPRAY, SUSPENSION (ML) NASAL DAILY
Qty: 16 G | Refills: 5 | Status: SHIPPED | OUTPATIENT
Start: 2021-11-15 | End: 2022-01-04

## 2021-11-15 RX ORDER — LANOLIN ALCOHOL/MO/W.PET/CERES
1000 CREAM (GRAM) TOPICAL DAILY
Qty: 30 TABLET | Refills: 5 | Status: SHIPPED | OUTPATIENT
Start: 2021-11-15 | End: 2022-02-03

## 2021-11-15 RX ORDER — LORAZEPAM 1 MG/1
TABLET ORAL
Qty: 90 TABLET | Refills: 0 | Status: SHIPPED | OUTPATIENT
Start: 2021-11-15 | End: 2022-04-04

## 2021-11-15 RX ORDER — TOPIRAMATE 100 MG/1
100 TABLET, FILM COATED ORAL 2 TIMES DAILY
Qty: 60 TABLET | Refills: 5
Start: 2021-11-15 | End: 2022-06-16 | Stop reason: SDUPTHER

## 2021-11-15 RX ORDER — RISPERIDONE 2 MG/1
2 TABLET ORAL
Qty: 30 TABLET | Refills: 5 | Status: SHIPPED | OUTPATIENT
Start: 2021-11-15 | End: 2022-06-16 | Stop reason: SDUPTHER

## 2021-11-15 RX ORDER — PANTOPRAZOLE SODIUM 40 MG/1
40 TABLET, DELAYED RELEASE ORAL DAILY
Qty: 30 TABLET | Refills: 5 | Status: SHIPPED | OUTPATIENT
Start: 2021-11-15 | End: 2022-06-16 | Stop reason: SDUPTHER

## 2021-11-15 RX ORDER — IPRATROPIUM BROMIDE AND ALBUTEROL SULFATE 2.5; .5 MG/3ML; MG/3ML
3 SOLUTION RESPIRATORY (INHALATION) 4 TIMES DAILY PRN
Qty: 360 ML | Refills: 8 | Status: SHIPPED | OUTPATIENT
Start: 2021-11-15 | End: 2021-12-07

## 2021-11-15 RX ORDER — LEVOTHYROXINE SODIUM 0.2 MG/1
200 TABLET ORAL DAILY
Qty: 30 TABLET | Refills: 5 | Status: SHIPPED | OUTPATIENT
Start: 2021-11-15 | End: 2022-02-03

## 2021-11-15 RX ORDER — BUPROPION HYDROCHLORIDE 300 MG/1
300 TABLET ORAL EVERY MORNING
Qty: 30 TABLET | Refills: 5 | Status: SHIPPED | OUTPATIENT
Start: 2021-11-15 | End: 2022-06-16 | Stop reason: SDUPTHER

## 2021-11-15 RX ORDER — LOSARTAN POTASSIUM 50 MG/1
50 TABLET ORAL DAILY
Qty: 30 TABLET | Refills: 5 | Status: SHIPPED | OUTPATIENT
Start: 2021-11-15 | End: 2022-02-03

## 2021-11-15 RX ORDER — ESCITALOPRAM OXALATE 10 MG/1
10 TABLET ORAL DAILY
Qty: 30 TABLET | Refills: 5 | Status: SHIPPED | OUTPATIENT
Start: 2021-11-15 | End: 2022-06-16 | Stop reason: SDUPTHER

## 2021-11-15 RX ORDER — ACETAMINOPHEN 500 MG
1000 TABLET ORAL 4 TIMES DAILY
Qty: 180 TABLET | Refills: 5 | Status: SHIPPED | OUTPATIENT
Start: 2021-11-15 | End: 2021-12-07

## 2021-11-17 ENCOUNTER — TELEPHONE (OUTPATIENT)
Dept: FAMILY MEDICINE CLINIC | Facility: CLINIC | Age: 61
End: 2021-11-17

## 2021-11-17 NOTE — TELEPHONE ENCOUNTER
----- Message from Scott Chavez MD sent at 11/17/2021 12:10 PM EST -----  Hmm -I think you can disregard this  When I saw her this week she was taking oral semaglutide that she received at the pharmacy  ----- Message -----  From: Vernell Dotson MA  Sent: 11/17/2021  12:08 PM EST  To: Scott Chavez MD    Is this the Wegovy? What MG? And for DM or Obesity?   ----- Message -----  From: Scott Chavez MD  Sent: 11/15/2021   9:00 PM EST  To: Vernell Dotson MA    Needs pa on semaglutide

## 2021-12-03 ENCOUNTER — HOSPITAL ENCOUNTER (OUTPATIENT)
Dept: CT IMAGING | Facility: HOSPITAL | Age: 61
Discharge: HOME | End: 2021-12-03
Admitting: GENERAL PRACTICE

## 2021-12-03 DIAGNOSIS — R91.1 SOLITARY PULMONARY NODULE ON LUNG CT: ICD-10-CM

## 2021-12-03 PROCEDURE — 71260 CT THORAX DX C+: CPT

## 2021-12-03 PROCEDURE — 25010000002 IOPAMIDOL 61 % SOLUTION: Performed by: GENERAL PRACTICE

## 2021-12-03 PROCEDURE — 71260 CT THORAX DX C+: CPT | Performed by: RADIOLOGY

## 2021-12-03 RX ADMIN — IOPAMIDOL 60 ML: 612 INJECTION, SOLUTION INTRAVENOUS at 11:43

## 2021-12-06 ENCOUNTER — HOSPITAL ENCOUNTER (OUTPATIENT)
Dept: BONE DENSITY | Facility: HOSPITAL | Age: 61
Discharge: HOME | End: 2021-12-06

## 2021-12-06 ENCOUNTER — HOSPITAL ENCOUNTER (OUTPATIENT)
Dept: MAMMOGRAPHY | Facility: HOSPITAL | Age: 61
Discharge: HOME | End: 2021-12-06

## 2021-12-06 DIAGNOSIS — M85.80 OSTEOPENIA, UNSPECIFIED LOCATION: ICD-10-CM

## 2021-12-06 DIAGNOSIS — Z00.00 HEALTHCARE MAINTENANCE: ICD-10-CM

## 2021-12-06 DIAGNOSIS — Z12.31 ENCOUNTER FOR SCREENING MAMMOGRAM FOR BREAST CANCER: ICD-10-CM

## 2021-12-06 PROCEDURE — 77080 DXA BONE DENSITY AXIAL: CPT

## 2021-12-06 PROCEDURE — 77063 BREAST TOMOSYNTHESIS BI: CPT | Performed by: RADIOLOGY

## 2021-12-06 PROCEDURE — 77067 SCR MAMMO BI INCL CAD: CPT

## 2021-12-06 PROCEDURE — 77080 DXA BONE DENSITY AXIAL: CPT | Performed by: RADIOLOGY

## 2021-12-06 PROCEDURE — 77067 SCR MAMMO BI INCL CAD: CPT | Performed by: RADIOLOGY

## 2021-12-06 PROCEDURE — 77063 BREAST TOMOSYNTHESIS BI: CPT

## 2021-12-06 NOTE — PROGRESS NOTES
Spoke with pt about the following per Dr. Chavez. VH    -- Please let patient know that her CT is stable Would recommend a six month follow up (sometime around may-June)

## 2021-12-07 DIAGNOSIS — J44.1 COPD WITH ACUTE EXACERBATION (HCC): ICD-10-CM

## 2021-12-07 DIAGNOSIS — E11.42 TYPE 2 DIABETES MELLITUS WITH PERIPHERAL NEUROPATHY (HCC): ICD-10-CM

## 2021-12-07 DIAGNOSIS — J43.1 PANLOBULAR EMPHYSEMA (HCC): ICD-10-CM

## 2021-12-07 DIAGNOSIS — E55.9 VITAMIN D DEFICIENCY: ICD-10-CM

## 2021-12-07 DIAGNOSIS — M15.9 GENERALIZED OSTEOARTHRITIS: ICD-10-CM

## 2021-12-07 RX ORDER — PSEUDOEPHED/ACETAMINOPH/DIPHEN 30MG-500MG
TABLET ORAL
Qty: 180 TABLET | Refills: 5 | Status: SHIPPED | OUTPATIENT
Start: 2021-12-07 | End: 2022-06-16 | Stop reason: SDUPTHER

## 2021-12-07 RX ORDER — MELATONIN
2000 DAILY
Qty: 60 TABLET | Refills: 5 | Status: SHIPPED | OUTPATIENT
Start: 2021-12-07 | End: 2022-06-16 | Stop reason: SDUPTHER

## 2021-12-07 RX ORDER — METFORMIN HYDROCHLORIDE 500 MG/1
2000 TABLET, EXTENDED RELEASE ORAL DAILY
Qty: 120 TABLET | Refills: 5 | Status: SHIPPED | OUTPATIENT
Start: 2021-12-07 | End: 2022-06-16 | Stop reason: SDUPTHER

## 2021-12-07 RX ORDER — IPRATROPIUM BROMIDE AND ALBUTEROL SULFATE 2.5; .5 MG/3ML; MG/3ML
SOLUTION RESPIRATORY (INHALATION)
Qty: 360 ML | Refills: 11 | Status: SHIPPED | OUTPATIENT
Start: 2021-12-07 | End: 2022-12-05

## 2021-12-13 DIAGNOSIS — I25.10 CORONARY ARTERY CALCIFICATION SEEN ON CT SCAN: Primary | ICD-10-CM

## 2021-12-13 DIAGNOSIS — R91.1 SOLITARY PULMONARY NODULE ON LUNG CT: ICD-10-CM

## 2021-12-13 RX ORDER — ORAL SEMAGLUTIDE 3 MG/1
1 TABLET ORAL DAILY
Qty: 30 TABLET | Refills: 5 | Status: SHIPPED | OUTPATIENT
Start: 2021-12-13 | End: 2022-05-26

## 2021-12-14 ENCOUNTER — TELEPHONE (OUTPATIENT)
Dept: FAMILY MEDICINE CLINIC | Facility: CLINIC | Age: 61
End: 2021-12-14

## 2021-12-14 NOTE — TELEPHONE ENCOUNTER
Pt is aware of this information.       ----- Message from Scott Chavez MD sent at 12/13/2021  1:27 PM EST -----  K - we'll knock it back down to the 3 mg dose - gifty emailed a script to her pharm  ----- Message -----  From: Yesenia Correa MA  Sent: 12/13/2021  11:13 AM EST  To: Scott Chavez MD    Patient called and stated that the 7mg rybelsus is to strong and its making her stomach hurt, and she feels terrible when she takes it.

## 2022-01-04 DIAGNOSIS — J30.1 SEASONAL ALLERGIC RHINITIS DUE TO POLLEN: ICD-10-CM

## 2022-01-04 RX ORDER — FLUTICASONE PROPIONATE 50 MCG
SPRAY, SUSPENSION (ML) NASAL
Qty: 16 G | Refills: 5 | Status: SHIPPED | OUTPATIENT
Start: 2022-01-04 | End: 2022-06-16 | Stop reason: SDUPTHER

## 2022-01-13 ENCOUNTER — APPOINTMENT (OUTPATIENT)
Dept: CT IMAGING | Facility: HOSPITAL | Age: 62
End: 2022-01-13

## 2022-01-21 ENCOUNTER — CLINICAL SUPPORT (OUTPATIENT)
Dept: FAMILY MEDICINE CLINIC | Facility: CLINIC | Age: 62
End: 2022-01-21

## 2022-01-21 DIAGNOSIS — N39.3 STRESS BLADDER INCONTINENCE, FEMALE: ICD-10-CM

## 2022-01-21 DIAGNOSIS — R30.0 DYSURIA: Primary | ICD-10-CM

## 2022-01-21 LAB
BILIRUB BLD-MCNC: NEGATIVE MG/DL
CLARITY, POC: ABNORMAL
COLOR UR: ABNORMAL
EXPIRATION DATE: ABNORMAL
GLUCOSE UR STRIP-MCNC: NEGATIVE MG/DL
KETONES UR QL: NEGATIVE
LEUKOCYTE EST, POC: ABNORMAL
Lab: ABNORMAL
NITRITE UR-MCNC: NEGATIVE MG/ML
PH UR: 6 [PH] (ref 5–8)
PROT UR STRIP-MCNC: NEGATIVE MG/DL
RBC # UR STRIP: ABNORMAL /UL
SP GR UR: 1.02 (ref 1–1.03)
UROBILINOGEN UR QL: NORMAL

## 2022-01-21 PROCEDURE — 81001 URINALYSIS AUTO W/SCOPE: CPT | Performed by: GENERAL PRACTICE

## 2022-01-21 PROCEDURE — 87086 URINE CULTURE/COLONY COUNT: CPT | Performed by: GENERAL PRACTICE

## 2022-01-21 PROCEDURE — 87186 SC STD MICRODIL/AGAR DIL: CPT

## 2022-01-21 PROCEDURE — 81003 URINALYSIS AUTO W/O SCOPE: CPT | Performed by: GENERAL PRACTICE

## 2022-01-21 PROCEDURE — 87088 URINE BACTERIA CULTURE: CPT | Performed by: GENERAL PRACTICE

## 2022-01-21 RX ORDER — CIPROFLOXACIN 250 MG/1
250 TABLET, FILM COATED ORAL 2 TIMES DAILY
Qty: 10 TABLET | Refills: 0 | Status: SHIPPED | OUTPATIENT
Start: 2022-01-21 | End: 2022-03-02

## 2022-01-22 LAB
BACTERIA UR QL AUTO: ABNORMAL /HPF
BILIRUB UR QL STRIP: NEGATIVE
CLARITY UR: CLEAR
COD CRY URNS QL: ABNORMAL /HPF
COLOR UR: YELLOW
GLUCOSE UR STRIP-MCNC: NEGATIVE MG/DL
HGB UR QL STRIP.AUTO: ABNORMAL
HYALINE CASTS UR QL AUTO: ABNORMAL /LPF
KETONES UR QL STRIP: NEGATIVE
LEUKOCYTE ESTERASE UR QL STRIP.AUTO: ABNORMAL
NITRITE UR QL STRIP: NEGATIVE
PH UR STRIP.AUTO: 6 [PH] (ref 5–8)
PROT UR QL STRIP: NEGATIVE
RBC # UR STRIP: ABNORMAL /HPF
REF LAB TEST METHOD: ABNORMAL
SP GR UR STRIP: 1.01 (ref 1–1.03)
SQUAMOUS #/AREA URNS HPF: ABNORMAL /HPF
UROBILINOGEN UR QL STRIP: ABNORMAL
WBC # UR STRIP: ABNORMAL /HPF

## 2022-01-24 LAB — BACTERIA SPEC AEROBE CULT: ABNORMAL

## 2022-01-25 ENCOUNTER — OFFICE VISIT (OUTPATIENT)
Dept: PULMONOLOGY | Facility: CLINIC | Age: 62
End: 2022-01-25

## 2022-01-25 VITALS
WEIGHT: 240 LBS | OXYGEN SATURATION: 96 % | TEMPERATURE: 97.1 F | BODY MASS INDEX: 42.52 KG/M2 | DIASTOLIC BLOOD PRESSURE: 76 MMHG | HEART RATE: 106 BPM | HEIGHT: 63 IN | SYSTOLIC BLOOD PRESSURE: 138 MMHG

## 2022-01-25 DIAGNOSIS — F17.210 CIGARETTE NICOTINE DEPENDENCE WITHOUT COMPLICATION: ICD-10-CM

## 2022-01-25 DIAGNOSIS — J43.1 PANLOBULAR EMPHYSEMA: ICD-10-CM

## 2022-01-25 DIAGNOSIS — G47.34 NOCTURNAL HYPOXIA: Primary | ICD-10-CM

## 2022-01-25 DIAGNOSIS — R91.1 PULMONARY NODULE: ICD-10-CM

## 2022-01-25 DIAGNOSIS — J44.1 COPD WITH ACUTE EXACERBATION: ICD-10-CM

## 2022-01-25 DIAGNOSIS — I89.8 CALCIFIED LYMPH NODES: ICD-10-CM

## 2022-01-25 DIAGNOSIS — G47.33 OBSTRUCTIVE SLEEP APNEA: ICD-10-CM

## 2022-01-25 PROCEDURE — 99214 OFFICE O/P EST MOD 30 MIN: CPT | Performed by: PHYSICIAN ASSISTANT

## 2022-01-25 RX ORDER — GUAIFENESIN 600 MG/1
600 TABLET, EXTENDED RELEASE ORAL 2 TIMES DAILY PRN
Qty: 14 TABLET | Refills: 3 | Status: SHIPPED | OUTPATIENT
Start: 2022-01-25 | End: 2022-02-01

## 2022-01-25 NOTE — PROGRESS NOTES
"Chief Complaint  Emphysema    Subjective          Lisa Hill presents to [facility]ULMONARY [facility]IT[facility] MEDICINE  History of Present Illness     Patient presents today for follow-up of emphysema pulmonary nodule, noncompliant obstructive sleep apnea, nocturnal hypoxia, and cigarette nicotine dependence.  She continues with reduced cigarette usage of 1/2 pack/day.  She has attempted with multiple efforts and multiple methods, but unable to achieve complete cessation.  She states that overall symptoms are fairly stable.  She notes chronic phlegm production that is often difficult to produce.   she feels the phlegm becomes stuck in her throat.  She did have some increased improvement of shortness of breath after starting breztri, and continues with this currently.  She also admits to frequent coughing, which can often result in urinary leakage and can impair daily activities.  She is concerned that the inhaler may aggravate the coughing at times, and would like to try something different.  Recent low-dose CT chest was notable for a new 7 mm groundglass opacification in the left upper lobe.  CT without contrast was completed and confirmed this finding, however it appears more vaguely noted and may be resolving.  Primary provider has ordered follow-up CT imaging to be completed in February.  She admits to some recent difficulty swallowing, and states that it feels that she would become stuck in the top throat area.        Objective   Vital Signs:   /76   Pulse 106   Temp 97.1 °F (36.2 °C) (Temporal)   Ht 158.8 cm (62.5\")   Wt 109 kg (240 lb)   SpO2 96%   BMI 43.20 kg/m²     Physical Exam  Vitals reviewed.   Constitutional:       General: She is not in acute distress.     Appearance: She is well-developed. She is not diaphoretic.   HENT:      Head: Normocephalic and atraumatic.   Neck:      Thyroid: No thyromegaly.   Cardiovascular:      Rate and Rhythm: Normal rate and regular " rhythm.      Heart sounds: Normal heart sounds, S1 normal and S2 normal.   Pulmonary:      Effort: Pulmonary effort is normal.      Breath sounds: No wheezing, rhonchi or rales.      Comments: Intermittent mixed wheeze/rhonchi of the left lower lung field.  Neurological:      Mental Status: She is alert and oriented to person, place, and time.   Psychiatric:         Behavior: Behavior normal.          Result Review :   The following data was reviewed by: Katarina Wan PA-C on 01/25/2022:    Reviewed the CT chest Imaging/report from December 2021.     Reviewed the CT chest imaging/report from November 2021    Reviewed the CT chest imaging/report from June 2020    Reviewed PFT from 2019.         Assessment and Plan    Diagnoses and all orders for this visit:    1. Nocturnal hypoxia (Primary)  -     Miscellaneous DME    2. Panlobular emphysema (HCC)  -     ProAir  (90 Base) MCG/ACT inhaler; Inhale 2 puffs Every 4 (Four) Hours As Needed for Wheezing or Shortness of Air.  Dispense: 8.5 g; Refill: 8    3. Obstructive sleep apnea    4. Calcified lymph nodes    5. Pulmonary nodule    6. Cigarette nicotine dependence without complication    7. COPD with acute exacerbation (HCC)  -     ProAir  (90 Base) MCG/ACT inhaler; Inhale 2 puffs Every 4 (Four) Hours As Needed for Wheezing or Shortness of Air.  Dispense: 8.5 g; Refill: 8    Other orders  -     guaiFENesin (Mucinex) 600 MG 12 hr tablet; Take 1 tablet by mouth 2 (Two) Times a Day As Needed for Cough or Congestion for up to 7 days.  Dispense: 14 tablet; Refill: 3          COPD, emphysema:  · Continue albuterol inhaler as needed  · Continue duo nebs as needed  · Previously on breztri 2 puffs twice daily  · Recommend as needed mucinex tablets for chest congestion  Prescription ordered.   · Occasional low pitched wheeze/high pitched rhonchi noted. Will hold off on steroids for now as she does not seem to have an exacerbation. Discussed if symptoms worsen to  contact our office as needed.    Patient was concerned that steroid component may be aggravating her cough.  · Stiolto sample provided.   Discontinued Breztri  She will started trial of Stiolto, and then call our office if she wishes to switch from breztri to Stiolto   otherwise, she will resume breztri.          Obstructive sleep apnea/nocturnal hypoxia:  · Previously unable to tolerate positive airway pressure therapy.  · Remains compliant with nasal cannula 2 L/min at nighttime.  · Reordered ordered facial mask for nocturnal oxygen supplies. - has some difficulty with maintaining placement of the nasal cannula.  Had not yet received the mask.  Supplies: [employer]           Pulmonary nodule, calcified lymph nodes:   Recent low-dose CT imaging in November 2021 showed   - New 7 mm groundglass opacity of the left upper lobe.  Repeat CT without contrast is ordered and confirmed this finding.    - Also notable for stable 2 mm right nodule on image 34 (stable since March 2019)  - Previous left 4 mm nodule resolved (2017 only)  - Stable calcified lymph nodes (since 2017)  · PCP ordered repeat CT chest with contrast for 2/3/2022 for new 7 mm SUDEEP opacity, will follow for results.            Cigarette nicotine dependence:  · Smoking cessation counseling:  Mrs. Hill is a current cigarette user. Notable for 44 year use with approximately 1 pack/day use. Currently using 1/2 packs per day average.   She understands the possible risks with smoking, possible benefits of cessation.  She has tried to quit on multiple occasions, not successful with assistance from various methods including nicotine inhaler, nicotine nasal spray, nicotine gum without success.  Smoking cessation counseling limited.          Follow Up   Return in about 3 months (around 4/25/2022), or as needed, for Recheck.  Patient was given instructions and counseling regarding her condition or for health maintenance advice. Please see specific  information pulled into the AVS if appropriate.     Will reassess inhaler change, discuss CT scan findings.         Addendum: 01/31/2022  Patient contacted the office, wished to continue Stiolto inhaler   · Stiolto Order placed.   · Discontinuing Breztri.

## 2022-02-03 ENCOUNTER — APPOINTMENT (OUTPATIENT)
Dept: CT IMAGING | Facility: HOSPITAL | Age: 62
End: 2022-02-03

## 2022-02-03 DIAGNOSIS — I10 ESSENTIAL HYPERTENSION: ICD-10-CM

## 2022-02-03 DIAGNOSIS — E03.9 ACQUIRED HYPOTHYROIDISM: ICD-10-CM

## 2022-02-03 DIAGNOSIS — E11.42 TYPE 2 DIABETES MELLITUS WITH PERIPHERAL NEUROPATHY: ICD-10-CM

## 2022-02-03 RX ORDER — LANOLIN ALCOHOL/MO/W.PET/CERES
1000 CREAM (GRAM) TOPICAL DAILY
Qty: 30 TABLET | Refills: 5 | Status: SHIPPED | OUTPATIENT
Start: 2022-02-03 | End: 2022-09-22

## 2022-02-03 RX ORDER — LEVOTHYROXINE SODIUM 0.2 MG/1
200 TABLET ORAL DAILY
Qty: 30 TABLET | Refills: 1 | Status: SHIPPED | OUTPATIENT
Start: 2022-02-03 | End: 2022-06-16 | Stop reason: SDUPTHER

## 2022-02-03 RX ORDER — LOSARTAN POTASSIUM 50 MG/1
50 TABLET ORAL DAILY
Qty: 30 TABLET | Refills: 1 | Status: SHIPPED | OUTPATIENT
Start: 2022-02-03 | End: 2022-06-16 | Stop reason: SDUPTHER

## 2022-02-07 NOTE — TELEPHONE ENCOUNTER
I called the patient with low-dose CT chest results, but she was unavailable.  CT scan was no longer notable for the previously noted pulmonary nodule measuring less 2.3 mm on CT chest in March 2019.    Current CT imaging remains notable for COPD changes and calcified lymph nodes, likely related to a previous infection.  Dr. Fam had reported me the CT scan results and requested to repeat the CT imaging in 1 year.    · We will order the low-dose CT chest closer to June 2021 for 1 year follow-up  Right 2.3 cm pulmonary nodule has resolved.    I stated that she can call office with any further questions.  We will continue to follow up as scheduled and as needed.   Malnutrition...

## 2022-03-01 ENCOUNTER — OFFICE VISIT (OUTPATIENT)
Dept: FAMILY MEDICINE CLINIC | Facility: CLINIC | Age: 62
End: 2022-03-01

## 2022-03-01 DIAGNOSIS — K59.09 CHRONIC CONSTIPATION: ICD-10-CM

## 2022-03-01 DIAGNOSIS — E06.3 HYPOTHYROIDISM DUE TO HASHIMOTO'S THYROIDITIS: ICD-10-CM

## 2022-03-01 DIAGNOSIS — N39.3 STRESS BLADDER INCONTINENCE, FEMALE: ICD-10-CM

## 2022-03-01 DIAGNOSIS — R91.1 PULMONARY NODULE: ICD-10-CM

## 2022-03-01 DIAGNOSIS — E78.2 MIXED HYPERLIPIDEMIA: ICD-10-CM

## 2022-03-01 DIAGNOSIS — K21.9 GASTROESOPHAGEAL REFLUX DISEASE WITHOUT ESOPHAGITIS: ICD-10-CM

## 2022-03-01 DIAGNOSIS — M06.9 RHEUMATOID ARTHRITIS INVOLVING MULTIPLE SITES, UNSPECIFIED WHETHER RHEUMATOID FACTOR PRESENT: ICD-10-CM

## 2022-03-01 DIAGNOSIS — J30.9 CHRONIC ALLERGIC RHINITIS: Primary | ICD-10-CM

## 2022-03-01 DIAGNOSIS — J43.1 PANLOBULAR EMPHYSEMA: ICD-10-CM

## 2022-03-01 DIAGNOSIS — F17.200 SMOKER: ICD-10-CM

## 2022-03-01 DIAGNOSIS — E66.01 MORBID OBESITY WITH BMI OF 45.0-49.9, ADULT: ICD-10-CM

## 2022-03-01 DIAGNOSIS — E03.8 HYPOTHYROIDISM DUE TO HASHIMOTO'S THYROIDITIS: ICD-10-CM

## 2022-03-01 DIAGNOSIS — M15.9 GENERALIZED OSTEOARTHRITIS: ICD-10-CM

## 2022-03-01 DIAGNOSIS — E11.42 TYPE 2 DIABETES MELLITUS WITH PERIPHERAL NEUROPATHY: ICD-10-CM

## 2022-03-01 DIAGNOSIS — I10 ESSENTIAL HYPERTENSION: ICD-10-CM

## 2022-03-01 DIAGNOSIS — G47.33 OBSTRUCTIVE SLEEP APNEA: ICD-10-CM

## 2022-03-01 DIAGNOSIS — I70.0 AORTIC ATHEROSCLEROSIS: ICD-10-CM

## 2022-03-01 DIAGNOSIS — M85.80 OSTEOPENIA, UNSPECIFIED LOCATION: ICD-10-CM

## 2022-03-01 DIAGNOSIS — F41.8 DEPRESSION WITH ANXIETY: ICD-10-CM

## 2022-03-01 DIAGNOSIS — M54.59 MECHANICAL LOW BACK PAIN: ICD-10-CM

## 2022-03-01 DIAGNOSIS — I25.10 CORONARY ARTERY CALCIFICATION SEEN ON CT SCAN: ICD-10-CM

## 2022-03-01 DIAGNOSIS — E55.9 VITAMIN D DEFICIENCY: ICD-10-CM

## 2022-03-01 DIAGNOSIS — Z00.00 HEALTHCARE MAINTENANCE: ICD-10-CM

## 2022-03-01 PROCEDURE — 99214 OFFICE O/P EST MOD 30 MIN: CPT | Performed by: GENERAL PRACTICE

## 2022-03-01 PROCEDURE — 83036 HEMOGLOBIN GLYCOSYLATED A1C: CPT | Performed by: GENERAL PRACTICE

## 2022-03-01 PROCEDURE — 80050 GENERAL HEALTH PANEL: CPT | Performed by: GENERAL PRACTICE

## 2022-03-01 PROCEDURE — 80061 LIPID PANEL: CPT | Performed by: GENERAL PRACTICE

## 2022-03-01 NOTE — PROGRESS NOTES
Subjective   Lisa Hill is a 61 y.o. female.     Chief Complaint  She returns for a scheduled reassessment of multiple medical problems including COPD, type 2 diabetes mellitus, hyperlipidemia, hypothyroidism, and depression    History of Present Illness     COPD  She feels that her SOB and cough have been somewhat better. She admits to intermittent nasal congestion but continues to deny any other upper respiratory tract symptoms, chest pain, hemoptysis, fever or chills.  Her symptoms worsen with activity, exposure to cold air and environmental allergens and improve some with rest, supplemental oxygen, and inhaled inhaled medication.  She underwent a pulmonology reassessment on 1/25/2022 with a change in her breztri to stiolto and feels that it has been more effective. She continues to smoke about 1 pack per day.  She underwent an updated low dose CT of the chest on 11/5/2021 with evidence of moderate COPD, a 7 mm groundglass nodule within the SUDEEP and mild coronary artery calcifications.  Dedicated CT of the chest was recommended and performed on 12/3/2021.  This confirmed the same and a 6-month follow-up is planned     Diabetes  Current symptoms include paresthesia of the feet. She continues to deny any visual disturbances, polydipsia, polyuria, hypoglycemia or foot ulcerations. Current treatments: metformin and oral semaglutide.  She has been unable to tolerate any more than 3 mg daily of the latter    Dyslipidemia  Compliance with treatment has been fair.  She has been following her diet closer and with the weight that she has lost has been able to tolerate more activity. She remains on simvastatin with no apparent side effects    Hypothyroidism  Patient presents for evaluation of thyroid function. Symptoms consist of weight gain, constipation. The symptoms are moderate.  The problem has been unchanged.  Previous thyroid studies include TSH. The hypothyroidism is due to Hashimoto's disease.    Low Back  Pain  She complains of persistent low back pain. There has been no change in the quality of her discomfort nor any new associated symptoms. There is no history of any weakness, numbness, tingling, or any change in her bowel/bladder control.  There is no history of any fever, chills, or night sweats.  When she sits down the pain generally resolves within minutes and she denies any problem at night. She continues to use her TENS unit for several hours daily and feels that it helps.  She also feels that meloxicam helps.  Plain films of the lumbar spine performed on 8/5/2019 were reported as showing degenerative disc disease as well as atherosclerotic calcification of the aorta    Depression  She has a long history of intermittent depression, nervousness, anhedonia, difficulty concentrating, fatigue and impaired memory. She continues to deny any suicidal ideation. Risk factors: history of seasonal affective disorder.  Current medication includes escitalopram 10 daily,  bupropion 300 daily, risperdone 2 nightly, and lorazepam 0.5 twice a day.  She continues to do well at present    The following portions of the patient's history were reviewed and updated as appropriate: allergies, current medications, past medical history, past social history and problem list.    Review of Systems   Constitutional: Positive for fatigue. Negative for appetite change, chills, fever and unexpected weight change.   HENT: Positive for rhinorrhea. Negative for congestion, ear pain, postnasal drip, sneezing, sore throat and voice change.    Eyes: Negative for visual disturbance.   Respiratory: Positive for cough, shortness of breath and wheezing.    Cardiovascular: Positive for leg swelling. Negative for chest pain and palpitations.   Gastrointestinal: Positive for constipation. Negative for abdominal pain, anal bleeding, blood in stool, diarrhea, nausea and vomiting.   Genitourinary: Negative for difficulty urinating, dysuria, frequency,  hematuria and urgency.        Incontinence with coughing, sneezing, or lifting   Musculoskeletal: Positive for arthralgias and back pain. Negative for joint swelling and myalgias.   Skin: Negative for rash.   Neurological: Positive for numbness (and paresthesias both feet) and headaches (when stressed). Negative for weakness.   Psychiatric/Behavioral: Positive for decreased concentration, dysphoric mood and sleep disturbance. Negative for suicidal ideas.     Objective   Physical Exam  Constitutional:       General: She is not in acute distress.     Appearance: Normal appearance. She is well-developed. She is not diaphoretic.      Comments: Accompanied by her .  Bright and in fair spirits.  Climbed onto the exam table with minimal assistance.  No apparent distress.   HENT:      Head: Atraumatic.      Right Ear: Tympanic membrane, ear canal and external ear normal.      Left Ear: Tympanic membrane, ear canal and external ear normal.   Eyes:      Conjunctiva/sclera: Conjunctivae normal.   Neck:      Thyroid: No thyroid mass or thyromegaly.      Vascular: No carotid bruit or JVD.      Trachea: Trachea normal. No tracheal deviation.   Cardiovascular:      Rate and Rhythm: Normal rate and regular rhythm.      Heart sounds: Normal heart sounds, S1 normal and S2 normal. No murmur heard.  No gallop.    Pulmonary:      Effort: Pulmonary effort is normal.      Breath sounds: Decreased air movement present. Examination of the right-lower field reveals decreased breath sounds. Examination of the left-lower field reveals decreased breath sounds. Decreased breath sounds and wheezing (diffuse - moderate) present. No rales.      Comments: Pulmonary hyperinflation  Chest:   Breasts:      Right: No supraclavicular adenopathy.      Left: No supraclavicular adenopathy.       Abdominal:      General: Bowel sounds are normal. There is no distension.   Musculoskeletal:      Right lower leg: No edema.      Left lower leg: No edema.    Lymphadenopathy:      Head:      Right side of head: No submental, submandibular, tonsillar, preauricular, posterior auricular or occipital adenopathy.      Left side of head: No submental, submandibular, tonsillar, preauricular, posterior auricular or occipital adenopathy.      Cervical: No cervical adenopathy.      Upper Body:      Right upper body: No supraclavicular adenopathy.      Left upper body: No supraclavicular adenopathy.   Skin:     General: Skin is warm.      Coloration: Skin is not cyanotic, jaundiced or pale.      Findings: No rash.      Nails: There is no clubbing.   Neurological:      Mental Status: She is alert and oriented to person, place, and time.      Cranial Nerves: No cranial nerve deficit.      Sensory: Sensory deficit (decreased vibration sense both feet) present.      Motor: No tremor.      Coordination: Coordination normal.      Gait: Gait normal.   Psychiatric:         Attention and Perception: Attention normal.         Mood and Affect: Mood normal.         Speech: Speech normal.         Behavior: Behavior normal.         Thought Content: Thought content normal.       Assessment/Plan   Problems Addressed this Visit        Allergies and Adverse Reactions    Chronic allergic rhinitis        Cardiac and Vasculature    Coronary artery calcification seen on CT scan  Reminded regarding risk factor modification with an emphasis on tobacco cessation.    Relevant Orders    CBC & Differential (Completed)    Atherosclerosis of the aorta seen on CT scan  As above.  Essential hypertension   Hypertension: at goal. Evidence of target organ damage: coronary artery calcifications and atherosclerosis of the aorta on previous imaging.  Encouraged to continue to work on diet and exercise plan.   Continue current medication    Relevant Orders    CBC & Differential (Completed)    Comprehensive Metabolic Panel (Completed)    Mixed hyperlipidemia  As above.   Continue current medication.    Relevant Orders     Comprehensive Metabolic Panel (Completed)    Lipid Panel (Completed)       Endocrine and Metabolic    Hypothyroidism due to Hashimoto's thyroiditis  Clinically euthyroid.  Continue current medication.    Relevant Orders    TSH (Completed)    Morbid obesity with BMI of 45.0-49.9, adult (HCC)    Type 2 diabetes mellitus with peripheral neuropathy (HCC)  Diabetes mellitus Type II, under unknown control.   Encouraged to continue to pursue ADA diet  Encouraged aerobic exercise.  Continue current medication  Updated labs drawn.    Relevant Orders    Hemoglobin A1c (Completed)    MicroAlbumin, Urine, Random - Urine, Clean Catch    Vitamin D deficiency  Continue supplementation with monitoring.       Gastrointestinal Abdominal     Chronic constipation    Gastroesophageal reflux disease without esophagitis       Genitourinary and Reproductive     Stress bladder incontinence, female       Health Encounters    Healthcare maintenance  Patient has already received a flu shot along with a second dose of the [place] [employer] [place] COVID-19 vaccine       Mental Health    Depression with anxiety  Stable.  Supportive therapy.   Continue current medication.       Musculoskeletal and Injuries    Generalized osteoarthritis  Reminded regarding symptomatic treatment.   Continue current medication    Mechanical low back pain    Osteopenia    Rheumatoid arthritis (HCC)  As above.  Follow up with  rheumatology       Pulmonary and Pneumonias    Panlobular emphysema (HCC)   COPD is stable.  Reminded of the importance of smoking cessation  Encouraged to remain as active as symptoms allow for  Continue current medication  Follow up with pulmonology     Pulmonary nodule  We will arrange an updated CT scan at her return       Sleep    Obstructive sleep apnea       Tobacco    Smoker      Diagnoses       Codes Comments    Chronic allergic rhinitis    -  Primary ICD-10-CM: J30.9  ICD-9-CM: 477.9     Coronary artery calcification seen on CT scan      ICD-10-CM: I25.10  ICD-9-CM: 414.00     Essential hypertension     ICD-10-CM: I10  ICD-9-CM: 401.9     Mixed hyperlipidemia     ICD-10-CM: E78.2  ICD-9-CM: 272.2     Hypothyroidism due to Hashimoto's thyroiditis     ICD-10-CM: E03.8, E06.3  ICD-9-CM: 244.8, 245.2     Morbid obesity with BMI of 45.0-49.9, adult (HCC)     ICD-10-CM: E66.01, Z68.42  ICD-9-CM: 278.01, V85.42     Type 2 diabetes mellitus with peripheral neuropathy (HCC)     ICD-10-CM: E11.42  ICD-9-CM: 250.60, 357.2     Vitamin D deficiency     ICD-10-CM: E55.9  ICD-9-CM: 268.9     Chronic constipation     ICD-10-CM: K59.09  ICD-9-CM: 564.00     Gastroesophageal reflux disease without esophagitis     ICD-10-CM: K21.9  ICD-9-CM: 530.81     Stress bladder incontinence, female     ICD-10-CM: N39.3  ICD-9-CM: 625.6     Healthcare maintenance     ICD-10-CM: Z00.00  ICD-9-CM: V70.0     Depression with anxiety     ICD-10-CM: F41.8  ICD-9-CM: 300.4     Generalized osteoarthritis     ICD-10-CM: M15.9  ICD-9-CM: 715.00     Osteopenia, unspecified location     ICD-10-CM: M85.80  ICD-9-CM: 733.90     Rheumatoid arthritis involving multiple sites, unspecified whether rheumatoid factor present (HCC)     ICD-10-CM: M06.9  ICD-9-CM: 714.0     Panlobular emphysema (HCC)     ICD-10-CM: J43.1  ICD-9-CM: 492.8     Obstructive sleep apnea     ICD-10-CM: G47.33  ICD-9-CM: 327.23     Smoker     ICD-10-CM: F17.200  ICD-9-CM: 305.1     Mechanical low back pain     ICD-10-CM: M54.59  ICD-9-CM: 724.2     Pulmonary nodule     ICD-10-CM: R91.1  ICD-9-CM: 793.11

## 2022-03-02 VITALS
BODY MASS INDEX: 42.52 KG/M2 | OXYGEN SATURATION: 92 % | HEART RATE: 98 BPM | HEIGHT: 63 IN | DIASTOLIC BLOOD PRESSURE: 65 MMHG | RESPIRATION RATE: 15 BRPM | TEMPERATURE: 97.1 F | WEIGHT: 240 LBS | SYSTOLIC BLOOD PRESSURE: 122 MMHG

## 2022-03-02 PROBLEM — I70.0 AORTIC ATHEROSCLEROSIS: Status: ACTIVE | Noted: 2022-03-02

## 2022-03-02 LAB
ALBUMIN SERPL-MCNC: 3.8 G/DL (ref 3.5–5.2)
ALBUMIN/GLOB SERPL: 1.1 G/DL
ALP SERPL-CCNC: 56 U/L (ref 39–117)
ALT SERPL W P-5'-P-CCNC: 28 U/L (ref 1–33)
ANION GAP SERPL CALCULATED.3IONS-SCNC: 13.2 MMOL/L (ref 5–15)
AST SERPL-CCNC: 15 U/L (ref 1–32)
BASOPHILS # BLD AUTO: 0.07 10*3/MM3 (ref 0–0.2)
BASOPHILS NFR BLD AUTO: 0.5 % (ref 0–1.5)
BILIRUB SERPL-MCNC: 0.3 MG/DL (ref 0–1.2)
BUN SERPL-MCNC: 11 MG/DL (ref 8–23)
BUN/CREAT SERPL: 11.8 (ref 7–25)
CALCIUM SPEC-SCNC: 9.6 MG/DL (ref 8.6–10.5)
CHLORIDE SERPL-SCNC: 96 MMOL/L (ref 98–107)
CHOLEST SERPL-MCNC: 124 MG/DL (ref 0–200)
CO2 SERPL-SCNC: 25.8 MMOL/L (ref 22–29)
CREAT SERPL-MCNC: 0.93 MG/DL (ref 0.57–1)
DEPRECATED RDW RBC AUTO: 43.4 FL (ref 37–54)
EGFRCR SERPLBLD CKD-EPI 2021: 69.2 ML/MIN/1.73
EOSINOPHIL # BLD AUTO: 0.17 10*3/MM3 (ref 0–0.4)
EOSINOPHIL NFR BLD AUTO: 1.3 % (ref 0.3–6.2)
ERYTHROCYTE [DISTWIDTH] IN BLOOD BY AUTOMATED COUNT: 12.3 % (ref 12.3–15.4)
GLOBULIN UR ELPH-MCNC: 3.5 GM/DL
GLUCOSE SERPL-MCNC: 85 MG/DL (ref 65–99)
HBA1C MFR BLD: 5.6 % (ref 4.8–5.6)
HCT VFR BLD AUTO: 44.4 % (ref 34–46.6)
HDLC SERPL-MCNC: 40 MG/DL (ref 40–60)
HGB BLD-MCNC: 15.1 G/DL (ref 12–15.9)
IMM GRANULOCYTES # BLD AUTO: 0.05 10*3/MM3 (ref 0–0.05)
IMM GRANULOCYTES NFR BLD AUTO: 0.4 % (ref 0–0.5)
LDLC SERPL CALC-MCNC: 52 MG/DL (ref 0–100)
LDLC/HDLC SERPL: 1.11 {RATIO}
LYMPHOCYTES # BLD AUTO: 3.55 10*3/MM3 (ref 0.7–3.1)
LYMPHOCYTES NFR BLD AUTO: 27.3 % (ref 19.6–45.3)
MCH RBC QN AUTO: 32.9 PG (ref 26.6–33)
MCHC RBC AUTO-ENTMCNC: 34 G/DL (ref 31.5–35.7)
MCV RBC AUTO: 96.7 FL (ref 79–97)
MONOCYTES # BLD AUTO: 0.85 10*3/MM3 (ref 0.1–0.9)
MONOCYTES NFR BLD AUTO: 6.5 % (ref 5–12)
NEUTROPHILS NFR BLD AUTO: 64 % (ref 42.7–76)
NEUTROPHILS NFR BLD AUTO: 8.32 10*3/MM3 (ref 1.7–7)
NRBC BLD AUTO-RTO: 0 /100 WBC (ref 0–0.2)
PLATELET # BLD AUTO: 398 10*3/MM3 (ref 140–450)
PMV BLD AUTO: 10.7 FL (ref 6–12)
POTASSIUM SERPL-SCNC: 4.7 MMOL/L (ref 3.5–5.2)
PROT SERPL-MCNC: 7.3 G/DL (ref 6–8.5)
RBC # BLD AUTO: 4.59 10*6/MM3 (ref 3.77–5.28)
SODIUM SERPL-SCNC: 135 MMOL/L (ref 136–145)
TRIGL SERPL-MCNC: 198 MG/DL (ref 0–150)
TSH SERPL DL<=0.05 MIU/L-ACNC: 1.6 UIU/ML (ref 0.27–4.2)
VLDLC SERPL-MCNC: 32 MG/DL (ref 5–40)
WBC NRBC COR # BLD: 13.01 10*3/MM3 (ref 3.4–10.8)

## 2022-03-02 RX ORDER — HYDROXYCHLOROQUINE SULFATE 200 MG/1
200 TABLET, FILM COATED ORAL DAILY
COMMUNITY
Start: 2022-02-03

## 2022-03-03 NOTE — PROGRESS NOTES
Sent [employer] message.     -- Please have patient know that her blood work looks really good -her sugar and cholesterol are perfect -I think the weight loss has helped a lot

## 2022-03-22 ENCOUNTER — OFFICE VISIT (OUTPATIENT)
Dept: FAMILY MEDICINE CLINIC | Facility: CLINIC | Age: 62
End: 2022-03-22

## 2022-03-22 VITALS
SYSTOLIC BLOOD PRESSURE: 120 MMHG | RESPIRATION RATE: 14 BRPM | HEIGHT: 63 IN | OXYGEN SATURATION: 97 % | DIASTOLIC BLOOD PRESSURE: 80 MMHG | HEART RATE: 96 BPM | WEIGHT: 236 LBS | TEMPERATURE: 96.9 F | BODY MASS INDEX: 41.82 KG/M2

## 2022-03-22 DIAGNOSIS — E03.8 HYPOTHYROIDISM DUE TO HASHIMOTO'S THYROIDITIS: Chronic | ICD-10-CM

## 2022-03-22 DIAGNOSIS — E11.42 TYPE 2 DIABETES MELLITUS WITH PERIPHERAL NEUROPATHY: Primary | Chronic | ICD-10-CM

## 2022-03-22 DIAGNOSIS — E78.2 MIXED DYSLIPIDEMIA: Chronic | ICD-10-CM

## 2022-03-22 DIAGNOSIS — I10 ESSENTIAL HYPERTENSION: Chronic | ICD-10-CM

## 2022-03-22 DIAGNOSIS — E06.3 HYPOTHYROIDISM DUE TO HASHIMOTO'S THYROIDITIS: Chronic | ICD-10-CM

## 2022-03-22 PROCEDURE — 99214 OFFICE O/P EST MOD 30 MIN: CPT | Performed by: NURSE PRACTITIONER

## 2022-03-22 RX ORDER — PROCHLORPERAZINE 25 MG/1
SUPPOSITORY RECTAL
Qty: 9 EACH | Refills: 3 | Status: SHIPPED | OUTPATIENT
Start: 2022-03-22

## 2022-03-22 RX ORDER — PROCHLORPERAZINE 25 MG/1
1 SUPPOSITORY RECTAL DAILY
Qty: 1 EACH | Refills: 0 | Status: SHIPPED | OUTPATIENT
Start: 2022-03-22

## 2022-03-22 RX ORDER — PROCHLORPERAZINE 25 MG/1
1 SUPPOSITORY RECTAL DAILY
Qty: 1 EACH | Refills: 3 | Status: SHIPPED | OUTPATIENT
Start: 2022-03-22 | End: 2022-06-20

## 2022-03-22 NOTE — PATIENT INSTRUCTIONS
Type 2 Diabetes Mellitus, Self-Care, Adult  When you have type 2 diabetes (type 2 diabetes mellitus), you must make sure your blood sugar (glucose) stays in a healthy range. You can do this with:  Nutrition.  Exercise.  Lifestyle changes.  Medicines or insulin, if needed.  Support from your doctors and others.  What are the risks?  Having diabetes can raise your risk for other long-term (chronic) health problems. You may get medicines to help prevent these problems.  How to stay aware of blood sugar    Check your blood sugar level every day, as often as told.  Have your A1C (hemoglobin A1C) level checked two or more times a year. Have it checked more often if told.  Your doctor will set personal treatment goals for you. In general, you should have these blood sugar levels:  Before meals:  mg/dL (4.4-7.2 mmol/L).  After meals: below 180 mg/dL (10 mmol/L).  A1C: less than 7%.  How to manage high and low blood sugar  Symptoms of high blood sugar  High blood sugar is also called hyperglycemia. Know the symptoms of high blood sugar. These may include:  More thirst.  Hunger.  Feeling very tired.  Needing to pee (urinate) more often than normal.  Seeing things blurry.  Symptoms of low blood sugar  Low blood sugar is also called hypoglycemia. This is when blood sugar is at or below 70 mg/dL (3.9 mmol/L). Symptoms may include:  Hunger.  Feeling worried or nervous (anxious).  Feeling sweaty and cold to the touch (clammy).  Being dizzy or light-headed.  Feeling sleepy.  A fast heartbeat.  Feeling grouchy (irritable).  Tingling or loss of feeling (numbness) around your mouth, lips, or tongue.  Restless sleep.  Diabetes medicines can cause low blood sugar. You are more at risk:  While you exercise.  After exercise.  During sleep.  When you are sick.  When you skip meals or do not eat for a long time.  Treating low blood sugar  If you think you have low blood sugar, eat or drink something sugary right away. Keep 15 grams of  a fast-acting carb (carbohydrate) with you all the time. Make sure your family and friends know how to treat you if you cannot treat yourself.  Treating very low blood sugar  Severe hypoglycemia is when your blood sugar is at or below 54 mg/dL (3 mmol/L). Severe hypoglycemia is an emergency. Do not wait to see if the symptoms will go away. Get medical help right away. Call your local emergency services (911 in the [place] Do not drive yourself to the hospital.  You may need a glucagon shot if you have very low blood sugar and you cannot eat or drink. Have a family member or friend learn how to check your blood sugar and how to give you a glucagon shot. Ask your doctor if you should have a kit for glucagon shots.  Follow these instructions at home:  Medicines  Take diabetes medicines as told. If your doctor prescribed insulin or diabetes medicines, take them each day.  Do not run out of insulin or other medicines. Plan ahead.  If you use insulin, change the amount you take based on how active you are and what foods you eat. Your doctor will tell you how to do this.  Take over-the-counter and prescription medicines only as told by your doctor.  Eating and drinking    Eat healthy foods. These include:  Low-fat (lean) proteins.  Complex carbs, such as whole grains.  Fresh fruits and vegetables.  Low-fat dairy products.  Healthy fats.  Meet with a food expert (dietitian) to make an eating plan.  Follow instructions from your doctor about what you cannot eat or drink.  Drink enough fluid to keep your pee (urine) pale yellow.  Keep track of carbs that you eat. Read food labels and learn serving sizes of foods.  Follow your sick-day plan when you cannot eat or drink as normal. Make this plan with your doctor so it is ready to use.    Activity  Exercise as told by your doctor. You may need to:  Do stretching and strength exercises 2 or more times a week.  Do 150 minutes or more of exercise each week that makes your heart beat  faster and makes you sweat.  Spread out your exercise over 3 or more days a week.  Do not go more than 2 days in a row without exercise.  Talk with your doctor before you start a new exercise. Your doctor may tell you to change:  How much insulin or medicines you take.  How much food you eat.  Lifestyle  Do not use any products that contain nicotine or tobacco, such as cigarettes, e-cigarettes, and chewing tobacco. If you need help quitting, ask your doctor.  If you drink alcohol and your doctor says alcohol is safe for you:  Limit how much you use to:  0-1 drink a day for women who are not pregnant.  0-2 drinks a day for men.  Be aware of how much alcohol is in your drink. In the [place] one drink equals one 12 oz bottle of beer (355 mL), one 5 oz glass of wine (148 mL), or one 1½ oz glass of hard liquor (44 mL).  Learn to deal with stress. If you need help, ask your doctor.  Body care    Stay up to date with your shots (immunizations).  Have your eyes and feet checked by a doctor as often as told.  Check your skin and feet every day. Check for cuts, bruises, redness, blisters, or sores.  Brush your teeth and gums two times a day. Floss one or more times a day.  Go to the dentist one or more times every 6 months.  Stay at a healthy weight.    General instructions  Share your diabetes care plan with:  Your work or school.  People you live with.  Carry a card or wear jewelry that says you have diabetes.  Keep all follow-up visits as told by your doctor. This is important.  Questions to ask your doctor  Do I need to meet with a certified expert in diabetes education and care?  Where can I find a support group?  Where to find more information  American Diabetes Association: www.diabetes.org  American Association of Diabetes Care and Education Specialists: www.diabeteseducator.org  International Diabetes Federation: www.idf.org  Summary  When you have type 2 diabetes, you must make sure your blood sugar (glucose) stays in  a healthy range. You can do this with nutrition, exercise, medicines and insulin, and support from doctors and others.  Check your blood sugar every day, as often as told.  Having diabetes can raise your risk for other long-term health problems. You may get medicines to help prevent these problems.  Share your diabetes management plan with people at work, school, and home.  Keep all follow-up visits as told by your doctor. This is important.  This information is not intended to replace advice given to you by your health care provider. Make sure you discuss any questions you have with your health care provider.  Document Revised: 01/26/2021 Document Reviewed: 01/26/2021  Elsevier Patient Education © 2021 [employer]

## 2022-03-22 NOTE — PROGRESS NOTES
History of Present Illness    Lisa Hill is a 61 y.o. female who presents to the clinic today pertaining to her DM, type 2. In addition, she has  Dyslipidemia, HTN and Hypothyroidism. She did have recent labs which will be reviewed with her.     Diabetes  She presents for her follow-up diabetic visit. She has type 2 diabetes mellitus. Her disease course has been improving. Diabetic complications include peripheral neuropathy. Risk factors for coronary artery disease include diabetes mellitus, dyslipidemia, hypertension, obesity and post-menopausal. Current diabetic treatment includes oral agent (dual therapy). An ACE inhibitor/angiotensin II receptor blocker is being taken.   Lab Results   Component Value Date    HGBA1C 5.60 03/01/2022     Hypertension  The current episode started more than 1 year ago. Pertinent negatives include no headaches, palpitations or shortness of breath. Risk factors for coronary artery disease include diabetes mellitus, dyslipidemia, obesity, post-menopausal state and sedentary lifestyle. Past treatments include angiotensin blockers.   Lab Results   Component Value Date    GLUCOSE 85 03/01/2022    BUN 11 03/01/2022    CREATININE 0.93 03/01/2022    BCR 11.8 03/01/2022    K 4.7 03/01/2022    CO2 25.8 03/01/2022    CALCIUM 9.6 03/01/2022    ALBUMIN 3.80 03/01/2022    AST 15 03/01/2022    ALT 28 03/01/2022     Dyslipidemia  The current episode started more than 1 year ago. The problem is controlled. Exacerbating diseases include hypothyroidism. Pertinent negatives include no shortness of breath. Current antihyperlipidemic treatment includes statins. Risk factors for coronary artery disease include diabetes mellitus, dyslipidemia, hypertension, obesity and post-menopausal.   Lab Results   Component Value Date    CHOL 124 03/01/2022    CHOL 133 10/11/2021    CHOL 124 03/18/2021     Lab Results   Component Value Date    TRIG 198 (H) 03/01/2022    TRIG 220 (H) 10/11/2021    TRIG 143  "03/18/2021     Lab Results   Component Value Date    HDL 40 03/01/2022    HDL 37 (L) 10/11/2021    HDL 39 (L) 03/18/2021     Lab Results   Component Value Date    LDL 52 03/01/2022    LDL 60 10/11/2021    LDL 60 03/18/2021     Hypothyroidism  Currently on Levothyroxine 200 mcg  Lab Results   Component Value Date    TSH 1.600 03/01/2022     The following portions of the patient's history were reviewed and updated as appropriate: allergies, current medications, past family history, past medical history, past social history, past surgical history and problem list.    Review of Systems   Constitutional: Positive for appetite change and fatigue. Negative for activity change and unexpected weight change.   Eyes: Negative for visual disturbance.   Respiratory: Positive for cough, shortness of breath and wheezing. Negative for chest tightness.         Has COPD and continues to smoke    Cardiovascular: Negative for leg swelling.   Gastrointestinal: Negative for constipation and diarrhea.   Endocrine: Negative for cold intolerance and heat intolerance.   Musculoskeletal: Positive for arthralgias and gait problem.   Skin: Negative for color change.   Neurological: Positive for numbness and headaches. Negative for light-headedness.   Hematological: Negative for adenopathy.   Psychiatric/Behavioral: Negative for decreased concentration.   All other systems reviewed and are negative.    Vital signs:  /80 (BP Location: Left arm, Patient Position: Sitting, Cuff Size: Adult)   Pulse 96   Temp 96.9 °F (36.1 °C) (Temporal)   Resp 14   Ht 158.8 cm (62.52\")   Wt 107 kg (236 lb)   LMP  (LMP Unknown)   SpO2 97%   BMI 42.45 kg/m²     Physical Exam  Vitals and nursing note reviewed.   Constitutional:       General: She is not in acute distress.     Appearance: She is well-developed.   HENT:      Head: Normocephalic.      Nose: Nose normal.   Eyes:      General: No scleral icterus.        Right eye: No discharge.         Left " eye: No discharge.      Conjunctiva/sclera: Conjunctivae normal.   Neck:      Vascular: No JVD.   Cardiovascular:      Rate and Rhythm: Normal rate and regular rhythm.      Heart sounds: Normal heart sounds. No murmur heard.    No friction rub.   Pulmonary:      Effort: Pulmonary effort is normal. No respiratory distress.      Breath sounds: Decreased breath sounds and wheezing present. No rhonchi or rales.   Abdominal:      General: Bowel sounds are normal.      Palpations: Abdomen is soft.      Tenderness: There is no abdominal tenderness. There is no guarding.   Musculoskeletal:      Cervical back: Neck supple.      Right foot: Decreased range of motion.      Left foot: Decreased range of motion.   Feet:      Right foot:      Protective Sensation: 10 sites tested. 10 sites sensed.      Skin integrity: Skin integrity normal.      Toenail Condition: Right toenails are normal.      Left foot:      Protective Sensation: 10 sites tested. 10 sites sensed.      Skin integrity: Skin integrity normal.      Toenail Condition: Left toenails are normal.   Lymphadenopathy:      Cervical: No cervical adenopathy.   Skin:     General: Skin is warm and dry.      Capillary Refill: Capillary refill takes less than 2 seconds.   Neurological:      Mental Status: She is alert and oriented to person, place, and time.      Cranial Nerves: Cranial nerves are intact.      Gait: Gait abnormal.   Psychiatric:         Mood and Affect: Mood and affect normal.         Speech: Speech normal.         Behavior: Behavior is cooperative.         Thought Content: Thought content normal.         Cognition and Memory: Cognition and memory normal.       Result Review   Results for orders placed or performed in visit on 03/01/22   Comprehensive Metabolic Panel    Specimen: Arm, Right; Blood   Result Value Ref Range    Glucose 85 65 - 99 mg/dL    BUN 11 8 - 23 mg/dL    Creatinine 0.93 0.57 - 1.00 mg/dL    Sodium 135 (L) 136 - 145 mmol/L    Potassium 4.7  3.5 - 5.2 mmol/L    Chloride 96 (L) 98 - 107 mmol/L    CO2 25.8 22.0 - 29.0 mmol/L    Calcium 9.6 8.6 - 10.5 mg/dL    Total Protein 7.3 6.0 - 8.5 g/dL    Albumin 3.80 3.50 - 5.20 g/dL    ALT (SGPT) 28 1 - 33 U/L    AST (SGOT) 15 1 - 32 U/L    Alkaline Phosphatase 56 39 - 117 U/L    Total Bilirubin 0.3 0.0 - 1.2 mg/dL    Globulin 3.5 gm/dL    A/G Ratio 1.1 g/dL    BUN/Creatinine Ratio 11.8 7.0 - 25.0    Anion Gap 13.2 5.0 - 15.0 mmol/L    eGFR 69.2 >60.0 mL/min/1.73   Lipid Panel    Specimen: Arm, Right; Blood   Result Value Ref Range    Total Cholesterol 124 0 - 200 mg/dL    Triglycerides 198 (H) 0 - 150 mg/dL    HDL Cholesterol 40 40 - 60 mg/dL    LDL Cholesterol  52 0 - 100 mg/dL    VLDL Cholesterol 32 5 - 40 mg/dL    LDL/HDL Ratio 1.11    TSH    Specimen: Arm, Right; Blood   Result Value Ref Range    TSH 1.600 0.270 - 4.200 uIU/mL   Hemoglobin A1c    Specimen: Arm, Right; Blood   Result Value Ref Range    Hemoglobin A1C 5.60 4.80 - 5.60 %   CBC Auto Differential    Specimen: Arm, Right; Blood   Result Value Ref Range    WBC 13.01 (H) 3.40 - 10.80 10*3/mm3    RBC 4.59 3.77 - 5.28 10*6/mm3    Hemoglobin 15.1 12.0 - 15.9 g/dL    Hematocrit 44.4 34.0 - 46.6 %    MCV 96.7 79.0 - 97.0 fL    MCH 32.9 26.6 - 33.0 pg    MCHC 34.0 31.5 - 35.7 g/dL    RDW 12.3 12.3 - 15.4 %    RDW-SD 43.4 37.0 - 54.0 fl    MPV 10.7 6.0 - 12.0 fL    Platelets 398 140 - 450 10*3/mm3    Neutrophil % 64.0 42.7 - 76.0 %    Lymphocyte % 27.3 19.6 - 45.3 %    Monocyte % 6.5 5.0 - 12.0 %    Eosinophil % 1.3 0.3 - 6.2 %    Basophil % 0.5 0.0 - 1.5 %    Immature Grans % 0.4 0.0 - 0.5 %    Neutrophils, Absolute 8.32 (H) 1.70 - 7.00 10*3/mm3    Lymphocytes, Absolute 3.55 (H) 0.70 - 3.10 10*3/mm3    Monocytes, Absolute 0.85 0.10 - 0.90 10*3/mm3    Eosinophils, Absolute 0.17 0.00 - 0.40 10*3/mm3    Basophils, Absolute 0.07 0.00 - 0.20 10*3/mm3    Immature Grans, Absolute 0.05 0.00 - 0.05 10*3/mm3    nRBC 0.0 0.0 - 0.2 /100 WBC       Patient's Body mass  index is 42.45 kg/m². indicating that she is obese (BMI >30). Obesity-related health conditions include the following: hypertension, diabetes mellitus and dyslipidemias. Obesity is improving with lifestyle modifications. BMI  is above average; BMI management plan is completed. Provided information on  portion control and increasing exercise..       Assessment/Plan     Diagnoses and all orders for this visit:    1. Type 2 diabetes mellitus with peripheral neuropathy (HCC) (Primary)  Comments:  Findings and recommendations discussed with Lisa.Stressed importance of continued glycemic control to prevent further complications or worsening neuropathy.   Orders:  -     Continuous Blood Gluc Sensor (Dexcom G6 Sensor); Every 10 (Ten) Days.  Dispense: 9 each; Refill: 3  -     Continuous Blood Gluc  (Dexcom G6 ) device; 1 Device Daily.  Dispense: 1 each; Refill: 0  -     Continuous Blood Gluc Transmit (Dexcom G6 Transmitter) misc; 1 Device Daily for 90 days.  Dispense: 1 each; Refill: 3    2. Essential hypertension  Comments:  Well controlled. Continue Losartan     3. Mixed dyslipidemia  Comments:  Continue Simvastatin     4. Hypothyroidism due to Hashimoto's thyroiditis  Comments:  Continue Levothyroxine 200 mcg      Follow Up In June with Dr Chavez  Findings and recommendations discussed with Lisa. Reviewed test results. Dex Com CGM prescription sent to her pharmacy. Encouraged her is she had issues with the start to contact our office.  Lifestyle modifications reinforced including nutrition and activity recommendations. She will follow up in June with Dr Chavez  sooner if problems/concerns occur  Lisa was given instructions and counseling regarding her condition or for health maintenance advice. Please see specific information pulled into the AVS if appropriate      This document has been electronically signed by:

## 2022-03-24 ENCOUNTER — CLINICAL SUPPORT (OUTPATIENT)
Dept: FAMILY MEDICINE CLINIC | Facility: CLINIC | Age: 62
End: 2022-03-24

## 2022-03-24 ENCOUNTER — TELEPHONE (OUTPATIENT)
Dept: FAMILY MEDICINE CLINIC | Facility: CLINIC | Age: 62
End: 2022-03-24

## 2022-03-24 DIAGNOSIS — Z00.00 HEALTHCARE MAINTENANCE: ICD-10-CM

## 2022-03-24 DIAGNOSIS — E11.42 TYPE 2 DIABETES MELLITUS WITH PERIPHERAL NEUROPATHY: Primary | ICD-10-CM

## 2022-03-24 LAB — GLUCOSE BLDC GLUCOMTR-MCNC: 114 MG/DL (ref 70–130)

## 2022-03-24 PROCEDURE — 36416 COLLJ CAPILLARY BLOOD SPEC: CPT | Performed by: NURSE PRACTITIONER

## 2022-03-24 PROCEDURE — 82962 GLUCOSE BLOOD TEST: CPT | Performed by: NURSE PRACTITIONER

## 2022-03-24 RX ORDER — CEFDINIR 300 MG/1
300 CAPSULE ORAL 2 TIMES DAILY
Qty: 10 CAPSULE | Refills: 0 | Status: SHIPPED | OUTPATIENT
Start: 2022-03-24 | End: 2022-03-29

## 2022-03-24 NOTE — TELEPHONE ENCOUNTER
I called the patient back and she was telling me that her dexcom was alerting her that her BS was low and she didn't think it was right. She said she checked it with her monitor at home this morning and it was 114. I told her to come into the office around 1 and I will check it out for her. Patient said she will be here.    ----- Message from Jorge Perez sent at 3/24/2022  8:08 AM EDT -----  Called this morning requesting you call her back regarding the dexacom she got yesterday     [phone]

## 2022-03-24 NOTE — PROGRESS NOTES
Patient came in the office to make sure her Dexcom is accurate with her regular glucose monitor. I checked her glucose today and it was 114. Her Dexcom said 84.

## 2022-03-25 DIAGNOSIS — E11.65 CONTROLLED TYPE 2 DIABETES MELLITUS WITH HYPERGLYCEMIA, WITHOUT LONG-TERM CURRENT USE OF INSULIN: Chronic | ICD-10-CM

## 2022-03-25 RX ORDER — BLOOD SUGAR DIAGNOSTIC
STRIP MISCELLANEOUS
Qty: 100 EACH | Refills: 3 | Status: SHIPPED | OUTPATIENT
Start: 2022-03-25

## 2022-04-04 DIAGNOSIS — F41.8 DEPRESSION WITH ANXIETY: ICD-10-CM

## 2022-04-04 RX ORDER — LORAZEPAM 1 MG/1
TABLET ORAL
Qty: 90 TABLET | Refills: 0 | Status: SHIPPED | OUTPATIENT
Start: 2022-04-04 | End: 2022-06-16 | Stop reason: SDUPTHER

## 2022-04-07 NOTE — TELEPHONE ENCOUNTER
Faxed prior authorization for patients suppository's to [facility] at [phone]. Will track for prior approval.     BC 10/11/18 @ 9:35am    Render Note In Bullet Format When Appropriate: No Render Post-Care Instructions In Note?: yes Detail Level: Detailed Number Of Freeze-Thaw Cycles: 2 freeze-thaw cycles Post-Care Instructions: I reviewed with the patient in detail post-care instructions. Patient is to wear sunprotection, and avoid picking at any of the treated lesions. Pt may apply Vaseline to crusted or scabbing areas. Duration Of Freeze Thaw-Cycle (Seconds): 30 Consent: The patient's consent was obtained including but not limited to risks of crusting, scabbing, blistering, scarring, darker or lighter pigmentary change, recurrence, incomplete removal and infection.

## 2022-04-26 ENCOUNTER — OFFICE VISIT (OUTPATIENT)
Dept: PULMONOLOGY | Facility: CLINIC | Age: 62
End: 2022-04-26

## 2022-04-26 VITALS
BODY MASS INDEX: 41.99 KG/M2 | HEIGHT: 63 IN | WEIGHT: 237 LBS | SYSTOLIC BLOOD PRESSURE: 162 MMHG | HEART RATE: 98 BPM | TEMPERATURE: 97.5 F | DIASTOLIC BLOOD PRESSURE: 82 MMHG | OXYGEN SATURATION: 97 %

## 2022-04-26 DIAGNOSIS — I89.8 CALCIFIED LYMPH NODES: ICD-10-CM

## 2022-04-26 DIAGNOSIS — R91.1 PULMONARY NODULE: ICD-10-CM

## 2022-04-26 DIAGNOSIS — F17.210 CIGARETTE NICOTINE DEPENDENCE WITHOUT COMPLICATION: ICD-10-CM

## 2022-04-26 DIAGNOSIS — G47.34 NOCTURNAL HYPOXIA: ICD-10-CM

## 2022-04-26 DIAGNOSIS — G47.33 OBSTRUCTIVE SLEEP APNEA: Primary | ICD-10-CM

## 2022-04-26 DIAGNOSIS — J30.9 CHRONIC ALLERGIC RHINITIS: ICD-10-CM

## 2022-04-26 DIAGNOSIS — J43.1 PANLOBULAR EMPHYSEMA: ICD-10-CM

## 2022-04-26 PROCEDURE — 99214 OFFICE O/P EST MOD 30 MIN: CPT | Performed by: PHYSICIAN ASSISTANT

## 2022-04-26 RX ORDER — MONTELUKAST SODIUM 10 MG/1
10 TABLET ORAL NIGHTLY
Qty: 30 TABLET | Refills: 8 | Status: SHIPPED | OUTPATIENT
Start: 2022-04-26 | End: 2022-09-22 | Stop reason: SDUPTHER

## 2022-04-26 NOTE — PROGRESS NOTES
"Chief Complaint  Emphysema    Subjective          Lisa Hill presents to [facility]  History of Present Illness     Patient presents today for follow-up of emphysema, cigarette dependence, previously noted pulmonary nodules, obstructive sleep apnea/nocturnal hypoxia.  She continues on supplemental oxygen at nighttime as she was not previously able to tolerate positive airway pressure therapy.  This is overall tolerated well.  She states that breathing is fairly stable at this time.  Her symptoms typically worsen at times when exposed to the colder temperatures, such as in the fall.  Symptoms typically improve when the warmer seasons including the spring and summer.  She seems to be doing well with Stiolto, as she thought the previous ICS component may be providing more irritation rather than help.  She had been on breztri previously.  She reports issues with persistent dry mouth, noted even before trying inhaled therapies.  She states that she has followed up with rheumatology in the past as they were concerned for possible lupus, but was not truly noted by evaluation yet.  She remains a current smoker, not interested in cessation at this time.    Objective   Vital Signs:   /82   Pulse 98   Temp 97.5 °F (36.4 °C) (Temporal)   Ht 158.8 cm (62.5\")   Wt 108 kg (237 lb)   SpO2 97%   BMI 42.66 kg/m²     Physical Exam  Vitals reviewed.   Constitutional:       General: She is not in acute distress.     Appearance: She is well-developed. She is not diaphoretic.   HENT:      Head: Normocephalic and atraumatic.   Cardiovascular:      Rate and Rhythm: Normal rate and regular rhythm.      Heart sounds: Normal heart sounds, S1 normal and S2 normal.   Pulmonary:      Effort: Pulmonary effort is normal.      Breath sounds: No wheezing, rhonchi or rales.   Neurological:      Mental Status: She is alert and oriented to person, place, and time.   Psychiatric:       "   Behavior: Behavior normal.        Result Review :   The following data was reviewed by: Katarina Wan PA-C on 04/26/2022:      Reviewed the previous CT chest from December 2021.    Reviewed PFT from 2019.      Assessment and Plan    Diagnoses and all orders for this visit:    1. Obstructive sleep apnea (Primary)    2. Chronic allergic rhinitis  -     montelukast (SINGULAIR) 10 MG tablet; Take 1 tablet by mouth Every Night.  Dispense: 30 tablet; Refill: 8    3. Panlobular emphysema (HCC)  -     ProAir  (90 Base) MCG/ACT inhaler; Inhale 2 puffs Every 4 (Four) Hours As Needed for Wheezing or Shortness of Air.  Dispense: 8.5 g; Refill: 8  -     tiotropium bromide-olodaterol (STIOLTO RESPIMAT) 2.5-2.5 MCG/ACT aerosol solution inhaler; Inhale 2 puffs Daily.  Dispense: 1 each; Refill: 8    4. Nocturnal hypoxia    5. Pulmonary nodule    6. Cigarette nicotine dependence without complication    7. Calcified lymph nodes         COPD, emphysema:  · Continue albuterol inhaler as needed  · Continue duo nebs as needed  · Continue Stiolto 2 puffs daily.  Previously on breztri, but felt that this may be more irritating and beneficial.  Tolerating change to Stiolto well.  · Recommend Mucinex as needed for congestion.        Obstructive sleep apnea/nocturnal hypoxia:  · Previously unable to tolerate positive airway pressure therapy.  · Remains compliant with nasal cannula 2 L/min at nighttime.         Pulmonary nodule, calcified lymph nodes:   Recent low-dose CT imaging in November 2021 showed   - 7 mm groundglass opacity of the left upper lobe.  Stable for 1 month (since November 2021)  - stable 2 mm right nodule (stable since March 2019)  - Previous left 4 mm nodule resolved (2017 only)  - Stable calcified lymph nodes (since 2017)  · PCP ordered repeat CT chest for December 2022 follow-up, 1 year follow up.   Will follow for results.       Cigarette dependence:  Remains notable for cigarette use, estimated 1/2  pack/day.  · Not interested in cessation at this time.  Notable for an estimated 44-year use with approximately 1 pack per day average.  She has tried on multiple occasions to quit without success.  Previously tried nicotine inhaler, nicotine nasal spray, nicotine gum      Allergic rhinitis:   · Continue singulair tablets.   · Continue Flonase as needed      Follow Up   Return in about 6 months (around 10/26/2022), or as needed, for Next scheduled follow up.  Patient was given instructions and counseling regarding her condition or for health maintenance advice. Please see specific information pulled into the AVS if appropriate.

## 2022-05-26 RX ORDER — ORAL SEMAGLUTIDE 3 MG/1
1 TABLET ORAL DAILY
Qty: 30 TABLET | Refills: 5 | Status: SHIPPED | OUTPATIENT
Start: 2022-05-26 | End: 2022-06-11

## 2022-06-03 ENCOUNTER — TELEPHONE (OUTPATIENT)
Dept: FAMILY MEDICINE CLINIC | Facility: CLINIC | Age: 62
End: 2022-06-03

## 2022-06-03 NOTE — TELEPHONE ENCOUNTER
Pharmacy Name:  [facility]    Pharmacy representative name: GRACIELA    Pharmacy representative phone number: [phone]    What medication are you calling in regards to: BRADLEY    What question does the pharmacy have:  GRACIELA STATES THAT WHEN THEY PUT IN FOR A PRIOR AUTHORIZATION, IT SAYS MEMBER NOT FOUND.    Who is the provider that prescribed the medication: LIAN    REFERENCE NO IS [id number]

## 2022-06-11 RX ORDER — SEMAGLUTIDE 1.34 MG/ML
0.25 INJECTION, SOLUTION SUBCUTANEOUS WEEKLY
Qty: 1 PEN | Refills: 5 | Status: SHIPPED | OUTPATIENT
Start: 2022-06-11 | End: 2022-06-16 | Stop reason: SDUPTHER

## 2022-06-15 ENCOUNTER — TELEPHONE (OUTPATIENT)
Dept: FAMILY MEDICINE CLINIC | Facility: CLINIC | Age: 62
End: 2022-06-15

## 2022-06-15 NOTE — TELEPHONE ENCOUNTER
----- Message from Scott Chavez MD sent at 6/11/2022  9:25 AM EDT -----  Emailed to [facility]  Thanks  ----- Message -----  From: Vernell Dotson MA  Sent: 6/10/2022   4:03 PM EDT  To: Scott Chavez MD    Rybelsus was denied. Can you put her back on Ozempic? If so, she said she will need a script sent.

## 2022-06-16 ENCOUNTER — OFFICE VISIT (OUTPATIENT)
Dept: FAMILY MEDICINE CLINIC | Facility: CLINIC | Age: 62
End: 2022-06-16

## 2022-06-16 VITALS
OXYGEN SATURATION: 93 % | HEART RATE: 100 BPM | HEIGHT: 63 IN | BODY MASS INDEX: 41.29 KG/M2 | RESPIRATION RATE: 15 BRPM | SYSTOLIC BLOOD PRESSURE: 146 MMHG | TEMPERATURE: 98.6 F | WEIGHT: 233 LBS | DIASTOLIC BLOOD PRESSURE: 80 MMHG

## 2022-06-16 DIAGNOSIS — M06.9 RHEUMATOID ARTHRITIS INVOLVING MULTIPLE SITES, UNSPECIFIED WHETHER RHEUMATOID FACTOR PRESENT: ICD-10-CM

## 2022-06-16 DIAGNOSIS — J30.9 CHRONIC ALLERGIC RHINITIS: Primary | ICD-10-CM

## 2022-06-16 DIAGNOSIS — J43.1 PANLOBULAR EMPHYSEMA: ICD-10-CM

## 2022-06-16 DIAGNOSIS — M85.80 OSTEOPENIA, UNSPECIFIED LOCATION: ICD-10-CM

## 2022-06-16 DIAGNOSIS — M15.9 GENERALIZED OSTEOARTHRITIS: ICD-10-CM

## 2022-06-16 DIAGNOSIS — E11.42 TYPE 2 DIABETES MELLITUS WITH PERIPHERAL NEUROPATHY: ICD-10-CM

## 2022-06-16 DIAGNOSIS — E66.01 CLASS 3 SEVERE OBESITY WITH SERIOUS COMORBIDITY AND BODY MASS INDEX (BMI) OF 40.0 TO 44.9 IN ADULT, UNSPECIFIED OBESITY TYPE: ICD-10-CM

## 2022-06-16 DIAGNOSIS — I10 ESSENTIAL HYPERTENSION: ICD-10-CM

## 2022-06-16 DIAGNOSIS — G47.33 OBSTRUCTIVE SLEEP APNEA: ICD-10-CM

## 2022-06-16 DIAGNOSIS — I70.0 AORTIC ATHEROSCLEROSIS: ICD-10-CM

## 2022-06-16 DIAGNOSIS — E78.2 MIXED HYPERLIPIDEMIA: ICD-10-CM

## 2022-06-16 DIAGNOSIS — K59.09 CHRONIC CONSTIPATION: ICD-10-CM

## 2022-06-16 DIAGNOSIS — J30.1 SEASONAL ALLERGIC RHINITIS DUE TO POLLEN: ICD-10-CM

## 2022-06-16 DIAGNOSIS — F17.200 SMOKER: ICD-10-CM

## 2022-06-16 DIAGNOSIS — E06.3 HYPOTHYROIDISM DUE TO HASHIMOTO'S THYROIDITIS: ICD-10-CM

## 2022-06-16 DIAGNOSIS — F41.8 DEPRESSION WITH ANXIETY: ICD-10-CM

## 2022-06-16 DIAGNOSIS — M54.59 MECHANICAL LOW BACK PAIN: ICD-10-CM

## 2022-06-16 DIAGNOSIS — N39.3 STRESS BLADDER INCONTINENCE, FEMALE: ICD-10-CM

## 2022-06-16 DIAGNOSIS — K21.9 GASTROESOPHAGEAL REFLUX DISEASE WITHOUT ESOPHAGITIS: ICD-10-CM

## 2022-06-16 DIAGNOSIS — M47.816 SPONDYLOSIS OF LUMBAR REGION WITHOUT MYELOPATHY OR RADICULOPATHY: ICD-10-CM

## 2022-06-16 DIAGNOSIS — E55.9 VITAMIN D DEFICIENCY: ICD-10-CM

## 2022-06-16 DIAGNOSIS — E03.8 HYPOTHYROIDISM DUE TO HASHIMOTO'S THYROIDITIS: ICD-10-CM

## 2022-06-16 DIAGNOSIS — Z00.00 HEALTHCARE MAINTENANCE: ICD-10-CM

## 2022-06-16 DIAGNOSIS — I25.10 CORONARY ARTERY CALCIFICATION SEEN ON CT SCAN: ICD-10-CM

## 2022-06-16 DIAGNOSIS — E03.9 ACQUIRED HYPOTHYROIDISM: ICD-10-CM

## 2022-06-16 PROBLEM — E66.813 CLASS 3 SEVERE OBESITY WITH SERIOUS COMORBIDITY AND BODY MASS INDEX (BMI) OF 40.0 TO 44.9 IN ADULT: Status: ACTIVE | Noted: 2019-05-21

## 2022-06-16 PROCEDURE — 99215 OFFICE O/P EST HI 40 MIN: CPT | Performed by: GENERAL PRACTICE

## 2022-06-16 RX ORDER — EMPAGLIFLOZIN, METFORMIN HYDROCHLORIDE 5; 1000 MG/1; MG/1
1 TABLET, EXTENDED RELEASE ORAL DAILY
Qty: 30 TABLET | Refills: 5 | Status: SHIPPED | OUTPATIENT
Start: 2022-06-16 | End: 2022-06-29

## 2022-06-16 RX ORDER — EMPAGLIFLOZIN, METFORMIN HYDROCHLORIDE 5; 1000 MG/1; MG/1
1 TABLET, EXTENDED RELEASE ORAL DAILY
COMMUNITY
Start: 2022-05-26 | End: 2022-06-16 | Stop reason: SDUPTHER

## 2022-06-16 RX ORDER — ESCITALOPRAM OXALATE 10 MG/1
10 TABLET ORAL DAILY
Qty: 30 TABLET | Refills: 5 | Status: SHIPPED | OUTPATIENT
Start: 2022-06-16 | End: 2022-09-22 | Stop reason: SDUPTHER

## 2022-06-16 RX ORDER — MELOXICAM 15 MG/1
15 TABLET ORAL DAILY
Qty: 30 TABLET | Refills: 5 | Status: SHIPPED | OUTPATIENT
Start: 2022-06-16 | End: 2022-09-22 | Stop reason: SDUPTHER

## 2022-06-16 RX ORDER — RISPERIDONE 2 MG/1
2 TABLET ORAL
Qty: 30 TABLET | Refills: 5 | Status: SHIPPED | OUTPATIENT
Start: 2022-06-16 | End: 2022-09-22 | Stop reason: SDUPTHER

## 2022-06-16 RX ORDER — SIMVASTATIN 20 MG
20 TABLET ORAL
Qty: 30 TABLET | Refills: 5 | Status: SHIPPED | OUTPATIENT
Start: 2022-06-16 | End: 2022-09-22 | Stop reason: SDUPTHER

## 2022-06-16 RX ORDER — LORAZEPAM 1 MG/1
TABLET ORAL
Qty: 90 TABLET | Refills: 0 | Status: SHIPPED | OUTPATIENT
Start: 2022-06-16 | End: 2022-09-22 | Stop reason: SDUPTHER

## 2022-06-16 RX ORDER — BUPROPION HYDROCHLORIDE 300 MG/1
300 TABLET ORAL EVERY MORNING
Qty: 30 TABLET | Refills: 5 | Status: SHIPPED | OUTPATIENT
Start: 2022-06-16 | End: 2022-09-22 | Stop reason: SDUPTHER

## 2022-06-16 RX ORDER — PANTOPRAZOLE SODIUM 40 MG/1
40 TABLET, DELAYED RELEASE ORAL DAILY
Qty: 30 TABLET | Refills: 5 | Status: SHIPPED | OUTPATIENT
Start: 2022-06-16 | End: 2022-09-22 | Stop reason: SDUPTHER

## 2022-06-16 RX ORDER — LEVOTHYROXINE SODIUM 0.2 MG/1
200 TABLET ORAL DAILY
Qty: 30 TABLET | Refills: 5 | Status: SHIPPED | OUTPATIENT
Start: 2022-06-16 | End: 2022-09-22 | Stop reason: SDUPTHER

## 2022-06-16 RX ORDER — ACETAMINOPHEN 500 MG
1000 TABLET ORAL 4 TIMES DAILY
Qty: 180 TABLET | Refills: 5 | Status: SHIPPED | OUTPATIENT
Start: 2022-06-16 | End: 2022-09-22 | Stop reason: SDUPTHER

## 2022-06-16 RX ORDER — METFORMIN HYDROCHLORIDE 500 MG/1
2000 TABLET, EXTENDED RELEASE ORAL DAILY
Qty: 120 TABLET | Refills: 5 | Status: SHIPPED | OUTPATIENT
Start: 2022-06-16 | End: 2022-06-16

## 2022-06-16 RX ORDER — FLUTICASONE PROPIONATE 50 MCG
2 SPRAY, SUSPENSION (ML) NASAL DAILY
Qty: 16 G | Refills: 5 | Status: SHIPPED | OUTPATIENT
Start: 2022-06-16 | End: 2022-09-22 | Stop reason: SDUPTHER

## 2022-06-16 RX ORDER — SEMAGLUTIDE 1.34 MG/ML
0.25 INJECTION, SOLUTION SUBCUTANEOUS WEEKLY
Qty: 1 PEN | Refills: 5 | Status: SHIPPED | OUTPATIENT
Start: 2022-06-16 | End: 2022-06-16 | Stop reason: SDUPTHER

## 2022-06-16 RX ORDER — SEMAGLUTIDE 1.34 MG/ML
0.5 INJECTION, SOLUTION SUBCUTANEOUS WEEKLY
Qty: 2 PEN | Refills: 5 | Status: SHIPPED | OUTPATIENT
Start: 2022-06-16 | End: 2022-08-31

## 2022-06-16 RX ORDER — EMPAGLIFLOZIN, METFORMIN HYDROCHLORIDE 5; 1000 MG/1; MG/1
1 TABLET, EXTENDED RELEASE ORAL DAILY
Qty: 30 TABLET | Refills: 5 | Status: SHIPPED | OUTPATIENT
Start: 2022-06-16 | End: 2022-06-16 | Stop reason: SDUPTHER

## 2022-06-16 RX ORDER — LOSARTAN POTASSIUM 50 MG/1
50 TABLET ORAL DAILY
Qty: 30 TABLET | Refills: 5 | Status: SHIPPED | OUTPATIENT
Start: 2022-06-16 | End: 2022-09-22 | Stop reason: SDUPTHER

## 2022-06-16 RX ORDER — BUSPIRONE HYDROCHLORIDE 15 MG/1
15 TABLET ORAL 3 TIMES DAILY
Qty: 90 TABLET | Refills: 5 | Status: SHIPPED | OUTPATIENT
Start: 2022-06-16 | End: 2022-09-22 | Stop reason: SDUPTHER

## 2022-06-16 RX ORDER — TOPIRAMATE 100 MG/1
100 TABLET, FILM COATED ORAL 2 TIMES DAILY
Qty: 60 TABLET | Refills: 5
Start: 2022-06-16 | End: 2022-09-22 | Stop reason: SDUPTHER

## 2022-06-16 RX ORDER — MELATONIN
2000 DAILY
Qty: 60 TABLET | Refills: 5 | Status: SHIPPED | OUTPATIENT
Start: 2022-06-16 | End: 2022-09-22 | Stop reason: SDUPTHER

## 2022-06-16 NOTE — PROGRESS NOTES
Subjective   Lisa Hill is a 62 y.o. female.     Chief Complaint  She returns for a scheduled reassessment of multiple medical problems including COPD, type 2 diabetes mellitus, hyperlipidemia, essential hypertension, hypothyroidism, back and joint pain, and depression    History of Present Illness     COPD  Her shortness of breath, and cough continue to improve as she loses weight. She admits to intermittent nasal congestion but continues to deny any other upper respiratory tract symptoms, chest pain, hemoptysis, fever or chills.  Her symptoms worsen with activity, exposure to cold air and environmental allergens and improve some with rest, supplemental oxygen, and inhaled inhaled medication.  She remains on breztri and is using her rescue inhaler once or twice daily. She continues to smoke about 1 pack per day.  She underwent an updated low dose CT of the chest on 11/5/2021 with evidence of moderate COPD, a 7 mm groundglass nodule within the SUDEEP and mild coronary artery calcifications.  Dedicated CT of the chest was recommended and performed on 12/3/2021.  This confirmed the same    Type 2 Diabetes Mellitus  Current symptoms include paresthesia of the feet. She continues to deny any visual disturbances, polydipsia, polyuria, hypoglycemia or foot ulcerations. Current treatments: metformin, empagliflozin, and semaglutide.  She is currently on the 0.25 mg dose with no apparent side effects.      Hyperlipidemia  Her compliance with treatment has been fair.  She has been following her diet closer and with the weight that she has lost has been able to tolerate more activity. She remains on simvastatin with no apparent side effects    Essential Hypertension  She admits to shortness of breath with intermittent lower extremity swelling but denies any chest pain, orthopnea, PND, palpitations, or lightheadedness.  She is taking losartan with no apparent side effects    Hypothyroidism  Patient presents for evaluation of  thyroid function. Symptoms consist of weight gain, constipation. The symptoms are moderate.  The problem has been unchanged.  Previous thyroid studies include TSH. The hypothyroidism is due to Hashimoto's disease.    Low Back and Joint Pain  She complains of persistent low back and generalized joint pain. There has been no change in the quality of her discomfort nor any new associated symptoms. There is no history of any weakness, numbness, tingling, or any change in her bowel/bladder control.  There is no history of any fever, chills, or night sweats.  When she sits down the pain generally resolves within minutes and she denies any problem at night. She continues to use her TENS unit for several hours daily and feels that it helps.  She also feels that meloxicam helps.  She remains on hydroxychloroquine.  Plain films of the lumbar spine performed on 8/5/2019 were reported as showing degenerative disc disease as well as atherosclerotic calcification of the aorta    Depression  She has a long history of intermittent depression, nervousness, anhedonia, difficulty concentrating, fatigue and impaired memory. She continues to deny any suicidal ideation. Risk factors: history of seasonal affective disorder.  Current medication includes escitalopram 10 daily,  bupropion 300 daily, risperdone 2 nightly, and lorazepam 0.5 twice a day.  She continues to do well at present    The following portions of the patient's history were reviewed and updated as appropriate: allergies, current medications, past medical history, past social history and problem list.    Review of Systems   Constitutional: Positive for fatigue. Negative for appetite change, chills, fever and unexpected weight change.   HENT: Positive for rhinorrhea. Negative for congestion, ear pain, postnasal drip, sneezing, sore throat and voice change.    Eyes: Negative for visual disturbance.   Respiratory: Positive for cough, shortness of breath and wheezing.     Cardiovascular: Positive for leg swelling. Negative for chest pain and palpitations.   Gastrointestinal: Positive for constipation. Negative for abdominal pain, anal bleeding, blood in stool, diarrhea, nausea and vomiting.   Genitourinary: Negative for difficulty urinating, dysuria, frequency, hematuria and urgency.        Incontinence with coughing, sneezing, or lifting   Musculoskeletal: Positive for arthralgias and back pain. Negative for joint swelling and myalgias.   Skin: Negative for rash.   Neurological: Positive for numbness (and paresthesias both feet) and headaches (when stressed). Negative for weakness.   Psychiatric/Behavioral: Positive for decreased concentration, dysphoric mood and sleep disturbance. Negative for suicidal ideas.     Objective   Physical Exam  Constitutional:       General: She is not in acute distress.     Appearance: Normal appearance. She is well-developed. She is not diaphoretic.      Comments: Accompanied by her .  Bright and in fair spirits.  Climbed onto the exam table with minimal assistance.  No apparent distress.   HENT:      Head: Atraumatic.      Right Ear: Tympanic membrane, ear canal and external ear normal.      Left Ear: Tympanic membrane, ear canal and external ear normal.   Eyes:      Conjunctiva/sclera: Conjunctivae normal.   Neck:      Thyroid: No thyroid mass or thyromegaly.      Vascular: No carotid bruit or JVD.      Trachea: Trachea normal. No tracheal deviation.   Cardiovascular:      Rate and Rhythm: Normal rate and regular rhythm.      Heart sounds: Normal heart sounds, S1 normal and S2 normal. No murmur heard.    No gallop.   Pulmonary:      Effort: Pulmonary effort is normal.      Breath sounds: Decreased air movement present. Examination of the right-lower field reveals decreased breath sounds. Examination of the left-lower field reveals decreased breath sounds. Decreased breath sounds and wheezing (diffuse - mild) present. No rales.       Comments: Pulmonary hyperinflation  Chest:   Breasts:      Right: No supraclavicular adenopathy.      Left: No supraclavicular adenopathy.       Abdominal:      General: Bowel sounds are normal. There is no distension.   Musculoskeletal:      Right lower leg: No edema.      Left lower leg: No edema.   Lymphadenopathy:      Head:      Right side of head: No submental, submandibular, tonsillar, preauricular, posterior auricular or occipital adenopathy.      Left side of head: No submental, submandibular, tonsillar, preauricular, posterior auricular or occipital adenopathy.      Cervical: No cervical adenopathy.      Upper Body:      Right upper body: No supraclavicular adenopathy.      Left upper body: No supraclavicular adenopathy.   Skin:     General: Skin is warm.      Coloration: Skin is not cyanotic, jaundiced or pale.      Findings: No rash.      Nails: There is no clubbing.   Neurological:      Mental Status: She is alert and oriented to person, place, and time.      Cranial Nerves: No cranial nerve deficit.      Sensory: Sensory deficit (decreased vibration sense both feet) present.      Motor: No tremor.      Coordination: Coordination normal.      Gait: Gait normal.   Psychiatric:         Attention and Perception: Attention normal.         Mood and Affect: Mood normal.         Speech: Speech normal.         Behavior: Behavior normal.         Thought Content: Thought content normal.       Assessment & Plan   Problems Addressed this Visit        Allergies and Adverse Reactions    Chronic allergic rhinitis  Continue current medication    Relevant Medications    meloxicam (MOBIC) 15 MG tablet    fluticasone (FLONASE) 50 MCG/ACT nasal spray       Cardiac and Vasculature    Aortic atherosclerosis (HCC)  Reminded regarding risk factor modification with an emphasis on tobacco cessation.    Coronary artery calcification seen on CT scan  As above.    Essential hypertension   Hypertension: elevated today. Evidence of  target organ damage: coronary artery calcifications and aortic atherosclerosis on previous imaging.  Encouraged to continue to work on diet and exercise plan.   Continue current medication for now  If elevated at return will titrate losartan    Relevant Medications    losartan (COZAAR) 50 MG tablet    Mixed hyperlipidemia  As above.   Continue current medication.    Relevant Medications    simvastatin (ZOCOR) 20 MG tablet       Endocrine and Metabolic    Class 3 severe obesity with serious comorbidity and body mass index (BMI) of 40.0 to 44.9 in adult (HCC)    Hypothyroidism due to Hashimoto's thyroiditis  Clinically euthyroid.  Continue current medication.    Relevant Medications    meloxicam (MOBIC) 15 MG tablet    levothyroxine (SYNTHROID, LEVOTHROID) 200 MCG tablet    Type 2 diabetes mellitus with peripheral neuropathy (HCC)  Diabetes mellitus Type II, under better control.   Encouraged to continue to pursue ADA diet  Encouraged aerobic exercise.  Semaglutide will be titrated to 0.5 mg weekly  Updated labs will be drawn at her return.    Relevant Medications    Semaglutide,0.25 or 0.5MG/DOS, (Ozempic, 0.25 or 0.5 MG/DOSE,) 2 MG/1.5ML solution pen-injector    Synjardy XR 5-1000 mg tablet    Vitamin D deficiency  Continue supplementation with monitoring.    Relevant Medications    cholecalciferol (VITAMIN D3) 25 MCG (1000 UT) tablet       Gastrointestinal Abdominal     Chronic constipation    Gastroesophageal reflux disease without esophagitis    Relevant Medications    pantoprazole (PROTONIX) 40 MG EC tablet       Genitourinary and Reproductive     Stress bladder incontinence, female       Health Encounters    Healthcare maintenance  Recommended a COVID-19 booster with one of the mRNA vaccines  Reminded to get a flu shot when available  We will discuss an updated CT of the chest and mammogram at her return       Mental Health    Depression with anxiety  Stable.  Supportive therapy.   Continue current medication.     Relevant Medications    topiramate (TOPAMAX) 100 MG tablet    risperiDONE (risperDAL) 2 MG tablet    LORazepam (ATIVAN) 1 MG tablet    escitalopram (LEXAPRO) 10 MG tablet    busPIRone (BUSPAR) 15 MG tablet    buPROPion XL (WELLBUTRIN XL) 300 MG 24 hr tablet       Musculoskeletal and Injuries    Generalized osteoarthritis  Reminded regarding symptomatic treatment.   Continue current medication    Relevant Medications    acetaminophen (Acetaminophen Extra Strength) 500 MG tablet    Mechanical low back pain  As above.    Relevant Medications    topiramate (TOPAMAX) 100 MG tablet    Osteopenia    Rheumatoid arthritis (HCC)  As above.    Relevant Medications    meloxicam (MOBIC) 15 MG tablet       Pulmonary and Pneumonias    Panlobular emphysema (HCC)   COPD is improving some with weight loss.  Reminded of the importance of smoking cessation  Encouraged to remain as active as symptoms allow for  Continue current medication  Follow up with pulmonology     Relevant Medications    fluticasone (FLONASE) 50 MCG/ACT nasal spray       Sleep    Obstructive sleep apnea       Tobacco    Smoker         Diagnoses       Codes Comments    Chronic allergic rhinitis    -  Primary ICD-10-CM: J30.9  ICD-9-CM: 477.9     Aortic atherosclerosis (HCC)     ICD-10-CM: I70.0  ICD-9-CM: 440.0     Coronary artery calcification seen on CT scan     ICD-10-CM: I25.10  ICD-9-CM: 414.00     Essential hypertension     ICD-10-CM: I10  ICD-9-CM: 401.9     Mixed hyperlipidemia     ICD-10-CM: E78.2  ICD-9-CM: 272.2     Hypothyroidism due to Hashimoto's thyroiditis     ICD-10-CM: E03.8, E06.3  ICD-9-CM: 244.8, 245.2     Class 3 severe obesity with serious comorbidity and body mass index (BMI) of 40.0 to 44.9 in adult, unspecified obesity type (HCC)     ICD-10-CM: E66.01, Z68.41  ICD-9-CM: 278.01, V85.41     Type 2 diabetes mellitus with peripheral neuropathy (HCC)     ICD-10-CM: E11.42  ICD-9-CM: 250.60, 357.2     Vitamin D deficiency     ICD-10-CM:  E55.9  ICD-9-CM: 268.9     Chronic constipation     ICD-10-CM: K59.09  ICD-9-CM: 564.00     Gastroesophageal reflux disease without esophagitis     ICD-10-CM: K21.9  ICD-9-CM: 530.81     Stress bladder incontinence, female     ICD-10-CM: N39.3  ICD-9-CM: 625.6     Healthcare maintenance     ICD-10-CM: Z00.00  ICD-9-CM: V70.0     Depression with anxiety     ICD-10-CM: F41.8  ICD-9-CM: 300.4     Generalized osteoarthritis     ICD-10-CM: M15.9  ICD-9-CM: 715.00     Osteopenia, unspecified location     ICD-10-CM: M85.80  ICD-9-CM: 733.90     Rheumatoid arthritis involving multiple sites, unspecified whether rheumatoid factor present (HCC)     ICD-10-CM: M06.9  ICD-9-CM: 714.0     Panlobular emphysema (HCC)     ICD-10-CM: J43.1  ICD-9-CM: 492.8     Obstructive sleep apnea     ICD-10-CM: G47.33  ICD-9-CM: 327.23     Smoker     ICD-10-CM: F17.200  ICD-9-CM: 305.1     Mechanical low back pain     ICD-10-CM: M54.59  ICD-9-CM: 724.2     Spondylosis of lumbar region without myelopathy or radiculopathy     ICD-10-CM: M47.816  ICD-9-CM: 721.3 increase mobic 15 mg qd  take tylenol prn breakthrough pain    Acquired hypothyroidism     ICD-10-CM: E03.9  ICD-9-CM: 244.9     Seasonal allergic rhinitis due to pollen     ICD-10-CM: J30.1  ICD-9-CM: 477.0         I spent 48 minutes caring for Lisa Hill on this date of service. This time includes time spent by me in the following activities:reviewing tests, performing a medically appropriate examination and/or evaluation , counseling and educating the patient/family/caregiver, ordering medications, tests, or procedures and documenting information in the medical record

## 2022-06-23 ENCOUNTER — LAB (OUTPATIENT)
Dept: FAMILY MEDICINE CLINIC | Facility: CLINIC | Age: 62
End: 2022-06-23

## 2022-06-23 DIAGNOSIS — G47.33 OBSTRUCTIVE SLEEP APNEA: ICD-10-CM

## 2022-06-23 DIAGNOSIS — M15.9 GENERALIZED OSTEOARTHRITIS: ICD-10-CM

## 2022-06-23 DIAGNOSIS — K21.9 GASTROESOPHAGEAL REFLUX DISEASE WITHOUT ESOPHAGITIS: ICD-10-CM

## 2022-06-23 DIAGNOSIS — E66.01 CLASS 3 SEVERE OBESITY WITH SERIOUS COMORBIDITY AND BODY MASS INDEX (BMI) OF 40.0 TO 44.9 IN ADULT, UNSPECIFIED OBESITY TYPE: ICD-10-CM

## 2022-06-23 DIAGNOSIS — M06.9 RHEUMATOID ARTHRITIS INVOLVING MULTIPLE SITES, UNSPECIFIED WHETHER RHEUMATOID FACTOR PRESENT: ICD-10-CM

## 2022-06-23 DIAGNOSIS — I10 ESSENTIAL HYPERTENSION: ICD-10-CM

## 2022-06-23 DIAGNOSIS — N39.3 STRESS BLADDER INCONTINENCE, FEMALE: ICD-10-CM

## 2022-06-23 DIAGNOSIS — I25.10 CORONARY ARTERY CALCIFICATION SEEN ON CT SCAN: ICD-10-CM

## 2022-06-23 DIAGNOSIS — F41.8 DEPRESSION WITH ANXIETY: ICD-10-CM

## 2022-06-23 DIAGNOSIS — E78.2 MIXED HYPERLIPIDEMIA: ICD-10-CM

## 2022-06-23 DIAGNOSIS — J30.9 CHRONIC ALLERGIC RHINITIS: Primary | ICD-10-CM

## 2022-06-23 DIAGNOSIS — M85.80 OSTEOPENIA, UNSPECIFIED LOCATION: ICD-10-CM

## 2022-06-23 DIAGNOSIS — E11.42 TYPE 2 DIABETES MELLITUS WITH PERIPHERAL NEUROPATHY: ICD-10-CM

## 2022-06-23 DIAGNOSIS — E55.9 VITAMIN D DEFICIENCY: ICD-10-CM

## 2022-06-23 DIAGNOSIS — I70.0 AORTIC ATHEROSCLEROSIS: ICD-10-CM

## 2022-06-23 DIAGNOSIS — M54.59 MECHANICAL LOW BACK PAIN: ICD-10-CM

## 2022-06-23 DIAGNOSIS — E03.8 HYPOTHYROIDISM DUE TO HASHIMOTO'S THYROIDITIS: ICD-10-CM

## 2022-06-23 DIAGNOSIS — J43.1 PANLOBULAR EMPHYSEMA: ICD-10-CM

## 2022-06-23 DIAGNOSIS — E06.3 HYPOTHYROIDISM DUE TO HASHIMOTO'S THYROIDITIS: ICD-10-CM

## 2022-06-23 DIAGNOSIS — K59.09 CHRONIC CONSTIPATION: ICD-10-CM

## 2022-06-23 LAB
25(OH)D3 SERPL-MCNC: 49.5 NG/ML (ref 30–100)
ALBUMIN SERPL-MCNC: 3.7 G/DL (ref 3.5–5.2)
ALBUMIN/GLOB SERPL: 1.2 G/DL
ALP SERPL-CCNC: 53 U/L (ref 39–117)
ALT SERPL W P-5'-P-CCNC: 30 U/L (ref 1–33)
ANION GAP SERPL CALCULATED.3IONS-SCNC: 13 MMOL/L (ref 5–15)
AST SERPL-CCNC: 19 U/L (ref 1–32)
BASOPHILS # BLD AUTO: 0.09 10*3/MM3 (ref 0–0.2)
BASOPHILS NFR BLD AUTO: 0.9 % (ref 0–1.5)
BILIRUB SERPL-MCNC: 0.2 MG/DL (ref 0–1.2)
BUN SERPL-MCNC: 9 MG/DL (ref 8–23)
BUN/CREAT SERPL: 9.9 (ref 7–25)
CALCIUM SPEC-SCNC: 9.3 MG/DL (ref 8.6–10.5)
CHLORIDE SERPL-SCNC: 99 MMOL/L (ref 98–107)
CHOLEST SERPL-MCNC: 131 MG/DL (ref 0–200)
CO2 SERPL-SCNC: 25 MMOL/L (ref 22–29)
CREAT SERPL-MCNC: 0.91 MG/DL (ref 0.57–1)
DEPRECATED RDW RBC AUTO: 42.2 FL (ref 37–54)
EGFRCR SERPLBLD CKD-EPI 2021: 70.6 ML/MIN/1.73
EOSINOPHIL # BLD AUTO: 0.18 10*3/MM3 (ref 0–0.4)
EOSINOPHIL NFR BLD AUTO: 1.9 % (ref 0.3–6.2)
ERYTHROCYTE [DISTWIDTH] IN BLOOD BY AUTOMATED COUNT: 12.3 % (ref 12.3–15.4)
GLOBULIN UR ELPH-MCNC: 3.2 GM/DL
GLUCOSE SERPL-MCNC: 93 MG/DL (ref 65–99)
HBA1C MFR BLD: 5.3 % (ref 4.8–5.6)
HCT VFR BLD AUTO: 42.5 % (ref 34–46.6)
HDLC SERPL-MCNC: 36 MG/DL (ref 40–60)
HGB BLD-MCNC: 14.5 G/DL (ref 12–15.9)
IMM GRANULOCYTES # BLD AUTO: 0.02 10*3/MM3 (ref 0–0.05)
IMM GRANULOCYTES NFR BLD AUTO: 0.2 % (ref 0–0.5)
LDLC SERPL CALC-MCNC: 53 MG/DL (ref 0–100)
LDLC/HDLC SERPL: 1.14 {RATIO}
LYMPHOCYTES # BLD AUTO: 3.24 10*3/MM3 (ref 0.7–3.1)
LYMPHOCYTES NFR BLD AUTO: 33.9 % (ref 19.6–45.3)
MCH RBC QN AUTO: 32.6 PG (ref 26.6–33)
MCHC RBC AUTO-ENTMCNC: 34.1 G/DL (ref 31.5–35.7)
MCV RBC AUTO: 95.5 FL (ref 79–97)
MONOCYTES # BLD AUTO: 0.71 10*3/MM3 (ref 0.1–0.9)
MONOCYTES NFR BLD AUTO: 7.4 % (ref 5–12)
NEUTROPHILS NFR BLD AUTO: 5.31 10*3/MM3 (ref 1.7–7)
NEUTROPHILS NFR BLD AUTO: 55.7 % (ref 42.7–76)
NRBC BLD AUTO-RTO: 0 /100 WBC (ref 0–0.2)
PLATELET # BLD AUTO: 388 10*3/MM3 (ref 140–450)
PMV BLD AUTO: 10 FL (ref 6–12)
POTASSIUM SERPL-SCNC: 5 MMOL/L (ref 3.5–5.2)
PROT SERPL-MCNC: 6.9 G/DL (ref 6–8.5)
RBC # BLD AUTO: 4.45 10*6/MM3 (ref 3.77–5.28)
SODIUM SERPL-SCNC: 137 MMOL/L (ref 136–145)
TRIGL SERPL-MCNC: 270 MG/DL (ref 0–150)
TSH SERPL DL<=0.05 MIU/L-ACNC: 0.17 UIU/ML (ref 0.27–4.2)
VIT B12 BLD-MCNC: 1435 PG/ML (ref 211–946)
VLDLC SERPL-MCNC: 42 MG/DL (ref 5–40)
WBC NRBC COR # BLD: 9.55 10*3/MM3 (ref 3.4–10.8)

## 2022-06-23 PROCEDURE — 82306 VITAMIN D 25 HYDROXY: CPT | Performed by: GENERAL PRACTICE

## 2022-06-23 PROCEDURE — 80050 GENERAL HEALTH PANEL: CPT | Performed by: GENERAL PRACTICE

## 2022-06-23 PROCEDURE — 82607 VITAMIN B-12: CPT | Performed by: GENERAL PRACTICE

## 2022-06-23 PROCEDURE — 80061 LIPID PANEL: CPT | Performed by: GENERAL PRACTICE

## 2022-06-23 PROCEDURE — 83036 HEMOGLOBIN GLYCOSYLATED A1C: CPT | Performed by: GENERAL PRACTICE

## 2022-06-27 DIAGNOSIS — E11.42 TYPE 2 DIABETES MELLITUS WITH PERIPHERAL NEUROPATHY: ICD-10-CM

## 2022-06-27 RX ORDER — METFORMIN HYDROCHLORIDE 500 MG/1
2000 TABLET, EXTENDED RELEASE ORAL DAILY
Qty: 120 TABLET | Refills: 5 | OUTPATIENT
Start: 2022-06-27

## 2022-06-29 ENCOUNTER — TELEPHONE (OUTPATIENT)
Dept: FAMILY MEDICINE CLINIC | Facility: CLINIC | Age: 62
End: 2022-06-29

## 2022-06-29 RX ORDER — LIDOCAINE 50 MG/G
1 OINTMENT TOPICAL
Qty: 150 G | Refills: 2 | Status: SHIPPED | OUTPATIENT
Start: 2022-06-29 | End: 2023-04-03

## 2022-06-29 RX ORDER — METFORMIN HYDROCHLORIDE 500 MG/1
2000 TABLET, EXTENDED RELEASE ORAL DAILY
Qty: 120 TABLET | Refills: 5 | Status: SHIPPED | OUTPATIENT
Start: 2022-06-29 | End: 2022-09-22 | Stop reason: SDUPTHER

## 2022-06-29 NOTE — TELEPHONE ENCOUNTER
----- Message from Scott Chavez MD sent at 6/29/2022  1:59 PM EDT -----  Emailed  Thanks    ----- Message -----  From: Yesenia Correa MA  Sent: 6/29/2022   1:54 PM EDT  To: Scott Chavez MD    Pt called in and needs a refill on lidocaine (XYLOCAINE) 5 % ointment. She also stated that she has stopped taking the synjardy due to diarrhea and needed a refill on metformin.

## 2022-07-05 ENCOUNTER — TELEPHONE (OUTPATIENT)
Dept: FAMILY MEDICINE CLINIC | Facility: CLINIC | Age: 62
End: 2022-07-05

## 2022-07-05 NOTE — TELEPHONE ENCOUNTER
Patient called wanting to know about her lab results. I let her know that Dr. GREY Hasn't made a result note but overall they looked fine. Patient is in understanding.

## 2022-07-08 ENCOUNTER — TELEPHONE (OUTPATIENT)
Dept: FAMILY MEDICINE CLINIC | Facility: CLINIC | Age: 62
End: 2022-07-08

## 2022-07-08 NOTE — TELEPHONE ENCOUNTER
Patient called back wanting let Dr. Chavez know that she didn't want to take Metformin anymore due to it being dangerous. I let her know that a message was sent to [facility] and she said to continue taking it. Any medicine has side effects and can do harm. Patient is in understanding and she will continue to take the metformin.

## 2022-08-04 ENCOUNTER — TELEPHONE (OUTPATIENT)
Dept: FAMILY MEDICINE CLINIC | Facility: CLINIC | Age: 62
End: 2022-08-04

## 2022-08-08 NOTE — TELEPHONE ENCOUNTER
I called around to a couple different pharmacy's and they are all unable to get ozempic in stock. They said the manufacture is stating that they may be able to get more towards the end of august.

## 2022-08-23 DIAGNOSIS — E11.42 TYPE 2 DIABETES MELLITUS WITH PERIPHERAL NEUROPATHY: ICD-10-CM

## 2022-08-24 RX ORDER — DM/PE/ACETAMINOPHEN/CHLORPHENR 10-5-325-2
TABLET, SEQUENTIAL ORAL
Qty: 30 EACH | Refills: 5 | Status: SHIPPED | OUTPATIENT
Start: 2022-08-24 | End: 2022-09-22 | Stop reason: SDUPTHER

## 2022-08-31 ENCOUNTER — TELEPHONE (OUTPATIENT)
Dept: FAMILY MEDICINE CLINIC | Facility: CLINIC | Age: 62
End: 2022-08-31

## 2022-08-31 DIAGNOSIS — E11.42 TYPE 2 DIABETES MELLITUS WITH PERIPHERAL NEUROPATHY: Primary | ICD-10-CM

## 2022-08-31 NOTE — TELEPHONE ENCOUNTER
----- Message from Scott Chavez MD sent at 8/31/2022 10:21 AM EDT -----  Emailed to [facility]  ----- Message -----  From: Yesenia Correa MA  Sent: 8/29/2022   2:32 PM EDT  To: Scott Chavez MD    She would like to try this.    ----- Message -----  From: Scott Chavez MD  Sent: 8/29/2022   1:29 PM EDT  To: Yesenia Correa MA    She want to try once daily victoza shots (made by the same company as ozempic - similar weight loss)?    ----- Message -----  From: Yesenia Correa MA  Sent: 8/29/2022   1:21 PM EDT  To: Scott Chavez MD    Patient stated that she is unable to take the trulicity. She said the injection itself hurts to bad and she is unable to tolerate that. She wanted to know if you could prescribe something else? Ozempic is still on back order.

## 2022-09-06 RX ORDER — PEN NEEDLE, DIABETIC 30 GX3/16"
1 NEEDLE, DISPOSABLE MISCELLANEOUS DAILY
Qty: 100 EACH | Refills: 0 | Status: SHIPPED | OUTPATIENT
Start: 2022-09-06 | End: 2022-11-08 | Stop reason: SDUPTHER

## 2022-09-22 ENCOUNTER — OFFICE VISIT (OUTPATIENT)
Dept: FAMILY MEDICINE CLINIC | Facility: CLINIC | Age: 62
End: 2022-09-22

## 2022-09-22 VITALS
DIASTOLIC BLOOD PRESSURE: 66 MMHG | RESPIRATION RATE: 15 BRPM | BODY MASS INDEX: 40.57 KG/M2 | OXYGEN SATURATION: 95 % | TEMPERATURE: 98.6 F | HEIGHT: 63 IN | WEIGHT: 229 LBS | SYSTOLIC BLOOD PRESSURE: 125 MMHG | HEART RATE: 100 BPM

## 2022-09-22 DIAGNOSIS — E55.9 VITAMIN D DEFICIENCY: ICD-10-CM

## 2022-09-22 DIAGNOSIS — M06.9 RHEUMATOID ARTHRITIS INVOLVING MULTIPLE SITES, UNSPECIFIED WHETHER RHEUMATOID FACTOR PRESENT: ICD-10-CM

## 2022-09-22 DIAGNOSIS — I10 ESSENTIAL HYPERTENSION: ICD-10-CM

## 2022-09-22 DIAGNOSIS — F45.0 SOMATIZATION DISORDER: ICD-10-CM

## 2022-09-22 DIAGNOSIS — E66.01 CLASS 3 SEVERE OBESITY WITH SERIOUS COMORBIDITY AND BODY MASS INDEX (BMI) OF 40.0 TO 44.9 IN ADULT, UNSPECIFIED OBESITY TYPE: ICD-10-CM

## 2022-09-22 DIAGNOSIS — E11.42 TYPE 2 DIABETES MELLITUS WITH PERIPHERAL NEUROPATHY: ICD-10-CM

## 2022-09-22 DIAGNOSIS — F41.8 DEPRESSION WITH ANXIETY: ICD-10-CM

## 2022-09-22 DIAGNOSIS — J30.1 SEASONAL ALLERGIC RHINITIS DUE TO POLLEN: ICD-10-CM

## 2022-09-22 DIAGNOSIS — Z00.00 HEALTHCARE MAINTENANCE: ICD-10-CM

## 2022-09-22 DIAGNOSIS — M85.80 OSTEOPENIA, UNSPECIFIED LOCATION: ICD-10-CM

## 2022-09-22 DIAGNOSIS — E78.2 MIXED HYPERLIPIDEMIA: ICD-10-CM

## 2022-09-22 DIAGNOSIS — E03.8 HYPOTHYROIDISM DUE TO HASHIMOTO'S THYROIDITIS: ICD-10-CM

## 2022-09-22 DIAGNOSIS — M47.816 SPONDYLOSIS OF LUMBAR REGION WITHOUT MYELOPATHY OR RADICULOPATHY: ICD-10-CM

## 2022-09-22 DIAGNOSIS — Z23 ENCOUNTER FOR IMMUNIZATION: ICD-10-CM

## 2022-09-22 DIAGNOSIS — E03.9 ACQUIRED HYPOTHYROIDISM: ICD-10-CM

## 2022-09-22 DIAGNOSIS — M54.59 MECHANICAL LOW BACK PAIN: ICD-10-CM

## 2022-09-22 DIAGNOSIS — I25.10 CORONARY ARTERY CALCIFICATION SEEN ON CT SCAN: ICD-10-CM

## 2022-09-22 DIAGNOSIS — M15.9 GENERALIZED OSTEOARTHRITIS: ICD-10-CM

## 2022-09-22 DIAGNOSIS — N39.3 STRESS BLADDER INCONTINENCE, FEMALE: ICD-10-CM

## 2022-09-22 DIAGNOSIS — D75.89 MACROCYTOSIS: ICD-10-CM

## 2022-09-22 DIAGNOSIS — I70.0 AORTIC ATHEROSCLEROSIS: ICD-10-CM

## 2022-09-22 DIAGNOSIS — Z12.31 ENCOUNTER FOR SCREENING MAMMOGRAM FOR BREAST CANCER: ICD-10-CM

## 2022-09-22 DIAGNOSIS — J30.9 CHRONIC ALLERGIC RHINITIS: Primary | ICD-10-CM

## 2022-09-22 DIAGNOSIS — E06.3 HYPOTHYROIDISM DUE TO HASHIMOTO'S THYROIDITIS: ICD-10-CM

## 2022-09-22 DIAGNOSIS — K21.9 GASTROESOPHAGEAL REFLUX DISEASE WITHOUT ESOPHAGITIS: ICD-10-CM

## 2022-09-22 DIAGNOSIS — K59.09 CHRONIC CONSTIPATION: ICD-10-CM

## 2022-09-22 DIAGNOSIS — J43.1 PANLOBULAR EMPHYSEMA: ICD-10-CM

## 2022-09-22 DIAGNOSIS — G47.33 OBSTRUCTIVE SLEEP APNEA: ICD-10-CM

## 2022-09-22 DIAGNOSIS — F17.200 SMOKER: ICD-10-CM

## 2022-09-22 PROBLEM — I89.8 CALCIFIED LYMPH NODES: Status: RESOLVED | Noted: 2020-08-20 | Resolved: 2022-09-22

## 2022-09-22 PROCEDURE — 90686 IIV4 VACC NO PRSV 0.5 ML IM: CPT | Performed by: GENERAL PRACTICE

## 2022-09-22 PROCEDURE — 99215 OFFICE O/P EST HI 40 MIN: CPT | Performed by: GENERAL PRACTICE

## 2022-09-22 PROCEDURE — 90471 IMMUNIZATION ADMIN: CPT | Performed by: GENERAL PRACTICE

## 2022-09-22 RX ORDER — FLUTICASONE PROPIONATE 50 MCG
2 SPRAY, SUSPENSION (ML) NASAL DAILY
Qty: 16 G | Refills: 5 | Status: SHIPPED | OUTPATIENT
Start: 2022-09-22 | End: 2023-01-03

## 2022-09-22 RX ORDER — METFORMIN HYDROCHLORIDE 500 MG/1
2000 TABLET, EXTENDED RELEASE ORAL DAILY
Qty: 120 TABLET | Refills: 5 | Status: SHIPPED | OUTPATIENT
Start: 2022-09-22 | End: 2022-12-05

## 2022-09-22 RX ORDER — LOSARTAN POTASSIUM 50 MG/1
50 TABLET ORAL DAILY
Qty: 30 TABLET | Refills: 5 | Status: SHIPPED | OUTPATIENT
Start: 2022-09-22 | End: 2022-10-10

## 2022-09-22 RX ORDER — LEVOTHYROXINE SODIUM 0.2 MG/1
200 TABLET ORAL DAILY
Qty: 30 TABLET | Refills: 5 | Status: SHIPPED | OUTPATIENT
Start: 2022-09-22 | End: 2022-10-10

## 2022-09-22 RX ORDER — MELATONIN
2000 DAILY
Qty: 60 TABLET | Refills: 5 | Status: SHIPPED | OUTPATIENT
Start: 2022-09-22 | End: 2022-12-05

## 2022-09-22 RX ORDER — ESCITALOPRAM OXALATE 20 MG/1
20 TABLET ORAL DAILY
Qty: 30 TABLET | Refills: 5 | Status: SHIPPED | OUTPATIENT
Start: 2022-09-22 | End: 2023-02-27

## 2022-09-22 RX ORDER — SEMAGLUTIDE 1.34 MG/ML
0.5 INJECTION, SOLUTION SUBCUTANEOUS WEEKLY
Qty: 1.5 ML | Refills: 5 | Status: SHIPPED | OUTPATIENT
Start: 2022-09-22 | End: 2022-11-08 | Stop reason: SDUPTHER

## 2022-09-22 RX ORDER — MONTELUKAST SODIUM 10 MG/1
10 TABLET ORAL NIGHTLY
Qty: 30 TABLET | Refills: 8 | Status: SHIPPED | OUTPATIENT
Start: 2022-09-22 | End: 2022-11-09

## 2022-09-22 RX ORDER — TOPIRAMATE 100 MG/1
100 TABLET, FILM COATED ORAL 2 TIMES DAILY
Qty: 60 TABLET | Refills: 5 | Status: SHIPPED | OUTPATIENT
Start: 2022-09-22 | End: 2023-04-03 | Stop reason: SDUPTHER

## 2022-09-22 RX ORDER — RISPERIDONE 2 MG/1
2 TABLET ORAL
Qty: 30 TABLET | Refills: 5 | Status: SHIPPED | OUTPATIENT
Start: 2022-09-22 | End: 2022-10-10

## 2022-09-22 RX ORDER — BUSPIRONE HYDROCHLORIDE 15 MG/1
15 TABLET ORAL 3 TIMES DAILY
Qty: 90 TABLET | Refills: 5 | Status: SHIPPED | OUTPATIENT
Start: 2022-09-22 | End: 2023-01-30

## 2022-09-22 RX ORDER — SIMVASTATIN 20 MG
20 TABLET ORAL
Qty: 30 TABLET | Refills: 5 | Status: SHIPPED | OUTPATIENT
Start: 2022-09-22 | End: 2023-02-27

## 2022-09-22 RX ORDER — ACETAMINOPHEN 500 MG
1000 TABLET ORAL 4 TIMES DAILY
Qty: 180 TABLET | Refills: 5 | Status: SHIPPED | OUTPATIENT
Start: 2022-09-22 | End: 2022-12-05

## 2022-09-22 RX ORDER — BUPROPION HYDROCHLORIDE 450 MG/1
450 TABLET, FILM COATED, EXTENDED RELEASE ORAL EVERY MORNING
Qty: 30 TABLET | Refills: 5 | Status: SHIPPED | OUTPATIENT
Start: 2022-09-22 | End: 2023-02-27

## 2022-09-22 RX ORDER — MELOXICAM 15 MG/1
15 TABLET ORAL DAILY
Qty: 30 TABLET | Refills: 5 | Status: SHIPPED | OUTPATIENT
Start: 2022-09-22 | End: 2023-01-03

## 2022-09-22 RX ORDER — DM/PE/ACETAMINOPHEN/CHLORPHENR 10-5-325-2
1 TABLET, SEQUENTIAL ORAL DAILY
Qty: 30 EACH | Refills: 5 | Status: SHIPPED | OUTPATIENT
Start: 2022-09-22

## 2022-09-22 RX ORDER — LORAZEPAM 1 MG/1
TABLET ORAL
Qty: 90 TABLET | Refills: 0 | Status: SHIPPED | OUTPATIENT
Start: 2022-09-22 | End: 2022-12-13 | Stop reason: SDUPTHER

## 2022-09-22 RX ORDER — PANTOPRAZOLE SODIUM 40 MG/1
40 TABLET, DELAYED RELEASE ORAL DAILY
Qty: 30 TABLET | Refills: 5 | Status: SHIPPED | OUTPATIENT
Start: 2022-09-22 | End: 2023-02-27

## 2022-09-22 NOTE — PROGRESS NOTES
Injection  Injection performed in right deltoid by Loretta Jacome MA. Patient tolerated the procedure well without complications.  09/22/22   Loretta Jacome MA

## 2022-09-22 NOTE — PROGRESS NOTES
Subjective   Lisa Hill is a 62 y.o. female.     Chief Complaint  She returns for a scheduled reassessment of multiple medical problems including chronic obstructive pulmonary disease, type 2 diabetes mellitus, hyperlipidemia, essential hypertension, hypothyroidism, and depression    History of Present Illness     COPD  Her shortness of breath, and cough continue to improve as she loses weight. She admits to intermittent nasal congestion but continues to deny any other upper respiratory tract symptoms, chest pain, hemoptysis, fever or chills.  Her symptoms worsen with activity, exposure to cold air and environmental allergens and improve some with rest, supplemental oxygen, and inhaled inhaled medication.  She remains on stiolto and is using her rescue inhaler once or twice daily. She continues to smoke about 1 pack per day.  She underwent an updated low dose CT of the chest on 11/5/2021 with evidence of moderate COPD, a 7 mm groundglass nodule within the SUDEEP and mild coronary artery calcifications.  Dedicated CT of the chest was recommended and performed on 12/3/2021.  This confirmed the same.  She is scheduled to undergo a pulmonology reassessment with NATALIE Johnson on 10/20/2022  Lab Results   Component Value Date    WBC 9.55 06/23/2022    HGB 14.5 06/23/2022    HCT 42.5 06/23/2022    MCV 95.5 06/23/2022     06/23/2022     Type 2 Diabetes Mellitus  She admits to paresthesias of the feet.  There is no history of any visual disturbances, polydipsia, polyuria, hypoglycemia or foot ulcerations. Current treatments: metformin, empagliflozin, and liraglutide 0.6 mg daily.  She was changed from semaglutide due to availability.    Lab Results   Component Value Date    HGBA1C 5.30 06/23/2022     Lab Results   Component Value Date    BIJMUDKS98 1,435 (H) 06/23/2022     Hyperlipidemia  Her compliance with treatment has been fair.  She has been following her diet closer and with the weight that she has lost  has been able to tolerate more activity. She remains on simvastatin with no apparent side effects  Lab Results   Component Value Date    CHOL 131 06/23/2022    CHLPL 141 02/05/2016    TRIG 270 (H) 06/23/2022    HDL 36 (L) 06/23/2022    LDL 53 06/23/2022     Essential Hypertension  She admits to shortness of breath with intermittent lower extremity swelling but denies any chest pain, orthopnea, PND, palpitations, or lightheadedness.  She is taking losartan with no apparent side effects  Lab Results   Component Value Date    GLUCOSE 93 06/23/2022    BUN 9 06/23/2022    CREATININE 0.91 06/23/2022    EGFRIFNONA 59 (L) 10/11/2021    EGFRIFAFRI  09/02/2016      Comment:      <15 Indicative of kidney failure.    BCR 9.9 06/23/2022    K 5.0 06/23/2022    CO2 25.0 06/23/2022    CALCIUM 9.3 06/23/2022    ALBUMIN 3.70 06/23/2022    LABIL2 1.3 (L) 02/05/2016    AST 19 06/23/2022    ALT 30 06/23/2022     Lab Results   Component Value Date    ALKPHOS 53 06/23/2022     Hypothyroidism  Patient presents for evaluation of thyroid function. Symptoms consist of weight gain, constipation. The symptoms are moderate.  The problem has been unchanged.  Previous thyroid studies include TSH. The hypothyroidism is due to Hashimoto's disease.  Lab Results   Component Value Date    TSH 0.174 (L) 06/23/2022     Low Back and Joint Pain  She complains of persistent low back and generalized joint pain. There has been no change in the quality of her discomfort nor any new associated symptoms. There is no history of any weakness, numbness, tingling, or any change in her bowel/bladder control.  There is no history of any fever, chills, or night sweats.  When she sits down the pain generally resolves within minutes and she denies any problem at night. She continues to use her TENS unit for several hours daily and feels that it helps.  She also feels that meloxicam helps.  She remains on hydroxychloroquine.  Plain films of the lumbar spine performed on  8/5/2019 were reported as showing degenerative disc disease as well as atherosclerotic calcification of the aorta    Depression  She has a long history of intermittent depression, nervousness, anhedonia, difficulty concentrating, fatigue and impaired memory. She continues to deny any suicidal ideation. Risk factors: history of seasonal affective disorder.  She anticipates her symptoms worsening in several months.  Current medication includes escitalopram 10 daily,  bupropion 300 daily, risperdone 2 nightly, and lorazepam 0.5 twice a day.  She continues to do well at present    Labs  Most recent vitamin D 49.5    The following portions of the patient's history were reviewed and updated as appropriate: allergies, current medications, past medical history, past social history and problem list.    Review of Systems   Constitutional: Positive for fatigue. Negative for appetite change, chills, fever and unexpected weight change.   HENT: Positive for rhinorrhea. Negative for congestion, ear pain, postnasal drip, sneezing, sore throat and voice change.    Eyes: Negative for visual disturbance.   Respiratory: Positive for cough, shortness of breath and wheezing.    Cardiovascular: Positive for leg swelling. Negative for chest pain and palpitations.   Gastrointestinal: Positive for constipation. Negative for abdominal pain, anal bleeding, blood in stool, diarrhea, nausea and vomiting.   Genitourinary: Negative for difficulty urinating, dysuria, frequency, hematuria and urgency.        Incontinence with coughing, sneezing, or lifting   Musculoskeletal: Positive for arthralgias and back pain. Negative for joint swelling and myalgias.   Skin: Negative for rash.   Neurological: Positive for numbness (and paresthesias both feet) and headaches (when stressed). Negative for weakness.   Psychiatric/Behavioral: Positive for decreased concentration, dysphoric mood and sleep disturbance. Negative for suicidal ideas.     Objective    Physical Exam  Constitutional:       General: She is not in acute distress.     Appearance: Normal appearance. She is well-developed. She is not diaphoretic.      Comments: Accompanied by her .  Bright and in fair spirits.  Climbed onto the exam table with minimal assistance.  No apparent distress.   HENT:      Head: Atraumatic.      Right Ear: Tympanic membrane, ear canal and external ear normal.      Left Ear: Tympanic membrane, ear canal and external ear normal.   Eyes:      Conjunctiva/sclera: Conjunctivae normal.   Neck:      Thyroid: No thyroid mass or thyromegaly.      Vascular: No carotid bruit or JVD.      Trachea: Trachea normal. No tracheal deviation.   Cardiovascular:      Rate and Rhythm: Normal rate and regular rhythm.      Heart sounds: Normal heart sounds, S1 normal and S2 normal. No murmur heard.    No gallop.   Pulmonary:      Effort: Pulmonary effort is normal.      Breath sounds: Decreased air movement present. Examination of the right-lower field reveals decreased breath sounds. Examination of the left-lower field reveals decreased breath sounds. Decreased breath sounds and wheezing (diffuse - mild) present. No rales.      Comments: Pulmonary hyperinflation  Chest:   Breasts:      Right: No supraclavicular adenopathy.      Left: No supraclavicular adenopathy.       Abdominal:      General: Bowel sounds are normal. There is no distension.   Musculoskeletal:      Right lower leg: No edema.      Left lower leg: No edema.   Lymphadenopathy:      Head:      Right side of head: No submental, submandibular, tonsillar, preauricular, posterior auricular or occipital adenopathy.      Left side of head: No submental, submandibular, tonsillar, preauricular, posterior auricular or occipital adenopathy.      Cervical: No cervical adenopathy.      Upper Body:      Right upper body: No supraclavicular adenopathy.      Left upper body: No supraclavicular adenopathy.   Skin:     General: Skin is warm.       Coloration: Skin is not cyanotic, jaundiced or pale.      Findings: No rash.      Nails: There is no clubbing.   Neurological:      Mental Status: She is alert and oriented to person, place, and time.      Cranial Nerves: No cranial nerve deficit.      Sensory: Sensory deficit (decreased vibration sense both feet) present.      Motor: No tremor.      Coordination: Coordination normal.      Gait: Gait normal.   Psychiatric:         Attention and Perception: Attention normal.         Mood and Affect: Mood normal.         Speech: Speech normal.         Behavior: Behavior normal.         Thought Content: Thought content normal.       Assessment & Plan   Problems Addressed this Visit        Allergies and Adverse Reactions    Chronic allergic rhinitis  Reminded regarding allergen avoidance.  Continue current medication    Relevant Medications    montelukast (SINGULAIR) 10 MG tablet    meloxicam (MOBIC) 15 MG tablet    fluticasone (FLONASE) 50 MCG/ACT nasal spray       Cardiac and Vasculature    Aortic atherosclerosis (HCC)  Reminded regarding risk factor modification with an emphasis on tobacco cessation.    Coronary artery calcification seen on CT scan  As above.    Essential hypertension   Hypertension: at goal. Evidence of target organ damage: evidence of coronary artery calcifications and atherosclerosis on previous imaging.  Encouraged to continue to work on diet and exercise plan.   Continue current medication    Relevant Medications    losartan (COZAAR) 50 MG tablet    Mixed hyperlipidemia  As above.   Continue current medication.    Relevant Medications    simvastatin (ZOCOR) 20 MG tablet       Endocrine and Metabolic    Class 3 severe obesity with serious comorbidity and body mass index (BMI) of 40.0 to 44.9 in adult (HCC)    Hypothyroidism due to Hashimoto's thyroiditis  Clinically euthyroid.  Continue current medication.    Relevant Medications    meloxicam (MOBIC) 15 MG tablet    levothyroxine (SYNTHROID,  LEVOTHROID) 200 MCG tablet    Type 2 diabetes mellitus with peripheral neuropathy (HCC)  Diabetes mellitus Type II, under excellent control.   Encouraged to continue to pursue ADA diet  Encouraged aerobic exercise.  Semaglutide will be resumed in place of liraglutide if insurance and supply permit  Updated labs will be drawn at her return.    Relevant Medications    metFORMIN ER (GLUCOPHAGE-XR) 500 MG 24 hr tablet    Cyanocobalamin ER (GNP Vitamin B-12) 1000 MCG tablet controlled-release    Semaglutide,0.25 or 0.5MG/DOS, (Ozempic, 0.25 or 0.5 MG/DOSE,) 2 MG/1.5ML solution pen-injector    Vitamin D deficiency  Continue supplementation with monitoring.    Relevant Medications    cholecalciferol (VITAMIN D3) 25 MCG (1000 UT) tablet       Gastrointestinal Abdominal     Chronic constipation    Gastroesophageal reflux disease without esophagitis    Relevant Medications    pantoprazole (PROTONIX) 40 MG EC tablet       Genitourinary and Reproductive     Encounter for screening mammogram for breast cancer    Relevant Orders    Mammo Screening Digital Tomosynthesis Bilateral With CAD    Stress bladder incontinence, female       Health Encounters    Healthcare maintenance  Flu shot administered.  Encouraged to obtain an updated bivalent COVID-19 vaccination.  Will arrange an updated mammogram and low-dose CT of the chest for late this year    Relevant Orders    FluLaval/Fluarix/Fluzone >6 Months (Completed)    Mammo Screening Digital Tomosynthesis Bilateral With CAD     CT Chest Low Dose Cancer Screening WO       Hematology and Neoplasia    Macrocytosis       Mental Health    Depression with anxiety  With seasonal exacerbation  Supportive therapy  Escitalopram and bupropion will both be titrated through the winter  Encouraged to report if any worse or if any new symptoms or concerns.    Relevant Medications    topiramate (TOPAMAX) 100 MG tablet    risperiDONE (risperDAL) 2 MG tablet    LORazepam (ATIVAN) 1 MG tablet     escitalopram (LEXAPRO) 20 MG tablet    busPIRone (BUSPAR) 15 MG tablet    buPROPion XL (FORFIVO XL) 450 MG 24 hr tablet    Somatization disorder    Relevant Medications    risperiDONE (risperDAL) 2 MG tablet    LORazepam (ATIVAN) 1 MG tablet    escitalopram (LEXAPRO) 20 MG tablet    busPIRone (BUSPAR) 15 MG tablet    buPROPion XL (FORFIVO XL) 450 MG 24 hr tablet       Musculoskeletal and Injuries    Generalized osteoarthritis  Reminded regarding symptomatic treatment.   Continue current medication    Relevant Medications    acetaminophen (Acetaminophen Extra Strength) 500 MG tablet    Mechanical low back pain    Relevant Medications    topiramate (TOPAMAX) 100 MG tablet    Osteopenia    Rheumatoid arthritis (HCC)    Relevant Medications    meloxicam (MOBIC) 15 MG tablet       Pulmonary and Pneumonias    Panlobular emphysema (HCC)   COPD is stable.  Reminded of the importance of smoking cessation  Encouraged to remain as active as symptoms allow for  Continue current medication  Follow up with pulmonology     Relevant Medications    tiotropium bromide-olodaterol (STIOLTO RESPIMAT) 2.5-2.5 MCG/ACT aerosol solution inhaler    ProAir  (90 Base) MCG/ACT inhaler    montelukast (SINGULAIR) 10 MG tablet    fluticasone (FLONASE) 50 MCG/ACT nasal spray       Sleep    Obstructive sleep apnea       Tobacco    Smoker    Relevant Orders     CT Chest Low Dose Cancer Screening WO          Diagnoses       Codes Comments    Chronic allergic rhinitis    -  Primary ICD-10-CM: J30.9  ICD-9-CM: 477.9     Aortic atherosclerosis (HCC)     ICD-10-CM: I70.0  ICD-9-CM: 440.0     Coronary artery calcification seen on CT scan     ICD-10-CM: I25.10  ICD-9-CM: 414.00     Essential hypertension     ICD-10-CM: I10  ICD-9-CM: 401.9     Mixed hyperlipidemia     ICD-10-CM: E78.2  ICD-9-CM: 272.2     Class 3 severe obesity with serious comorbidity and body mass index (BMI) of 40.0 to 44.9 in adult, unspecified obesity type (HCC)     ICD-10-CM:  E66.01, Z68.41  ICD-9-CM: 278.01, V85.41     Hypothyroidism due to Hashimoto's thyroiditis     ICD-10-CM: E03.8, E06.3  ICD-9-CM: 244.8, 245.2     Type 2 diabetes mellitus with peripheral neuropathy (HCC)     ICD-10-CM: E11.42  ICD-9-CM: 250.60, 357.2     Vitamin D deficiency     ICD-10-CM: E55.9  ICD-9-CM: 268.9     Chronic constipation     ICD-10-CM: K59.09  ICD-9-CM: 564.00     Gastroesophageal reflux disease without esophagitis     ICD-10-CM: K21.9  ICD-9-CM: 530.81     Stress bladder incontinence, female     ICD-10-CM: N39.3  ICD-9-CM: 625.6     Healthcare maintenance     ICD-10-CM: Z00.00  ICD-9-CM: V70.0     Macrocytosis     ICD-10-CM: D75.89  ICD-9-CM: 289.89     Depression with anxiety     ICD-10-CM: F41.8  ICD-9-CM: 300.4     Somatization disorder     ICD-10-CM: F45.0  ICD-9-CM: 300.81     Generalized osteoarthritis     ICD-10-CM: M15.9  ICD-9-CM: 715.00     Osteopenia, unspecified location     ICD-10-CM: M85.80  ICD-9-CM: 733.90     Rheumatoid arthritis involving multiple sites, unspecified whether rheumatoid factor present (HCC)     ICD-10-CM: M06.9  ICD-9-CM: 714.0     Panlobular emphysema (HCC)     ICD-10-CM: J43.1  ICD-9-CM: 492.8     Obstructive sleep apnea     ICD-10-CM: G47.33  ICD-9-CM: 327.23     Smoker     ICD-10-CM: F17.200  ICD-9-CM: 305.1     Encounter for immunization     ICD-10-CM: Z23  ICD-9-CM: V03.89     Encounter for screening mammogram for breast cancer     ICD-10-CM: Z12.31  ICD-9-CM: V76.12     Mechanical low back pain     ICD-10-CM: M54.59  ICD-9-CM: 724.2     Spondylosis of lumbar region without myelopathy or radiculopathy     ICD-10-CM: M47.816  ICD-9-CM: 721.3 increase mobic 15 mg qd  take tylenol prn breakthrough pain    Acquired hypothyroidism     ICD-10-CM: E03.9  ICD-9-CM: 244.9     Seasonal allergic rhinitis due to pollen     ICD-10-CM: J30.1  ICD-9-CM: 477.0           I spent 48 minutes caring for Lisa Hill on this date of service. This time includes time spent by me in  the following activities:reviewing tests, performing a medically appropriate examination and/or evaluation , counseling and educating the patient/family/caregiver, ordering medications, tests, or procedures and documenting information in the medical record

## 2022-09-23 ENCOUNTER — TELEPHONE (OUTPATIENT)
Dept: FAMILY MEDICINE CLINIC | Facility: CLINIC | Age: 62
End: 2022-09-23

## 2022-09-23 DIAGNOSIS — H90.3 SENSORINEURAL HEARING LOSS (SNHL) OF BOTH EARS: Primary | ICD-10-CM

## 2022-09-23 NOTE — TELEPHONE ENCOUNTER
----- Message from Scott Chavez MD sent at 9/23/2022 12:59 PM EDT -----  K - referral placed in [facility]    ----- Message -----  From: Yesenia Correa MA  Sent: 9/23/2022  10:32 AM EDT  To: Scott Chavez MD    I don't think so, there is some assistance programs they may be able to receive. If you could put a referral in for [facility] audiology we can see if they can help him.       ----- Message -----  From: Scott Chavez MD  Sent: 9/22/2022   9:48 PM EDT  To: Yesenia Correa MA    This [employer] cover hearing aids?

## 2022-10-04 ENCOUNTER — OFFICE VISIT (OUTPATIENT)
Dept: FAMILY MEDICINE CLINIC | Facility: CLINIC | Age: 62
End: 2022-10-04

## 2022-10-04 ENCOUNTER — TELEPHONE (OUTPATIENT)
Dept: FAMILY MEDICINE CLINIC | Facility: CLINIC | Age: 62
End: 2022-10-04

## 2022-10-04 VITALS
HEIGHT: 63 IN | WEIGHT: 229 LBS | SYSTOLIC BLOOD PRESSURE: 136 MMHG | BODY MASS INDEX: 40.57 KG/M2 | HEART RATE: 108 BPM | DIASTOLIC BLOOD PRESSURE: 80 MMHG | OXYGEN SATURATION: 94 % | TEMPERATURE: 98 F

## 2022-10-04 DIAGNOSIS — N30.01 ACUTE CYSTITIS WITH HEMATURIA: Primary | ICD-10-CM

## 2022-10-04 DIAGNOSIS — R30.0 DYSURIA: ICD-10-CM

## 2022-10-04 LAB
BILIRUB BLD-MCNC: NEGATIVE MG/DL
CLARITY, POC: ABNORMAL
COLOR UR: YELLOW
GLUCOSE UR STRIP-MCNC: NEGATIVE MG/DL
KETONES UR QL: NEGATIVE
LEUKOCYTE EST, POC: ABNORMAL
NITRITE UR-MCNC: NEGATIVE MG/ML
PH UR: 6 [PH] (ref 5–8)
PROT UR STRIP-MCNC: ABNORMAL MG/DL
RBC # UR STRIP: ABNORMAL /UL
SP GR UR: 1.01 (ref 1–1.03)
UROBILINOGEN UR QL: NORMAL

## 2022-10-04 PROCEDURE — 87077 CULTURE AEROBIC IDENTIFY: CPT | Performed by: PHYSICIAN ASSISTANT

## 2022-10-04 PROCEDURE — 99213 OFFICE O/P EST LOW 20 MIN: CPT | Performed by: PHYSICIAN ASSISTANT

## 2022-10-04 PROCEDURE — 87086 URINE CULTURE/COLONY COUNT: CPT | Performed by: PHYSICIAN ASSISTANT

## 2022-10-04 PROCEDURE — 87186 SC STD MICRODIL/AGAR DIL: CPT | Performed by: PHYSICIAN ASSISTANT

## 2022-10-04 RX ORDER — CEFDINIR 300 MG/1
300 CAPSULE ORAL 2 TIMES DAILY
Qty: 14 CAPSULE | Refills: 0 | Status: SHIPPED | OUTPATIENT
Start: 2022-10-04 | End: 2022-10-11

## 2022-10-04 NOTE — PROGRESS NOTES
Subjective        Chief Complaint  concern for uti    Subjective      History of Present Illness  Lisa Hill is a 62 y.o. female who presents today to [facility] for concern for uti. Past medical history is significant for chronic obstructive pulmonary disease, type 2 diabetes mellitus, hyperlipidemia, essential hypertension, hypothyroidism, and depression.     Concern for UTI:  She reports developed burning with urination last night.  She denies any associated fever, chills, back or flank pain. No blood noted in the urine.  She has not had a UTI for some time. Most recent urine culture from January 2022 showed pansensitive E. Coli.  No recently reported antibiotic therapy.      Current Outpatient Medications:   •  acetaminophen (Acetaminophen Extra Strength) 500 MG tablet, Take 2 tablets by mouth 4 (Four) Times a Day., Disp: 180 tablet, Rfl: 5  •  buPROPion XL (FORFIVO XL) 450 MG 24 hr tablet, Take 1 tablet by mouth Every Morning., Disp: 30 tablet, Rfl: 5  •  busPIRone (BUSPAR) 15 MG tablet, Take 1 tablet by mouth 3 (Three) Times a Day., Disp: 90 tablet, Rfl: 5  •  cholecalciferol (VITAMIN D3) 25 MCG (1000 UT) tablet, Take 2 tablets by mouth Daily., Disp: 60 tablet, Rfl: 5  •  Continuous Blood Gluc  (Dexcom G6 ) device, 1 Device Daily., Disp: 1 each, Rfl: 0  •  Continuous Blood Gluc Sensor (Dexcom G6 Sensor), Every 10 (Ten) Days., Disp: 9 each, Rfl: 3  •  Cyanocobalamin ER (GNP Vitamin B-12) 1000 MCG tablet controlled-release, Take 1 tablet by mouth Daily., Disp: 30 each, Rfl: 5  •  escitalopram (LEXAPRO) 20 MG tablet, Take 1 tablet by mouth Daily., Disp: 30 tablet, Rfl: 5  •  fluticasone (FLONASE) 50 MCG/ACT nasal spray, 2 sprays by Each Nare route Daily., Disp: 16 g, Rfl: 5  •  hydroxychloroquine (PLAQUENIL) 200 MG tablet, Take 200 mg by mouth Daily., Disp: , Rfl:   •  Insulin Pen Needle (Pen Needles) 30G X 5 MM misc, 1 each Daily., Disp: 100 each, Rfl:  0  •  ipratropium-albuterol (DUO-NEB) 0.5-2.5 mg/3 ml nebulizer, USE 1 VIAL IN NEBUILZER FOUR TIMES A DAY, Disp: 360 mL, Rfl: 11  •  Lancets (OneTouch Delica Plus [id number]) misc, USE TO CHECK BLOOD SUGAR THREE TIMES A DAY, Disp: 100 each, Rfl: 5  •  levothyroxine (SYNTHROID, LEVOTHROID) 200 MCG tablet, Take 1 tablet by mouth Daily., Disp: 30 tablet, Rfl: 5  •  lidocaine (XYLOCAINE) 5 % ointment, Apply 1 application topically to the appropriate area as directed Every 2 (Two) Hours As Needed for Mild Pain ., Disp: 150 g, Rfl: 2  •  LORazepam (ATIVAN) 1 MG tablet, TAKE 1/2 TABLET BY MOUTH TWO TIMES A DAY AS NEEDED, Disp: 90 tablet, Rfl: 0  •  losartan (COZAAR) 50 MG tablet, Take 1 tablet by mouth Daily., Disp: 30 tablet, Rfl: 5  •  meloxicam (MOBIC) 15 MG tablet, Take 1 tablet by mouth Daily., Disp: 30 tablet, Rfl: 5  •  metFORMIN ER (GLUCOPHAGE-XR) 500 MG 24 hr tablet, Take 4 tablets by mouth Daily., Disp: 120 tablet, Rfl: 5  •  montelukast (SINGULAIR) 10 MG tablet, Take 1 tablet by mouth Every Night., Disp: 30 tablet, Rfl: 8  •  OneTouch Ultra test strip, USE TO TEST BLOOD GLUCOSE DAILY, Disp: 100 each, Rfl: 3  •  pantoprazole (PROTONIX) 40 MG EC tablet, Take 1 tablet by mouth Daily., Disp: 30 tablet, Rfl: 5  •  ProAir  (90 Base) MCG/ACT inhaler, Inhale 2 puffs Every 4 (Four) Hours As Needed for Wheezing or Shortness of Air., Disp: 8.5 g, Rfl: 8  •  risperiDONE (risperDAL) 2 MG tablet, Take 1 tablet by mouth every night at bedtime., Disp: 30 tablet, Rfl: 5  •  Semaglutide,0.25 or 0.5MG/DOS, (Ozempic, 0.25 or 0.5 MG/DOSE,) 2 MG/1.5ML solution pen-injector, Inject 0.5 mg under the skin into the appropriate area as directed 1 (One) Time Per Week., Disp: 1.5 mL, Rfl: 5  •  simvastatin (ZOCOR) 20 MG tablet, Take 1 tablet by mouth every night at bedtime., Disp: 30 tablet, Rfl: 5  •  tiotropium bromide-olodaterol (STIOLTO RESPIMAT) 2.5-2.5 MCG/ACT aerosol solution inhaler, Inhale 2 puffs Daily., Disp: 1 each, Rfl:  "8  •  topiramate (TOPAMAX) 100 MG tablet, Take 1 tablet by mouth 2 (Two) Times a Day., Disp: 60 tablet, Rfl: 5  •  cefdinir (OMNICEF) 300 MG capsule, Take 1 capsule by mouth 2 (Two) Times a Day for 7 days., Disp: 14 capsule, Rfl: 0      Allergies   Allergen Reactions   • Sulfa Antibiotics      Objective     Objective   Vital Signs:  Blood Pressure 136/80   Pulse 108   Temperature 98 °F (36.7 °C)   Height 158.8 cm (62.52\")   Weight 104 kg (229 lb)   Oxygen Saturation 94%   Body Mass Index 41.19 kg/m²   Estimated body mass index is 41.19 kg/m² as calculated from the following:    Height as of this encounter: 158.8 cm (62.52\").    Weight as of this encounter: 104 kg (229 lb).    Class 3 Severe Obesity (BMI >=40). Obesity-related health conditions include the following: hypertension, diabetes mellitus, dyslipidemias and GERD. Obesity is unchanged. BMI is is above average; BMI management plan is completed. We discussed increasing water intake.    Past Medical History:   Diagnosis Date   • Anxiety    • Arthritis    • Cigarette nicotine dependence without complication 9/8/2016   • COPD (chronic obstructive pulmonary disease) ([facility])    • Coronary artery calcification seen on CT scan 6/13/2020   • Depression    • Disease of thyroid gland     GRAVES DISEASE   • Elevated cholesterol    • GERD (gastroesophageal reflux disease)    • History of transfusion    • Hyperlipidemia    • Hypertension    • Type 2 diabetes mellitus with diabetic autonomic neuropathy, without long-term current use of insulin ([facility]) 1/18/2021     Past Surgical History:   Procedure Laterality Date   • CHOLECYSTECTOMY     • COLONOSCOPY N/A 9/14/2018    Procedure: COLONOSCOPY;  Surgeon: Keon Melgoza MD;  Location: [facility];  Service: Gastroenterology   • HYSTERECTOMY  1996    For benign disease   • TONSILLECTOMY       Social History     Socioeconomic History   • Marital status:      Spouse name: kateryna   • Number of children: 3   • Years " of education: 10   Tobacco Use   • Smoking status: Current Every Day Smoker     Packs/day: 1.00     Types: Cigarettes   • Smokeless tobacco: Never Used   • Tobacco comment: cessation has been discussed   Vaping Use   • Vaping Use: Never used   Substance and Sexual Activity   • Alcohol use: No   • Drug use: No   • Sexual activity: Defer      Physical Exam  Vitals and nursing note reviewed.   Constitutional:       General: She is not in acute distress.     Appearance: She is well-developed. She is not diaphoretic.   HENT:      Head: Normocephalic and atraumatic.   Eyes:      General: No scleral icterus.        Right eye: No discharge.         Left eye: No discharge.      Conjunctiva/sclera: Conjunctivae normal.   Cardiovascular:      Rate and Rhythm: Normal rate and regular rhythm.      Heart sounds: Normal heart sounds. No murmur heard.    No friction rub. No gallop.   Pulmonary:      Effort: Pulmonary effort is normal. No respiratory distress.      Breath sounds: Normal breath sounds. No wheezing or rales.   Chest:      Chest wall: No tenderness.   Abdominal:      Tenderness: There is no right CVA tenderness or left CVA tenderness.   Musculoskeletal:         General: Normal range of motion.      Cervical back: Normal range of motion and neck supple.   Skin:     General: Skin is warm and dry.      Coloration: Skin is not pale.      Findings: No erythema or rash.   Neurological:      Mental Status: She is alert and oriented to person, place, and time.   Psychiatric:         Behavior: Behavior normal.        Result Review :  The following data was reviewed by: ANGEL Vasquez on 10/04/2022:  Office Visit on 10/04/2022   Component Date Value Ref Range Status   • Color 10/04/2022 Yellow  Yellow, Straw, Dark Yellow, Mariah Final   • Clarity, UA 10/04/2022 Cloudy (A) Clear Final   • Glucose, UA 10/04/2022 Negative  Negative mg/dL Final   • Bilirubin 10/04/2022 Negative  Negative Final   • Ketones, UA 10/04/2022  Negative  Negative Final   • Specific Gravity  10/04/2022 1.010  1.005 - 1.030 Final   • Blood, UA 10/04/2022 3+ (A) Negative Final   • pH, Urine 10/04/2022 6.0  5.0 - 8.0 Final   • Protein, POC 10/04/2022 Trace (A) Negative mg/dL Final   • Urobilinogen, UA 10/04/2022 Normal  Normal, 0.2 E.U./dL Final   • Leukocytes 10/04/2022 Large (3+) (A) Negative Final   • Nitrite, UA 10/04/2022 Negative  Negative Final                  Assessment / Plan         Assessment   Diagnoses and all orders for this visit:    1. Acute cystitis with hematuria (Primary)  2. Dysuria  • Encouraged her on increasing her fluid intake, specifically with water.  • Dipstick UA shows evidence of likely UTI, will send for culture.  • Previous urine culture from January 2022 reviewed with pansensitive E. coli noted.  • Based on previous sensitivity results, will treat with cefdinir 300 mg twice daily x7 days.  She was encouraged to notify the office if her symptoms did not improve after 7 days of treatment.  -     POCT urinalysis dipstick, manual  -     Urine Culture - Urine, Urine, Clean Catch  -     cefdinir (OMNICEF) 300 MG capsule; Take 1 capsule by mouth 2 (Two) Times a Day for 7 days.  Dispense: 14 capsule; Refill: 0       New Medications Ordered This Visit   Medications   • cefdinir (OMNICEF) 300 MG capsule     Sig: Take 1 capsule by mouth 2 (Two) Times a Day for 7 days.     Dispense:  14 capsule     Refill:  0     Health Maintenance  • Consider diabetic foot and eye exam.    I spent 23 minutes caring for Lisa on this date of service. This time includes time spent by me in the following activities:preparing for the visit, reviewing tests, obtaining and/or reviewing a separately obtained history, performing a medically appropriate examination and/or evaluation , ordering medications, tests, or procedures and documenting information in the medical record    Follow Up   Return for Next scheduled follow up.    Patient was given instructions  and counseling regarding her condition or for health maintenance advice. Please see specific information pulled into the AVS if appropriate.       This document has been electronically signed by ANGEL Vasquez   October 4, 2022 09:58 EDT    Dictated Utilizing Dragon Dictation: Part of this note may be an electronic transcription/translation of spoken language to printed text using the Dragon Dictation System.

## 2022-10-04 NOTE — TELEPHONE ENCOUNTER
Caller: MARLON    Relationship: Other     [facility]  REFERENCE NUMBER: [id number]  [phone]      Best call back number:     What is the best time to reach you: NA      Who are you requesting to speak with (clinical staff, provider,  specific staff member): CLINICAL    Do you know the name of the person who called: NA    What was the call regarding: MARLON GAR[facility]TT [facility] CALLED TO FOLLOW UP ON STATUS OF PRIOR AUTHORIZATION FOR PATIENTS OZEMPIC.    STATED PRIOR AUTHORIZATION WAS FAXED TO OFFICE 9/24,9/28 AND 9/30    Do you require a callback: YES

## 2022-10-06 LAB — BACTERIA SPEC AEROBE CULT: ABNORMAL

## 2022-10-10 DIAGNOSIS — I10 ESSENTIAL HYPERTENSION: ICD-10-CM

## 2022-10-10 DIAGNOSIS — J43.1 PANLOBULAR EMPHYSEMA: ICD-10-CM

## 2022-10-10 DIAGNOSIS — E03.9 ACQUIRED HYPOTHYROIDISM: ICD-10-CM

## 2022-10-10 DIAGNOSIS — F41.8 DEPRESSION WITH ANXIETY: ICD-10-CM

## 2022-10-10 RX ORDER — TIOTROPIUM BROMIDE AND OLODATEROL 3.124; 2.736 UG/1; UG/1
SPRAY, METERED RESPIRATORY (INHALATION)
Qty: 4 G | Refills: 8 | OUTPATIENT
Start: 2022-10-10

## 2022-10-10 RX ORDER — LOSARTAN POTASSIUM 50 MG/1
50 TABLET ORAL DAILY
Qty: 30 TABLET | Refills: 1 | Status: SHIPPED | OUTPATIENT
Start: 2022-10-10 | End: 2022-12-05

## 2022-10-10 RX ORDER — RISPERIDONE 2 MG/1
TABLET ORAL
Qty: 30 TABLET | Refills: 5 | Status: SHIPPED | OUTPATIENT
Start: 2022-10-10 | End: 2023-03-27

## 2022-10-10 RX ORDER — LEVOTHYROXINE SODIUM 0.2 MG/1
200 TABLET ORAL DAILY
Qty: 30 TABLET | Refills: 1 | Status: SHIPPED | OUTPATIENT
Start: 2022-10-10 | End: 2022-12-05

## 2022-10-17 ENCOUNTER — PRIOR AUTHORIZATION (OUTPATIENT)
Dept: FAMILY MEDICINE CLINIC | Facility: CLINIC | Age: 62
End: 2022-10-17

## 2022-10-25 ENCOUNTER — APPOINTMENT (OUTPATIENT)
Dept: CT IMAGING | Facility: HOSPITAL | Age: 62
End: 2022-10-25

## 2022-11-08 DIAGNOSIS — E11.42 TYPE 2 DIABETES MELLITUS WITH PERIPHERAL NEUROPATHY: ICD-10-CM

## 2022-11-08 RX ORDER — PEN NEEDLE, DIABETIC 30 GX3/16"
1 NEEDLE, DISPOSABLE MISCELLANEOUS DAILY
Qty: 100 EACH | Refills: 0 | Status: SHIPPED | OUTPATIENT
Start: 2022-11-08 | End: 2023-04-03

## 2022-11-08 RX ORDER — SEMAGLUTIDE 1.34 MG/ML
0.5 INJECTION, SOLUTION SUBCUTANEOUS WEEKLY
Qty: 1.5 ML | Refills: 5 | Status: SHIPPED | OUTPATIENT
Start: 2022-11-08 | End: 2023-01-23

## 2022-11-08 NOTE — TELEPHONE ENCOUNTER
Caller: Lisa Hill    Relationship: Self    Best call back number: [phone]    Requested Prescriptions:   Requested Prescriptions     Pending Prescriptions Disp Refills   • Semaglutide,0.25 or 0.5MG/DOS, (Ozempic, 0.25 or 0.5 MG/DOSE,) 2 MG/1.5ML solution pen-injector 1.5 mL 5     Sig: Inject 0.5 mg under the skin into the appropriate area as directed 1 (One) Time Per Week.   • Insulin Pen Needle (Pen Needles) 30G X 5 MM misc 100 each 0     Si each Daily.        Pharmacy where request should be sent: [facility] - [address] - [phone]  - [phone]     Additional details provided by patient: PATIENT IS ON HER LAST NEEDLE     Does the patient have less than a 3 day supply:  [x] Yes  [] No    Cadance Dunaway, RegSched Rep   22 10:16 EST            Called pt phone line busy

## 2022-11-09 DIAGNOSIS — J30.9 CHRONIC ALLERGIC RHINITIS: ICD-10-CM

## 2022-11-09 RX ORDER — MONTELUKAST SODIUM 10 MG/1
TABLET ORAL
Qty: 30 TABLET | Refills: 5 | Status: SHIPPED | OUTPATIENT
Start: 2022-11-09 | End: 2022-12-08 | Stop reason: SDUPTHER

## 2022-12-04 DIAGNOSIS — E55.9 VITAMIN D DEFICIENCY: ICD-10-CM

## 2022-12-04 DIAGNOSIS — M15.9 GENERALIZED OSTEOARTHRITIS: ICD-10-CM

## 2022-12-04 DIAGNOSIS — E11.42 TYPE 2 DIABETES MELLITUS WITH PERIPHERAL NEUROPATHY: ICD-10-CM

## 2022-12-04 DIAGNOSIS — E03.9 ACQUIRED HYPOTHYROIDISM: ICD-10-CM

## 2022-12-04 DIAGNOSIS — I10 ESSENTIAL HYPERTENSION: ICD-10-CM

## 2022-12-04 DIAGNOSIS — J43.1 PANLOBULAR EMPHYSEMA: ICD-10-CM

## 2022-12-05 RX ORDER — METFORMIN HYDROCHLORIDE 500 MG/1
2000 TABLET, EXTENDED RELEASE ORAL DAILY
Qty: 120 TABLET | Refills: 5 | Status: SHIPPED | OUTPATIENT
Start: 2022-12-05 | End: 2023-04-03 | Stop reason: SDUPTHER

## 2022-12-05 RX ORDER — LEVOTHYROXINE SODIUM 0.2 MG/1
TABLET ORAL
Qty: 30 TABLET | Refills: 1 | Status: SHIPPED | OUTPATIENT
Start: 2022-12-05 | End: 2023-04-03 | Stop reason: SDUPTHER

## 2022-12-05 RX ORDER — PEN NEEDLE, DIABETIC 32GX 5/32"
NEEDLE, DISPOSABLE MISCELLANEOUS
Qty: 100 EACH | Refills: 0 | Status: SHIPPED | OUTPATIENT
Start: 2022-12-05 | End: 2023-03-06

## 2022-12-05 RX ORDER — LOSARTAN POTASSIUM 50 MG/1
TABLET ORAL
Qty: 30 TABLET | Refills: 1 | Status: SHIPPED | OUTPATIENT
Start: 2022-12-05 | End: 2023-04-03 | Stop reason: SDUPTHER

## 2022-12-05 RX ORDER — IPRATROPIUM BROMIDE AND ALBUTEROL SULFATE 2.5; .5 MG/3ML; MG/3ML
SOLUTION RESPIRATORY (INHALATION)
Qty: 360 ML | Refills: 11 | Status: SHIPPED | OUTPATIENT
Start: 2022-12-05

## 2022-12-05 RX ORDER — PSEUDOEPHED/ACETAMINOPH/DIPHEN 30MG-500MG
TABLET ORAL
Qty: 180 TABLET | Refills: 5 | Status: SHIPPED | OUTPATIENT
Start: 2022-12-05

## 2022-12-05 RX ORDER — METHANOL
LIQUID (ML) MISCELLANEOUS
Qty: 60 TABLET | Refills: 5 | Status: SHIPPED | OUTPATIENT
Start: 2022-12-05 | End: 2023-04-03 | Stop reason: SDUPTHER

## 2022-12-07 ENCOUNTER — HOSPITAL ENCOUNTER (OUTPATIENT)
Dept: MAMMOGRAPHY | Facility: HOSPITAL | Age: 62
End: 2022-12-07

## 2022-12-08 ENCOUNTER — OFFICE VISIT (OUTPATIENT)
Dept: PULMONOLOGY | Facility: CLINIC | Age: 62
End: 2022-12-08

## 2022-12-08 VITALS — WEIGHT: 221 LBS | HEIGHT: 63 IN | BODY MASS INDEX: 39.16 KG/M2

## 2022-12-08 DIAGNOSIS — G47.33 OBSTRUCTIVE SLEEP APNEA: Primary | ICD-10-CM

## 2022-12-08 DIAGNOSIS — J43.1 PANLOBULAR EMPHYSEMA: ICD-10-CM

## 2022-12-08 DIAGNOSIS — R91.1 PULMONARY NODULE: ICD-10-CM

## 2022-12-08 DIAGNOSIS — J30.9 CHRONIC ALLERGIC RHINITIS: ICD-10-CM

## 2022-12-08 DIAGNOSIS — G47.34 NOCTURNAL HYPOXIA: ICD-10-CM

## 2022-12-08 DIAGNOSIS — F17.210 CIGARETTE NICOTINE DEPENDENCE WITHOUT COMPLICATION: ICD-10-CM

## 2022-12-08 DIAGNOSIS — E66.01 SEVERE OBESITY (BMI 35.0-35.9 WITH COMORBIDITY): ICD-10-CM

## 2022-12-08 PROCEDURE — 99443 PR PHYS/QHP TELEPHONE EVALUATION 21-30 MIN: CPT | Performed by: NURSE PRACTITIONER

## 2022-12-08 RX ORDER — MONTELUKAST SODIUM 10 MG/1
10 TABLET ORAL DAILY
Qty: 30 TABLET | Refills: 5 | Status: SHIPPED | OUTPATIENT
Start: 2022-12-08

## 2022-12-08 NOTE — PROGRESS NOTES
"You have chosen to receive care through a telephone visit. Do you consent to use a telephone visit for your medical care today? Yes      Chief Complaint  Sleep Apnea    Subjective    {Problem List  Visit Diagnosis   Encounters  Notes  Medications  Labs  Result Review Imaging  Media :23}    Lisa Hill presents to [facility]  History of Present Illness     Ms. Hill is a 62 year old female with a medical history significant for diabetes, CAD, anxiety, arthritis, COPD, depression, GERD, hyperlipidemia, hypertension and sleep apnea.     She is doing a telephone visit to follow up on sleep apnea and COPD.  She states that she has been doing well since her last visit.  She reports that her breathing is at baseline.  She is currently taking Stiolto once daily.  She is also using albuterol inhaler and duonebs as needed.  She is complaint with supplemental oxygen use at night.  She is scheduled for LDCT on 12/12.  She remains a current smoker of about 1/2 ppd.        Objective   Vital Signs:  Ht 158.8 cm (62.5\")   Wt 100 kg (221 lb)   BMI 39.78 kg/m²   Estimated body mass index is 39.78 kg/m² as calculated from the following:    Height as of this encounter: 158.8 cm (62.5\").    Weight as of this encounter: 100 kg (221 lb).    Class 2 Severe Obesity (BMI >=35 and <=39.9). Obesity-related health conditions include the following: obstructive sleep apnea, hypertension, coronary heart disease, diabetes mellitus, dyslipidemias and GERD. Obesity is unchanged. BMI is is above average; BMI management plan is completed. We discussed portion control and increasing exercise.      Physical Exam     Physical exam was deferred as visit was done via telephone.  Result Review :  The following data was reviewed by: NATALIE Lan on 12/08/2022:  Common labs    Common Labs 3/1/22 3/1/22 3/1/22 3/1/22 6/23/22 6/23/22 6/23/22 6/23/22    1127 1127 1127 1127 0903 " 0903 0903 0903   Glucose   85    93    BUN   11    9    Creatinine   0.93    0.91    Sodium   135 (A)    137    Potassium   4.7    5.0    Chloride   96 (A)    99    Calcium   9.6    9.3    Albumin   3.80    3.70    Total Bilirubin   0.3    0.2    Alkaline Phosphatase   56    53    AST (SGOT)   15    19    ALT (SGPT)   28    30    WBC 13.01 (A)     9.55     Hemoglobin 15.1     14.5     Hematocrit 44.4     42.5     Platelets 398     388     Total Cholesterol  124      131   Triglycerides  198 (A)      270 (A)   HDL Cholesterol  40      36 (A)   LDL Cholesterol   52      53   Hemoglobin A1C    5.60 5.30      (A) Abnormal value       Comments are available for some flowsheets but are not being displayed.                    Assessment and Plan   Diagnoses and all orders for this visit:    1. Obstructive sleep apnea (Primary)    2. Panlobular emphysema (HCC)  -     tiotropium bromide-olodaterol (STIOLTO RESPIMAT) 2.5-2.5 MCG/ACT aerosol solution inhaler; Inhale 2 puffs Daily.  Dispense: 1 each; Refill: 8  -     ProAir  (90 Base) MCG/ACT inhaler; Inhale 2 puffs Every 4 (Four) Hours As Needed for Wheezing or Shortness of Air.  Dispense: 8.5 g; Refill: 8    3. Chronic allergic rhinitis  -     montelukast (SINGULAIR) 10 MG tablet; Take 1 tablet by mouth Daily.  Dispense: 30 tablet; Refill: 5    4. Nocturnal hypoxia    5. Pulmonary nodule    6. Cigarette nicotine dependence without complication    7. Severe obesity (BMI 35.0-35.9 with comorbidity) (HCC)      Continue albuterol inhaler and duonebs as needed.  Continue Stiolto once daily.  Refills send to pharmacy.    She has been unable to tolerate cpap in the past.  She does use supplemental oxygen at night.   Patient was educated on positive airway pressure treatment.  As per CMS guidelines, more than 4 hours on 70% of observed nights is considered adherence. Patient was strongly encouraged to use CPAP as much as possible during sleep as more CPAP use is equal to more  benefit. Use of heated humidification in positive airway pressure treatment to improve the adherence to the device.  In case of claustrophobia, we will provide the patient cognitive behavioral therapy and desensitization. Oral appliances use will be discussed with the patient in case of mild to moderate sleep apnea or if the patient with severe disease fail positive airway pressure treatment.       The patient was extensively educated on the consequences of untreated obstructive sleep apnea namely cardiovascular/metabolic disorder, neurocognitive deficit, daytime sleepiness, motor vehicle accidents, depression, mood disorders and reduced quality of life.  At the end of conversation, the patient voices understanding of the disease process and treatment modality.  Patient also understands the risk of untreated obstructive sleep apnea and benefit benefits of the treatment.    Counseling time was greater than 10 minutes.      Continue Flonase and Singulair for allergies.    Previous chest imaging shows multiple stable pulmonary nodules.  She is scheduled for LDCT on 12/12.  Will follow these results.        She continues to smoker about 1/2 ppd.    Lisa Hill  reports that she has been smoking cigarettes. She has been smoking an average of 1 pack per day. She has never used smokeless tobacco.. I have educated her on the risk of diseases from using tobacco products such as cancer, COPD and heart disease.     I advised her to quit and she is not willing to quit.         Follow Up   Return in about 6 months (around 6/8/2023).  Patient was given instructions and counseling regarding her condition or for health maintenance advice. Please see specific information pulled into the AVS if appropriate.       This visit has been rescheduled as a phone visit to comply with patient safety concerns in accordance with CDC recommendations. Total time of discussion was 23 minutes.

## 2022-12-09 ENCOUNTER — HOSPITAL ENCOUNTER (OUTPATIENT)
Dept: MAMMOGRAPHY | Facility: HOSPITAL | Age: 62
Discharge: HOME | End: 2022-12-09
Admitting: GENERAL PRACTICE

## 2022-12-09 DIAGNOSIS — Z12.31 ENCOUNTER FOR SCREENING MAMMOGRAM FOR BREAST CANCER: ICD-10-CM

## 2022-12-09 DIAGNOSIS — Z00.00 HEALTHCARE MAINTENANCE: ICD-10-CM

## 2022-12-09 PROCEDURE — 77063 BREAST TOMOSYNTHESIS BI: CPT

## 2022-12-09 PROCEDURE — 77063 BREAST TOMOSYNTHESIS BI: CPT | Performed by: RADIOLOGY

## 2022-12-09 PROCEDURE — 77067 SCR MAMMO BI INCL CAD: CPT

## 2022-12-09 PROCEDURE — 77067 SCR MAMMO BI INCL CAD: CPT | Performed by: RADIOLOGY

## 2022-12-12 ENCOUNTER — OFFICE VISIT (OUTPATIENT)
Dept: FAMILY MEDICINE CLINIC | Facility: CLINIC | Age: 62
End: 2022-12-12

## 2022-12-12 ENCOUNTER — APPOINTMENT (OUTPATIENT)
Dept: CT IMAGING | Facility: HOSPITAL | Age: 62
End: 2022-12-12

## 2022-12-12 VITALS
WEIGHT: 222 LBS | OXYGEN SATURATION: 94 % | SYSTOLIC BLOOD PRESSURE: 125 MMHG | HEIGHT: 63 IN | TEMPERATURE: 98.6 F | BODY MASS INDEX: 39.34 KG/M2 | DIASTOLIC BLOOD PRESSURE: 70 MMHG | HEART RATE: 96 BPM | RESPIRATION RATE: 15 BRPM

## 2022-12-12 DIAGNOSIS — M85.80 OSTEOPENIA, UNSPECIFIED LOCATION: ICD-10-CM

## 2022-12-12 DIAGNOSIS — E55.9 VITAMIN D DEFICIENCY: ICD-10-CM

## 2022-12-12 DIAGNOSIS — E03.8 HYPOTHYROIDISM DUE TO HASHIMOTO'S THYROIDITIS: ICD-10-CM

## 2022-12-12 DIAGNOSIS — I70.0 AORTIC ATHEROSCLEROSIS: ICD-10-CM

## 2022-12-12 DIAGNOSIS — N39.3 STRESS BLADDER INCONTINENCE, FEMALE: ICD-10-CM

## 2022-12-12 DIAGNOSIS — K59.09 CHRONIC CONSTIPATION: ICD-10-CM

## 2022-12-12 DIAGNOSIS — Z00.00 HEALTHCARE MAINTENANCE: ICD-10-CM

## 2022-12-12 DIAGNOSIS — I10 ESSENTIAL HYPERTENSION: ICD-10-CM

## 2022-12-12 DIAGNOSIS — M15.9 GENERALIZED OSTEOARTHRITIS: ICD-10-CM

## 2022-12-12 DIAGNOSIS — E78.2 MIXED HYPERLIPIDEMIA: ICD-10-CM

## 2022-12-12 DIAGNOSIS — I25.10 CORONARY ARTERY CALCIFICATION SEEN ON CT SCAN: ICD-10-CM

## 2022-12-12 DIAGNOSIS — N30.00 ACUTE CYSTITIS WITHOUT HEMATURIA: ICD-10-CM

## 2022-12-12 DIAGNOSIS — J43.1 PANLOBULAR EMPHYSEMA: ICD-10-CM

## 2022-12-12 DIAGNOSIS — M06.9 RHEUMATOID ARTHRITIS INVOLVING MULTIPLE SITES, UNSPECIFIED WHETHER RHEUMATOID FACTOR PRESENT: ICD-10-CM

## 2022-12-12 DIAGNOSIS — E66.01 CLASS 3 SEVERE OBESITY WITH SERIOUS COMORBIDITY AND BODY MASS INDEX (BMI) OF 40.0 TO 44.9 IN ADULT, UNSPECIFIED OBESITY TYPE: ICD-10-CM

## 2022-12-12 DIAGNOSIS — G47.33 OBSTRUCTIVE SLEEP APNEA: ICD-10-CM

## 2022-12-12 DIAGNOSIS — F17.200 SMOKER: ICD-10-CM

## 2022-12-12 DIAGNOSIS — E06.3 HYPOTHYROIDISM DUE TO HASHIMOTO'S THYROIDITIS: ICD-10-CM

## 2022-12-12 DIAGNOSIS — K21.9 GASTROESOPHAGEAL REFLUX DISEASE WITHOUT ESOPHAGITIS: ICD-10-CM

## 2022-12-12 DIAGNOSIS — E11.42 TYPE 2 DIABETES MELLITUS WITH PERIPHERAL NEUROPATHY: ICD-10-CM

## 2022-12-12 DIAGNOSIS — F41.8 DEPRESSION WITH ANXIETY: ICD-10-CM

## 2022-12-12 DIAGNOSIS — J30.9 CHRONIC ALLERGIC RHINITIS: Primary | ICD-10-CM

## 2022-12-12 PROCEDURE — 99214 OFFICE O/P EST MOD 30 MIN: CPT | Performed by: GENERAL PRACTICE

## 2022-12-12 RX ORDER — CEFDINIR 300 MG/1
300 CAPSULE ORAL 2 TIMES DAILY
Qty: 14 CAPSULE | Refills: 0 | Status: SHIPPED | OUTPATIENT
Start: 2022-12-12 | End: 2022-12-19

## 2022-12-12 RX ORDER — FLUCONAZOLE 150 MG/1
150 TABLET ORAL ONCE
Qty: 1 TABLET | Refills: 0 | Status: SHIPPED | OUTPATIENT
Start: 2022-12-12 | End: 2022-12-12

## 2022-12-12 NOTE — PROGRESS NOTES
Subjective   Lisa Hill is a 62 y.o. female.     Chief Complaint  Dysuria    History of Present Illness     Dysuria  She gives a 4 day history of dysuria associated with frequency, nocturia, urgency, suprapubic pressure, and increased low back pain.  She denies any hematuria and there is no history of any fever or chills.    Low Back and Joint Pain  She complains of persistent low back and generalized joint pain. There has been no change in the quality of her discomfort nor any new associated symptoms. There is no history of any weakness, numbness, tingling, or any change in her bowel/bladder control.  There is no history of any fever, chills, or night sweats.  When she sits down the pain generally resolves within minutes and she denies any problem at night. She continues to use her TENS unit for several hours daily and feels that it helps.  She also feels that meloxicam helps.  She remains on hydroxychloroquine.  Plain films of the lumbar spine performed on 8/5/2019 were reported as showing degenerative disc disease as well as atherosclerotic calcification of the aorta    COPD  Her shortness of breath, and cough continue to improve as she loses weight. She admits to intermittent nasal congestion but continues to deny any other upper respiratory tract symptoms, chest pain, hemoptysis, fever or chills.  Her symptoms worsen with activity, exposure to cold air and environmental allergens and improve some with rest, supplemental oxygen, and inhaled inhaled medication.  She remains on stiolto and is using her rescue inhaler once or twice daily. She is currently smoking just less than 1 pack per day.  She underwent an updated low dose CT of the chest on 11/5/2021 with evidence of moderate COPD, a 7 mm groundglass nodule within the SUDEEP and mild coronary artery calcifications.  Dedicated CT of the chest was recommended and performed on 12/3/2021.  This confirmed the same.  She underwent a pulmonology reassessment with  Laura Ch, NATALIE on 12/8/2022 and is scheduled to undergo an updated CT on 1/11/2023    Type 2 Diabetes Mellitus  She admits to paresthesias of the feet.  There is no history of any visual disturbances, polydipsia, polyuria, hypoglycemia or foot ulcerations. Current treatments: metformin, empagliflozin, and liraglutide 0.6 mg daily.  She was changed from semaglutide due to availability.      Hyperlipidemia  Her compliance with treatment has been fair.  She has been following her diet closer and with the weight that she has lost has been able to tolerate more activity. She remains on simvastatin with no apparent side effects    Essential Hypertension  She admits to shortness of breath with intermittent lower extremity swelling but denies any chest pain, orthopnea, PND, palpitations, or lightheadedness.  She is taking losartan with no apparent side effects    Hypothyroidism  Patient presents for evaluation of thyroid function. Symptoms consist of weight gain, constipation. The symptoms are moderate.  The problem has been unchanged.  Previous thyroid studies include TSH. The hypothyroidism is due to Hashimoto's disease.    The following portions of the patient's history were reviewed and updated as appropriate: allergies, current medications, past medical history, past social history and problem list.    Review of Systems   Constitutional: Positive for fatigue. Negative for appetite change, chills, fever and unexpected weight change.   HENT: Positive for rhinorrhea. Negative for congestion, ear pain, postnasal drip, sneezing, sore throat and voice change.    Eyes: Negative for visual disturbance.   Respiratory: Positive for cough, shortness of breath and wheezing.    Cardiovascular: Positive for leg swelling. Negative for chest pain and palpitations.   Gastrointestinal: Positive for constipation. Negative for abdominal pain, anal bleeding, blood in stool, diarrhea, nausea and vomiting.   Genitourinary: Positive for  dysuria, frequency, pelvic pain and urgency. Negative for difficulty urinating and hematuria.        Incontinence with coughing, sneezing, or lifting   Musculoskeletal: Positive for arthralgias and back pain. Negative for joint swelling and myalgias.   Skin: Negative for rash.   Neurological: Positive for numbness (and paresthesias both feet) and headaches (when stressed). Negative for weakness.   Psychiatric/Behavioral: Positive for decreased concentration, dysphoric mood and sleep disturbance. Negative for suicidal ideas.     Objective   Physical Exam  Constitutional:       General: She is not in acute distress.     Appearance: Normal appearance. She is well-developed. She is not diaphoretic.      Comments: Bright and in fair spirits. Climbed onto the exam table with minimal assistance. No apparent distress.  No pallor, cyanosis, diaphoresis, or jaundice   HENT:      Head: Atraumatic.      Right Ear: Tympanic membrane, ear canal and external ear normal.      Left Ear: Tympanic membrane, ear canal and external ear normal.      Mouth/Throat:      Lips: No lesions.      Mouth: No oral lesions.      Pharynx: No oropharyngeal exudate or posterior oropharyngeal erythema.   Eyes:      Conjunctiva/sclera: Conjunctivae normal.   Neck:      Thyroid: No thyroid mass or thyromegaly.      Vascular: No carotid bruit or JVD.      Trachea: Trachea normal. No tracheal deviation.   Cardiovascular:      Rate and Rhythm: Normal rate and regular rhythm.      Heart sounds: Normal heart sounds, S1 normal and S2 normal. No murmur heard.    No gallop.   Pulmonary:      Effort: Pulmonary effort is normal.      Breath sounds: Decreased air movement present. Examination of the right-lower field reveals decreased breath sounds. Examination of the left-lower field reveals decreased breath sounds. Decreased breath sounds and wheezing (diffuse - mild) present. No rales.      Comments: Pulmonary hyperinflation  Abdominal:      General: Bowel  sounds are normal. There is no distension.      Tenderness: There is no right CVA tenderness or left CVA tenderness.   Musculoskeletal:      Lumbar back: Tenderness (bilateral lumbar paraspinal muscle tenderness) present. Negative right straight leg raise test and negative left straight leg raise test.      Right lower leg: No edema.      Left lower leg: No edema.   Lymphadenopathy:      Head:      Right side of head: No submental, submandibular, tonsillar, preauricular, posterior auricular or occipital adenopathy.      Left side of head: No submental, submandibular, tonsillar, preauricular, posterior auricular or occipital adenopathy.      Cervical: No cervical adenopathy.      Upper Body:      Right upper body: No supraclavicular adenopathy.      Left upper body: No supraclavicular adenopathy.   Skin:     General: Skin is warm.      Coloration: Skin is not cyanotic, jaundiced or pale.      Findings: No rash.      Nails: There is no clubbing.   Neurological:      Mental Status: She is alert and oriented to person, place, and time.      Cranial Nerves: No cranial nerve deficit.      Sensory: Sensory deficit (decreased vibration sense both feet) present.      Motor: No tremor.      Coordination: Coordination normal.      Gait: Gait normal.   Psychiatric:         Attention and Perception: Attention normal.         Mood and Affect: Mood normal.         Speech: Speech normal.         Behavior: Behavior normal.         Thought Content: Thought content normal.       Assessment & Plan   Problems Addressed this Visit        Allergies and Adverse Reactions    Chronic allergic rhinitis       Cardiac and Vasculature    Aortic atherosclerosis (HCC)  Reminded regarding the importance of risk factor modification.    Relevant Orders    CBC & Differential    Coronary artery calcification seen on CT scan  As above.    Relevant Orders    CBC & Differential    Essential hypertension   Hypertension: at goal. Evidence of target organ  damage: atherosclerosis of the aorta and coronary artery calcifications noted on previous imaging.  Encouraged to continue to work on diet and exercise plan.   Continue current medication    Relevant Orders    Comprehensive Metabolic Panel    Mixed hyperlipidemia  As above.   Continue current medication.    Relevant Orders    Comprehensive Metabolic Panel    Lipid Panel       Endocrine and Metabolic    Class 3 severe obesity with serious comorbidity and body mass index (BMI) of 40.0 to 44.9 in adult ([facility])    Hypothyroidism due to Hashimoto's thyroiditis  Clinically euthyroid.  Continue current medication.    Relevant Orders    TSH    Type 2 diabetes mellitus with peripheral neuropathy ([facility])  Diabetes mellitus Type II, under better control.   Encouraged to continue to pursue ADA diet  Encouraged aerobic exercise.  Continue current medication  Scheduled for updated labs    Relevant Orders    Comprehensive Metabolic Panel    Hemoglobin A1c    MicroAlbumin, Urine, Random - Urine, Clean Catch    Vitamin D deficiency    Relevant Orders    Vitamin D,25-Hydroxy       Gastrointestinal Abdominal     Chronic constipation    Relevant Orders    CBC & Differential    Gastroesophageal reflux disease without esophagitis    Relevant Orders    CBC & Differential       Genitourinary and Reproductive     Acute cystitis without hematuria  Cefdinir 300 twice daily for 1 week  Patient will drop off a urine sample for analysis and culture prior to starting on this  Encouraged to report if any worse, any new symptoms, or if not resolving over the next 2-3 days    Relevant Medications    cefdinir (OMNICEF) 300 MG capsule    fluconazole (DIFLUCAN) 150 MG tablet    Other Relevant Orders    Urinalysis With Culture If Indicated -    Stress bladder incontinence, female       Health Encounters    Healthcare maintenance  Patient has already received a flu shot fall.  Recommended an updated bivalent COVID-19 vaccination.  Reminded to follow-up with  her scheduled CT of the chest       Mental Health    Depression with anxiety       Musculoskeletal and Injuries    Generalized osteoarthritis  Reminded regarding symptomatic treatment.   Continue current medication    Osteopenia    Rheumatoid arthritis (HCC)  As above.  Follow up with  rheumatology    Relevant Orders    CBC & Differential    Comprehensive Metabolic Panel       Pulmonary and Pneumonias    Panlobular emphysema (HCC)   COPD is stable.  Reminded of the importance of smoking cessation  Encouraged to remain as active as symptoms allow for  Continue current medication  Follow up with pulmonology     Relevant Orders    CBC & Differential       Sleep    Obstructive sleep apnea    Relevant Orders    CBC & Differential       Tobacco    Smoker   Diagnoses       Codes Comments    Chronic allergic rhinitis    -  Primary ICD-10-CM: J30.9  ICD-9-CM: 477.9     Mixed hyperlipidemia     ICD-10-CM: E78.2  ICD-9-CM: 272.2     Essential hypertension     ICD-10-CM: I10  ICD-9-CM: 401.9     Coronary artery calcification seen on CT scan     ICD-10-CM: I25.10  ICD-9-CM: 414.00     Aortic atherosclerosis (HCC)     ICD-10-CM: I70.0  ICD-9-CM: 440.0     Vitamin D deficiency     ICD-10-CM: E55.9  ICD-9-CM: 268.9     Type 2 diabetes mellitus with peripheral neuropathy (HCC)     ICD-10-CM: E11.42  ICD-9-CM: 250.60, 357.2     Hypothyroidism due to Hashimoto's thyroiditis     ICD-10-CM: E03.8, E06.3  ICD-9-CM: 244.8, 245.2     Class 3 severe obesity with serious comorbidity and body mass index (BMI) of 40.0 to 44.9 in adult, unspecified obesity type (HCC)     ICD-10-CM: E66.01, Z68.41  ICD-9-CM: 278.01, V85.41     Gastroesophageal reflux disease without esophagitis     ICD-10-CM: K21.9  ICD-9-CM: 530.81     Chronic constipation     ICD-10-CM: K59.09  ICD-9-CM: 564.00     Stress bladder incontinence, female     ICD-10-CM: N39.3  ICD-9-CM: 625.6     Healthcare maintenance     ICD-10-CM: Z00.00  ICD-9-CM: V70.0     Depression with  anxiety     ICD-10-CM: F41.8  ICD-9-CM: 300.4     Rheumatoid arthritis involving multiple sites, unspecified whether rheumatoid factor present (HCC)     ICD-10-CM: M06.9  ICD-9-CM: 714.0     Osteopenia, unspecified location     ICD-10-CM: M85.80  ICD-9-CM: 733.90     Generalized osteoarthritis     ICD-10-CM: M15.9  ICD-9-CM: 715.00     Panlobular emphysema (HCC)     ICD-10-CM: J43.1  ICD-9-CM: 492.8     Obstructive sleep apnea     ICD-10-CM: G47.33  ICD-9-CM: 327.23     Smoker     ICD-10-CM: F17.200  ICD-9-CM: 305.1     Acute cystitis without hematuria     ICD-10-CM: N30.00  ICD-9-CM: 595.0

## 2022-12-13 ENCOUNTER — LAB (OUTPATIENT)
Dept: FAMILY MEDICINE CLINIC | Facility: CLINIC | Age: 62
End: 2022-12-13

## 2022-12-13 DIAGNOSIS — N30.00 ACUTE CYSTITIS WITHOUT HEMATURIA: ICD-10-CM

## 2022-12-13 DIAGNOSIS — F41.8 DEPRESSION WITH ANXIETY: ICD-10-CM

## 2022-12-13 PROCEDURE — 81001 URINALYSIS AUTO W/SCOPE: CPT | Performed by: GENERAL PRACTICE

## 2022-12-13 PROCEDURE — 87077 CULTURE AEROBIC IDENTIFY: CPT | Performed by: GENERAL PRACTICE

## 2022-12-13 PROCEDURE — 87086 URINE CULTURE/COLONY COUNT: CPT | Performed by: GENERAL PRACTICE

## 2022-12-13 PROCEDURE — 87186 SC STD MICRODIL/AGAR DIL: CPT

## 2022-12-13 PROCEDURE — 82043 UR ALBUMIN QUANTITATIVE: CPT | Performed by: GENERAL PRACTICE

## 2022-12-13 RX ORDER — LORAZEPAM 1 MG/1
TABLET ORAL
Qty: 90 TABLET | Refills: 0 | Status: SHIPPED | OUTPATIENT
Start: 2022-12-13 | End: 2023-03-10 | Stop reason: SDUPTHER

## 2022-12-13 NOTE — TELEPHONE ENCOUNTER
Caller: Lisa Hill    Relationship: Self    Best call back number: [phone]     Requested Prescriptions:   Requested Prescriptions     Pending Prescriptions Disp Refills   • LORazepam (ATIVAN) 1 MG tablet 90 tablet 0     Sig: TAKE 1/2 TABLET BY MOUTH TWO TIMES A DAY AS NEEDED        Pharmacy where request should be sent: [facility] [address] - [phone]  - [phone] FX     Additional details provided by patient: PATIENT WILL BE OUT OF MEDICATION JAN 4    Does the patient have less than a 3 day supply:  [] Yes  [x] No    Would you like a call back once the refill request has been completed: [] Yes [] No    If the office needs to give you a call back, can they leave a voicemail: [] Yes [] No    Jorge Fitzgerald Rep   12/13/22 15:43 EST

## 2022-12-14 LAB
ALBUMIN UR-MCNC: 3.2 MG/DL
BACTERIA UR QL AUTO: ABNORMAL /HPF
BILIRUB UR QL STRIP: NEGATIVE
CLARITY UR: CLEAR
COLOR UR: YELLOW
GLUCOSE UR STRIP-MCNC: NEGATIVE MG/DL
HGB UR QL STRIP.AUTO: ABNORMAL
HYALINE CASTS UR QL AUTO: ABNORMAL /LPF
KETONES UR QL STRIP: NEGATIVE
LEUKOCYTE ESTERASE UR QL STRIP.AUTO: ABNORMAL
NITRITE UR QL STRIP: NEGATIVE
PH UR STRIP.AUTO: 8.5 [PH] (ref 5–8)
PROT UR QL STRIP: NEGATIVE
RBC # UR STRIP: ABNORMAL /HPF
REF LAB TEST METHOD: ABNORMAL
SP GR UR STRIP: 1.01 (ref 1–1.03)
SQUAMOUS #/AREA URNS HPF: ABNORMAL /HPF
UROBILINOGEN UR QL STRIP: ABNORMAL
WBC # UR STRIP: ABNORMAL /HPF

## 2022-12-16 ENCOUNTER — TELEPHONE (OUTPATIENT)
Dept: FAMILY MEDICINE CLINIC | Facility: CLINIC | Age: 62
End: 2022-12-16

## 2022-12-16 LAB — BACTERIA SPEC AEROBE CULT: ABNORMAL

## 2022-12-16 RX ORDER — NITROFURANTOIN 25; 75 MG/1; MG/1
100 CAPSULE ORAL 2 TIMES DAILY
Qty: 10 CAPSULE | Refills: 0 | Status: SHIPPED | OUTPATIENT
Start: 2022-12-16 | End: 2023-01-23

## 2022-12-16 NOTE — TELEPHONE ENCOUNTER
Caller: Lisa Hill    Relationship: Self    Best call back number: [phone]    What medications are you currently taking:   Current Outpatient Medications on File Prior to Visit   Medication Sig Dispense Refill   • Acetaminophen Extra Strength 500 MG tablet TAKE TWO TABLETS BY MOUTH FOUR TIMES A  tablet 5   • BD Pen Needle Juju U/F 32G X 4 MM misc USE TO INJECT INSULIN ONCE DAILY 100 each 0   • buPROPion XL (FORFIVO XL) 450 MG 24 hr tablet Take 1 tablet by mouth Every Morning. 30 tablet 5   • busPIRone (BUSPAR) 15 MG tablet Take 1 tablet by mouth 3 (Three) Times a Day. 90 tablet 5   • cefdinir (OMNICEF) 300 MG capsule Take 1 capsule by mouth 2 (Two) Times a Day for 7 days. 14 capsule 0   • Continuous Blood Gluc  (Dexcom G6 ) device 1 Device Daily. 1 each 0   • Continuous Blood Gluc Sensor (Dexcom G6 Sensor) Every 10 (Ten) Days. 9 each 3   • Cyanocobalamin ER (GNP Vitamin B-12) 1000 MCG tablet controlled-release Take 1 tablet by mouth Daily. 30 each 5   • escitalopram (LEXAPRO) 20 MG tablet Take 1 tablet by mouth Daily. 30 tablet 5   • fluticasone (FLONASE) 50 MCG/ACT nasal spray 2 sprays by Each Nare route Daily. 16 g 5   • GNP Vitamin D 25 MCG (1000 UT) tablet TAKE 2 TABLETS BY MOUTH DAILY. 60 tablet 5   • hydroxychloroquine (PLAQUENIL) 200 MG tablet Take 200 mg by mouth Daily.     • Insulin Pen Needle (Pen Needles) 30G X 5 MM misc 1 each Daily. 100 each 0   • ipratropium-albuterol (DUO-NEB) 0.5-2.5 mg/3 ml nebulizer USE 1 VIAL IN NEBUILZER FOUR TIMES A  mL 11   • Lancets (OneTouch Delica Plus [id number]) misc USE TO CHECK BLOOD SUGAR THREE TIMES A  each 5   • levothyroxine (SYNTHROID, LEVOTHROID) 200 MCG tablet TAKE ONE TABLET BY MOUTH EVERY DAY 30 tablet 1   • lidocaine (XYLOCAINE) 5 % ointment Apply 1 application topically to the appropriate area as directed Every 2 (Two) Hours As Needed for Mild Pain . 150 g 2   • LORazepam (ATIVAN) 1 MG tablet TAKE 1/2 TABLET  BY MOUTH TWO TIMES A DAY AS NEEDED 90 tablet 0   • losartan (COZAAR) 50 MG tablet TAKE ONE TABLET BY MOUTH EVERY DAY 30 tablet 1   • meloxicam (MOBIC) 15 MG tablet Take 1 tablet by mouth Daily. 30 tablet 5   • metFORMIN ER (GLUCOPHAGE-XR) 500 MG 24 hr tablet TAKE 4 TABLETS BY MOUTH DAILY. 120 tablet 5   • montelukast (SINGULAIR) 10 MG tablet Take 1 tablet by mouth Daily. 30 tablet 5   • OneTouch Ultra test strip USE TO TEST BLOOD GLUCOSE DAILY 100 each 3   • pantoprazole (PROTONIX) 40 MG EC tablet Take 1 tablet by mouth Daily. 30 tablet 5   • ProAir  (90 Base) MCG/ACT inhaler Inhale 2 puffs Every 4 (Four) Hours As Needed for Wheezing or Shortness of Air. 8.5 g 8   • risperiDONE (risperDAL) 2 MG tablet TAKE ONE TABLET BY MOUTH EVERY NIGHT AT BEDTIME 30 tablet 5   • Semaglutide,0.25 or 0.5MG/DOS, (Ozempic, 0.25 or 0.5 MG/DOSE,) 2 MG/1.5ML solution pen-injector Inject 0.5 mg under the skin into the appropriate area as directed 1 (One) Time Per Week. 1.5 mL 5   • simvastatin (ZOCOR) 20 MG tablet Take 1 tablet by mouth every night at bedtime. 30 tablet 5   • tiotropium bromide-olodaterol (STIOLTO RESPIMAT) 2.5-2.5 MCG/ACT aerosol solution inhaler Inhale 2 puffs Daily. 1 each 8   • topiramate (TOPAMAX) 100 MG tablet Take 1 tablet by mouth 2 (Two) Times a Day. 60 tablet 5     No current facility-administered medications on file prior to visit.          When did you start taking these medications: 12/12/22    Which medication are you concerned about: cefdinir (OMNICEF) 300 MG capsule    Who prescribed you this medication: LIAN    What are your concerns: PATIENT STATED THAT SHE CAN NOT TAKE THIS MEDICATION THAT IT UPSETS HER STOMACH AND MAKES HER FEEL SICKER AND WANTED TO SEE IF PROVIDER WOULD CALL ANOTHER ANTIBIOTIC INTO PHARMACY BEFORE OFFICE CLOSES TODAY    PLEASE ADVISE    How long have you had these concerns: N/A

## 2022-12-28 ENCOUNTER — TELEPHONE (OUTPATIENT)
Dept: FAMILY MEDICINE CLINIC | Facility: CLINIC | Age: 62
End: 2022-12-28

## 2022-12-28 RX ORDER — LEVOFLOXACIN 500 MG/1
500 TABLET, FILM COATED ORAL DAILY
Qty: 7 TABLET | Refills: 0 | Status: SHIPPED | OUTPATIENT
Start: 2022-12-28 | End: 2023-01-23

## 2022-12-28 NOTE — TELEPHONE ENCOUNTER
PATIENT HAS HAD ONE ROUND OF ANTIBIOTICS FOR A UTI AND WAS TOLD IF THAT DID NOT WORK SHE WOULD GET ANOTHER ROUND.  THAT DID NOT WORK.  SHE WOULD LIKE MEDICATION.  ALLERGIC TO SULFA.      PHARMACY:  [facility]    PLEASE CALL [phone]

## 2023-01-02 DIAGNOSIS — M47.816 SPONDYLOSIS OF LUMBAR REGION WITHOUT MYELOPATHY OR RADICULOPATHY: ICD-10-CM

## 2023-01-02 DIAGNOSIS — J30.1 SEASONAL ALLERGIC RHINITIS DUE TO POLLEN: ICD-10-CM

## 2023-01-03 RX ORDER — MELOXICAM 15 MG/1
TABLET ORAL
Qty: 30 TABLET | Refills: 5 | Status: SHIPPED | OUTPATIENT
Start: 2023-01-03

## 2023-01-03 RX ORDER — FLUTICASONE PROPIONATE 50 MCG
SPRAY, SUSPENSION (ML) NASAL
Qty: 16 G | Refills: 5 | Status: SHIPPED | OUTPATIENT
Start: 2023-01-03

## 2023-01-11 ENCOUNTER — APPOINTMENT (OUTPATIENT)
Dept: CT IMAGING | Facility: HOSPITAL | Age: 63
End: 2023-01-11
Payer: COMMERCIAL

## 2023-01-23 ENCOUNTER — OFFICE VISIT (OUTPATIENT)
Dept: FAMILY MEDICINE CLINIC | Facility: CLINIC | Age: 63
End: 2023-01-23
Payer: COMMERCIAL

## 2023-01-23 VITALS
HEART RATE: 103 BPM | DIASTOLIC BLOOD PRESSURE: 78 MMHG | SYSTOLIC BLOOD PRESSURE: 122 MMHG | HEIGHT: 63 IN | OXYGEN SATURATION: 98 % | BODY MASS INDEX: 38.45 KG/M2 | TEMPERATURE: 98.1 F | WEIGHT: 217 LBS

## 2023-01-23 DIAGNOSIS — R59.0 LYMPHADENOPATHY, CERVICAL: Primary | ICD-10-CM

## 2023-01-23 DIAGNOSIS — E78.2 MIXED HYPERLIPIDEMIA: Chronic | ICD-10-CM

## 2023-01-23 DIAGNOSIS — I10 ESSENTIAL HYPERTENSION: Chronic | ICD-10-CM

## 2023-01-23 PROCEDURE — 99214 OFFICE O/P EST MOD 30 MIN: CPT | Performed by: PHYSICIAN ASSISTANT

## 2023-01-23 RX ORDER — FLUCONAZOLE 150 MG/1
150 TABLET ORAL DAILY
Qty: 2 TABLET | Refills: 0 | Status: SHIPPED | OUTPATIENT
Start: 2023-01-23 | End: 2023-02-06 | Stop reason: SDUPTHER

## 2023-01-23 RX ORDER — AMOXICILLIN AND CLAVULANATE POTASSIUM 875; 125 MG/1; MG/1
1 TABLET, FILM COATED ORAL 2 TIMES DAILY
Qty: 20 TABLET | Refills: 0 | Status: SHIPPED | OUTPATIENT
Start: 2023-01-23 | End: 2023-04-03

## 2023-01-23 RX ORDER — LIRAGLUTIDE 6 MG/ML
INJECTION SUBCUTANEOUS
COMMUNITY
Start: 2023-01-10 | End: 2023-02-24 | Stop reason: SDUPTHER

## 2023-01-23 NOTE — PROGRESS NOTES
"Subjective   Lisa Hill is a 62 y.o. female.       Chief Complaint -earache    History of Present Illness -    ROS    Ear ache-  Patient complains of right-sided earache with pain described as occurring just inferior to the right ear and along the right neck.  Minimal relief with Flonase or over-the-counter swimmer's ear drops.  Onset 4 days.  She denies any fever or sore throat.    Hypertension-  Controlled with losartan and dietary modification    Hyperlipidemia-stable simvastatin and dietary modification    The following portions of the patient's history were reviewed and updated as appropriate: allergies, current medications, past family history, past medical history, past social history, past surgical history and problem list.    Review of Systems    Objective  Vital signs:  /78   Pulse 103   Temp 98.1 °F (36.7 °C) (Temporal)   Ht 158.8 cm (62.5\")   Wt 98.4 kg (217 lb)   LMP  (LMP Unknown)   SpO2 98%   BMI 39.06 kg/m²     Physical Exam  Vitals and nursing note reviewed.   Constitutional:       General: She is not in acute distress.     Appearance: Normal appearance. She is well-developed. She is not diaphoretic.   HENT:      Head: Normocephalic and atraumatic.      Right Ear: Tympanic membrane, ear canal and external ear normal.      Left Ear: Tympanic membrane, ear canal and external ear normal.      Nose: Nose normal.      Mouth/Throat:      Mouth: Mucous membranes are moist.      Pharynx: No oropharyngeal exudate or posterior oropharyngeal erythema.   Neck:      Thyroid: No thyromegaly.   Cardiovascular:      Rate and Rhythm: Regular rhythm. Tachycardia present.      Heart sounds: Normal heart sounds. No murmur heard.  Pulmonary:      Effort: Pulmonary effort is normal.      Breath sounds: Normal breath sounds. No wheezing or rales.   Musculoskeletal:      Cervical back: Normal range of motion and neck supple.   Lymphadenopathy:      Cervical: Cervical adenopathy (right enlarged tender " inferior auricular lymph nodes) present.   Skin:     General: Skin is warm and dry.      Findings: No rash.   Neurological:      Mental Status: She is alert and oriented to person, place, and time.   Psychiatric:         Mood and Affect: Mood normal.         Behavior: Behavior normal.         Thought Content: Thought content normal.         The following data was reviewed by: ANGEL Chaves on 01/23/2023:  CMP    CMP 3/1/22 6/23/22   Glucose 85 93   BUN 11 9   Creatinine 0.93 0.91   eGFR 69.2 70.6   Sodium 135 (A) 137   Potassium 4.7 5.0   Chloride 96 (A) 99   Calcium 9.6 9.3   Total Protein 7.3 6.9   Albumin 3.80 3.70   Globulin 3.5 3.2   Total Bilirubin 0.3 0.2   Alkaline Phosphatase 56 53   AST (SGOT) 15 19   ALT (SGPT) 28 30   Albumin/Globulin Ratio 1.1 1.2   BUN/Creatinine Ratio 11.8 9.9   Anion Gap 13.2 13.0   (A) Abnormal value       Comments are available for some flowsheets but are not being displayed.           CBC w/diff    CBC w/Diff 3/1/22 6/23/22   WBC 13.01 (A) 9.55   RBC 4.59 4.45   Hemoglobin 15.1 14.5   Hematocrit 44.4 42.5   MCV 96.7 95.5   MCH 32.9 32.6   MCHC 34.0 34.1   RDW 12.3 12.3   Platelets 398 388   Neutrophil Rel % 64.0 55.7   Immature Granulocyte Rel % 0.4 0.2   Lymphocyte Rel % 27.3 33.9   Monocyte Rel % 6.5 7.4   Eosinophil Rel % 1.3 1.9   Basophil Rel % 0.5 0.9   (A) Abnormal value            Lipid Panel    Lipid Panel 3/1/22 6/23/22   Total Cholesterol 124 131   Triglycerides 198 (A) 270 (A)   HDL Cholesterol 40 36 (A)   VLDL Cholesterol 32 42 (A)   LDL Cholesterol  52 53   LDL/HDL Ratio 1.11 1.14   (A) Abnormal value            TSH    TSH 3/1/22 6/23/22   TSH 1.600 0.174 (A)   (A) Abnormal value            Most Recent A1C    HGBA1C Most Recent 6/23/22   Hemoglobin A1C 5.30                  Assessment & Plan     Diagnoses and all orders for this visit:    1. Lymphadenopathy, cervical (Primary)  -     fluconazole (Diflucan) 150 MG tablet; Take 1 tablet by mouth Daily.   Dispense: 2 tablet; Refill: 0  -     amoxicillin-clavulanate (Augmentin) 875-125 MG per tablet; Take 1 tablet by mouth 2 (Two) Times a Day.  Dispense: 20 tablet; Refill: 0    2. Essential hypertension  Comments:  Continue losartan  Advised to check BP daily and report any consistent readings greater 130/80    3. Mixed hyperlipidemia  Comments:  Continue simvastatin  Advised a low cholesterol diet            Patient was given instructions and counseling regarding his condition or for health maintenance advice. Please see specific information pulled into the AVS if appropriate      This document has been electronically signed by:  Kerry Bravo PA-C

## 2023-01-30 DIAGNOSIS — F41.8 DEPRESSION WITH ANXIETY: ICD-10-CM

## 2023-01-30 RX ORDER — MAGNESIUM 200 MG
TABLET ORAL
Qty: 30 TABLET | Refills: 5 | Status: SHIPPED | OUTPATIENT
Start: 2023-01-30 | End: 2023-04-03

## 2023-01-30 RX ORDER — BUSPIRONE HYDROCHLORIDE 15 MG/1
TABLET ORAL
Qty: 90 TABLET | Refills: 5 | Status: SHIPPED | OUTPATIENT
Start: 2023-01-30

## 2023-02-06 ENCOUNTER — LAB (OUTPATIENT)
Dept: FAMILY MEDICINE CLINIC | Facility: CLINIC | Age: 63
End: 2023-02-06
Payer: COMMERCIAL

## 2023-02-06 ENCOUNTER — TELEPHONE (OUTPATIENT)
Dept: FAMILY MEDICINE CLINIC | Facility: CLINIC | Age: 63
End: 2023-02-06

## 2023-02-06 DIAGNOSIS — J43.1 PANLOBULAR EMPHYSEMA: ICD-10-CM

## 2023-02-06 DIAGNOSIS — K59.09 CHRONIC CONSTIPATION: ICD-10-CM

## 2023-02-06 DIAGNOSIS — I70.0 AORTIC ATHEROSCLEROSIS: ICD-10-CM

## 2023-02-06 DIAGNOSIS — I25.10 CORONARY ARTERY CALCIFICATION SEEN ON CT SCAN: ICD-10-CM

## 2023-02-06 DIAGNOSIS — R30.0 DYSURIA: Primary | ICD-10-CM

## 2023-02-06 DIAGNOSIS — E06.3 HYPOTHYROIDISM DUE TO HASHIMOTO'S THYROIDITIS: ICD-10-CM

## 2023-02-06 DIAGNOSIS — G47.33 OBSTRUCTIVE SLEEP APNEA: ICD-10-CM

## 2023-02-06 DIAGNOSIS — R59.0 LYMPHADENOPATHY, CERVICAL: ICD-10-CM

## 2023-02-06 DIAGNOSIS — K21.9 GASTROESOPHAGEAL REFLUX DISEASE WITHOUT ESOPHAGITIS: ICD-10-CM

## 2023-02-06 DIAGNOSIS — E55.9 VITAMIN D DEFICIENCY: ICD-10-CM

## 2023-02-06 DIAGNOSIS — I10 ESSENTIAL HYPERTENSION: ICD-10-CM

## 2023-02-06 DIAGNOSIS — E78.2 MIXED HYPERLIPIDEMIA: ICD-10-CM

## 2023-02-06 DIAGNOSIS — M06.9 RHEUMATOID ARTHRITIS INVOLVING MULTIPLE SITES, UNSPECIFIED WHETHER RHEUMATOID FACTOR PRESENT: ICD-10-CM

## 2023-02-06 DIAGNOSIS — E11.42 TYPE 2 DIABETES MELLITUS WITH PERIPHERAL NEUROPATHY: ICD-10-CM

## 2023-02-06 DIAGNOSIS — E03.8 HYPOTHYROIDISM DUE TO HASHIMOTO'S THYROIDITIS: ICD-10-CM

## 2023-02-06 LAB
BILIRUB BLD-MCNC: NEGATIVE MG/DL
CLARITY, POC: CLEAR
COLOR UR: YELLOW
GLUCOSE UR STRIP-MCNC: NEGATIVE MG/DL
KETONES UR QL: NEGATIVE
LEUKOCYTE EST, POC: ABNORMAL
NITRITE UR-MCNC: NEGATIVE MG/ML
PH UR: 7 [PH] (ref 5–8)
PROT UR STRIP-MCNC: NEGATIVE MG/DL
RBC # UR STRIP: NEGATIVE /UL
SP GR UR: 1.01 (ref 1–1.03)
UROBILINOGEN UR QL: NORMAL

## 2023-02-06 PROCEDURE — 81002 URINALYSIS NONAUTO W/O SCOPE: CPT | Performed by: GENERAL PRACTICE

## 2023-02-06 PROCEDURE — 80061 LIPID PANEL: CPT | Performed by: GENERAL PRACTICE

## 2023-02-06 PROCEDURE — 83036 HEMOGLOBIN GLYCOSYLATED A1C: CPT | Performed by: GENERAL PRACTICE

## 2023-02-06 PROCEDURE — 87086 URINE CULTURE/COLONY COUNT: CPT | Performed by: GENERAL PRACTICE

## 2023-02-06 PROCEDURE — 82306 VITAMIN D 25 HYDROXY: CPT | Performed by: GENERAL PRACTICE

## 2023-02-06 PROCEDURE — 82043 UR ALBUMIN QUANTITATIVE: CPT | Performed by: GENERAL PRACTICE

## 2023-02-06 PROCEDURE — 80050 GENERAL HEALTH PANEL: CPT | Performed by: GENERAL PRACTICE

## 2023-02-06 RX ORDER — FLUCONAZOLE 150 MG/1
150 TABLET ORAL EVERY OTHER DAY
Qty: 3 TABLET | Refills: 0 | Status: SHIPPED | OUTPATIENT
Start: 2023-02-06 | End: 2023-04-03

## 2023-02-06 RX ORDER — MAGNESIUM 200 MG
TABLET ORAL
Qty: 30 TABLET | Refills: 5 | OUTPATIENT
Start: 2023-02-06

## 2023-02-06 NOTE — TELEPHONE ENCOUNTER
Caller: Lisa Hill    Relationship: Self    Best call back number: [phone]    What medication are you requesting: RX FOR UTI  What are your current symptoms: BURNING, PAIN,HURTING    How long have you been experiencing symptoms:   2/5  Have you had these symptoms before:    [x] Yes  [] No    Have you been treated for these symptoms before:   [x] Yes  [] No    If a prescription is needed, what is your preferred pharmacy and phone number:    [facility] - [place], [place] - [address] - [phone]  - [phone] FX

## 2023-02-07 ENCOUNTER — TELEPHONE (OUTPATIENT)
Dept: FAMILY MEDICINE CLINIC | Facility: CLINIC | Age: 63
End: 2023-02-07

## 2023-02-07 LAB
25(OH)D3 SERPL-MCNC: 65.7 NG/ML (ref 30–100)
ALBUMIN SERPL-MCNC: 4.1 G/DL (ref 3.5–5.2)
ALBUMIN UR-MCNC: <1.2 MG/DL
ALBUMIN/GLOB SERPL: 1.4 G/DL
ALP SERPL-CCNC: 55 U/L (ref 39–117)
ALT SERPL W P-5'-P-CCNC: 31 U/L (ref 1–33)
ANION GAP SERPL CALCULATED.3IONS-SCNC: 10 MMOL/L (ref 5–15)
AST SERPL-CCNC: 21 U/L (ref 1–32)
BACTERIA SPEC AEROBE CULT: NO GROWTH
BASOPHILS # BLD AUTO: 0.07 10*3/MM3 (ref 0–0.2)
BASOPHILS NFR BLD AUTO: 0.6 % (ref 0–1.5)
BILIRUB SERPL-MCNC: 0.2 MG/DL (ref 0–1.2)
BUN SERPL-MCNC: 7 MG/DL (ref 8–23)
BUN/CREAT SERPL: 7.2 (ref 7–25)
CALCIUM SPEC-SCNC: 9.5 MG/DL (ref 8.6–10.5)
CHLORIDE SERPL-SCNC: 98 MMOL/L (ref 98–107)
CHOLEST SERPL-MCNC: 135 MG/DL (ref 0–200)
CO2 SERPL-SCNC: 27 MMOL/L (ref 22–29)
CREAT SERPL-MCNC: 0.97 MG/DL (ref 0.57–1)
DEPRECATED RDW RBC AUTO: 44.7 FL (ref 37–54)
EGFRCR SERPLBLD CKD-EPI 2021: 65.4 ML/MIN/1.73
EOSINOPHIL # BLD AUTO: 0.11 10*3/MM3 (ref 0–0.4)
EOSINOPHIL NFR BLD AUTO: 1 % (ref 0.3–6.2)
ERYTHROCYTE [DISTWIDTH] IN BLOOD BY AUTOMATED COUNT: 12.5 % (ref 12.3–15.4)
GLOBULIN UR ELPH-MCNC: 2.9 GM/DL
GLUCOSE SERPL-MCNC: 77 MG/DL (ref 65–99)
HBA1C MFR BLD: 5.4 % (ref 4.8–5.6)
HCT VFR BLD AUTO: 45.5 % (ref 34–46.6)
HDLC SERPL-MCNC: 43 MG/DL (ref 40–60)
HGB BLD-MCNC: 15 G/DL (ref 12–15.9)
IMM GRANULOCYTES # BLD AUTO: 0.04 10*3/MM3 (ref 0–0.05)
IMM GRANULOCYTES NFR BLD AUTO: 0.3 % (ref 0–0.5)
LDLC SERPL CALC-MCNC: 62 MG/DL (ref 0–100)
LDLC/HDLC SERPL: 1.29 {RATIO}
LYMPHOCYTES # BLD AUTO: 3.29 10*3/MM3 (ref 0.7–3.1)
LYMPHOCYTES NFR BLD AUTO: 28.7 % (ref 19.6–45.3)
MCH RBC QN AUTO: 31.5 PG (ref 26.6–33)
MCHC RBC AUTO-ENTMCNC: 33 G/DL (ref 31.5–35.7)
MCV RBC AUTO: 95.6 FL (ref 79–97)
MONOCYTES # BLD AUTO: 0.64 10*3/MM3 (ref 0.1–0.9)
MONOCYTES NFR BLD AUTO: 5.6 % (ref 5–12)
NEUTROPHILS NFR BLD AUTO: 63.8 % (ref 42.7–76)
NEUTROPHILS NFR BLD AUTO: 7.3 10*3/MM3 (ref 1.7–7)
NRBC BLD AUTO-RTO: 0 /100 WBC (ref 0–0.2)
PLATELET # BLD AUTO: 445 10*3/MM3 (ref 140–450)
PMV BLD AUTO: 10.4 FL (ref 6–12)
POTASSIUM SERPL-SCNC: 4.7 MMOL/L (ref 3.5–5.2)
PROT SERPL-MCNC: 7 G/DL (ref 6–8.5)
RBC # BLD AUTO: 4.76 10*6/MM3 (ref 3.77–5.28)
SODIUM SERPL-SCNC: 135 MMOL/L (ref 136–145)
TRIGL SERPL-MCNC: 183 MG/DL (ref 0–150)
TSH SERPL DL<=0.05 MIU/L-ACNC: 0.17 UIU/ML (ref 0.27–4.2)
VLDLC SERPL-MCNC: 30 MG/DL (ref 5–40)
WBC NRBC COR # BLD: 11.45 10*3/MM3 (ref 3.4–10.8)

## 2023-02-07 NOTE — TELEPHONE ENCOUNTER
Caller: Lisa Hill    Relationship: Self    Best call back number: [phone]    What test was performed: LABS    When was the test performed:  2/6/23      Additional notes:     PLEASE CALL PATIENT WITH RESULTS

## 2023-02-09 ENCOUNTER — HOSPITAL ENCOUNTER (OUTPATIENT)
Dept: CT IMAGING | Facility: HOSPITAL | Age: 63
Discharge: HOME | End: 2023-02-09
Admitting: GENERAL PRACTICE
Payer: COMMERCIAL

## 2023-02-09 DIAGNOSIS — Z00.00 HEALTHCARE MAINTENANCE: ICD-10-CM

## 2023-02-09 DIAGNOSIS — F17.200 SMOKER: ICD-10-CM

## 2023-02-09 PROCEDURE — 71271 CT THORAX LUNG CANCER SCR C-: CPT

## 2023-02-09 PROCEDURE — 71271 CT THORAX LUNG CANCER SCR C-: CPT | Performed by: RADIOLOGY

## 2023-02-10 ENCOUNTER — TELEPHONE (OUTPATIENT)
Dept: FAMILY MEDICINE CLINIC | Facility: CLINIC | Age: 63
End: 2023-02-10

## 2023-02-10 NOTE — TELEPHONE ENCOUNTER
Caller: Lisa Hill    Relationship: Self    Best call back number: [phone]    Caller requesting test results: YES    What test was performed: CT SCAN    When was the test performed: 2.9.23    Additional notes: PLEASE CALL TO REVIEW THESE RESULTS AS SOON AS POSSIBLE.     THANK YOU.

## 2023-02-10 NOTE — PROGRESS NOTES
Spoke with pt about the following per Dr. Chavez. VH    -- Please let patient know that low dose CT OK. Will be due for next in one year.

## 2023-02-24 DIAGNOSIS — E11.42 TYPE 2 DIABETES MELLITUS WITH PERIPHERAL NEUROPATHY: Primary | ICD-10-CM

## 2023-02-24 RX ORDER — LIRAGLUTIDE 6 MG/ML
1.8 INJECTION SUBCUTANEOUS DAILY
Qty: 9 ML | Refills: 3 | Status: SHIPPED | OUTPATIENT
Start: 2023-02-24 | End: 2023-03-21 | Stop reason: SDUPTHER

## 2023-02-26 DIAGNOSIS — E78.2 MIXED HYPERLIPIDEMIA: ICD-10-CM

## 2023-02-26 DIAGNOSIS — F41.8 DEPRESSION WITH ANXIETY: ICD-10-CM

## 2023-02-26 DIAGNOSIS — K21.9 GASTROESOPHAGEAL REFLUX DISEASE WITHOUT ESOPHAGITIS: ICD-10-CM

## 2023-02-27 RX ORDER — SIMVASTATIN 20 MG
TABLET ORAL
Qty: 30 TABLET | Refills: 5 | Status: SHIPPED | OUTPATIENT
Start: 2023-02-27

## 2023-02-27 RX ORDER — BUPROPION HYDROCHLORIDE 450 MG/1
450 TABLET, FILM COATED, EXTENDED RELEASE ORAL EVERY MORNING
Qty: 30 TABLET | Refills: 5 | Status: SHIPPED | OUTPATIENT
Start: 2023-02-27

## 2023-02-27 RX ORDER — PANTOPRAZOLE SODIUM 40 MG/1
TABLET, DELAYED RELEASE ORAL
Qty: 30 TABLET | Refills: 5 | Status: SHIPPED | OUTPATIENT
Start: 2023-02-27

## 2023-02-27 RX ORDER — ESCITALOPRAM OXALATE 20 MG/1
TABLET ORAL
Qty: 30 TABLET | Refills: 5 | Status: SHIPPED | OUTPATIENT
Start: 2023-02-27

## 2023-03-06 RX ORDER — PEN NEEDLE, DIABETIC 32GX 5/32"
NEEDLE, DISPOSABLE MISCELLANEOUS
Qty: 100 EACH | Refills: 0 | Status: SHIPPED | OUTPATIENT
Start: 2023-03-06 | End: 2023-04-03 | Stop reason: SDUPTHER

## 2023-03-10 ENCOUNTER — TELEPHONE (OUTPATIENT)
Dept: FAMILY MEDICINE CLINIC | Facility: CLINIC | Age: 63
End: 2023-03-10

## 2023-03-10 DIAGNOSIS — F41.8 DEPRESSION WITH ANXIETY: ICD-10-CM

## 2023-03-10 RX ORDER — LORAZEPAM 1 MG/1
TABLET ORAL
Qty: 90 TABLET | Refills: 0 | Status: SHIPPED | OUTPATIENT
Start: 2023-03-10 | End: 2023-04-03

## 2023-03-10 NOTE — TELEPHONE ENCOUNTER
Caller: Lisa Hill    Relationship: Self    Best call back number:   (Self) [phone]      Requested Prescriptions:      LORazepam (ATIVAN) 1 MG tablet     Pharmacy where request should be sent: [facility] [address] - [phone]  - [phone] FX     Additional details provided by patient: PLEASE REFILL 90 DAY SUPPLY WITH REFILLS    Does the patient have less than a 3 day supply:  [x] Yes  [] No    Would you like a call back once the refill request has been completed: [] Yes [] No    If the office needs to give you a call back, can they leave a voicemail: [] Yes [] No    Jorge Hernandez Rep   03/10/23 15:44 EST

## 2023-03-13 RX ORDER — LORAZEPAM 1 MG/1
TABLET ORAL
Qty: 90 TABLET | Refills: 0 | Status: SHIPPED | OUTPATIENT
Start: 2023-03-13

## 2023-03-21 ENCOUNTER — TELEPHONE (OUTPATIENT)
Dept: FAMILY MEDICINE CLINIC | Facility: CLINIC | Age: 63
End: 2023-03-21

## 2023-03-21 DIAGNOSIS — E11.42 TYPE 2 DIABETES MELLITUS WITH PERIPHERAL NEUROPATHY: ICD-10-CM

## 2023-03-21 RX ORDER — LIRAGLUTIDE 6 MG/ML
1.8 INJECTION SUBCUTANEOUS DAILY
Qty: 9 ML | Refills: 3 | Status: SHIPPED | OUTPATIENT
Start: 2023-03-21

## 2023-03-21 NOTE — TELEPHONE ENCOUNTER
Caller: Lisa Hill    Relationship: Self    Best call back number: [phone]     What was the call regarding: PATIENT CALLED TO REQUEST Victoza 18 MG/3ML solution pen-injector injection.  PATIENT NEEDED ENOUGH UNTIL 3-24-23. PATIENT HAS ENOUGH FOR TODAY AND MAYBE IN THE MORNING.    Do you require a callback: YES

## 2023-03-22 NOTE — TELEPHONE ENCOUNTER
Called and let the patient know we do not have any samples of this medication. I explained that she would have to wait and pick her prescription up Friday.

## 2023-03-22 NOTE — TELEPHONE ENCOUNTER
Caller: Lsia Hill    Relationship: Self    Best call back number: [phone]    What is the best time to reach you: ANYTIME      Who are you requesting to speak with (clinical staff, provider,  specific staff member): CLINICAL STAFF     Do you know the name of the person who called: THE PATIENT LISA     What was the call regarding THE PATIENT REPORTS THAT [facility] ADVISED HER THAT HER INSURANCE WILL NOT ALLOW THEM TO DO HER REFILL ON HER VICTOZA UNTIL Friday, 03/24/2023. THE PATIENT IS REQUESTING SAMPLES BECAUSE HER PEN IS ALMOST OUT.     Do you require a callback: YES, PLEASE CALL AND ADVISE

## 2023-03-26 DIAGNOSIS — F41.8 DEPRESSION WITH ANXIETY: ICD-10-CM

## 2023-03-27 RX ORDER — RISPERIDONE 2 MG/1
TABLET ORAL
Qty: 30 TABLET | Refills: 5 | Status: SHIPPED | OUTPATIENT
Start: 2023-03-27

## 2023-03-27 NOTE — TELEPHONE ENCOUNTER
Dr. Chavez is out of the office this week. Could you refill this?     Pt was last seen on 12/12/2022.

## 2023-04-03 ENCOUNTER — OFFICE VISIT (OUTPATIENT)
Dept: FAMILY MEDICINE CLINIC | Facility: CLINIC | Age: 63
End: 2023-04-03
Payer: MEDICARE

## 2023-04-03 VITALS
BODY MASS INDEX: 36.86 KG/M2 | WEIGHT: 208 LBS | RESPIRATION RATE: 15 BRPM | HEART RATE: 99 BPM | DIASTOLIC BLOOD PRESSURE: 65 MMHG | OXYGEN SATURATION: 92 % | TEMPERATURE: 98.6 F | SYSTOLIC BLOOD PRESSURE: 118 MMHG | HEIGHT: 63 IN

## 2023-04-03 DIAGNOSIS — E06.3 HYPOTHYROIDISM DUE TO HASHIMOTO'S THYROIDITIS: ICD-10-CM

## 2023-04-03 DIAGNOSIS — E11.42 TYPE 2 DIABETES MELLITUS WITH PERIPHERAL NEUROPATHY: ICD-10-CM

## 2023-04-03 DIAGNOSIS — M06.9 RHEUMATOID ARTHRITIS INVOLVING MULTIPLE SITES, UNSPECIFIED WHETHER RHEUMATOID FACTOR PRESENT: ICD-10-CM

## 2023-04-03 DIAGNOSIS — K59.09 CHRONIC CONSTIPATION: ICD-10-CM

## 2023-04-03 DIAGNOSIS — N39.3 STRESS BLADDER INCONTINENCE, FEMALE: ICD-10-CM

## 2023-04-03 DIAGNOSIS — J43.1 PANLOBULAR EMPHYSEMA: ICD-10-CM

## 2023-04-03 DIAGNOSIS — K21.9 GASTROESOPHAGEAL REFLUX DISEASE WITHOUT ESOPHAGITIS: ICD-10-CM

## 2023-04-03 DIAGNOSIS — M15.9 GENERALIZED OSTEOARTHRITIS: ICD-10-CM

## 2023-04-03 DIAGNOSIS — I10 ESSENTIAL HYPERTENSION: ICD-10-CM

## 2023-04-03 DIAGNOSIS — E03.9 ACQUIRED HYPOTHYROIDISM: ICD-10-CM

## 2023-04-03 DIAGNOSIS — E66.01 CLASS 3 SEVERE OBESITY WITH SERIOUS COMORBIDITY AND BODY MASS INDEX (BMI) OF 40.0 TO 44.9 IN ADULT, UNSPECIFIED OBESITY TYPE: ICD-10-CM

## 2023-04-03 DIAGNOSIS — M85.80 OSTEOPENIA, UNSPECIFIED LOCATION: ICD-10-CM

## 2023-04-03 DIAGNOSIS — Z00.00 HEALTHCARE MAINTENANCE: ICD-10-CM

## 2023-04-03 DIAGNOSIS — E03.8 HYPOTHYROIDISM DUE TO HASHIMOTO'S THYROIDITIS: ICD-10-CM

## 2023-04-03 DIAGNOSIS — I25.10 CORONARY ARTERY CALCIFICATION SEEN ON CT SCAN: ICD-10-CM

## 2023-04-03 DIAGNOSIS — E78.2 MIXED HYPERLIPIDEMIA: ICD-10-CM

## 2023-04-03 DIAGNOSIS — F17.200 SMOKER: ICD-10-CM

## 2023-04-03 DIAGNOSIS — F41.8 DEPRESSION WITH ANXIETY: ICD-10-CM

## 2023-04-03 DIAGNOSIS — G47.33 OBSTRUCTIVE SLEEP APNEA: ICD-10-CM

## 2023-04-03 DIAGNOSIS — E55.9 VITAMIN D DEFICIENCY: ICD-10-CM

## 2023-04-03 DIAGNOSIS — M54.59 MECHANICAL LOW BACK PAIN: ICD-10-CM

## 2023-04-03 DIAGNOSIS — I70.0 AORTIC ATHEROSCLEROSIS: ICD-10-CM

## 2023-04-03 DIAGNOSIS — J30.9 CHRONIC ALLERGIC RHINITIS: Primary | ICD-10-CM

## 2023-04-03 PROBLEM — N30.00 ACUTE CYSTITIS WITHOUT HEMATURIA: Status: RESOLVED | Noted: 2022-12-12 | Resolved: 2023-04-03

## 2023-04-03 RX ORDER — LEVOTHYROXINE SODIUM 0.2 MG/1
200 TABLET ORAL DAILY
Qty: 30 TABLET | Refills: 5 | Status: SHIPPED | OUTPATIENT
Start: 2023-04-03

## 2023-04-03 RX ORDER — TOPIRAMATE 100 MG/1
100 TABLET, FILM COATED ORAL 2 TIMES DAILY
Qty: 60 TABLET | Refills: 5 | Status: SHIPPED | OUTPATIENT
Start: 2023-04-03

## 2023-04-03 RX ORDER — MELATONIN
2000 DAILY
Qty: 60 TABLET | Refills: 5 | Status: SHIPPED | OUTPATIENT
Start: 2023-04-03

## 2023-04-03 RX ORDER — METFORMIN HYDROCHLORIDE 500 MG/1
2000 TABLET, EXTENDED RELEASE ORAL DAILY
Qty: 120 TABLET | Refills: 5 | Status: SHIPPED | OUTPATIENT
Start: 2023-04-03

## 2023-04-03 RX ORDER — LOSARTAN POTASSIUM 50 MG/1
50 TABLET ORAL DAILY
Qty: 30 TABLET | Refills: 5 | Status: SHIPPED | OUTPATIENT
Start: 2023-04-03

## 2023-04-03 RX ORDER — PEN NEEDLE, DIABETIC 32GX 5/32"
1 NEEDLE, DISPOSABLE MISCELLANEOUS DAILY
Qty: 100 EACH | Refills: 3 | Status: SHIPPED | OUTPATIENT
Start: 2023-04-03

## 2023-04-03 NOTE — PROGRESS NOTES
Subjective   Lisa Hill is a 62 y.o. female.     Chief Complaint  She returns for a scheduled reassessment of multiple medical problems including chronic back and joint pain, chronic obstructive pulmonary disease, type 2 diabetes mellitus, hyperlipidemia, essential hypertension, hypothyroidism, and depression    History of Present Illness     Low Back and Joint Pain  She complains of persistent low back and generalized joint pain.  She feels this has improved some with the weight that she has lost over the last year.  There has been no change in the quality of her discomfort nor any new associated symptoms. There is no history of any weakness, numbness, tingling, or any change in her bowel/bladder control.  There is no history of any fever, chills, or night sweats.  When she sits down the pain generally resolves within minutes and she denies any problem at night. She continues to use her TENS unit for several hours daily and feels that it helps.  She also feels that meloxicam helps.  She remains on hydroxychloroquine.  Plain films of the lumbar spine performed on 8/5/2019 were reported as showing degenerative disc disease as well as atherosclerotic calcification of the aorta    COPD  Her shortness of breath, and cough have also continued to improve as she has lost weight. She admits to intermittent nasal congestion but continues to deny any other upper respiratory tract symptoms, chest pain, hemoptysis, fever or chills.  Her symptoms worsen with activity, exposure to cold air and environmental allergens and improve some with rest, supplemental oxygen, and inhaled inhaled medication.  She remains on stiolto and is using her rescue inhaler once or twice daily. She is currently smoking just less than 1 pack per day.  She underwent an updated low dose CT of the chest on 11/5/2021 with evidence of moderate COPD, a 7 mm groundglass nodule within the SUDEEP and mild coronary artery calcifications.  Dedicated CT of the  chest was recommended and performed on 12/3/2021.  This confirmed the same.  She underwent an updated low-dose CT of the chest on 2/9/2023 with no changes noted.  She continues to be followed by pulmonology and is scheduled to undergo a reassessment with Katarina Wan PA-C on 6/8/2023  Lab Results   Component Value Date    WBC 11.45 (H) 02/06/2023    HGB 15.0 02/06/2023    HCT 45.5 02/06/2023    MCV 95.6 02/06/2023     02/06/2023     Type 2 Diabetes Mellitus  She admits to paresthesias of the feet.  There is no history of any visual disturbances, polydipsia, polyuria, hypoglycemia or foot ulcerations. Current treatments: metformin, empagliflozin, and liraglutide 1.8 mg daily.    Lab Results   Component Value Date    HGBA1C 5.40 02/06/2023     Lab Results   Component Value Date    MICROALBUR <1.2 02/06/2023     Lab Results   Component Value Date    SCFOILGG22 1,435 (H) 06/23/2022     Hyperlipidemia  Her compliance with treatment has been fair.  She has been following her diet closer and with the weight that she has lost has been able to tolerate more activity. She remains on simvastatin with no apparent side effects  Lab Results   Component Value Date    CHOL 135 02/06/2023    CHLPL 141 02/05/2016    TRIG 183 (H) 02/06/2023    HDL 43 02/06/2023    LDL 62 02/06/2023     Essential Hypertension  She admits to shortness of breath with intermittent lower extremity swelling but continues to deny any chest pain, orthopnea, PND, palpitations, or lightheadedness.  She is taking losartan with no apparent side effects  Lab Results   Component Value Date    GLUCOSE 77 02/06/2023    BUN 7 (L) 02/06/2023    CREATININE 0.97 02/06/2023    EGFR 65.4 02/06/2023    BCR 7.2 02/06/2023    K 4.7 02/06/2023    CO2 27.0 02/06/2023    CALCIUM 9.5 02/06/2023    ALBUMIN 4.1 02/06/2023    BILITOT 0.2 02/06/2023    AST 21 02/06/2023    ALT 31 02/06/2023     Lab Results   Component Value Date    ALKPHOS 55 02/06/2023      Hypothyroidism  Patient presents for evaluation of thyroid function. Symptoms consist of weight gain, constipation. The symptoms are moderate.  The problem has been unchanged.  Previous thyroid studies include TSH. The hypothyroidism is due to Hashimoto's disease.  Lab Results   Component Value Date    TSH 0.174 (L) 02/06/2023     Depression  She has a long history of intermittent depression, nervousness, anhedonia, difficulty concentrating, fatigue and impaired memory. She continues to deny any suicidal ideation. Risk factors: history of seasonal affective disorder.  She did well through winter.  Current medication includes escitalopram 10 daily,  bupropion 450 daily, risperdone 2 nightly, and lorazepam 0.5 twice a day.  She continues to do well at present    Labs  Most recent vitamin D 65.7    The following portions of the patient's history were reviewed and updated as appropriate: allergies, current medications, past medical history, past social history and problem list.    Review of Systems   Constitutional: Positive for fatigue. Negative for appetite change, chills, fever and unexpected weight change.   HENT: Positive for rhinorrhea. Negative for congestion, ear pain, postnasal drip, sneezing, sore throat and voice change.    Eyes: Negative for visual disturbance.   Respiratory: Positive for cough and shortness of breath. Negative for wheezing.    Cardiovascular: Positive for leg swelling. Negative for chest pain and palpitations.   Gastrointestinal: Positive for constipation. Negative for abdominal pain, anal bleeding, blood in stool, diarrhea, nausea and vomiting.   Genitourinary: Negative for difficulty urinating, dysuria, frequency, hematuria and urgency.        Incontinence with coughing, sneezing, or lifting   Musculoskeletal: Positive for arthralgias and back pain. Negative for joint swelling and myalgias.   Skin: Negative for rash.   Neurological: Positive for numbness (and paresthesias both feet)  and headaches (when stressed). Negative for weakness.   Psychiatric/Behavioral: Positive for decreased concentration, dysphoric mood and sleep disturbance. Negative for suicidal ideas.     Objective   Physical Exam  Constitutional:       General: She is not in acute distress.     Appearance: Normal appearance. She is well-developed. She is not diaphoretic.      Comments: Accompanied by her .  Bright and in fair spirits.  Climbed onto the exam table with minimal assistance.  No apparent distress.   HENT:      Head: Atraumatic.      Right Ear: Tympanic membrane, ear canal and external ear normal.      Left Ear: Tympanic membrane, ear canal and external ear normal.      Mouth/Throat:      Lips: No lesions.      Mouth: Mucous membranes are moist. No oral lesions.      Pharynx: No oropharyngeal exudate or posterior oropharyngeal erythema.   Eyes:      General: Lids are normal.      Extraocular Movements: Extraocular movements intact.      Conjunctiva/sclera: Conjunctivae normal.      Pupils: Pupils are equal.   Neck:      Thyroid: No thyroid mass or thyromegaly.      Vascular: No carotid bruit or JVD.      Trachea: Trachea normal. No tracheal deviation.   Cardiovascular:      Rate and Rhythm: Normal rate and regular rhythm.      Heart sounds: Normal heart sounds, S1 normal and S2 normal. No murmur heard.    No gallop.   Pulmonary:      Effort: Pulmonary effort is normal.      Breath sounds: Decreased air movement present. Examination of the right-lower field reveals decreased breath sounds. Examination of the left-lower field reveals decreased breath sounds. Decreased breath sounds and wheezing (diffuse - mild) present. No rales.      Comments: Pulmonary hyperinflation  Abdominal:      General: Bowel sounds are normal. There is no distension.   Musculoskeletal:      Right lower leg: No edema.      Left lower leg: No edema.   Lymphadenopathy:      Head:      Right side of head: No submental, submandibular,  tonsillar, preauricular, posterior auricular or occipital adenopathy.      Left side of head: No submental, submandibular, tonsillar, preauricular, posterior auricular or occipital adenopathy.      Cervical: No cervical adenopathy.      Upper Body:      Right upper body: No supraclavicular adenopathy.      Left upper body: No supraclavicular adenopathy.   Skin:     General: Skin is warm.      Coloration: Skin is not cyanotic, jaundiced or pale.      Findings: No rash.      Nails: There is no clubbing.   Neurological:      Mental Status: She is alert and oriented to person, place, and time.      Cranial Nerves: No cranial nerve deficit, dysarthria or facial asymmetry.      Sensory: Sensory deficit (decreased vibration sense both feet) present.      Motor: No tremor.      Coordination: Coordination normal.      Gait: Gait normal.   Psychiatric:         Attention and Perception: Attention normal.         Mood and Affect: Mood normal.         Speech: Speech normal.         Behavior: Behavior normal.         Thought Content: Thought content normal.       Assessment & Plan   Problems Addressed this Visit        Allergies and Adverse Reactions    Chronic allergic rhinitis  Continue current medication  Encouraged to report if any worse or if any new symptoms or concerns.       Cardiac and Vasculature    Aortic atherosclerosis  Reminded regarding risk factor modification with an emphasis on tobacco cessation.    Coronary artery calcification seen on CT scan  As above.    Essential hypertension   Hypertension: at goal. Evidence of target organ damage: evidence of aortic atherosclerosis and coronary artery calcifications on previous imaging.  Encouraged to continue to work on diet and exercise plan.   Continue current medication    Relevant Medications    losartan (COZAAR) 50 MG tablet    Mixed hyperlipidemia  As above.   Continue current medication.       Endocrine and Metabolic    Class 3 severe obesity with serious  comorbidity and body mass index (BMI) of 40.0 to 44.9 in adult (HCC)    Hypothyroidism due to Hashimoto's thyroiditis  Clinically euthyroid.  Continue current medication.    Relevant Medications    levothyroxine (SYNTHROID, LEVOTHROID) 200 MCG tablet    Type 2 diabetes mellitus with peripheral neuropathy  Diabetes mellitus Type II, under excellent control.   Encouraged to continue to pursue ADA diet  Encouraged aerobic exercise.  Continue current medication  Updated labs will be drawn at her return.    Relevant Medications    metFORMIN ER (GLUCOPHAGE-XR) 500 MG 24 hr tablet    Insulin Pen Needle (BD Pen Needle Juju U/F) 32G X 4 MM misc    Vitamin D deficiency  Continue supplementation with monitoring.    Relevant Medications    cholecalciferol (GNP Vitamin D) 25 MCG (1000 UT) tablet       Gastrointestinal Abdominal     Chronic constipation    Gastroesophageal reflux disease without esophagitis       Genitourinary and Reproductive     Stress bladder incontinence, female       Health Encounters    Healthcare maintenance  Recommended an updated bivalent COVID-19 vaccination.       Mental Health    Depression with anxiety  Stable.  Supportive therapy.   Continue current medication.    Relevant Medications    topiramate (TOPAMAX) 100 MG tablet       Musculoskeletal and Injuries    Generalized osteoarthritis  Reminded regarding symptomatic treatment.   Continue current medication  Encouraged to report if any worse or if any new symptoms or concerns.    Mechanical low back pain  As above.    Relevant Medications    topiramate (TOPAMAX) 100 MG tablet    Osteopenia    Rheumatoid arthritis  As above.  Follow up with  rheumatology       Pulmonary and Pneumonias    Panlobular emphysema   COPD is stable.  Reminded of the importance of smoking cessation  Encouraged to remain as active as symptoms allow for  Continue current medication  Follow up with pulmonology        Sleep    Obstructive sleep apnea       Tobacco    Smoker          Diagnoses       Codes Comments    Chronic allergic rhinitis    -  Primary ICD-10-CM: J30.9  ICD-9-CM: 477.9     Mixed hyperlipidemia     ICD-10-CM: E78.2  ICD-9-CM: 272.2     Essential hypertension     ICD-10-CM: I10  ICD-9-CM: 401.9     Coronary artery calcification seen on CT scan     ICD-10-CM: I25.10  ICD-9-CM: 414.00     Aortic atherosclerosis     ICD-10-CM: I70.0  ICD-9-CM: 440.0     Vitamin D deficiency     ICD-10-CM: E55.9  ICD-9-CM: 268.9     Hypothyroidism due to Hashimoto's thyroiditis     ICD-10-CM: E03.8, E06.3  ICD-9-CM: 244.8, 245.2     Class 3 severe obesity with serious comorbidity and body mass index (BMI) of 40.0 to 44.9 in adult, unspecified obesity type     ICD-10-CM: E66.01, Z68.41  ICD-9-CM: 278.01, V85.41     Type 2 diabetes mellitus with peripheral neuropathy     ICD-10-CM: E11.42  ICD-9-CM: 250.60, 357.2     Gastroesophageal reflux disease without esophagitis     ICD-10-CM: K21.9  ICD-9-CM: 530.81     Chronic constipation     ICD-10-CM: K59.09  ICD-9-CM: 564.00     Stress bladder incontinence, female     ICD-10-CM: N39.3  ICD-9-CM: 625.6     Healthcare maintenance     ICD-10-CM: Z00.00  ICD-9-CM: V70.0     Depression with anxiety     ICD-10-CM: F41.8  ICD-9-CM: 300.4     Osteopenia, unspecified location     ICD-10-CM: M85.80  ICD-9-CM: 733.90     Generalized osteoarthritis     ICD-10-CM: M15.9  ICD-9-CM: 715.00     Rheumatoid arthritis involving multiple sites, unspecified whether rheumatoid factor present     ICD-10-CM: M06.9  ICD-9-CM: 714.0     Panlobular emphysema     ICD-10-CM: J43.1  ICD-9-CM: 492.8     Obstructive sleep apnea     ICD-10-CM: G47.33  ICD-9-CM: 327.23     Smoker     ICD-10-CM: F17.200  ICD-9-CM: 305.1     Mechanical low back pain     ICD-10-CM: M54.59  ICD-9-CM: 724.2     Acquired hypothyroidism     ICD-10-CM: E03.9  ICD-9-CM: 244.9

## 2023-04-23 DIAGNOSIS — J30.9 CHRONIC ALLERGIC RHINITIS: ICD-10-CM

## 2023-04-24 RX ORDER — MONTELUKAST SODIUM 10 MG/1
TABLET ORAL
Qty: 30 TABLET | Refills: 5 | Status: SHIPPED | OUTPATIENT
Start: 2023-04-24

## 2023-04-25 DIAGNOSIS — E11.42 TYPE 2 DIABETES MELLITUS WITH PERIPHERAL NEUROPATHY: Primary | ICD-10-CM

## 2023-04-25 RX ORDER — SEMAGLUTIDE 1.34 MG/ML
0.5 INJECTION, SOLUTION SUBCUTANEOUS WEEKLY
Qty: 1.5 ML | Refills: 0 | Status: SHIPPED | OUTPATIENT
Start: 2023-04-25

## 2023-05-08 DIAGNOSIS — E11.65 CONTROLLED TYPE 2 DIABETES MELLITUS WITH HYPERGLYCEMIA, WITHOUT LONG-TERM CURRENT USE OF INSULIN: Chronic | ICD-10-CM

## 2023-05-08 RX ORDER — BLOOD SUGAR DIAGNOSTIC
STRIP MISCELLANEOUS
Qty: 100 EACH | Refills: 5 | Status: SHIPPED | OUTPATIENT
Start: 2023-05-08

## 2023-05-08 RX ORDER — LANCETS 33 GAUGE
EACH MISCELLANEOUS
Qty: 100 EACH | Refills: 5 | Status: SHIPPED | OUTPATIENT
Start: 2023-05-08

## 2023-05-10 ENCOUNTER — TELEPHONE (OUTPATIENT)
Dept: FAMILY MEDICINE CLINIC | Facility: CLINIC | Age: 63
End: 2023-05-10

## 2023-05-10 NOTE — TELEPHONE ENCOUNTER
Caller: Lisa Hill    Relationship: Self    Best call back number: [phone]    What medications are you currently taking:   Current Outpatient Medications on File Prior to Visit   Medication Sig Dispense Refill   • Acetaminophen Extra Strength 500 MG tablet TAKE TWO TABLETS BY MOUTH FOUR TIMES A  tablet 5   • buPROPion XL (FORFIVO XL) 450 MG 24 hr tablet Take 1 tablet by mouth Every Morning. 30 tablet 5   • busPIRone (BUSPAR) 15 MG tablet TAKE ONE TABLET BY MOUTH THREE TIMES A DAY 90 tablet 5   • cholecalciferol (GNP Vitamin D) 25 MCG (1000 UT) tablet Take 2 tablets by mouth Daily. 60 tablet 5   • Continuous Blood Gluc  (Dexcom G6 ) device 1 Device Daily. 1 each 0   • Continuous Blood Gluc Sensor (Dexcom G6 Sensor) Every 10 (Ten) Days. 9 each 3   • Cyanocobalamin ER (GNP Vitamin B-12) 1000 MCG tablet controlled-release Take 1 tablet by mouth Daily. 30 each 5   • escitalopram (LEXAPRO) 20 MG tablet TAKE ONE TABLET BY MOUTH EVERY DAY 30 tablet 5   • fluticasone (FLONASE) 50 MCG/ACT nasal spray USE 2 SPRAYS IN EACH NOSTRIL ONCE DAILY 16 g 5   • hydroxychloroquine (PLAQUENIL) 200 MG tablet Take 200 mg by mouth Daily.     • Insulin Pen Needle (BD Pen Needle Juju U/F) 32G X 4 MM misc 1 each by Other route Daily. 100 each 3   • ipratropium-albuterol (DUO-NEB) 0.5-2.5 mg/3 ml nebulizer USE 1 VIAL IN [facility] FOUR TIMES A  mL 11   • Lancets (OneTouch Delica Plus Lftwov09B) misc USE TO CHECK BLOOD SUGAR THREE TIMES A  each 5   • levothyroxine (SYNTHROID, LEVOTHROID) 200 MCG tablet Take 1 tablet by mouth Daily. 30 tablet 5   • LORazepam (ATIVAN) 1 MG tablet TAKE 1/2 TABLET BY MOUTH TWO TIMES A DAY AS NEEDED 90 tablet 0   • losartan (COZAAR) 50 MG tablet Take 1 tablet by mouth Daily. 30 tablet 5   • meloxicam (MOBIC) 15 MG tablet TAKE ONE TABLET BY MOUTH ONCE DAILY 30 tablet 5   • metFORMIN ER (GLUCOPHAGE-XR) 500 MG 24 hr tablet Take 4 tablets by mouth Daily. 120 tablet 5   •  montelukast (SINGULAIR) 10 MG tablet TAKE ONE TABLET BY MOUTH ONCE DAILY 30 tablet 5   • OneTouch Ultra test strip USE TO TEST BLOOD GLUCOSE DAILY 100 each 5   • pantoprazole (PROTONIX) 40 MG EC tablet TAKE ONE TABLET BY MOUTH ONCE DAILY 30 tablet 5   • ProAir  (90 Base) MCG/ACT inhaler Inhale 2 puffs Every 4 (Four) Hours As Needed for Wheezing or Shortness of Air. 8.5 g 8   • risperiDONE (risperDAL) 2 MG tablet TAKE ONE TABLET BY MOUTH EVERY NIGHT AT BEDTIME 30 tablet 5   • Semaglutide,0.25 or 0.5MG/DOS, (Ozempic, 0.25 or 0.5 MG/DOSE,) 2 MG/1.5ML solution pen-injector Inject 0.5 mg under the skin into the appropriate area as directed 1 (One) Time Per Week. 1.5 mL 0   • simvastatin (ZOCOR) 20 MG tablet TAKE ONE TABLET BY MOUTH EVERY NIGHT AT BEDTIME 30 tablet 5   • tiotropium bromide-olodaterol (STIOLTO RESPIMAT) 2.5-2.5 MCG/ACT aerosol solution inhaler Inhale 2 puffs Daily. 1 each 8   • topiramate (TOPAMAX) 100 MG tablet Take 1 tablet by mouth 2 (Two) Times a Day. 60 tablet 5     No current facility-administered medications on file prior to visit.          When did you start taking these medications: SINCE 2009 (BUT DOSAGE INCREASED THREE MONTHS AGO  MG)    Which medication are you concerned about: buPROPion XL (FORFIVO XL) 450 MG 24 hr tablet    Who prescribed you this medication:   DR BEAL   What are your concerns: PRIOR AUTHORIZATION REQUIRED PER THE [facility] IN [place]     How long have you had these concerns: SINCE TODAY 05/10/2023    PLEASE CALL AND ADVISE

## 2023-05-11 ENCOUNTER — OFFICE VISIT (OUTPATIENT)
Dept: FAMILY MEDICINE CLINIC | Facility: CLINIC | Age: 63
End: 2023-05-11
Payer: MEDICARE

## 2023-05-11 VITALS
DIASTOLIC BLOOD PRESSURE: 80 MMHG | SYSTOLIC BLOOD PRESSURE: 125 MMHG | HEART RATE: 91 BPM | TEMPERATURE: 97.3 F | HEIGHT: 63 IN | WEIGHT: 205.4 LBS | BODY MASS INDEX: 36.39 KG/M2 | OXYGEN SATURATION: 96 %

## 2023-05-11 DIAGNOSIS — R30.0 DYSURIA: Primary | ICD-10-CM

## 2023-05-11 LAB
BILIRUB BLD-MCNC: NEGATIVE MG/DL
CLARITY, POC: CLEAR
COLOR UR: YELLOW
GLUCOSE UR STRIP-MCNC: NEGATIVE MG/DL
KETONES UR QL: ABNORMAL
LEUKOCYTE EST, POC: ABNORMAL
NITRITE UR-MCNC: NEGATIVE MG/ML
PH UR: 6 [PH] (ref 5–8)
PROT UR STRIP-MCNC: ABNORMAL MG/DL
RBC # UR STRIP: NEGATIVE /UL
SP GR UR: 1.02 (ref 1–1.03)
UROBILINOGEN UR QL: NORMAL

## 2023-05-11 RX ORDER — NITROFURANTOIN 25; 75 MG/1; MG/1
100 CAPSULE ORAL 2 TIMES DAILY
Qty: 14 CAPSULE | Refills: 0 | Status: SHIPPED | OUTPATIENT
Start: 2023-05-11 | End: 2023-05-18

## 2023-05-11 RX ORDER — FLUCONAZOLE 150 MG/1
150 TABLET ORAL
Qty: 2 TABLET | Refills: 0 | Status: SHIPPED | OUTPATIENT
Start: 2023-05-11

## 2023-05-11 RX ORDER — LANOLIN ALCOHOL/MO/W.PET/CERES
CREAM (GRAM) TOPICAL
COMMUNITY
Start: 2023-04-28

## 2023-05-15 NOTE — PROGRESS NOTES
Subjective        Chief Complaint  Urinary Tract Infection    Subjective      History of Present Illness  Lisa Hill is a 63 y.o. female who presents today to [facility] for Urinary Tract Infection.  She has a past medical history of Anxiety, Arthritis, COPD, Coronary artery calcification seen on CT, Depression, hypothyroidism, HLD, HTN, GERD, and Type 2 diabetes mellitus with diabetic autonomic neuropathy, without long-term current use of insulin.    Urinary Tract Infection:  Lisa reports 1-2 days of dysuria and urgency with concern for UTI. No fever, chills, or flank pain. No hematuria. She has a history of frequent UTIs.       Current Outpatient Medications:   •  Acetaminophen Extra Strength 500 MG tablet, TAKE TWO TABLETS BY MOUTH FOUR TIMES A DAY, Disp: 180 tablet, Rfl: 5  •  buPROPion XL (FORFIVO XL) 450 MG 24 hr tablet, Take 1 tablet by mouth Every Morning., Disp: 30 tablet, Rfl: 5  •  busPIRone (BUSPAR) 15 MG tablet, TAKE ONE TABLET BY MOUTH THREE TIMES A DAY, Disp: 90 tablet, Rfl: 5  •  cholecalciferol (GNP Vitamin D) 25 MCG (1000 UT) tablet, Take 2 tablets by mouth Daily., Disp: 60 tablet, Rfl: 5  •  Continuous Blood Gluc  (Dexcom G6 ) device, 1 Device Daily., Disp: 1 each, Rfl: 0  •  Continuous Blood Gluc Sensor (Dexcom G6 Sensor), Every 10 (Ten) Days., Disp: 9 each, Rfl: 3  •  Cyanocobalamin ER (GNP Vitamin B-12) 1000 MCG tablet controlled-release, Take 1 tablet by mouth Daily., Disp: 30 each, Rfl: 5  •  escitalopram (LEXAPRO) 20 MG tablet, TAKE ONE TABLET BY MOUTH EVERY DAY, Disp: 30 tablet, Rfl: 5  •  fluticasone (FLONASE) 50 MCG/ACT nasal spray, USE 2 SPRAYS IN EACH NOSTRIL ONCE DAILY, Disp: 16 g, Rfl: 5  •  hydroxychloroquine (PLAQUENIL) 200 MG tablet, Take 1 tablet by mouth Daily., Disp: , Rfl:   •  Insulin Pen Needle (BD Pen Needle Juju U/F) 32G X 4 MM misc, 1 each by Other route Daily., Disp: 100 each, Rfl: 3  •  ipratropium-albuterol  (DUO-NEB) 0.5-2.5 mg/3 ml nebulizer, USE 1 VIAL IN NEBUILZER FOUR TIMES A DAY, Disp: 360 mL, Rfl: 11  •  Lancets (OneTouch Delica Plus Fyotsd18X) misc, USE TO CHECK BLOOD SUGAR THREE TIMES A DAY, Disp: 100 each, Rfl: 5  •  levothyroxine (SYNTHROID, LEVOTHROID) 200 MCG tablet, Take 1 tablet by mouth Daily., Disp: 30 tablet, Rfl: 5  •  LORazepam (ATIVAN) 1 MG tablet, TAKE 1/2 TABLET BY MOUTH TWO TIMES A DAY AS NEEDED, Disp: 90 tablet, Rfl: 0  •  losartan (COZAAR) 50 MG tablet, Take 1 tablet by mouth Daily., Disp: 30 tablet, Rfl: 5  •  meloxicam (MOBIC) 15 MG tablet, TAKE ONE TABLET BY MOUTH ONCE DAILY, Disp: 30 tablet, Rfl: 5  •  metFORMIN ER (GLUCOPHAGE-XR) 500 MG 24 hr tablet, Take 4 tablets by mouth Daily., Disp: 120 tablet, Rfl: 5  •  montelukast (SINGULAIR) 10 MG tablet, TAKE ONE TABLET BY MOUTH ONCE DAILY, Disp: 30 tablet, Rfl: 5  •  OneTouch Ultra test strip, USE TO TEST BLOOD GLUCOSE DAILY, Disp: 100 each, Rfl: 5  •  pantoprazole (PROTONIX) 40 MG EC tablet, TAKE ONE TABLET BY MOUTH ONCE DAILY, Disp: 30 tablet, Rfl: 5  •  ProAir  (90 Base) MCG/ACT inhaler, Inhale 2 puffs Every 4 (Four) Hours As Needed for Wheezing or Shortness of Air., Disp: 8.5 g, Rfl: 8  •  risperiDONE (risperDAL) 2 MG tablet, TAKE ONE TABLET BY MOUTH EVERY NIGHT AT BEDTIME, Disp: 30 tablet, Rfl: 5  •  Semaglutide,0.25 or 0.5MG/DOS, (Ozempic, 0.25 or 0.5 MG/DOSE,) 2 MG/1.5ML solution pen-injector, Inject 0.5 mg under the skin into the appropriate area as directed 1 (One) Time Per Week., Disp: 1.5 mL, Rfl: 0  •  simvastatin (ZOCOR) 20 MG tablet, TAKE ONE TABLET BY MOUTH EVERY NIGHT AT BEDTIME, Disp: 30 tablet, Rfl: 5  •  tiotropium bromide-olodaterol (STIOLTO RESPIMAT) 2.5-2.5 MCG/ACT aerosol solution inhaler, Inhale 2 puffs Daily., Disp: 1 each, Rfl: 8  •  topiramate (TOPAMAX) 100 MG tablet, Take 1 tablet by mouth 2 (Two) Times a Day., Disp: 60 tablet, Rfl: 5  •  vitamin B-12 (CYANOCOBALAMIN) 1000 MCG tablet, , Disp: , Rfl:   •   "fluconazole (Diflucan) 150 MG tablet, Take 1 tablet by mouth Every 72 (Seventy-Two) Hours As Needed (Yeast)., Disp: 2 tablet, Rfl: 0  •  nitrofurantoin, macrocrystal-monohydrate, (Macrobid) 100 MG capsule, Take 1 capsule by mouth 2 (Two) Times a Day for 7 days., Disp: 14 capsule, Rfl: 0      Allergies   Allergen Reactions   • Sulfa Antibiotics      Objective     Objective   Vital Signs:  Blood Pressure 125/80   Pulse 91   Temperature 97.3 °F (36.3 °C) (Temporal)   Height 158.8 cm (62.52\")   Weight 93.2 kg (205 lb 6.4 oz)   Oxygen Saturation 96%   Body Mass Index 36.95 kg/m²   Estimated body mass index is 36.95 kg/m² as calculated from the following:    Height as of this encounter: 158.8 cm (62.52\").    Weight as of this encounter: 93.2 kg (205 lb 6.4 oz).        Past Medical History:   Diagnosis Date   • Anxiety    • Arthritis    • Cigarette nicotine dependence without complication 9/8/2016   • COPD (chronic obstructive pulmonary disease)    • Coronary artery calcification seen on CT scan 6/13/2020   • Depression    • Disease of thyroid gland     GRAVES DISEASE   • Elevated cholesterol    • GERD (gastroesophageal reflux disease)    • History of transfusion    • Hyperlipidemia    • Hypertension    • Type 2 diabetes mellitus with diabetic autonomic neuropathy, without long-term current use of insulin 1/18/2021     Past Surgical History:   Procedure Laterality Date   • CHOLECYSTECTOMY     • COLONOSCOPY N/A 9/14/2018    Procedure: COLONOSCOPY;  Surgeon: Keon Melgoza MD;  Location: [facility];  Service: Gastroenterology   • HYSTERECTOMY  1996    For benign disease   • TONSILLECTOMY       Social History     Socioeconomic History   • Marital status:      Spouse name: kateryna   • Number of children: 3   • Years of education: 10   Tobacco Use   • Smoking status: Every Day     Packs/day: 1.00     Years: 45.00     Pack years: 45.00     Types: Cigarettes     Passive exposure: Current   • Smokeless tobacco: " Never   • Tobacco comments:     cessation has been discussed   Vaping Use   • Vaping Use: Never used   Substance and Sexual Activity   • Alcohol use: No   • Drug use: No   • Sexual activity: Defer      Physical Exam  Vitals and nursing note reviewed.   Constitutional:       General: She is not in acute distress.     Appearance: She is well-developed. She is not diaphoretic.   HENT:      Head: Normocephalic and atraumatic.   Eyes:      General: No scleral icterus.        Right eye: No discharge.         Left eye: No discharge.      Conjunctiva/sclera: Conjunctivae normal.   Cardiovascular:      Rate and Rhythm: Normal rate and regular rhythm.      Heart sounds: Normal heart sounds. No murmur heard.    No friction rub. No gallop.   Pulmonary:      Effort: Pulmonary effort is normal. No respiratory distress.      Breath sounds: Normal breath sounds. No wheezing or rales.   Chest:      Chest wall: No tenderness.   Abdominal:      Tenderness: There is no right CVA tenderness or left CVA tenderness.   Musculoskeletal:         General: Normal range of motion.      Cervical back: Normal range of motion and neck supple.      Right lower leg: No edema.      Left lower leg: No edema.   Skin:     General: Skin is warm and dry.      Coloration: Skin is not pale.      Findings: No erythema or rash.   Neurological:      Mental Status: She is alert and oriented to person, place, and time.   Psychiatric:         Behavior: Behavior normal.        Result Review :  The following data was reviewed by: ANGEL Vasquez on 05/11/2023:  Urine dipstick shows positive for protein, positive for leukocytes and positive for ketones.     Hemoglobin A1C   Date Value Ref Range Status   02/06/2023 5.40 4.80 - 5.60 % Final     TSH   Date Value Ref Range Status   02/06/2023 0.174 (L) 0.270 - 4.200 uIU/mL Final     HDL Cholesterol   Date Value Ref Range Status   02/06/2023 43 40 - 60 mg/dL Final     LDL Cholesterol    Date Value Ref Range Status    02/06/2023 62 0 - 100 mg/dL Final     Triglycerides   Date Value Ref Range Status   02/06/2023 183 (H) 0 - 150 mg/dL Final     Total Cholesterol   Date Value Ref Range Status   02/05/2016 141 0 - 200 mg/dL Final     Comment:       Cholesterol Reference Range:      Desirable                 < 200mg/dl      Borderline High        200-239mg/dl      High Risk                 > 239mg/dl           Assessment / Plan         Assessment   Diagnoses and all orders for this visit:    1. Dysuria (Primary)  • UA with moderate leukocytes noted and patient is symptomatic with dysuria and urgency similar to prior UTIs. She was unable to produce enough urine for culture today.   • Last 3 positive urine culture results reviewed and grew pansensitive E. Coli. Will cover with nitrofurantoin 100mg BID x 7 days.   • Add oral diflucan PRN for vulvovaginal candidiasis.   • Return to clinic if no improvement noted or if symptoms are worsening.     -     POCT urinalysis dipstick, manual  -     nitrofurantoin, macrocrystal-monohydrate, (Macrobid) 100 MG capsule; Take 1 capsule by mouth 2 (Two) Times a Day for 7 days.  Dispense: 14 capsule; Refill: 0  -     fluconazole (Diflucan) 150 MG tablet; Take 1 tablet by mouth Every 72 (Seventy-Two) Hours As Needed (Yeast).  Dispense: 2 tablet; Refill: 0     New Medications Ordered This Visit   Medications   • nitrofurantoin, macrocrystal-monohydrate, (Macrobid) 100 MG capsule     Sig: Take 1 capsule by mouth 2 (Two) Times a Day for 7 days.     Dispense:  14 capsule     Refill:  0   • fluconazole (Diflucan) 150 MG tablet     Sig: Take 1 tablet by mouth Every 72 (Seventy-Two) Hours As Needed (Yeast).     Dispense:  2 tablet     Refill:  0     Follow Up   Return if symptoms worsen or fail to improve.    Patient was given instructions and counseling regarding her condition or for health maintenance advice. Please see specific information pulled into the AVS if appropriate.       This document has been  electronically signed by ANGEL Vasquez   May 15, 2023 06:56 EDT    Dictated Utilizing Dragon Dictation: Part of this note may be an electronic transcription/translation of spoken language to printed text using the Dragon Dictation System.

## 2023-05-18 DIAGNOSIS — E55.9 VITAMIN D DEFICIENCY: ICD-10-CM

## 2023-05-18 DIAGNOSIS — E11.42 TYPE 2 DIABETES MELLITUS WITH PERIPHERAL NEUROPATHY: ICD-10-CM

## 2023-05-18 DIAGNOSIS — M15.9 GENERALIZED OSTEOARTHRITIS: ICD-10-CM

## 2023-05-18 DIAGNOSIS — E03.9 ACQUIRED HYPOTHYROIDISM: ICD-10-CM

## 2023-05-18 DIAGNOSIS — I10 ESSENTIAL HYPERTENSION: ICD-10-CM

## 2023-05-19 RX ORDER — LOSARTAN POTASSIUM 50 MG/1
TABLET ORAL
Qty: 30 TABLET | Refills: 5 | Status: SHIPPED | OUTPATIENT
Start: 2023-05-19

## 2023-05-19 RX ORDER — METHANOL
LIQUID (ML) MISCELLANEOUS
Qty: 60 TABLET | Refills: 5 | Status: SHIPPED | OUTPATIENT
Start: 2023-05-19

## 2023-05-19 RX ORDER — PSEUDOEPHED/ACETAMINOPH/DIPHEN 30MG-500MG
TABLET ORAL
Qty: 180 TABLET | Refills: 5 | Status: SHIPPED | OUTPATIENT
Start: 2023-05-19

## 2023-05-19 RX ORDER — LEVOTHYROXINE SODIUM 0.2 MG/1
TABLET ORAL
Qty: 30 TABLET | Refills: 5 | Status: SHIPPED | OUTPATIENT
Start: 2023-05-19

## 2023-05-19 RX ORDER — METFORMIN HYDROCHLORIDE 500 MG/1
TABLET, EXTENDED RELEASE ORAL
Qty: 120 TABLET | Refills: 5 | Status: SHIPPED | OUTPATIENT
Start: 2023-05-19

## 2023-05-31 ENCOUNTER — TELEPHONE (OUTPATIENT)
Dept: FAMILY MEDICINE CLINIC | Facility: CLINIC | Age: 63
End: 2023-05-31

## 2023-05-31 DIAGNOSIS — E11.42 TYPE 2 DIABETES MELLITUS WITH PERIPHERAL NEUROPATHY: ICD-10-CM

## 2023-05-31 RX ORDER — SEMAGLUTIDE 1.34 MG/ML
0.5 INJECTION, SOLUTION SUBCUTANEOUS WEEKLY
Qty: 1.5 ML | Refills: 0 | Status: SHIPPED | OUTPATIENT
Start: 2023-05-31

## 2023-05-31 NOTE — TELEPHONE ENCOUNTER
Caller: Lisa Hill    Relationship: Self    Best call back number: [phone]    What medication are you requesting: OZEMPIC        If a prescription is needed, what is your preferred pharmacy and phone number: [facility] - [place], [place] - [address] - [phone]  - [phone] FX     Additional notes: CURRENTLY ON VICTOZA. PATIENT STATES SHE DISCUSSED SWITCHING TO OZEMPIC ONCE A WEEK WITH DR BARNARD. SHE HAS NEW INSURANCE AND THINKS IT MAY BE COVERED NOW.        ”

## 2023-06-06 DIAGNOSIS — J30.9 CHRONIC ALLERGIC RHINITIS: ICD-10-CM

## 2023-06-06 RX ORDER — MONTELUKAST SODIUM 10 MG/1
10 TABLET ORAL DAILY
Qty: 90 TABLET | Refills: 3 | Status: SHIPPED | OUTPATIENT
Start: 2023-06-06

## 2023-08-01 DIAGNOSIS — J43.1 PANLOBULAR EMPHYSEMA: ICD-10-CM

## 2023-08-01 RX ORDER — ALBUTEROL SULFATE 90 UG/1
AEROSOL, METERED RESPIRATORY (INHALATION)
Qty: 18 G | Refills: 8 | Status: SHIPPED | OUTPATIENT
Start: 2023-08-01

## 2023-08-04 ENCOUNTER — OFFICE VISIT (OUTPATIENT)
Dept: FAMILY MEDICINE CLINIC | Facility: CLINIC | Age: 63
End: 2023-08-04
Payer: MEDICARE

## 2023-08-04 DIAGNOSIS — Z23 ENCOUNTER FOR IMMUNIZATION: ICD-10-CM

## 2023-08-04 DIAGNOSIS — I25.10 CORONARY ARTERY CALCIFICATION SEEN ON CT SCAN: ICD-10-CM

## 2023-08-04 DIAGNOSIS — Z51.81 ENCOUNTER FOR THERAPEUTIC DRUG LEVEL MONITORING: ICD-10-CM

## 2023-08-04 DIAGNOSIS — E78.2 MIXED HYPERLIPIDEMIA: ICD-10-CM

## 2023-08-04 DIAGNOSIS — R30.0 DYSURIA: Primary | ICD-10-CM

## 2023-08-04 DIAGNOSIS — Z00.00 HEALTHCARE MAINTENANCE: ICD-10-CM

## 2023-08-04 DIAGNOSIS — I70.0 AORTIC ATHEROSCLEROSIS: ICD-10-CM

## 2023-08-04 DIAGNOSIS — F41.8 DEPRESSION WITH ANXIETY: ICD-10-CM

## 2023-08-04 DIAGNOSIS — J30.9 CHRONIC ALLERGIC RHINITIS: ICD-10-CM

## 2023-08-04 DIAGNOSIS — I10 ESSENTIAL HYPERTENSION: ICD-10-CM

## 2023-08-04 DIAGNOSIS — F17.200 SMOKER: ICD-10-CM

## 2023-08-04 DIAGNOSIS — E55.9 VITAMIN D DEFICIENCY: ICD-10-CM

## 2023-08-04 DIAGNOSIS — M06.9 RHEUMATOID ARTHRITIS INVOLVING MULTIPLE SITES, UNSPECIFIED WHETHER RHEUMATOID FACTOR PRESENT: ICD-10-CM

## 2023-08-04 DIAGNOSIS — E06.3 HYPOTHYROIDISM DUE TO HASHIMOTO'S THYROIDITIS: ICD-10-CM

## 2023-08-04 DIAGNOSIS — F45.0 SOMATIZATION DISORDER: ICD-10-CM

## 2023-08-04 DIAGNOSIS — M15.9 GENERALIZED OSTEOARTHRITIS: ICD-10-CM

## 2023-08-04 DIAGNOSIS — J44.9 COPD MIXED TYPE: ICD-10-CM

## 2023-08-04 DIAGNOSIS — J30.1 SEASONAL ALLERGIC RHINITIS DUE TO POLLEN: ICD-10-CM

## 2023-08-04 DIAGNOSIS — K59.09 CHRONIC CONSTIPATION: ICD-10-CM

## 2023-08-04 DIAGNOSIS — G47.33 OBSTRUCTIVE SLEEP APNEA: ICD-10-CM

## 2023-08-04 DIAGNOSIS — K21.9 GASTROESOPHAGEAL REFLUX DISEASE WITHOUT ESOPHAGITIS: ICD-10-CM

## 2023-08-04 DIAGNOSIS — N39.3 STRESS BLADDER INCONTINENCE, FEMALE: ICD-10-CM

## 2023-08-04 DIAGNOSIS — E66.01 CLASS 2 SEVERE OBESITY WITH SERIOUS COMORBIDITY AND BODY MASS INDEX (BMI) OF 37.0 TO 37.9 IN ADULT, UNSPECIFIED OBESITY TYPE: ICD-10-CM

## 2023-08-04 DIAGNOSIS — E11.42 TYPE 2 DIABETES MELLITUS WITH PERIPHERAL NEUROPATHY: ICD-10-CM

## 2023-08-04 DIAGNOSIS — E03.8 HYPOTHYROIDISM DUE TO HASHIMOTO'S THYROIDITIS: ICD-10-CM

## 2023-08-04 DIAGNOSIS — M85.80 OSTEOPENIA, UNSPECIFIED LOCATION: ICD-10-CM

## 2023-08-04 PROBLEM — E66.812 CLASS 2 SEVERE OBESITY WITH SERIOUS COMORBIDITY AND BODY MASS INDEX (BMI) OF 37.0 TO 37.9 IN ADULT: Status: ACTIVE | Noted: 2019-05-21

## 2023-08-04 LAB
BILIRUB BLD-MCNC: NEGATIVE MG/DL
CLARITY, POC: CLEAR
COLOR UR: YELLOW
GLUCOSE UR STRIP-MCNC: ABNORMAL MG/DL
KETONES UR QL: NEGATIVE
LEUKOCYTE EST, POC: ABNORMAL
NITRITE UR-MCNC: NEGATIVE MG/ML
PH UR: 6 [PH] (ref 5–8)
PROT UR STRIP-MCNC: NEGATIVE MG/DL
RBC # UR STRIP: ABNORMAL /UL
SP GR UR: 1.01 (ref 1–1.03)
UROBILINOGEN UR QL: NORMAL

## 2023-08-04 PROCEDURE — 87077 CULTURE AEROBIC IDENTIFY: CPT | Performed by: GENERAL PRACTICE

## 2023-08-04 PROCEDURE — 82043 UR ALBUMIN QUANTITATIVE: CPT | Performed by: GENERAL PRACTICE

## 2023-08-04 PROCEDURE — 87086 URINE CULTURE/COLONY COUNT: CPT | Performed by: GENERAL PRACTICE

## 2023-08-04 PROCEDURE — 82306 VITAMIN D 25 HYDROXY: CPT | Performed by: GENERAL PRACTICE

## 2023-08-04 PROCEDURE — 87186 SC STD MICRODIL/AGAR DIL: CPT | Performed by: GENERAL PRACTICE

## 2023-08-04 PROCEDURE — 81001 URINALYSIS AUTO W/SCOPE: CPT | Performed by: GENERAL PRACTICE

## 2023-08-04 PROCEDURE — 84443 ASSAY THYROID STIM HORMONE: CPT | Performed by: GENERAL PRACTICE

## 2023-08-04 PROCEDURE — 80053 COMPREHEN METABOLIC PANEL: CPT | Performed by: GENERAL PRACTICE

## 2023-08-04 PROCEDURE — 80061 LIPID PANEL: CPT | Performed by: GENERAL PRACTICE

## 2023-08-04 PROCEDURE — 85025 COMPLETE CBC W/AUTO DIFF WBC: CPT | Performed by: GENERAL PRACTICE

## 2023-08-04 PROCEDURE — 82607 VITAMIN B-12: CPT | Performed by: GENERAL PRACTICE

## 2023-08-04 PROCEDURE — 83036 HEMOGLOBIN GLYCOSYLATED A1C: CPT | Performed by: GENERAL PRACTICE

## 2023-08-04 RX ORDER — SEMAGLUTIDE 1.34 MG/ML
1 INJECTION, SOLUTION SUBCUTANEOUS WEEKLY
Qty: 3 ML | Refills: 5 | Status: SHIPPED | OUTPATIENT
Start: 2023-08-04

## 2023-08-04 RX ORDER — FLUCONAZOLE 150 MG/1
150 TABLET ORAL ONCE
Qty: 1 TABLET | Refills: 0 | Status: SHIPPED | OUTPATIENT
Start: 2023-08-04 | End: 2023-08-04

## 2023-08-05 VITALS
OXYGEN SATURATION: 94 % | RESPIRATION RATE: 16 BRPM | TEMPERATURE: 98.6 F | HEART RATE: 80 BPM | WEIGHT: 207 LBS | DIASTOLIC BLOOD PRESSURE: 68 MMHG | BODY MASS INDEX: 36.68 KG/M2 | SYSTOLIC BLOOD PRESSURE: 120 MMHG | HEIGHT: 63 IN

## 2023-08-05 DIAGNOSIS — E03.8 HYPOTHYROIDISM DUE TO HASHIMOTO'S THYROIDITIS: Primary | ICD-10-CM

## 2023-08-05 DIAGNOSIS — E06.3 HYPOTHYROIDISM DUE TO HASHIMOTO'S THYROIDITIS: Primary | ICD-10-CM

## 2023-08-05 LAB
25(OH)D3 SERPL-MCNC: 62.1 NG/ML (ref 30–100)
ALBUMIN SERPL-MCNC: 4 G/DL (ref 3.5–5.2)
ALBUMIN UR-MCNC: <1.2 MG/DL
ALBUMIN/GLOB SERPL: 1.4 G/DL
ALP SERPL-CCNC: 52 U/L (ref 39–117)
ALT SERPL W P-5'-P-CCNC: 21 U/L (ref 1–33)
ANION GAP SERPL CALCULATED.3IONS-SCNC: 11.1 MMOL/L (ref 5–15)
AST SERPL-CCNC: 15 U/L (ref 1–32)
BACTERIA UR QL AUTO: ABNORMAL /HPF
BASOPHILS # BLD AUTO: 0.09 10*3/MM3 (ref 0–0.2)
BASOPHILS NFR BLD AUTO: 0.9 % (ref 0–1.5)
BILIRUB SERPL-MCNC: 0.2 MG/DL (ref 0–1.2)
BILIRUB UR QL STRIP: NEGATIVE
BUN SERPL-MCNC: 10 MG/DL (ref 8–23)
BUN/CREAT SERPL: 12.7 (ref 7–25)
CALCIUM SPEC-SCNC: 9 MG/DL (ref 8.6–10.5)
CHLORIDE SERPL-SCNC: 102 MMOL/L (ref 98–107)
CHOLEST SERPL-MCNC: 140 MG/DL (ref 0–200)
CLARITY UR: CLEAR
CO2 SERPL-SCNC: 24.9 MMOL/L (ref 22–29)
COLOR UR: YELLOW
CREAT SERPL-MCNC: 0.79 MG/DL (ref 0.57–1)
DEPRECATED RDW RBC AUTO: 42.8 FL (ref 37–54)
EGFRCR SERPLBLD CKD-EPI 2021: 83.1 ML/MIN/1.73
EOSINOPHIL # BLD AUTO: 0.15 10*3/MM3 (ref 0–0.4)
EOSINOPHIL NFR BLD AUTO: 1.4 % (ref 0.3–6.2)
ERYTHROCYTE [DISTWIDTH] IN BLOOD BY AUTOMATED COUNT: 12.5 % (ref 12.3–15.4)
GLOBULIN UR ELPH-MCNC: 2.9 GM/DL
GLUCOSE SERPL-MCNC: 87 MG/DL (ref 65–99)
GLUCOSE UR STRIP-MCNC: NEGATIVE MG/DL
HBA1C MFR BLD: 5.4 % (ref 4.8–5.6)
HCT VFR BLD AUTO: 44.6 % (ref 34–46.6)
HDLC SERPL-MCNC: 44 MG/DL (ref 40–60)
HGB BLD-MCNC: 15.2 G/DL (ref 12–15.9)
HGB UR QL STRIP.AUTO: NEGATIVE
HYALINE CASTS UR QL AUTO: ABNORMAL /LPF
IMM GRANULOCYTES # BLD AUTO: 0.04 10*3/MM3 (ref 0–0.05)
IMM GRANULOCYTES NFR BLD AUTO: 0.4 % (ref 0–0.5)
KETONES UR QL STRIP: NEGATIVE
LDLC SERPL CALC-MCNC: 66 MG/DL (ref 0–100)
LDLC/HDLC SERPL: 1.38 {RATIO}
LEUKOCYTE ESTERASE UR QL STRIP.AUTO: ABNORMAL
LYMPHOCYTES # BLD AUTO: 2.85 10*3/MM3 (ref 0.7–3.1)
LYMPHOCYTES NFR BLD AUTO: 27 % (ref 19.6–45.3)
MCH RBC QN AUTO: 31.8 PG (ref 26.6–33)
MCHC RBC AUTO-ENTMCNC: 34.1 G/DL (ref 31.5–35.7)
MCV RBC AUTO: 93.3 FL (ref 79–97)
MONOCYTES # BLD AUTO: 0.64 10*3/MM3 (ref 0.1–0.9)
MONOCYTES NFR BLD AUTO: 6.1 % (ref 5–12)
NEUTROPHILS NFR BLD AUTO: 6.78 10*3/MM3 (ref 1.7–7)
NEUTROPHILS NFR BLD AUTO: 64.2 % (ref 42.7–76)
NITRITE UR QL STRIP: NEGATIVE
NRBC BLD AUTO-RTO: 0 /100 WBC (ref 0–0.2)
PH UR STRIP.AUTO: 6.5 [PH] (ref 5–8)
PLATELET # BLD AUTO: 417 10*3/MM3 (ref 140–450)
PMV BLD AUTO: 10.2 FL (ref 6–12)
POTASSIUM SERPL-SCNC: 4.4 MMOL/L (ref 3.5–5.2)
PROT SERPL-MCNC: 6.9 G/DL (ref 6–8.5)
PROT UR QL STRIP: NEGATIVE
RBC # BLD AUTO: 4.78 10*6/MM3 (ref 3.77–5.28)
RBC # UR STRIP: ABNORMAL /HPF
REF LAB TEST METHOD: ABNORMAL
SODIUM SERPL-SCNC: 138 MMOL/L (ref 136–145)
SP GR UR STRIP: 1.01 (ref 1–1.03)
SQUAMOUS #/AREA URNS HPF: ABNORMAL /HPF
TRIGL SERPL-MCNC: 176 MG/DL (ref 0–150)
TSH SERPL DL<=0.05 MIU/L-ACNC: 0.04 UIU/ML (ref 0.27–4.2)
UROBILINOGEN UR QL STRIP: ABNORMAL
VIT B12 BLD-MCNC: 1551 PG/ML (ref 211–946)
VLDLC SERPL-MCNC: 30 MG/DL (ref 5–40)
WBC # UR STRIP: ABNORMAL /HPF
WBC NRBC COR # BLD: 10.55 10*3/MM3 (ref 3.4–10.8)

## 2023-08-05 RX ORDER — SIMVASTATIN 20 MG
20 TABLET ORAL
Qty: 30 TABLET | Refills: 5 | Status: SHIPPED | OUTPATIENT
Start: 2023-08-05

## 2023-08-05 RX ORDER — LORAZEPAM 1 MG/1
TABLET ORAL
Qty: 90 TABLET | Refills: 0 | Status: SHIPPED | OUTPATIENT
Start: 2023-08-05

## 2023-08-05 RX ORDER — PANTOPRAZOLE SODIUM 40 MG/1
40 TABLET, DELAYED RELEASE ORAL DAILY
Qty: 30 TABLET | Refills: 5 | Status: SHIPPED | OUTPATIENT
Start: 2023-08-05

## 2023-08-05 RX ORDER — BUPROPION HYDROCHLORIDE 450 MG/1
450 TABLET, FILM COATED, EXTENDED RELEASE ORAL EVERY MORNING
Qty: 30 TABLET | Refills: 5 | Status: SHIPPED | OUTPATIENT
Start: 2023-08-05

## 2023-08-05 RX ORDER — ASPIRIN 81 MG/1
81 TABLET ORAL DAILY
Qty: 30 TABLET | Refills: 5 | Status: SHIPPED | OUTPATIENT
Start: 2023-08-05

## 2023-08-05 RX ORDER — LEVOTHYROXINE SODIUM 175 UG/1
175 TABLET ORAL DAILY
Qty: 30 TABLET | Refills: 5 | Status: SHIPPED | OUTPATIENT
Start: 2023-08-05

## 2023-08-05 RX ORDER — FLUTICASONE PROPIONATE 50 MCG
2 SPRAY, SUSPENSION (ML) NASAL DAILY
Qty: 16 G | Refills: 5 | Status: SHIPPED | OUTPATIENT
Start: 2023-08-05

## 2023-08-05 RX ORDER — ESCITALOPRAM OXALATE 20 MG/1
20 TABLET ORAL DAILY
Qty: 30 TABLET | Refills: 5 | Status: SHIPPED | OUTPATIENT
Start: 2023-08-05

## 2023-08-05 RX ORDER — RISPERIDONE 2 MG/1
2 TABLET ORAL
Qty: 30 TABLET | Refills: 5 | Status: SHIPPED | OUTPATIENT
Start: 2023-08-05

## 2023-08-07 LAB — BACTERIA SPEC AEROBE CULT: ABNORMAL

## 2023-08-14 DIAGNOSIS — E78.2 MIXED HYPERLIPIDEMIA: ICD-10-CM

## 2023-08-14 DIAGNOSIS — F41.8 DEPRESSION WITH ANXIETY: ICD-10-CM

## 2023-08-14 RX ORDER — SIMVASTATIN 20 MG
TABLET ORAL
Qty: 30 TABLET | Refills: 5 | OUTPATIENT
Start: 2023-08-14

## 2023-08-14 RX ORDER — ESCITALOPRAM OXALATE 20 MG/1
TABLET ORAL
Qty: 30 TABLET | Refills: 5 | OUTPATIENT
Start: 2023-08-14

## 2023-08-17 ENCOUNTER — OFFICE VISIT (OUTPATIENT)
Dept: PULMONOLOGY | Facility: CLINIC | Age: 63
End: 2023-08-17
Payer: MEDICARE

## 2023-08-17 VITALS
SYSTOLIC BLOOD PRESSURE: 150 MMHG | DIASTOLIC BLOOD PRESSURE: 95 MMHG | TEMPERATURE: 98 F | WEIGHT: 207 LBS | HEART RATE: 75 BPM | BODY MASS INDEX: 36.68 KG/M2 | HEIGHT: 63 IN | RESPIRATION RATE: 18 BRPM | OXYGEN SATURATION: 95 %

## 2023-08-17 DIAGNOSIS — G47.33 OBSTRUCTIVE SLEEP APNEA: ICD-10-CM

## 2023-08-17 DIAGNOSIS — G47.34 NOCTURNAL HYPOXIA: ICD-10-CM

## 2023-08-17 DIAGNOSIS — R91.1 PULMONARY NODULE: ICD-10-CM

## 2023-08-17 DIAGNOSIS — F17.210 CIGARETTE NICOTINE DEPENDENCE WITHOUT COMPLICATION: ICD-10-CM

## 2023-08-17 DIAGNOSIS — J43.9 PULMONARY EMPHYSEMA, UNSPECIFIED EMPHYSEMA TYPE: Primary | ICD-10-CM

## 2023-08-17 NOTE — PROGRESS NOTES
"Chief Complaint  COPD    Subjective        Lisa Hill presents to [facility]  History of Present Illness    Mrs. Hill presents today for follow up. At the last visit, seemed to be notable for exacerbation of symptoms. Doing better at this time. Still notes worsening of symptoms during the warmer/humid months. Did note benefit from Breztri more than stiolto, and continues use of this currently. Still using oxygen at nighttime (in place of autopap device).   Notes family history prominent for pulmonary illness (father with emphysema, daughter with asthma). States that prior to her COPD diagnosis, she had also been diagnosed with asthma.   Notes intermittent difficulty with shortness of breath on exertion.   Remains a current smoker.         Objective   Vital Signs:  /95   Pulse 75   Temp 98 øF (36.7 øC) (Temporal)   Resp 18   Ht 158.8 cm (62.52\")   Wt 93.9 kg (207 lb)   SpO2 95%   BMI 37.23 kg/mý   Estimated body mass index is 37.23 kg/mý as calculated from the following:    Height as of this encounter: 158.8 cm (62.52\").    Weight as of this encounter: 93.9 kg (207 lb).         Physical Exam  Vitals reviewed.   Constitutional:       General: She is not in acute distress.     Appearance: She is well-developed. She is not diaphoretic.   HENT:      Head: Normocephalic and atraumatic.   Cardiovascular:      Rate and Rhythm: Normal rate and regular rhythm.      Heart sounds: Normal heart sounds, S1 normal and S2 normal.   Pulmonary:      Effort: Pulmonary effort is normal.      Breath sounds: Wheezing (faint wheeze, more prominent of the right side) present. No rhonchi or rales.   Neurological:      Mental Status: She is alert and oriented to person, place, and time.   Psychiatric:         Behavior: Behavior normal.      Result Review :  The following data was reviewed by: Katarina Wan PA-C on 08/17/2023:    Reviewed the previous PFT.   Reviewed " CT chest low dose imaging/report from February 2023.         Assessment and Plan   Diagnoses and all orders for this visit:    1. Pulmonary emphysema, unspecified emphysema type (Primary)    2. Obstructive sleep apnea    3. Nocturnal hypoxia    4. Pulmonary nodule    5. Cigarette nicotine dependence without complication          COPD (emphysema type with reported prior diagnosis of asthma):  Continue albuterol inhaler as needed  Continue duonebs as needed  Continue Breztri as scheduled  Can continue intermittent use of Mucinex when needed for congestion  Minimal wheezing today. Will hold off on steroids but can contact us for an order in the next several days if symptoms worsen.   Completed Alpha 1 genotype testing   Completed walk oximetry testing. Saturation reached as low as 90%, but then improved during exertion. Can repeat as needed.   Will consider repeat PFT/spirometry at the next visit.             Obstructive sleep apnea/nocturnal hypoxia:  Previously unable to tolerate positive airway pressure therapy.  Compliant with 2 L nasal cannula at nighttime.          Pulmonary nodule, calcified lymph nodes  Imaging previously notable for  - Stable 2 mm right nodule remained stable, image 70 (since March 2019, 4 years)  - Stable calcified lymph nodes (since 2017, 6 years)  - 7 mm groundglass opacity at the left upper lobe resolved (noted in Sept. 2021)  - Previous 4 mm left lung nodule (noted/resolved in 2017)   Will consider continued low-dose CT screening in 1 year, February 2024.           Cigarette dependence:  Estimated 45-year use. Still smoking 1 ppd average.   Tried to quit multiple times without success.  Has tried a nicotine inhaler, nicotine nasal spray, nicotine gum.        Allergic rhinitis:  Continue Singulair tablets nightly  Continue Flonase as needed        Follow Up   Return in about 6 months (around 2/17/2024), or if symptoms worsen or fail to improve, for Next scheduled follow up.  Patient was  given instructions and counseling regarding her condition or for health maintenance advice. Please see specific information pulled into the AVS if appropriate.

## 2023-08-27 DIAGNOSIS — F41.8 DEPRESSION WITH ANXIETY: ICD-10-CM

## 2023-08-28 RX ORDER — BUPROPION HYDROCHLORIDE 450 MG/1
TABLET, FILM COATED, EXTENDED RELEASE ORAL
Qty: 30 TABLET | Refills: 5 | Status: SHIPPED | OUTPATIENT
Start: 2023-08-28

## 2023-09-01 ENCOUNTER — TELEPHONE (OUTPATIENT)
Dept: PULMONOLOGY | Facility: CLINIC | Age: 63
End: 2023-09-01
Payer: MEDICARE

## 2023-09-01 NOTE — TELEPHONE ENCOUNTER
Called to give alpha 1 genotype results.  She was unavailable so I left a message to request a call back.       Upon calling back, can explain that  Genotype was noted at [facility].   This is typically the one where the one abnormal gene (S) does not impact the alpha 1 antitrypsin levels. Alpha 1 levels remain normal, and would not need replaced. Thus, no other testing is indicated.

## 2023-09-11 DIAGNOSIS — F41.8 DEPRESSION WITH ANXIETY: ICD-10-CM

## 2023-09-11 RX ORDER — RISPERIDONE 2 MG/1
2 TABLET ORAL
Qty: 30 TABLET | Refills: 5 | Status: SHIPPED | OUTPATIENT
Start: 2023-09-11

## 2023-09-13 ENCOUNTER — TELEPHONE (OUTPATIENT)
Dept: FAMILY MEDICINE CLINIC | Facility: CLINIC | Age: 63
End: 2023-09-13

## 2023-09-13 DIAGNOSIS — E11.42 TYPE 2 DIABETES MELLITUS WITH PERIPHERAL NEUROPATHY: Primary | ICD-10-CM

## 2023-09-13 RX ORDER — SEMAGLUTIDE 2.68 MG/ML
2 INJECTION, SOLUTION SUBCUTANEOUS WEEKLY
Qty: 3 ML | Refills: 5 | Status: SHIPPED | OUTPATIENT
Start: 2023-09-13

## 2023-09-13 NOTE — TELEPHONE ENCOUNTER
Looks like its on a nation wide back order right now. No one has it.    Burow's Advancement Flap Text: Adjacent tissue transfer was selected. The defect edges were debeveled with a #15 scalpel blade.  Given the location of the defect and the proximity to free margins a Burow's advancement flap was deemed most appropriate.  Using a sterile surgical marker, the appropriate advancement flap was drawn incorporating the defect and placing the expected incisions within the relaxed skin tension lines where possible.    The area thus outlined was incised deep to adipose tissue with a #15 scalpel blade.  The skin margins were undermined to an appropriate distance in all directions utilizing iris scissors.

## 2023-09-13 NOTE — TELEPHONE ENCOUNTER
Caller: Lisa Hill    Relationship: Self    Best call back number:  [phone]    Requested Prescriptions:   Requested Prescriptions      No prescriptions requested or ordered in this encounter      Semaglutide, 1 MG/DOSE, (Ozempic, 1 MG/DOSE,) 4 MG/3ML solution pen-injector     Pharmacy where request should be sent:      Last office visit with prescribing clinician: 8/4/2023   Last telemedicine visit with prescribing clinician: Visit date not found   Next office visit with prescribing clinician: 11/17/2023     Additional details provided by patient:     PATIENT HAS CALLED TWO PHARMACIES AND THEY ARE OUT OF MEDICATION UNTIL 9/22.  WHAT IS PATIENT SUPPOSED TO DO?      Melania Isaac, PCT   09/13/23 12:12 EDT

## 2023-10-11 ENCOUNTER — TELEPHONE (OUTPATIENT)
Dept: FAMILY MEDICINE CLINIC | Facility: CLINIC | Age: 63
End: 2023-10-11

## 2023-10-11 RX ORDER — SEMAGLUTIDE 0.68 MG/ML
0.5 INJECTION, SOLUTION SUBCUTANEOUS WEEKLY
Qty: 3 ML | Refills: 0 | Status: SHIPPED | OUTPATIENT
Start: 2023-10-11

## 2023-10-11 NOTE — TELEPHONE ENCOUNTER
THE PATIENT CALLED AND STATED [facility]  THE PATIENT WOULD LIKE TO KNOW WHAT TO DO NEXT.  A SHOT IS DUE ON FRIDAY.    PLEASE CALL [phone]  A MESSAGE CAN BE LEFT.

## 2023-10-23 ENCOUNTER — TELEPHONE (OUTPATIENT)
Dept: FAMILY MEDICINE CLINIC | Facility: CLINIC | Age: 63
End: 2023-10-23

## 2023-10-24 NOTE — TELEPHONE ENCOUNTER
Name: Lisa Hill      Relationship: Self      Best Callback Number: [phone]      HUB PROVIDED THE RELAY MESSAGE FROM THE OFFICE      PATIENT: RELAYED THE [facility] BUT LISA DIDN'T UNDERSTAND SO I TRANSFERRED HER TO THE OFFICE     ADDITIONAL INFORMATION:

## 2023-10-24 NOTE — TELEPHONE ENCOUNTER
Patient is aware. She wanted to know if there is anything you can prescribe her to help witht he aching, numbness, and pain in her feet.

## 2023-10-25 NOTE — TELEPHONE ENCOUNTER
Unable to reach the patient. Could not leave voicemail. Will try again later.     Hub to read.      no concerns

## 2023-11-06 DIAGNOSIS — E55.9 VITAMIN D DEFICIENCY: ICD-10-CM

## 2023-11-06 DIAGNOSIS — E11.42 TYPE 2 DIABETES MELLITUS WITH PERIPHERAL NEUROPATHY: ICD-10-CM

## 2023-11-06 DIAGNOSIS — I10 ESSENTIAL HYPERTENSION: ICD-10-CM

## 2023-11-06 RX ORDER — METFORMIN HYDROCHLORIDE 500 MG/1
TABLET, EXTENDED RELEASE ORAL
Qty: 120 TABLET | Refills: 5 | Status: SHIPPED | OUTPATIENT
Start: 2023-11-06

## 2023-11-06 RX ORDER — LOSARTAN POTASSIUM 50 MG/1
TABLET ORAL
Qty: 30 TABLET | Refills: 5 | Status: SHIPPED | OUTPATIENT
Start: 2023-11-06

## 2023-11-06 RX ORDER — MELATONIN
2000 DAILY
Qty: 60 TABLET | Refills: 5 | Status: SHIPPED | OUTPATIENT
Start: 2023-11-06

## 2023-11-09 ENCOUNTER — TELEPHONE (OUTPATIENT)
Dept: FAMILY MEDICINE CLINIC | Facility: CLINIC | Age: 63
End: 2023-11-09

## 2023-11-09 RX ORDER — SEMAGLUTIDE 0.68 MG/ML
0.5 INJECTION, SOLUTION SUBCUTANEOUS WEEKLY
Qty: 3 ML | Refills: 0 | Status: SHIPPED | OUTPATIENT
Start: 2023-11-09 | End: 2023-11-09 | Stop reason: SDUPTHER

## 2023-11-09 RX ORDER — SEMAGLUTIDE 0.68 MG/ML
0.5 INJECTION, SOLUTION SUBCUTANEOUS WEEKLY
Qty: 3 ML | Refills: 0 | Status: SHIPPED | OUTPATIENT
Start: 2023-11-09

## 2023-11-09 NOTE — TELEPHONE ENCOUNTER
Caller: Lisa Hill    Relationship: Self    Best call back number: [phone]     Requested Prescriptions: OZEMPIC 0.5 MG     Pharmacy where request should be sent: [address] - [phone] [facility] [phone] FX     Last office visit with prescribing clinician: 8/4/2023   Last telemedicine visit with prescribing clinician: Visit date not found   Next office visit with prescribing clinician: 11/17/2023     Additional details provided by patient:     Does the patient have less than a 3 day supply:  [x] Yes  [] No    Would you like a call back once the refill request has been completed: [x] Yes [] No    If the office needs to give you a call back, can they leave a voicemail: [x] Yes [] No    Jorge Zavala Rep   11/09/23 11:02 EST

## 2023-11-17 ENCOUNTER — OFFICE VISIT (OUTPATIENT)
Dept: FAMILY MEDICINE CLINIC | Facility: CLINIC | Age: 63
End: 2023-11-17
Payer: MEDICARE

## 2023-11-17 DIAGNOSIS — I25.10 CORONARY ARTERY CALCIFICATION SEEN ON CT SCAN: ICD-10-CM

## 2023-11-17 DIAGNOSIS — M06.9 RHEUMATOID ARTHRITIS INVOLVING MULTIPLE SITES, UNSPECIFIED WHETHER RHEUMATOID FACTOR PRESENT: ICD-10-CM

## 2023-11-17 DIAGNOSIS — N39.3 STRESS BLADDER INCONTINENCE, FEMALE: ICD-10-CM

## 2023-11-17 DIAGNOSIS — M47.816 SPONDYLOSIS OF LUMBAR REGION WITHOUT MYELOPATHY OR RADICULOPATHY: ICD-10-CM

## 2023-11-17 DIAGNOSIS — J30.9 CHRONIC ALLERGIC RHINITIS: Primary | ICD-10-CM

## 2023-11-17 DIAGNOSIS — Z12.31 ENCOUNTER FOR SCREENING MAMMOGRAM FOR BREAST CANCER: ICD-10-CM

## 2023-11-17 DIAGNOSIS — M85.80 OSTEOPENIA, UNSPECIFIED LOCATION: ICD-10-CM

## 2023-11-17 DIAGNOSIS — E03.8 HYPOTHYROIDISM DUE TO HASHIMOTO'S THYROIDITIS: ICD-10-CM

## 2023-11-17 DIAGNOSIS — K21.9 GASTROESOPHAGEAL REFLUX DISEASE WITHOUT ESOPHAGITIS: ICD-10-CM

## 2023-11-17 DIAGNOSIS — E06.3 HYPOTHYROIDISM DUE TO HASHIMOTO'S THYROIDITIS: ICD-10-CM

## 2023-11-17 DIAGNOSIS — E11.42 TYPE 2 DIABETES MELLITUS WITH PERIPHERAL NEUROPATHY: ICD-10-CM

## 2023-11-17 DIAGNOSIS — J30.1 SEASONAL ALLERGIC RHINITIS DUE TO POLLEN: ICD-10-CM

## 2023-11-17 DIAGNOSIS — Z00.00 HEALTHCARE MAINTENANCE: ICD-10-CM

## 2023-11-17 DIAGNOSIS — F41.8 DEPRESSION WITH ANXIETY: ICD-10-CM

## 2023-11-17 DIAGNOSIS — F45.0 SOMATIZATION DISORDER: ICD-10-CM

## 2023-11-17 DIAGNOSIS — M77.42 METATARSALGIA OF BOTH FEET: ICD-10-CM

## 2023-11-17 DIAGNOSIS — K59.09 CHRONIC CONSTIPATION: ICD-10-CM

## 2023-11-17 DIAGNOSIS — E55.9 VITAMIN D DEFICIENCY: ICD-10-CM

## 2023-11-17 DIAGNOSIS — M15.9 GENERALIZED OSTEOARTHRITIS: ICD-10-CM

## 2023-11-17 DIAGNOSIS — G47.33 OBSTRUCTIVE SLEEP APNEA: ICD-10-CM

## 2023-11-17 DIAGNOSIS — J43.1 PANLOBULAR EMPHYSEMA: ICD-10-CM

## 2023-11-17 DIAGNOSIS — F17.200 SMOKER: ICD-10-CM

## 2023-11-17 DIAGNOSIS — M77.41 METATARSALGIA OF BOTH FEET: ICD-10-CM

## 2023-11-17 DIAGNOSIS — I10 ESSENTIAL HYPERTENSION: ICD-10-CM

## 2023-11-17 DIAGNOSIS — E66.01 CLASS 2 SEVERE OBESITY WITH SERIOUS COMORBIDITY AND BODY MASS INDEX (BMI) OF 35.0 TO 35.9 IN ADULT, UNSPECIFIED OBESITY TYPE: ICD-10-CM

## 2023-11-17 DIAGNOSIS — E78.2 MIXED HYPERLIPIDEMIA: ICD-10-CM

## 2023-11-17 DIAGNOSIS — I70.0 AORTIC ATHEROSCLEROSIS: ICD-10-CM

## 2023-11-17 PROBLEM — G47.34 NOCTURNAL HYPOXIA: Status: RESOLVED | Noted: 2019-05-15 | Resolved: 2023-11-17

## 2023-11-17 PROBLEM — J43.9 PULMONARY EMPHYSEMA: Status: RESOLVED | Noted: 2019-11-06 | Resolved: 2023-11-17

## 2023-11-17 PROBLEM — S93.491A SPRAIN OF ANTERIOR TALOFIBULAR LIGAMENT OF RIGHT ANKLE: Status: RESOLVED | Noted: 2023-11-17 | Resolved: 2023-11-17

## 2023-11-17 PROBLEM — S93.491A SPRAIN OF ANTERIOR TALOFIBULAR LIGAMENT OF RIGHT ANKLE: Status: ACTIVE | Noted: 2023-11-17

## 2023-11-17 NOTE — PROGRESS NOTES
Subjective   Lisa Hill is a 63 y.o. female.     Chief Complaint  She returns for a scheduled reassessment of multiple medical problems including chronic back and joint pain, chronic obstructive pulmonary disease, type 2 diabetes mellitus, hyperlipidemia, essential hypertension, hypothyroidism, and depression    History of Present Illness     Chronic Back and Joint Pain  She complains of persistent low back and generalized joint pain.  She has had more pain about the base of her toes since last here. There has been no change in the quality of her discomfort, and apart from paresthesias of the feet, she denies any associated symptoms. There is no history of any weakness, or any change in her bowel/bladder control.  There is no history of any fever, chills, or night sweats.  When she sits down the pain generally resolves within minutes and she continues to deny any problem at night. She continues to use her TENS unit for several hours daily and feels that it helps.  She also feels that meloxicam helps.  She is no longer taking hydroxychloroquine.  Plain films of the lumbar spine performed on 8/5/2019 were reported as showing degenerative disc disease as well as atherosclerotic calcification of the aorta    COPD  Her shortness of breath and cough have have improved some with weight loss. She admits to intermittent nasal congestion, but continues to deny any other upper respiratory tract symptoms, chest pain, hemoptysis, fever or chills.  Her symptoms worsen with activity, exposure to cold air and environmental allergens and improve some with rest, supplemental oxygen, and inhaled inhaled medication.  She is currently on breztri and is using her rescue inhaler once or twice daily. She is currently smoking 1 pack per da on average.  She underwent an updated low dose CT of the chest on 11/5/2021 with evidence of moderate COPD, a 7 mm groundglass nodule within the SUDEEP and mild coronary artery calcifications.   Dedicated CT of the chest was recommended and performed on 12/3/2021.  This confirmed the same.  She underwent an updated low-dose CT of the chest on 2/9/2023 with no changes noted.  She continues to be followed by pulmonology and underwent a reassessment with Katarina Wan PA-C on 8/9/23  Lab Results   Component Value Date    WBC 10.55 08/04/2023    HGB 15.2 08/04/2023    HCT 44.6 08/04/2023    MCV 93.3 08/04/2023     08/04/2023     Type 2 Diabetes Mellitus  She admits to paresthesias of the feet.  There is no history of any visual disturbances, polydipsia, polyuria, hypoglycemia or foot ulcerations. Current treatments: metformin, empagliflozin, and semaglutide.  She continues to deny any apparent side effects  Lab Results   Component Value Date    HGBA1C 5.40 08/04/2023     Lab Results   Component Value Date    MICROALBUR <1.2 08/04/2023     Lab Results   Component Value Date    TBBJCYFH05 1,551 (H) 08/04/2023     Hyperlipidemia  Her compliance with treatment has been fair.  She has been following her diet closer and with the weight that she has lost has been able to tolerate more activity. She remains on simvastatin with no apparent side effects  Lab Results   Component Value Date    CHOL 140 08/04/2023    CHLPL 141 02/05/2016    TRIG 176 (H) 08/04/2023    HDL 44 08/04/2023    LDL 66 08/04/2023     Essential Hypertension  She admits to shortness of breath with intermittent lower extremity swelling but continues to deny any chest pain, orthopnea, PND, palpitations, or lightheadedness.  She is taking losartan with no apparent side effects  Lab Results   Component Value Date    GLUCOSE 87 08/04/2023    BUN 10 08/04/2023    CREATININE 0.79 08/04/2023    EGFR 83.1 08/04/2023    BCR 12.7 08/04/2023    K 4.4 08/04/2023    CO2 24.9 08/04/2023    CALCIUM 9.0 08/04/2023    ALBUMIN 4.0 08/04/2023    BILITOT 0.2 08/04/2023    AST 15 08/04/2023    ALT 21 08/04/2023     Lab Results   Component Value Date    ALKPHOS 52  08/04/2023     Hypothyroidism  She has a history of hypothyroidism associated with Hashimoto's disease.  The dosage of levothyroxine was reduced to 175 daily following her most recent labs  Lab Results   Component Value Date    TSH 0.044 (L) 08/04/2023     Depression  She has a long history of intermittent depression, nervousness, anhedonia, difficulty concentrating, fatigue and impaired memory. She continues to deny any suicidal ideation. Risk factors: history of seasonal affective disorder.  She remains on escitalopram 10 daily,  bupropion 450 daily, risperdone 2 nightly, and lorazepam 0.5 twice a day.  She continues to do well at present    Labs  Most recent vitamin D 62.1    The following portions of the patient's history were reviewed and updated as appropriate: allergies, current medications, past medical history, past social history, and problem list.    Review of Systems   Constitutional:  Positive for fatigue. Negative for appetite change, chills, fever and unexpected weight change.   HENT:  Positive for rhinorrhea. Negative for congestion, ear pain, postnasal drip, sneezing, sore throat and voice change.    Eyes:  Negative for visual disturbance.   Respiratory:  Positive for cough and shortness of breath. Negative for wheezing.    Cardiovascular:  Positive for leg swelling. Negative for chest pain and palpitations.   Gastrointestinal:  Positive for constipation. Negative for abdominal pain, anal bleeding, blood in stool, diarrhea, nausea and vomiting.   Genitourinary:  Negative for difficulty urinating, dysuria, frequency, hematuria and urgency.        Incontinence with coughing, sneezing, or lifting   Musculoskeletal:  Positive for arthralgias and back pain. Negative for joint swelling and myalgias.   Skin:  Negative for rash.   Neurological:  Positive for numbness (and paresthesias both feet) and headaches (when stressed). Negative for weakness.   Psychiatric/Behavioral:  Positive for decreased  concentration, dysphoric mood and sleep disturbance. Negative for suicidal ideas.      Objective   Physical Exam  Constitutional:       General: She is not in acute distress.     Appearance: Normal appearance. She is well-developed. She is not diaphoretic.      Comments: Accompanied by her .  Bright and in fair spirits.  Climbed onto the exam table with minimal assistance.  No apparent distress.   HENT:      Head: Atraumatic.      Right Ear: Tympanic membrane, ear canal and external ear normal.      Left Ear: Tympanic membrane, ear canal and external ear normal.      Mouth/Throat:      Lips: No lesions.      Mouth: Mucous membranes are moist. No oral lesions.      Pharynx: No oropharyngeal exudate or posterior oropharyngeal erythema.   Eyes:      General: Lids are normal.      Extraocular Movements: Extraocular movements intact.      Conjunctiva/sclera: Conjunctivae normal.      Pupils: Pupils are equal.   Neck:      Thyroid: No thyroid mass or thyromegaly.      Vascular: No carotid bruit or JVD.      Trachea: Trachea normal. No tracheal deviation.   Cardiovascular:      Rate and Rhythm: Normal rate and regular rhythm.      Pulses:           Dorsalis pedis pulses are 2+ on the right side and 2+ on the left side.        Posterior tibial pulses are 2+ on the right side and 2+ on the left side.      Heart sounds: Normal heart sounds, S1 normal and S2 normal. No murmur heard.     No gallop.   Pulmonary:      Effort: Pulmonary effort is normal.      Breath sounds: Decreased air movement present. Examination of the right-lower field reveals decreased breath sounds. Examination of the left-lower field reveals decreased breath sounds. Decreased breath sounds and wheezing (diffuse - mild) present. No rales.      Comments: Pulmonary hyperinflation  Abdominal:      General: Bowel sounds are normal. There is no distension.   Musculoskeletal:      Right lower leg: No edema.      Left lower leg: No edema.      Right foot:  Tenderness (about the MTP joints) present.      Left foot: Tenderness (about the MTP joints) present.   Lymphadenopathy:      Head:      Right side of head: No submental, submandibular, tonsillar, preauricular, posterior auricular or occipital adenopathy.      Left side of head: No submental, submandibular, tonsillar, preauricular, posterior auricular or occipital adenopathy.      Cervical: No cervical adenopathy.      Upper Body:      Right upper body: No supraclavicular adenopathy.      Left upper body: No supraclavicular adenopathy.   Skin:     General: Skin is warm.      Coloration: Skin is not cyanotic, jaundiced or pale.      Findings: No rash.      Nails: There is no clubbing.   Neurological:      Mental Status: She is alert and oriented to person, place, and time.      Cranial Nerves: No cranial nerve deficit, dysarthria or facial asymmetry.      Sensory: Sensory deficit (decreased vibration sense both feet) present.      Motor: No tremor.      Coordination: Coordination normal.      Gait: Gait normal.   Psychiatric:         Attention and Perception: Attention normal.         Mood and Affect: Mood normal.         Speech: Speech normal.         Behavior: Behavior normal.         Thought Content: Thought content normal.       Assessment & Plan   Problems Addressed this Visit          Allergies and Adverse Reactions    Chronic allergic rhinitis   Continue current medication  Encouraged to report if any worse or if any new symptoms or concerns.    Relevant Medications    fluticasone (FLONASE) 50 MCG/ACT nasal spray    meloxicam (MOBIC) 15 MG tablet    montelukast (SINGULAIR) 10 MG tablet       Cardiac and Vasculature    Aortic atherosclerosis  Reminded regarding risk factor modification with an emphasis on tobacco cessation.  Continue low-dose ASA.    Coronary artery calcification seen on CT scan  As above.    Relevant Medications    aspirin 81 MG EC tablet    Essential hypertension   Hypertension: at goal.  Evidence of target organ damage:  Evidence of atherosclerosis and coronary artery calcifications on previous imaging .  Encouraged to continue to work on diet and exercise plan.   Continue current medication    Relevant Medications    losartan (COZAAR) 50 MG tablet    Mixed hyperlipidemia  As above.   Continue current medication.    Relevant Medications    simvastatin (ZOCOR) 20 MG tablet       Endocrine and Metabolic    Class 2 severe obesity with serious comorbidity and body mass index (BMI) of 35.0 to 35.9 in adult    Hypothyroidism due to Hashimoto's thyroiditis  Clinically euthyroid.  Continue current medication.    Relevant Medications    levothyroxine (SYNTHROID, LEVOTHROID) 175 MCG tablet    meloxicam (MOBIC) 15 MG tablet    Type 2 diabetes mellitus with peripheral neuropathy  Diabetes mellitus Type II, under excellent control.   Encouraged to continue to pursue ADA diet  Encouraged aerobic exercise.  Continue current medication  Updated labs will be drawn at her return.    Relevant Medications    metFORMIN ER (GLUCOPHAGE-XR) 500 MG 24 hr tablet    vitamin B-12 (CYANOCOBALAMIN) 1000 MCG tablet    Semaglutide, 1 MG/DOSE, (Ozempic, 1 MG/DOSE,) 4 MG/3ML solution pen-injector    Other Relevant Orders    Ambulatory Referral to Podiatry (Completed)    Vitamin D deficiency    Relevant Medications    cholecalciferol (VITAMIN D3) 25 MCG (1000 UT) tablet       Gastrointestinal Abdominal     Chronic constipation    Gastroesophageal reflux disease without esophagitis    Relevant Medications    pantoprazole (PROTONIX) 40 MG EC tablet       Genitourinary and Reproductive     Encounter for screening mammogram for breast cancer    Relevant Orders    Mammo Screening Digital Tomosynthesis Bilateral With CAD    Stress bladder incontinence, female       Health Encounters    Healthcare maintenance  Patient has already received a flu and COVID-19 shot this fall  Recommended a RSV shot  We will arrange an updated mammogram     Relevant Orders    Mammo Screening Digital Tomosynthesis Bilateral With CAD       Mental Health    Depression with anxiety  Stable.  Supportive therapy.   Continue current medication.  Encouraged to report if any worse or if any new symptoms or concerns.    Relevant Medications    buPROPion XL (FORFIVO XL) 450 MG 24 hr tablet    busPIRone (BUSPAR) 15 MG tablet    escitalopram (LEXAPRO) 20 MG tablet    LORazepam (ATIVAN) 1 MG tablet    risperiDONE (risperDAL) 2 MG tablet    Somatization disorder    Relevant Medications    buPROPion XL (FORFIVO XL) 450 MG 24 hr tablet    busPIRone (BUSPAR) 15 MG tablet    escitalopram (LEXAPRO) 20 MG tablet    LORazepam (ATIVAN) 1 MG tablet    risperiDONE (risperDAL) 2 MG tablet       Musculoskeletal and Injuries    Generalized osteoarthritis  Reminded regarding symptomatic treatment.   Continue current medication    Relevant Medications    Acetaminophen Extra Strength 500 MG tablet    Metatarsalgia of both feet  Reminded regarding symptomatic treatment.   Continue current medication  Reviewed options and agreed on a podiatry assessment    Osteopenia    Rheumatoid arthritis     Diagnosed in the past by rheumatology           Pulmonary and Pneumonias    Panlobular emphysema   COPD is stable.  Reminded of the importance of smoking cessation  Encouraged to remain as active as symptoms allow for  Continue current medication  Follow up with pulmonology     Relevant Medications    albuterol sulfate  (90 Base) MCG/ACT inhaler    Budeson-Glycopyrrol-Formoterol (Breztri Aerosphere) 160-9-4.8 MCG/ACT aerosol inhaler    fluticasone (FLONASE) 50 MCG/ACT nasal spray    ipratropium-albuterol (DUO-NEB) 0.5-2.5 mg/3 ml nebulizer    montelukast (SINGULAIR) 10 MG tablet       Sleep    Obstructive sleep apnea       Tobacco    Smoker     Diagnoses         Codes Comments    Chronic allergic rhinitis    -  Primary ICD-10-CM: J30.9  ICD-9-CM: 477.9     Mixed hyperlipidemia     ICD-10-CM:  E78.2  ICD-9-CM: 272.2     Essential hypertension     ICD-10-CM: I10  ICD-9-CM: 401.9     Coronary artery calcification seen on CT scan     ICD-10-CM: I25.10  ICD-9-CM: 414.00     Aortic atherosclerosis     ICD-10-CM: I70.0  ICD-9-CM: 440.0     Vitamin D deficiency     ICD-10-CM: E55.9  ICD-9-CM: 268.9     Type 2 diabetes mellitus with peripheral neuropathy     ICD-10-CM: E11.42  ICD-9-CM: 250.60, 357.2     Hypothyroidism due to Hashimoto's thyroiditis     ICD-10-CM: E03.8, E06.3  ICD-9-CM: 244.8, 245.2     Class 2 severe obesity with serious comorbidity and body mass index (BMI) of 35.0 to 35.9 in adult, unspecified obesity type     ICD-10-CM: E66.01, Z68.35  ICD-9-CM: 278.01, V85.35     Gastroesophageal reflux disease without esophagitis     ICD-10-CM: K21.9  ICD-9-CM: 530.81     Stress bladder incontinence, female     ICD-10-CM: N39.3  ICD-9-CM: 625.6     Chronic constipation     ICD-10-CM: K59.09  ICD-9-CM: 564.00     Healthcare maintenance     ICD-10-CM: Z00.00  ICD-9-CM: V70.0     Somatization disorder     ICD-10-CM: F45.0  ICD-9-CM: 300.81     Depression with anxiety     ICD-10-CM: F41.8  ICD-9-CM: 300.4     Rheumatoid arthritis involving multiple sites, unspecified whether rheumatoid factor present     ICD-10-CM: M06.9  ICD-9-CM: 714.0     Osteopenia, unspecified location     ICD-10-CM: M85.80  ICD-9-CM: 733.90     Generalized osteoarthritis     ICD-10-CM: M15.9  ICD-9-CM: 715.00     Panlobular emphysema     ICD-10-CM: J43.1  ICD-9-CM: 492.8     Obstructive sleep apnea     ICD-10-CM: G47.33  ICD-9-CM: 327.23     Smoker     ICD-10-CM: F17.200  ICD-9-CM: 305.1     Encounter for screening mammogram for breast cancer     ICD-10-CM: Z12.31  ICD-9-CM: V76.12     Metatarsalgia of both feet     ICD-10-CM: M77.41, M77.42  ICD-9-CM: 726.70     Seasonal allergic rhinitis due to pollen     ICD-10-CM: J30.1  ICD-9-CM: 477.0     Spondylosis of lumbar region without myelopathy or radiculopathy     ICD-10-CM:  M47.816  ICD-9-CM: 721.3 increase mobic 15 mg qd  take tylenol prn breakthrough pain

## 2023-11-18 VITALS
DIASTOLIC BLOOD PRESSURE: 70 MMHG | BODY MASS INDEX: 35.08 KG/M2 | WEIGHT: 198 LBS | RESPIRATION RATE: 15 BRPM | HEART RATE: 93 BPM | SYSTOLIC BLOOD PRESSURE: 122 MMHG | HEIGHT: 63 IN | OXYGEN SATURATION: 94 % | TEMPERATURE: 98.6 F

## 2023-11-18 RX ORDER — ALBUTEROL SULFATE 90 UG/1
2 AEROSOL, METERED RESPIRATORY (INHALATION) EVERY 4 HOURS PRN
Qty: 18 G | Refills: 8 | Status: SHIPPED | OUTPATIENT
Start: 2023-11-18

## 2023-11-18 RX ORDER — PANTOPRAZOLE SODIUM 40 MG/1
40 TABLET, DELAYED RELEASE ORAL DAILY
Qty: 30 TABLET | Refills: 5 | Status: SHIPPED | OUTPATIENT
Start: 2023-11-18

## 2023-11-18 RX ORDER — PSEUDOEPHED/ACETAMINOPH/DIPHEN 30MG-500MG
1000 TABLET ORAL 3 TIMES DAILY
Qty: 180 TABLET | Refills: 5 | Status: SHIPPED | OUTPATIENT
Start: 2023-11-18

## 2023-11-18 RX ORDER — LANOLIN ALCOHOL/MO/W.PET/CERES
1000 CREAM (GRAM) TOPICAL DAILY
Qty: 30 TABLET | Refills: 5 | Status: SHIPPED | OUTPATIENT
Start: 2023-11-18

## 2023-11-18 RX ORDER — IPRATROPIUM BROMIDE AND ALBUTEROL SULFATE 2.5; .5 MG/3ML; MG/3ML
3 SOLUTION RESPIRATORY (INHALATION) 4 TIMES DAILY PRN
Qty: 360 ML | Refills: 5 | Status: SHIPPED | OUTPATIENT
Start: 2023-11-18

## 2023-11-18 RX ORDER — SIMVASTATIN 20 MG
20 TABLET ORAL
Qty: 30 TABLET | Refills: 5 | Status: SHIPPED | OUTPATIENT
Start: 2023-11-18

## 2023-11-18 RX ORDER — SEMAGLUTIDE 1.34 MG/ML
1 INJECTION, SOLUTION SUBCUTANEOUS WEEKLY
Qty: 3 ML | Refills: 5 | Status: SHIPPED | OUTPATIENT
Start: 2023-11-18

## 2023-11-18 RX ORDER — LEVOTHYROXINE SODIUM 175 UG/1
175 TABLET ORAL DAILY
Qty: 30 TABLET | Refills: 5 | Status: SHIPPED | OUTPATIENT
Start: 2023-11-18

## 2023-11-18 RX ORDER — SEMAGLUTIDE 1.34 MG/ML
1 INJECTION, SOLUTION SUBCUTANEOUS WEEKLY
Qty: 3 ML | Refills: 5 | Status: SHIPPED | OUTPATIENT
Start: 2023-11-18 | End: 2023-11-18 | Stop reason: SDUPTHER

## 2023-11-18 RX ORDER — BUPROPION HYDROCHLORIDE 450 MG/1
450 TABLET, FILM COATED, EXTENDED RELEASE ORAL EVERY MORNING
Qty: 30 TABLET | Refills: 5 | Status: SHIPPED | OUTPATIENT
Start: 2023-11-18

## 2023-11-18 RX ORDER — LORAZEPAM 1 MG/1
TABLET ORAL
Qty: 90 TABLET | Refills: 0 | Status: SHIPPED | OUTPATIENT
Start: 2023-11-18

## 2023-11-18 RX ORDER — LOSARTAN POTASSIUM 50 MG/1
50 TABLET ORAL DAILY
Qty: 30 TABLET | Refills: 5 | Status: SHIPPED | OUTPATIENT
Start: 2023-11-18

## 2023-11-18 RX ORDER — FLUTICASONE PROPIONATE 50 MCG
2 SPRAY, SUSPENSION (ML) NASAL DAILY
Qty: 16 G | Refills: 5 | Status: SHIPPED | OUTPATIENT
Start: 2023-11-18

## 2023-11-18 RX ORDER — ESCITALOPRAM OXALATE 20 MG/1
20 TABLET ORAL DAILY
Qty: 30 TABLET | Refills: 5 | Status: SHIPPED | OUTPATIENT
Start: 2023-11-18

## 2023-11-18 RX ORDER — BUDESONIDE, GLYCOPYRROLATE, AND FORMOTEROL FUMARATE 160; 9; 4.8 UG/1; UG/1; UG/1
2 AEROSOL, METERED RESPIRATORY (INHALATION) 2 TIMES DAILY
Qty: 1 EACH | Refills: 8 | Status: SHIPPED | OUTPATIENT
Start: 2023-11-18

## 2023-11-18 RX ORDER — MELATONIN
2000 DAILY
Qty: 60 TABLET | Refills: 5 | Status: SHIPPED | OUTPATIENT
Start: 2023-11-18

## 2023-11-18 RX ORDER — MELOXICAM 15 MG/1
15 TABLET ORAL DAILY
Qty: 30 TABLET | Refills: 5 | Status: SHIPPED | OUTPATIENT
Start: 2023-11-18

## 2023-11-18 RX ORDER — BUSPIRONE HYDROCHLORIDE 15 MG/1
15 TABLET ORAL 3 TIMES DAILY
Qty: 90 TABLET | Refills: 5 | Status: SHIPPED | OUTPATIENT
Start: 2023-11-18

## 2023-11-18 RX ORDER — RISPERIDONE 2 MG/1
2 TABLET ORAL
Qty: 30 TABLET | Refills: 5 | Status: SHIPPED | OUTPATIENT
Start: 2023-11-18

## 2023-11-18 RX ORDER — MONTELUKAST SODIUM 10 MG/1
10 TABLET ORAL DAILY
Qty: 90 TABLET | Refills: 3 | Status: SHIPPED | OUTPATIENT
Start: 2023-11-18

## 2023-11-18 RX ORDER — METFORMIN HYDROCHLORIDE 500 MG/1
2000 TABLET, EXTENDED RELEASE ORAL DAILY
Qty: 120 TABLET | Refills: 5 | Status: SHIPPED | OUTPATIENT
Start: 2023-11-18

## 2023-11-18 RX ORDER — ASPIRIN 81 MG/1
81 TABLET ORAL DAILY
Qty: 30 TABLET | Refills: 5 | Status: SHIPPED | OUTPATIENT
Start: 2023-11-18

## 2023-11-20 NOTE — TELEPHONE ENCOUNTER
I called twan abscess is improving      ----- Message from ANGEL Chaves sent at 3/17/2019  8:06 PM EDT -----   Please call Ms. Hill and see if her abscess in axilla is improving with the use of Keflex.     Spoke to patient and advised we do not currently have any eliquis samples in the office. Patient states understanding.

## 2023-12-04 DIAGNOSIS — E03.9 ACQUIRED HYPOTHYROIDISM: ICD-10-CM

## 2023-12-04 DIAGNOSIS — M15.9 GENERALIZED OSTEOARTHRITIS: ICD-10-CM

## 2023-12-04 RX ORDER — LEVOTHYROXINE SODIUM 175 UG/1
175 TABLET ORAL DAILY
Qty: 30 TABLET | Refills: 5 | Status: SHIPPED | OUTPATIENT
Start: 2023-12-04

## 2023-12-04 RX ORDER — GUAIFENESIN 600 MG/1
TABLET, EXTENDED RELEASE ORAL
Qty: 60 TABLET | Refills: 6 | Status: SHIPPED | OUTPATIENT
Start: 2023-12-04

## 2023-12-04 RX ORDER — PSEUDOEPHED/ACETAMINOPH/DIPHEN 30MG-500MG
2 TABLET ORAL 4 TIMES DAILY
Qty: 180 TABLET | Refills: 5 | Status: SHIPPED | OUTPATIENT
Start: 2023-12-04

## 2023-12-11 ENCOUNTER — HOSPITAL ENCOUNTER (OUTPATIENT)
Facility: HOSPITAL | Age: 63
Discharge: HOME | End: 2023-12-11
Payer: MEDICARE

## 2023-12-11 ENCOUNTER — HOSPITAL ENCOUNTER (OUTPATIENT)
Dept: MAMMOGRAPHY | Facility: HOSPITAL | Age: 63
Discharge: HOME | End: 2023-12-11
Payer: MEDICARE

## 2023-12-11 DIAGNOSIS — Z00.00 HEALTHCARE MAINTENANCE: ICD-10-CM

## 2023-12-11 DIAGNOSIS — Z12.31 ENCOUNTER FOR SCREENING MAMMOGRAM FOR BREAST CANCER: ICD-10-CM

## 2023-12-11 PROCEDURE — 77063 BREAST TOMOSYNTHESIS BI: CPT

## 2023-12-11 PROCEDURE — 77067 SCR MAMMO BI INCL CAD: CPT

## 2023-12-12 DIAGNOSIS — E11.65 CONTROLLED TYPE 2 DIABETES MELLITUS WITH HYPERGLYCEMIA, WITHOUT LONG-TERM CURRENT USE OF INSULIN: Chronic | ICD-10-CM

## 2023-12-12 RX ORDER — LANCETS 33 GAUGE
EACH MISCELLANEOUS
Qty: 100 EACH | Refills: 5 | Status: SHIPPED | OUTPATIENT
Start: 2023-12-12

## 2024-01-01 DIAGNOSIS — E11.42 TYPE 2 DIABETES MELLITUS WITH PERIPHERAL NEUROPATHY: ICD-10-CM

## 2024-01-01 DIAGNOSIS — F41.8 DEPRESSION WITH ANXIETY: ICD-10-CM

## 2024-01-02 ENCOUNTER — TELEPHONE (OUTPATIENT)
Dept: FAMILY MEDICINE CLINIC | Facility: CLINIC | Age: 64
End: 2024-01-02

## 2024-01-02 RX ORDER — LANOLIN ALCOHOL/MO/W.PET/CERES
1000 CREAM (GRAM) TOPICAL DAILY
Qty: 30 TABLET | Refills: 5 | Status: SHIPPED | OUTPATIENT
Start: 2024-01-02

## 2024-01-02 RX ORDER — BUSPIRONE HYDROCHLORIDE 15 MG/1
15 TABLET ORAL 3 TIMES DAILY
Qty: 90 TABLET | Refills: 5 | Status: SHIPPED | OUTPATIENT
Start: 2024-01-02

## 2024-01-02 NOTE — TELEPHONE ENCOUNTER
Caller: Lisa Hill    Relationship: Self    Best call back number: [phone] - PATIENT CALLED IN USING [phone]    Caller requesting test results    What test was performed: MAMMOGRAM    When was the test performed: 12.11.23    Where was the test performed: [facility]     Additional notes: PLEASE CALL PATIENT BACK, IF NO ANSWER LEAVE A DETAILED MESSAGE.

## 2024-01-22 ENCOUNTER — TELEPHONE (OUTPATIENT)
Dept: FAMILY MEDICINE CLINIC | Facility: CLINIC | Age: 64
End: 2024-01-22

## 2024-01-22 DIAGNOSIS — E11.42 TYPE 2 DIABETES MELLITUS WITH PERIPHERAL NEUROPATHY: Primary | ICD-10-CM

## 2024-01-22 NOTE — TELEPHONE ENCOUNTER
Caller: Lisa Hill    Relationship: Self    Best call back number: [phone]     Which medication are you concerned about: OZEMPIC    Who prescribed you this medication: DR BEAL    What are your concerns: HER REFRIGERATOR WENT OUT AND HER OZEMPIC HAS BEEN ON ICE FOR ABOUT A WEEK. SHE WOULD PREFER TO GO BACK TO PILLS AS OPPOSED TO AN INJECTION.  HER NEXT INJECTION WAS DUE YESTERDAY BUT SHE IS NOT SURE IF IT IS OF TO USE IT.      PLEASE CALL AND ADVISE

## 2024-01-29 DIAGNOSIS — F41.8 DEPRESSION WITH ANXIETY: ICD-10-CM

## 2024-01-29 DIAGNOSIS — K21.9 GASTROESOPHAGEAL REFLUX DISEASE WITHOUT ESOPHAGITIS: ICD-10-CM

## 2024-01-29 DIAGNOSIS — E78.2 MIXED HYPERLIPIDEMIA: ICD-10-CM

## 2024-01-29 RX ORDER — PANTOPRAZOLE SODIUM 40 MG/1
40 TABLET, DELAYED RELEASE ORAL DAILY
Qty: 30 TABLET | Refills: 5 | Status: SHIPPED | OUTPATIENT
Start: 2024-01-29

## 2024-01-29 RX ORDER — SIMVASTATIN 20 MG
20 TABLET ORAL
Qty: 30 TABLET | Refills: 5 | Status: SHIPPED | OUTPATIENT
Start: 2024-01-29

## 2024-01-29 RX ORDER — ESCITALOPRAM OXALATE 20 MG/1
20 TABLET ORAL DAILY
Qty: 30 TABLET | Refills: 5 | Status: SHIPPED | OUTPATIENT
Start: 2024-01-29

## 2024-01-29 RX ORDER — BUPROPION HYDROCHLORIDE 450 MG/1
450 TABLET, FILM COATED, EXTENDED RELEASE ORAL EVERY MORNING
Qty: 30 TABLET | Refills: 5 | Status: SHIPPED | OUTPATIENT
Start: 2024-01-29

## 2024-02-22 DIAGNOSIS — E11.42 TYPE 2 DIABETES MELLITUS WITH PERIPHERAL NEUROPATHY: ICD-10-CM

## 2024-02-22 NOTE — TELEPHONE ENCOUNTER
Caller: Lisa Hill    Relationship: Self    Best call back number: [phone]    Requested Prescriptions:   Requested Prescriptions     Pending Prescriptions Disp Refills    Empagliflozin-metFORMIN HCl 5-500 MG tablet 30 tablet 0     Sig: Take 1 tablet by mouth Daily.        Pharmacy where request should be sent: [facility] [address] - [phone]  - [phone]     Last office visit with prescribing clinician: 11/17/2023   Last telemedicine visit with prescribing clinician: Visit date not found   Next office visit with prescribing clinician: 2/26/2024     Additional details provided by patient: COMPLETELY OUT OF MEDICATION    Does the patient have less than a 3 day supply:  [x] Yes  [] No    Would you like a call back once the refill request has been completed: [x] Yes [] No    If the office needs to give you a call back, can they leave a voicemail: [x] Yes [] No    Jorge Montero   02/22/24 08:58 EST

## 2024-02-26 ENCOUNTER — OFFICE VISIT (OUTPATIENT)
Dept: FAMILY MEDICINE CLINIC | Facility: CLINIC | Age: 64
End: 2024-02-26
Payer: MEDICARE

## 2024-02-26 VITALS
BODY MASS INDEX: 33.84 KG/M2 | WEIGHT: 191 LBS | HEIGHT: 63 IN | SYSTOLIC BLOOD PRESSURE: 124 MMHG | OXYGEN SATURATION: 96 % | DIASTOLIC BLOOD PRESSURE: 65 MMHG | TEMPERATURE: 97.8 F | HEART RATE: 96 BPM | RESPIRATION RATE: 15 BRPM

## 2024-02-26 DIAGNOSIS — M15.9 GENERALIZED OSTEOARTHRITIS: ICD-10-CM

## 2024-02-26 DIAGNOSIS — I10 ESSENTIAL HYPERTENSION: ICD-10-CM

## 2024-02-26 DIAGNOSIS — E55.9 VITAMIN D DEFICIENCY: ICD-10-CM

## 2024-02-26 DIAGNOSIS — F17.200 SMOKER: ICD-10-CM

## 2024-02-26 DIAGNOSIS — K59.09 CHRONIC CONSTIPATION: ICD-10-CM

## 2024-02-26 DIAGNOSIS — G47.33 OBSTRUCTIVE SLEEP APNEA: ICD-10-CM

## 2024-02-26 DIAGNOSIS — Z00.00 HEALTHCARE MAINTENANCE: ICD-10-CM

## 2024-02-26 DIAGNOSIS — F41.8 DEPRESSION WITH ANXIETY: ICD-10-CM

## 2024-02-26 DIAGNOSIS — K21.9 GASTROESOPHAGEAL REFLUX DISEASE WITHOUT ESOPHAGITIS: ICD-10-CM

## 2024-02-26 DIAGNOSIS — I25.10 CORONARY ARTERY CALCIFICATION SEEN ON CT SCAN: ICD-10-CM

## 2024-02-26 DIAGNOSIS — I70.0 AORTIC ATHEROSCLEROSIS: ICD-10-CM

## 2024-02-26 DIAGNOSIS — E78.2 MIXED HYPERLIPIDEMIA: ICD-10-CM

## 2024-02-26 DIAGNOSIS — J43.1 PANLOBULAR EMPHYSEMA: ICD-10-CM

## 2024-02-26 DIAGNOSIS — J30.9 CHRONIC ALLERGIC RHINITIS: Primary | ICD-10-CM

## 2024-02-26 DIAGNOSIS — E03.8 HYPOTHYROIDISM DUE TO HASHIMOTO'S THYROIDITIS: ICD-10-CM

## 2024-02-26 DIAGNOSIS — E11.42 TYPE 2 DIABETES MELLITUS WITH PERIPHERAL NEUROPATHY: ICD-10-CM

## 2024-02-26 DIAGNOSIS — Z87.891 PERSONAL HISTORY OF NICOTINE DEPENDENCE: ICD-10-CM

## 2024-02-26 DIAGNOSIS — E66.9 CLASS 1 OBESITY WITH SERIOUS COMORBIDITY AND BODY MASS INDEX (BMI) OF 34.0 TO 34.9 IN ADULT, UNSPECIFIED OBESITY TYPE: ICD-10-CM

## 2024-02-26 DIAGNOSIS — N39.3 STRESS BLADDER INCONTINENCE, FEMALE: ICD-10-CM

## 2024-02-26 DIAGNOSIS — M85.80 OSTEOPENIA, UNSPECIFIED LOCATION: ICD-10-CM

## 2024-02-26 DIAGNOSIS — E06.3 HYPOTHYROIDISM DUE TO HASHIMOTO'S THYROIDITIS: ICD-10-CM

## 2024-02-26 PROBLEM — E66.811 CLASS 1 OBESITY WITH SERIOUS COMORBIDITY AND BODY MASS INDEX (BMI) OF 34.0 TO 34.9 IN ADULT: Status: ACTIVE | Noted: 2019-05-21

## 2024-02-26 RX ORDER — RISPERIDONE 2 MG/1
2 TABLET ORAL
Qty: 30 TABLET | Refills: 5 | Status: SHIPPED | OUTPATIENT
Start: 2024-02-26

## 2024-02-26 RX ORDER — BUDESONIDE, GLYCOPYRROLATE, AND FORMOTEROL FUMARATE 160; 9; 4.8 UG/1; UG/1; UG/1
2 AEROSOL, METERED RESPIRATORY (INHALATION) 2 TIMES DAILY
Qty: 10.7 G | Refills: 8 | Status: SHIPPED | OUTPATIENT
Start: 2024-02-26

## 2024-02-26 RX ORDER — LORAZEPAM 1 MG/1
TABLET ORAL
Qty: 90 TABLET | Refills: 0 | Status: SHIPPED | OUTPATIENT
Start: 2024-02-26

## 2024-02-26 NOTE — PROGRESS NOTES
Subjective   Lisa Hill is a 63 y.o. female.     Chief Complaint  She returns for a scheduled reassessment of multiple medical problems including chronic back and joint pain, chronic obstructive pulmonary disease, type 2 diabetes mellitus, hyperlipidemia, essential hypertension, hypothyroidism, and depression with anxiety    History of Present Illness     Chronic Back and Joint Pain  She complains of persistent low back and generalized joint pain. There has been no change in the quality of her discomfort, and apart from paresthesias of the feet, she continues to deny any associated symptoms. There is no history of any weakness, or any change in her bowel/bladder control.  There is no history of any fever, chills, or night sweats.  When she sits down the pain generally resolves within minutes and she continues to deny any problem at night. She continues to use her TENS unit for several hours daily and feels that it helps.  She also feels that meloxicam helps.  Plain films of the lumbar spine performed on 8/5/2019 were reported as showing degenerative disc disease as well as atherosclerotic calcification of the aorta    COPD  Her shortness of breath and cough have have improved some with weight loss. She admits to intermittent nasal congestion, but continues to deny any other upper respiratory tract symptoms, chest pain, hemoptysis, fever or chills.  Her symptoms worsen with activity, exposure to cold air and environmental allergens and improve some with rest, supplemental oxygen, and inhaled inhaled medication.  She is currently on breztri and is using her rescue inhaler once or twice daily. She is currently smoking 1 pack per day on average.  She underwent an updated low dose CT of the chest on 11/5/2021 with evidence of moderate COPD, a 7 mm groundglass nodule within the SUDEEP and mild coronary artery calcifications.  Dedicated CT of the chest was recommended and performed on 12/3/2021.  This confirmed the same.   She underwent an updated low-dose CT of the chest on 2/9/2023 with no changes noted.  She underwent a pulmonology reassessment with Katarina Wan PA-C on 8/9/23 but does not have a return appointment at present    Type 2 Diabetes Mellitus  She admits to paresthesias of the feet.  There is no history of any visual disturbances, polydipsia, polyuria, hypoglycemia or foot ulcerations. Current treatments: metformin, and empagliflozin.  She stopped semaglutide since last here    Hyperlipidemia  Her compliance with treatment has been fair.  She has been following her diet closer and with the weight that she has lost has been able to tolerate more activity. She remains on simvastatin with no apparent side effects    Essential Hypertension  She admits to shortness of breath with intermittent lower extremity swelling but continues to deny any chest pain, orthopnea, PND, palpitations, or lightheadedness.  She is taking losartan with no apparent side effects    Hypothyroidism  She has a history of hypothyroidism associated with Hashimoto's disease.  She remains on levothyroxine 175 daily     Depression  She has a long history of intermittent depression, nervousness, anhedonia, difficulty concentrating, fatigue and impaired memory.  Since last here she has experienced increased anxiety. She continues to deny any suicidal ideation. Risk factors: history of seasonal affective disorder.  She remains on escitalopram 20 daily,  bupropion 450 daily, risperdone 2 nightly, and lorazepam 0.5 twice a day.  She is prescribed buspirone 15 3 times daily but has been taking 1/2 tablet as needed as it makes her feel a bit disoriented    The following portions of the patient's history were reviewed and updated as appropriate: allergies, current medications, past medical history, past social history, and problem list.    Review of Systems   Constitutional:  Positive for fatigue. Negative for chills and fever.   HENT:  Positive for hearing  loss and rhinorrhea. Negative for congestion, ear pain, postnasal drip, sinus pressure, sneezing, sore throat and voice change.    Eyes:  Negative for visual disturbance.   Respiratory:  Positive for cough and shortness of breath. Negative for wheezing.    Cardiovascular:  Positive for leg swelling. Negative for chest pain and palpitations.   Gastrointestinal:  Positive for constipation. Negative for abdominal pain, blood in stool, diarrhea, nausea, vomiting and GERD.   Genitourinary:  Positive for urinary incontinence (with coughing, sneezing lifting etc). Negative for dysuria, frequency, hematuria, pelvic pain and urgency.   Musculoskeletal:  Positive for arthralgias and back pain. Negative for joint swelling and myalgias.   Skin:  Negative for rash.   Neurological:  Positive for numbness (with paresthesias of the feet) and headache. Negative for dizziness and weakness.   Psychiatric/Behavioral:  Positive for decreased concentration, sleep disturbance and depressed mood. Negative for hallucinations and suicidal ideas. The patient is nervous/anxious.      Objective   Physical Exam  Constitutional:       General: She is not in acute distress.     Appearance: Normal appearance. She is well-developed. She is not diaphoretic.      Comments: Accompanied by her .  Bright and in fair spirits.  Climbed onto the exam table with minimal assistance.  No apparent distress.   HENT:      Head: Atraumatic.      Right Ear: Tympanic membrane, ear canal and external ear normal. Decreased hearing noted.      Left Ear: Tympanic membrane, ear canal and external ear normal. Decreased hearing noted.      Mouth/Throat:      Lips: No lesions.      Mouth: Mucous membranes are moist. No oral lesions.      Pharynx: No oropharyngeal exudate or posterior oropharyngeal erythema.   Eyes:      General: Lids are normal.      Extraocular Movements: Extraocular movements intact.      Conjunctiva/sclera: Conjunctivae normal.      Pupils: Pupils  are equal.   Neck:      Thyroid: No thyroid mass or thyromegaly.      Vascular: No carotid bruit or JVD.      Trachea: Trachea normal. No tracheal deviation.   Cardiovascular:      Rate and Rhythm: Normal rate and regular rhythm.      Pulses:           Dorsalis pedis pulses are 2+ on the right side and 2+ on the left side.        Posterior tibial pulses are 2+ on the right side and 2+ on the left side.      Heart sounds: Normal heart sounds, S1 normal and S2 normal. No murmur heard.     No gallop.   Pulmonary:      Effort: Pulmonary effort is normal.      Breath sounds: Decreased air movement present. Examination of the right-lower field reveals decreased breath sounds. Examination of the left-lower field reveals decreased breath sounds. Decreased breath sounds and wheezing (diffuse - mild) present. No rales.      Comments: Pulmonary hyperinflation  Abdominal:      General: Bowel sounds are normal. There is no distension.   Musculoskeletal:      Right lower leg: No edema.      Left lower leg: No edema.   Lymphadenopathy:      Head:      Right side of head: No submental, submandibular, tonsillar, preauricular, posterior auricular or occipital adenopathy.      Left side of head: No submental, submandibular, tonsillar, preauricular, posterior auricular or occipital adenopathy.      Cervical: No cervical adenopathy.      Upper Body:      Right upper body: No supraclavicular adenopathy.      Left upper body: No supraclavicular adenopathy.   Skin:     General: Skin is warm.      Coloration: Skin is not cyanotic, jaundiced or pale.      Findings: No rash.      Nails: There is no clubbing.   Neurological:      Mental Status: She is alert and oriented to person, place, and time.      Cranial Nerves: No cranial nerve deficit, dysarthria or facial asymmetry.      Sensory: Sensory deficit (decreased vibration sense both feet) present.      Motor: No tremor.      Coordination: Coordination normal.      Gait: Gait normal.    Psychiatric:         Attention and Perception: Attention normal.         Mood and Affect: Mood normal.         Speech: Speech normal.         Behavior: Behavior normal.         Thought Content: Thought content normal.       Assessment & Plan   Problems Addressed this Visit          Allergies and Adverse Reactions    Chronic allergic rhinitis       Cardiac and Vasculature    Aortic atherosclerosis  Reminded regarding risk factor modification with an emphasis on tobacco cessation.  Continue low-dose ASA.    Relevant Orders    CBC & Differential    Coronary artery calcification seen on CT scan  As above.    Relevant Orders    CBC & Differential    Essential hypertension  Encouraged to continue to work on her diet and exercise plan.  Continue current medication    Relevant Orders    CBC & Differential    Comprehensive Metabolic Panel    Mixed hyperlipidemia  As above.   Continue current medication.    Relevant Orders    Comprehensive Metabolic Panel    Lipid Panel       Endocrine and Metabolic    Class 1 obesity with serious comorbidity and body mass index (BMI) of 34.0 to 34.9 in adult    Hypothyroidism due to Hashimoto's thyroiditis  Clinically euthyroid.  Continue current medication.    Relevant Orders    TSH    Type 2 diabetes mellitus with peripheral neuropathy  Diabetes mellitus Type II, under excellent control.   Encouraged to continue to pursue ADA diet  Encouraged aerobic exercise.  Continue current medication  Scheduled for updated labs    Relevant Orders    Comprehensive Metabolic Panel    Hemoglobin A1c    Vitamin B12    MicroAlbumin, Urine, Random - Urine, Clean Catch    Vitamin D deficiency    Relevant Orders    Vitamin D,25-Hydroxy       Gastrointestinal Abdominal     Chronic constipation    Relevant Orders    CBC & Differential    Gastroesophageal reflux disease without esophagitis    Relevant Orders    CBC & Differential       Genitourinary and Reproductive     Stress bladder incontinence, female        Health Encounters    Healthcare maintenance  Recommended RSV vaccination  Will arrange an updated low-dose CT of the chest  Will plan on updating a DEXA scan with her mammogram later this year    Relevant Orders     CT Chest Low Dose Cancer Screening WO       Mental Health    Depression with anxiety  Stable.  Supportive therapy.   Will arrange a psychiatric assessment with NATALIE Pringle  Will continue current medication in the meantime  Encouraged to report if any worse or if any new symptoms or concerns.    Relevant Medications    LORazepam (ATIVAN) 1 MG tablet       Musculoskeletal and Injuries    Generalized osteoarthritis    Osteopenia       Pulmonary and Pneumonias    Panlobular emphysema    Relevant Orders    CBC & Differential       Sleep    Obstructive sleep apnea   COPD is stable.  Reminded of the importance of smoking cessation  Encouraged to remain as active as symptoms allow for  Continue current medication    Relevant Orders    CBC & Differential       Tobacco    Smoker    Relevant Orders     CT Chest Low Dose Cancer Screening WO          Diagnoses         Codes Comments    Chronic allergic rhinitis    -  Primary ICD-10-CM: J30.9  ICD-9-CM: 477.9     Essential hypertension     ICD-10-CM: I10  ICD-9-CM: 401.9     Coronary artery calcification seen on CT scan     ICD-10-CM: I25.10  ICD-9-CM: 414.00     Aortic atherosclerosis     ICD-10-CM: I70.0  ICD-9-CM: 440.0     Mixed hyperlipidemia     ICD-10-CM: E78.2  ICD-9-CM: 272.2     Vitamin D deficiency     ICD-10-CM: E55.9  ICD-9-CM: 268.9     Type 2 diabetes mellitus with peripheral neuropathy     ICD-10-CM: E11.42  ICD-9-CM: 250.60, 357.2     Hypothyroidism due to Hashimoto's thyroiditis     ICD-10-CM: E03.8, E06.3  ICD-9-CM: 244.8, 245.2     Class 1 obesity with serious comorbidity and body mass index (BMI) of 34.0 to 34.9 in adult, unspecified obesity type     ICD-10-CM: E66.9, Z68.34  ICD-9-CM: 278.00, V85.34     Gastroesophageal reflux disease  without esophagitis     ICD-10-CM: K21.9  ICD-9-CM: 530.81     Chronic constipation     ICD-10-CM: K59.09  ICD-9-CM: 564.00     Stress bladder incontinence, female     ICD-10-CM: N39.3  ICD-9-CM: 625.6     Healthcare maintenance     ICD-10-CM: Z00.00  ICD-9-CM: V70.0     Depression with anxiety     ICD-10-CM: F41.8  ICD-9-CM: 300.4     Osteopenia, unspecified location     ICD-10-CM: M85.80  ICD-9-CM: 733.90     Generalized osteoarthritis     ICD-10-CM: M15.9  ICD-9-CM: 715.00     Panlobular emphysema     ICD-10-CM: J43.1  ICD-9-CM: 492.8     Obstructive sleep apnea     ICD-10-CM: G47.33  ICD-9-CM: 327.23     Smoker     ICD-10-CM: F17.200  ICD-9-CM: 305.1     Personal history of nicotine dependence     ICD-10-CM: Z87.891  ICD-9-CM: V15.82

## 2024-02-27 DIAGNOSIS — Z51.81 ENCOUNTER FOR THERAPEUTIC DRUG LEVEL MONITORING: ICD-10-CM

## 2024-02-27 DIAGNOSIS — M85.80 OSTEOPENIA, UNSPECIFIED LOCATION: Primary | ICD-10-CM

## 2024-03-07 ENCOUNTER — TELEPHONE (OUTPATIENT)
Dept: FAMILY MEDICINE CLINIC | Facility: CLINIC | Age: 64
End: 2024-03-07

## 2024-03-07 NOTE — TELEPHONE ENCOUNTER
Caller: Lisa Hill    Relationship: Self    Best call back number: [phone]     What medication are you requesting: OZEMPIC 1 MG PER WEEK    What are your current symptoms:     How long have you been experiencing symptoms:     Have you had these symptoms before:    [] Yes  [] No    Have you been treated for these symptoms before:   [] Yes  [] No    If a prescription is needed, what is your preferred pharmacy and phone number:      Additional notes:  [facility] STATES WE NEED TO CANCEL THE PRESCRIPTION AT [facility] AND FAX NEW PRESCRIPTION TO THEM.  PHONE # [phone].  PLEASE CALL PATIENT BACK WHEN COMPLETED.

## 2024-03-08 DIAGNOSIS — E11.42 TYPE 2 DIABETES MELLITUS WITH PERIPHERAL NEUROPATHY: Primary | ICD-10-CM

## 2024-03-08 RX ORDER — SEMAGLUTIDE 0.68 MG/ML
INJECTION, SOLUTION SUBCUTANEOUS
Qty: 9 ML | Refills: 0 | Status: SHIPPED | OUTPATIENT
Start: 2024-03-08

## 2024-03-08 NOTE — TELEPHONE ENCOUNTER
Caller: Lisa Hill     Relationship: Self     Best call back number: [phone]      What medication are you requesting: OZEMPIC 1 MG PER WEEK   Have you had these symptoms before:                                  [] Yes  [x] No     Have you been treated for these symptoms before:              [] Yes  [x] No     If a prescription is needed, what is your preferred pharmacy and phone number:    [facility] [address] [phone] [facility] [phone]  [phone]       SHE IS RESTARTING THE MEDICATION

## 2024-03-11 ENCOUNTER — LAB (OUTPATIENT)
Dept: FAMILY MEDICINE CLINIC | Facility: CLINIC | Age: 64
End: 2024-03-11
Payer: MEDICARE

## 2024-03-11 DIAGNOSIS — I10 ESSENTIAL HYPERTENSION: ICD-10-CM

## 2024-03-11 DIAGNOSIS — I70.0 AORTIC ATHEROSCLEROSIS: ICD-10-CM

## 2024-03-11 DIAGNOSIS — G47.33 OBSTRUCTIVE SLEEP APNEA: ICD-10-CM

## 2024-03-11 DIAGNOSIS — E11.42 TYPE 2 DIABETES MELLITUS WITH PERIPHERAL NEUROPATHY: ICD-10-CM

## 2024-03-11 DIAGNOSIS — I25.10 CORONARY ARTERY CALCIFICATION SEEN ON CT SCAN: ICD-10-CM

## 2024-03-11 DIAGNOSIS — K21.9 GASTROESOPHAGEAL REFLUX DISEASE WITHOUT ESOPHAGITIS: ICD-10-CM

## 2024-03-11 DIAGNOSIS — E06.3 HYPOTHYROIDISM DUE TO HASHIMOTO'S THYROIDITIS: ICD-10-CM

## 2024-03-11 DIAGNOSIS — E78.2 MIXED HYPERLIPIDEMIA: ICD-10-CM

## 2024-03-11 DIAGNOSIS — K59.09 CHRONIC CONSTIPATION: ICD-10-CM

## 2024-03-11 DIAGNOSIS — E03.8 HYPOTHYROIDISM DUE TO HASHIMOTO'S THYROIDITIS: ICD-10-CM

## 2024-03-11 DIAGNOSIS — E55.9 VITAMIN D DEFICIENCY: ICD-10-CM

## 2024-03-11 DIAGNOSIS — J43.1 PANLOBULAR EMPHYSEMA: ICD-10-CM

## 2024-03-11 LAB
25(OH)D3 SERPL-MCNC: 63.2 NG/ML (ref 30–100)
ALBUMIN SERPL-MCNC: 3.7 G/DL (ref 3.5–5.2)
ALBUMIN UR-MCNC: <1.2 MG/DL
ALBUMIN/GLOB SERPL: 1.2 G/DL
ALP SERPL-CCNC: 54 U/L (ref 39–117)
ALT SERPL W P-5'-P-CCNC: 19 U/L (ref 1–33)
ANION GAP SERPL CALCULATED.3IONS-SCNC: 13 MMOL/L (ref 5–15)
AST SERPL-CCNC: 13 U/L (ref 1–32)
BASOPHILS # BLD AUTO: 0.08 10*3/MM3 (ref 0–0.2)
BASOPHILS NFR BLD AUTO: 0.7 % (ref 0–1.5)
BILIRUB SERPL-MCNC: <0.2 MG/DL (ref 0–1.2)
BUN SERPL-MCNC: 12 MG/DL (ref 8–23)
BUN/CREAT SERPL: 12.6 (ref 7–25)
CALCIUM SPEC-SCNC: 9.3 MG/DL (ref 8.6–10.5)
CHLORIDE SERPL-SCNC: 100 MMOL/L (ref 98–107)
CHOLEST SERPL-MCNC: 175 MG/DL (ref 0–200)
CO2 SERPL-SCNC: 26 MMOL/L (ref 22–29)
CREAT SERPL-MCNC: 0.95 MG/DL (ref 0.57–1)
DEPRECATED RDW RBC AUTO: 45.2 FL (ref 37–54)
EGFRCR SERPLBLD CKD-EPI 2021: 66.6 ML/MIN/1.73
EOSINOPHIL # BLD AUTO: 0.16 10*3/MM3 (ref 0–0.4)
EOSINOPHIL NFR BLD AUTO: 1.3 % (ref 0.3–6.2)
ERYTHROCYTE [DISTWIDTH] IN BLOOD BY AUTOMATED COUNT: 12.7 % (ref 12.3–15.4)
GLOBULIN UR ELPH-MCNC: 3.2 GM/DL
GLUCOSE SERPL-MCNC: 66 MG/DL (ref 65–99)
HBA1C MFR BLD: 5.2 % (ref 4.8–5.6)
HCT VFR BLD AUTO: 45.4 % (ref 34–46.6)
HDLC SERPL-MCNC: 42 MG/DL (ref 40–60)
HGB BLD-MCNC: 15.1 G/DL (ref 12–15.9)
IMM GRANULOCYTES # BLD AUTO: 0.03 10*3/MM3 (ref 0–0.05)
IMM GRANULOCYTES NFR BLD AUTO: 0.2 % (ref 0–0.5)
LDLC SERPL CALC-MCNC: 93 MG/DL (ref 0–100)
LDLC/HDLC SERPL: 2.04 {RATIO}
LYMPHOCYTES # BLD AUTO: 4.19 10*3/MM3 (ref 0.7–3.1)
LYMPHOCYTES NFR BLD AUTO: 34.3 % (ref 19.6–45.3)
MCH RBC QN AUTO: 31.8 PG (ref 26.6–33)
MCHC RBC AUTO-ENTMCNC: 33.3 G/DL (ref 31.5–35.7)
MCV RBC AUTO: 95.6 FL (ref 79–97)
MONOCYTES # BLD AUTO: 0.79 10*3/MM3 (ref 0.1–0.9)
MONOCYTES NFR BLD AUTO: 6.5 % (ref 5–12)
NEUTROPHILS NFR BLD AUTO: 57 % (ref 42.7–76)
NEUTROPHILS NFR BLD AUTO: 6.96 10*3/MM3 (ref 1.7–7)
NRBC BLD AUTO-RTO: 0 /100 WBC (ref 0–0.2)
PLATELET # BLD AUTO: 420 10*3/MM3 (ref 140–450)
PMV BLD AUTO: 10.3 FL (ref 6–12)
POTASSIUM SERPL-SCNC: 4.8 MMOL/L (ref 3.5–5.2)
PROT SERPL-MCNC: 6.9 G/DL (ref 6–8.5)
RBC # BLD AUTO: 4.75 10*6/MM3 (ref 3.77–5.28)
SODIUM SERPL-SCNC: 139 MMOL/L (ref 136–145)
TRIGL SERPL-MCNC: 236 MG/DL (ref 0–150)
TSH SERPL DL<=0.05 MIU/L-ACNC: 0.21 UIU/ML (ref 0.27–4.2)
VIT B12 BLD-MCNC: 1635 PG/ML (ref 211–946)
VLDLC SERPL-MCNC: 40 MG/DL (ref 5–40)
WBC NRBC COR # BLD AUTO: 12.21 10*3/MM3 (ref 3.4–10.8)

## 2024-03-11 PROCEDURE — 82043 UR ALBUMIN QUANTITATIVE: CPT | Performed by: GENERAL PRACTICE

## 2024-03-11 PROCEDURE — 85025 COMPLETE CBC W/AUTO DIFF WBC: CPT | Performed by: GENERAL PRACTICE

## 2024-03-11 PROCEDURE — 80061 LIPID PANEL: CPT | Performed by: GENERAL PRACTICE

## 2024-03-11 PROCEDURE — 83036 HEMOGLOBIN GLYCOSYLATED A1C: CPT | Performed by: GENERAL PRACTICE

## 2024-03-11 PROCEDURE — 84443 ASSAY THYROID STIM HORMONE: CPT | Performed by: GENERAL PRACTICE

## 2024-03-11 PROCEDURE — 82607 VITAMIN B-12: CPT | Performed by: GENERAL PRACTICE

## 2024-03-11 PROCEDURE — 80053 COMPREHEN METABOLIC PANEL: CPT | Performed by: GENERAL PRACTICE

## 2024-03-11 PROCEDURE — 36415 COLL VENOUS BLD VENIPUNCTURE: CPT | Performed by: GENERAL PRACTICE

## 2024-03-11 PROCEDURE — 82306 VITAMIN D 25 HYDROXY: CPT | Performed by: GENERAL PRACTICE

## 2024-03-14 ENCOUNTER — TELEPHONE (OUTPATIENT)
Dept: FAMILY MEDICINE CLINIC | Facility: CLINIC | Age: 64
End: 2024-03-14

## 2024-03-14 NOTE — TELEPHONE ENCOUNTER
Caller: Lisa Hill    Relationship: Self    Best call back number:     OZEMPIC    Who prescribed you this medication: MD BEAL     When did you start taking this medication: JUST WENT BACK ON THE NEEDLE NEEDS TO KNOW WHICH DOSAGE TO TAKE.  PLEASE CALL TODAY IF POSSIBLE.

## 2024-03-14 NOTE — TELEPHONE ENCOUNTER
Let the patient know to take the 0.25 mg weekly for 6 weeks and then the 0.5 mg after words per Dr. Chavez.

## 2024-03-25 DIAGNOSIS — M47.816 SPONDYLOSIS OF LUMBAR REGION WITHOUT MYELOPATHY OR RADICULOPATHY: ICD-10-CM

## 2024-03-25 RX ORDER — MELOXICAM 15 MG/1
15 TABLET ORAL DAILY
Qty: 30 TABLET | Refills: 5 | Status: SHIPPED | OUTPATIENT
Start: 2024-03-25

## 2024-04-09 DIAGNOSIS — J43.1 PANLOBULAR EMPHYSEMA: ICD-10-CM

## 2024-04-09 RX ORDER — ALBUTEROL SULFATE 90 UG/1
2 AEROSOL, METERED RESPIRATORY (INHALATION) EVERY 4 HOURS PRN
Qty: 18 G | Refills: 8 | Status: SHIPPED | OUTPATIENT
Start: 2024-04-09

## 2024-04-11 ENCOUNTER — HOSPITAL ENCOUNTER (OUTPATIENT)
Dept: CT IMAGING | Facility: HOSPITAL | Age: 64
Discharge: HOME | End: 2024-04-11
Payer: MEDICARE

## 2024-04-11 DIAGNOSIS — F17.200 SMOKER: ICD-10-CM

## 2024-04-11 DIAGNOSIS — Z87.891 PERSONAL HISTORY OF NICOTINE DEPENDENCE: ICD-10-CM

## 2024-04-11 DIAGNOSIS — Z00.00 HEALTHCARE MAINTENANCE: ICD-10-CM

## 2024-04-11 PROCEDURE — 71271 CT THORAX LUNG CANCER SCR C-: CPT

## 2024-04-19 ENCOUNTER — TELEPHONE (OUTPATIENT)
Dept: FAMILY MEDICINE CLINIC | Facility: CLINIC | Age: 64
End: 2024-04-19

## 2024-04-19 NOTE — TELEPHONE ENCOUNTER
Caller: Lisa Hill    Relationship: Self    Best call back number: [phone]     Caller requesting test results: PATIENT    What test was performed: CT CHEST LOW DOSE    When was the test performed: 4/11/24    Where was the test performed:  [facility]    Additional notes: PHONE CALL PLEASE.

## 2024-04-22 ENCOUNTER — OFFICE VISIT (OUTPATIENT)
Dept: PSYCHIATRY | Facility: CLINIC | Age: 64
End: 2024-04-22
Payer: MEDICARE

## 2024-04-22 VITALS
HEART RATE: 92 BPM | HEIGHT: 63 IN | OXYGEN SATURATION: 95 % | DIASTOLIC BLOOD PRESSURE: 88 MMHG | SYSTOLIC BLOOD PRESSURE: 144 MMHG | WEIGHT: 196 LBS | BODY MASS INDEX: 34.73 KG/M2

## 2024-04-22 DIAGNOSIS — E55.9 VITAMIN D DEFICIENCY: ICD-10-CM

## 2024-04-22 DIAGNOSIS — Z79.899 HIGH RISK MEDICATION USE: ICD-10-CM

## 2024-04-22 DIAGNOSIS — F41.9 ANXIETY DISORDER, UNSPECIFIED TYPE: Primary | ICD-10-CM

## 2024-04-22 DIAGNOSIS — E11.42 TYPE 2 DIABETES MELLITUS WITH PERIPHERAL NEUROPATHY: ICD-10-CM

## 2024-04-22 DIAGNOSIS — I10 ESSENTIAL HYPERTENSION: ICD-10-CM

## 2024-04-22 DIAGNOSIS — F39 UNSPECIFIED MOOD (AFFECTIVE) DISORDER: ICD-10-CM

## 2024-04-22 PROCEDURE — 90792 PSYCH DIAG EVAL W/MED SRVCS: CPT | Performed by: NURSE PRACTITIONER

## 2024-04-22 PROCEDURE — 3079F DIAST BP 80-89 MM HG: CPT | Performed by: NURSE PRACTITIONER

## 2024-04-22 PROCEDURE — 1160F RVW MEDS BY RX/DR IN RCRD: CPT | Performed by: NURSE PRACTITIONER

## 2024-04-22 PROCEDURE — 3077F SYST BP >= 140 MM HG: CPT | Performed by: NURSE PRACTITIONER

## 2024-04-22 PROCEDURE — 1159F MED LIST DOCD IN RCRD: CPT | Performed by: NURSE PRACTITIONER

## 2024-04-22 RX ORDER — BUSPIRONE HYDROCHLORIDE 15 MG/1
15 TABLET ORAL 3 TIMES DAILY
Start: 2024-04-22

## 2024-04-22 RX ORDER — ESCITALOPRAM OXALATE 20 MG/1
20 TABLET ORAL DAILY
Start: 2024-04-22

## 2024-04-22 RX ORDER — METFORMIN HYDROCHLORIDE 500 MG/1
TABLET, EXTENDED RELEASE ORAL
Qty: 120 TABLET | Refills: 5 | OUTPATIENT
Start: 2024-04-22

## 2024-04-22 RX ORDER — LOSARTAN POTASSIUM 50 MG/1
50 TABLET ORAL DAILY
Qty: 30 TABLET | Refills: 5 | Status: SHIPPED | OUTPATIENT
Start: 2024-04-22

## 2024-04-22 RX ORDER — RISPERIDONE 2 MG/1
2 TABLET ORAL
Start: 2024-04-22

## 2024-04-22 RX ORDER — BUPROPION HYDROCHLORIDE 450 MG/1
450 TABLET, FILM COATED, EXTENDED RELEASE ORAL EVERY MORNING
Start: 2024-04-22

## 2024-04-22 RX ORDER — MELATONIN
2000 DAILY
Qty: 60 TABLET | Refills: 5 | Status: SHIPPED | OUTPATIENT
Start: 2024-04-22

## 2024-04-22 RX ORDER — RISPERIDONE 0.25 MG/1
0.25 TABLET ORAL
Qty: 30 TABLET | Refills: 0 | Status: SHIPPED | OUTPATIENT
Start: 2024-04-22

## 2024-04-30 DIAGNOSIS — E03.9 ACQUIRED HYPOTHYROIDISM: ICD-10-CM

## 2024-04-30 DIAGNOSIS — F41.8 DEPRESSION WITH ANXIETY: ICD-10-CM

## 2024-04-30 RX ORDER — LORAZEPAM 1 MG/1
TABLET ORAL
Qty: 90 TABLET | Refills: 0 | Status: SHIPPED | OUTPATIENT
Start: 2024-04-30

## 2024-04-30 RX ORDER — LEVOTHYROXINE SODIUM 175 UG/1
175 TABLET ORAL DAILY
Qty: 30 TABLET | Refills: 5 | Status: SHIPPED | OUTPATIENT
Start: 2024-04-30

## 2024-05-01 ENCOUNTER — TELEPHONE (OUTPATIENT)
Dept: PULMONOLOGY | Facility: CLINIC | Age: 64
End: 2024-05-01

## 2024-05-01 NOTE — TELEPHONE ENCOUNTER
Caller: Lisa Hill    Relationship to patient: Self    Best call back number: [phone]     Chief complaint: PATIENT IS WITHOUT A CAR AND WOULD LIKE TO SWITCH HER APPOINTMENT TO A MYCHART VIDEO VISIT.    Type of visit: MYCHART VIDEO VISIT    Requested date: 5/6/2024 AT 9:15 WITH KATHERYN KNAPP    If rescheduling, when is the original appointment: 5/6/2024 AT 9:15 WITH KATHERYN KNAPP     Additional notes:PLEASE CALL TO CONFIRM THIS IS POSSIBLE TO CHANGE.

## 2024-05-13 DIAGNOSIS — M15.9 GENERALIZED OSTEOARTHRITIS: ICD-10-CM

## 2024-05-13 RX ORDER — PSEUDOEPHED/ACETAMINOPH/DIPHEN 30MG-500MG
TABLET ORAL
Qty: 180 TABLET | Refills: 5 | Status: SHIPPED | OUTPATIENT
Start: 2024-05-13

## 2024-05-20 ENCOUNTER — OFFICE VISIT (OUTPATIENT)
Dept: PSYCHIATRY | Facility: CLINIC | Age: 64
End: 2024-05-20
Payer: MEDICARE

## 2024-05-20 VITALS
HEIGHT: 63 IN | BODY MASS INDEX: 34.55 KG/M2 | SYSTOLIC BLOOD PRESSURE: 146 MMHG | WEIGHT: 195 LBS | HEART RATE: 94 BPM | DIASTOLIC BLOOD PRESSURE: 76 MMHG | OXYGEN SATURATION: 95 %

## 2024-05-20 DIAGNOSIS — F41.9 ANXIETY DISORDER, UNSPECIFIED TYPE: ICD-10-CM

## 2024-05-20 DIAGNOSIS — F39 UNSPECIFIED MOOD (AFFECTIVE) DISORDER: ICD-10-CM

## 2024-05-20 RX ORDER — ESCITALOPRAM OXALATE 20 MG/1
20 TABLET ORAL DAILY
Start: 2024-05-20

## 2024-05-20 RX ORDER — BUPROPION HYDROCHLORIDE 450 MG/1
450 TABLET, FILM COATED, EXTENDED RELEASE ORAL EVERY MORNING
Start: 2024-05-20

## 2024-05-20 RX ORDER — BUSPIRONE HYDROCHLORIDE 15 MG/1
TABLET ORAL
Qty: 105 TABLET | Refills: 0 | Status: SHIPPED | OUTPATIENT
Start: 2024-05-20 | End: 2024-06-03 | Stop reason: SINTOL

## 2024-05-20 RX ORDER — RISPERIDONE 2 MG/1
2 TABLET ORAL
Start: 2024-05-20

## 2024-05-20 NOTE — PROGRESS NOTES
Subjective   Lisa Hill is a 64 y.o. female is here today for medication management follow-up.    Chief Complaint:  anxiety depression     History of Present Illness:    Patient presents today for a follow up for medication management for anxiety and depression. Denies any medical changes since last visit. Patient states not able to tell a difference in medication. Patient states taking the afternoon dose around 1-2 and still getting really nervous in the afternoon. Patient states had a few mood swings in which starts all at once. Patient states can be doing nothing and get really nervous. Patient states having hot flashes everyday that last about 3-4 mins. Patient states had some panic attacks but not a lot. Patient states the panic attacks last about an hour or so. Patient states still there with depression but not better or worse. Depression at a 5-6 on a 0/10 scale with 10 the worst. Denies any thoughts to harm self or others. Anxiety at a 7-8 on a 0/10 scale with 10 the worst. Patient reports only sleeping for a couple hours then up for most of the night. Patient states only sleeping three or fours hours total. Denies any nightmares. Denies any auditory or visual hallucinations. Appetite is ok and eating at least once or twice a day. Patient states still doing the Ozempic for glucose. Denies any aggression. Patient reports some irritability. Patient reports medication compliance and denies any side effects.   The following portions of the patient's history were reviewed and updated as appropriate: allergies, current medications, past family history, past medical history, past social history, past surgical history, and problem list.    Review of Systems   Constitutional: Negative.    Respiratory: Negative.     Cardiovascular: Negative.    Gastrointestinal: Negative.    Neurological: Negative.    Psychiatric/Behavioral:  Positive for agitation, dysphoric mood and sleep disturbance. The patient is  "nervous/anxious.        Objective   Physical Exam  Vitals reviewed.   Constitutional:       Appearance: Normal appearance. She is well-developed and well-groomed.   Neurological:      Mental Status: She is alert.   Psychiatric:         Attention and Perception: Attention and perception normal.         Mood and Affect: Affect normal. Mood is anxious.         Speech: Speech normal.         Behavior: Behavior normal. Behavior is cooperative.         Thought Content: Thought content normal.         Cognition and Memory: Cognition and memory normal.         Judgment: Judgment normal.       Blood pressure 146/76, pulse 94, height 158.8 cm (62.52\"), weight 88.5 kg (195 lb), SpO2 95%.Body mass index is 35.08 kg/m².    Allergies   Allergen Reactions    Sulfa Antibiotics        Medication List:   Current Outpatient Medications   Medication Sig Dispense Refill    Acetaminophen Extra Strength 500 MG tablet TAKE TWO TABLETS BY MOUTH FOUR TIMES DAILY 180 tablet 5    albuterol sulfate  (90 Base) MCG/ACT inhaler INHALE TWO PUFFS BY MOUTH EVERY 4 HOURS AS NEEDED FOR WHEEZING OR SHORTNESS OF BREATH 18 g 8    aspirin 81 MG EC tablet Take 1 tablet by mouth Daily. 30 tablet 5    Breztri Aerosphere 160-9-4.8 MCG/ACT aerosol inhaler INHALE TWO PUFFS BY MOUTH TWICE DAILY 10.7 g 8    buPROPion XL (FORFIVO XL) 450 MG 24 hr tablet Take 1 tablet by mouth Every Morning.      busPIRone (BUSPAR) 15 MG tablet Take 1 tablet by mouth 3 (Three) Times a Day.      cholecalciferol (VITAMIN D3) 25 MCG (1000 UT) tablet TAKE TWO TABLETS BY MOUTH EVERY DAY 60 tablet 5    Continuous Blood Gluc  (Dexcom G6 ) device 1 Device Daily. 1 each 0    Continuous Blood Gluc Sensor (Dexcom G6 Sensor) Every 10 (Ten) Days. 9 each 3    Empagliflozin-metFORMIN HCl 5-500 MG tablet Take 1 tablet by mouth Daily. 30 tablet 5    escitalopram (LEXAPRO) 20 MG tablet Take 1 tablet by mouth Daily.      fluticasone (FLONASE) 50 MCG/ACT nasal spray 2 sprays by " Each Nare route Daily. 16 g 5    ipratropium-albuterol (DUO-NEB) 0.5-2.5 mg/3 ml nebulizer Take 3 mL by nebulization 4 (Four) Times a Day As Needed for Wheezing. 360 mL 5    Lancets (OneTouch Delica Plus Benpvs43X) misc USE TO CHECK BLOOD SUGAR THREE TIMES A  each 5    levothyroxine (SYNTHROID, LEVOTHROID) 175 MCG tablet Take 1 tablet by mouth Daily. 30 tablet 5    levothyroxine (SYNTHROID, LEVOTHROID) 175 MCG tablet Take 1 tablet by mouth Daily. 30 tablet 5    LORazepam (ATIVAN) 1 MG tablet take 1/2 to 1 tablet by mouth TWICE DAILY AS NEEDED 90 tablet 0    losartan (COZAAR) 50 MG tablet TAKE ONE TABLET BY MOUTH EVERY DAY 30 tablet 5    meloxicam (MOBIC) 15 MG tablet TAKE ONE TABLET BY MOUTH EVERY DAY 30 tablet 5    montelukast (SINGULAIR) 10 MG tablet Take 1 tablet by mouth Daily. 90 tablet 3    Mucus Relief 600 MG 12 hr tablet TAKE ONE TABLET BY MOUTH TWICE DAILY AS NEEDED FOR COUGH OR CONGESTION FOR UP TO 30 DAYS 60 tablet 6    OneTouch Ultra test strip USE TO TEST BLOOD GLUCOSE DAILY 100 each 5    pantoprazole (PROTONIX) 40 MG EC tablet TAKE ONE TABLET BY MOUTH EVERY DAY 30 tablet 5    risperiDONE (risperDAL) 0.25 MG tablet Take 1 tablet by mouth Every Afternoon. 30 tablet 0    risperiDONE (risperDAL) 2 MG tablet Take 1 tablet by mouth every night at bedtime.      Semaglutide,0.25 or 0.5MG/DOS, (Ozempic, 0.25 or 0.5 MG/DOSE,) 2 MG/3ML solution pen-injector 0.25 mg SC weekly for 6 weeks, then 0.5 mg SC weekly afterward 9 mL 0    simvastatin (ZOCOR) 20 MG tablet TAKE ONE TABLET BY MOUTH AT BEDTIME 30 tablet 5    vitamin B-12 (CYANOCOBALAMIN) 1000 MCG tablet TAKE ONE TABLET BY MOUTH EVERY DAY 30 tablet 5     No current facility-administered medications for this visit.       Mental Status Exam:   Hygiene:   good  Cooperation:  Cooperative  Eye Contact:  Good  Psychomotor Behavior:  Appropriate  Affect:  Appropriate  Hopelessness: Denies  Speech:  Normal  Thought Process:  Goal directed and Linear  Thought  Content:  Normal  Suicidal:  None  Homicidal:  None  Hallucinations:  None  Delusion:  None  Memory:  Intact  Orientation:  Person, Place, Time, and Situation  Reliability:  fair  Insight:  Fair  Judgement:  Fair  Impulse Control:  Fair  Physical/Medical Issues:  No                 Assessment & Plan   Diagnoses and all orders for this visit:    1. Unspecified mood (affective) disorder  -     buPROPion XL (FORFIVO XL) 450 MG 24 hr tablet; Take 1 tablet by mouth Every Morning.  -     escitalopram (LEXAPRO) 20 MG tablet; Take 1 tablet by mouth Daily.  -     risperiDONE (risperDAL) 2 MG tablet; Take 1 tablet by mouth every night at bedtime.    2. Anxiety disorder, unspecified type  -     busPIRone (BUSPAR) 15 MG tablet; Take 1 tablet by mouth Every Morning AND 1.5 tablets Every Afternoon AND 1 tablet Every Evening.  Dispense: 105 tablet; Refill: 0  -     escitalopram (LEXAPRO) 20 MG tablet; Take 1 tablet by mouth Daily.            Discussed medication options with patient. Increase Buspar to 15 mg one tablet every morning, 1.5 tab every afternoon, and 1 tab every evening for worsening anxiety. Cont. Wellbutrin  mg daily for depression and mood. Cont. Lexapro 20 mg daily for depression and anxiety. Reviewed the risks, benefits, and side effects of the medications; patient acknowledged and verbally consented.   Patient is aware of risk regarding serotonin syndrome and to monitor for any signs or symptoms including diaphoresis, muscle rigidity, hyperreflexia, hyperthermia, nausea/vomiting, mental confusion, etc. Discussed with patient if begin having any of these symptoms to stop medication and notify the office or go to nearest ER. Patient acknowledged and agreed.    Patient is agreeable to call the office with any questions, concerns, or worsening of symptoms. Patient is aware to call 911 or go to the nearest ER should begin having any thoughts to harm self or others.          Follow up in four to six  weeks        Errors in dictation may reflect use of voice recognition software and not all errors in transcription may have been detected prior to signing.              This document has been electronically signed by NATALIE Pringle   May 20, 2024 10:37 EDT

## 2024-05-28 ENCOUNTER — TELEPHONE (OUTPATIENT)
Dept: FAMILY MEDICINE CLINIC | Facility: CLINIC | Age: 64
End: 2024-05-28

## 2024-05-28 NOTE — TELEPHONE ENCOUNTER
Caller: Lisa Hill    Relationship: Self    Best call back number: [phone]     What is the best time to reach you: ANYTIME    Who are you requesting to speak with (clinical staff, provider,  specific staff member): CLINICAL STAFF    Do you know the name of the person who called: LISA    What was the call regarding: PATIENT WOULD LIKE TO KNOW WHAT HER MOST RECENT THYROID LEVELS WERE, AS SHE SEEMS TO HAVE SOME CONCERNS REGARDING THEM    PLEASE ADVISE

## 2024-06-03 ENCOUNTER — TELEPHONE (OUTPATIENT)
Dept: PSYCHIATRY | Facility: CLINIC | Age: 64
End: 2024-06-03
Payer: MEDICARE

## 2024-06-03 NOTE — TELEPHONE ENCOUNTER
06/03/24 at 05:03 pm Returned patient's phone call regarding medication.  Patient states she started the BuSpar the day of last visit.  Patient states she took the medication for 8 to 9 days.  Patient states she started feeling bad and then feeling funny as well as jerking and shaking all over.  Patient states she did stop the medication and the shaking all over has stopped.  Patient reports her mood is doing good and feels overall she is doing okay.  Patient states her anxiety is just every now and then.  Patient denies any suicidal thoughts or homicidal thoughts.  Patient denies any panic attacks.  Discussed with patient will evaluate next medication option at neck scheduled visit.  Discussed with patient for any worsening of symptoms to contact the office to be seen earlier.  Patient acknowledged and agreed.  Patient is aware to call 911 or go to nearest ER if she begins having thoughts of harming self or others.

## 2024-06-04 DIAGNOSIS — F41.8 DEPRESSION WITH ANXIETY: ICD-10-CM

## 2024-06-04 RX ORDER — LORAZEPAM 1 MG/1
TABLET ORAL
Qty: 90 TABLET | Refills: 0 | Status: SHIPPED | OUTPATIENT
Start: 2024-06-04

## 2024-06-09 DIAGNOSIS — E11.65 CONTROLLED TYPE 2 DIABETES MELLITUS WITH HYPERGLYCEMIA, WITHOUT LONG-TERM CURRENT USE OF INSULIN: Chronic | ICD-10-CM

## 2024-06-10 DIAGNOSIS — J30.9 CHRONIC ALLERGIC RHINITIS: ICD-10-CM

## 2024-06-10 RX ORDER — MONTELUKAST SODIUM 10 MG/1
10 TABLET ORAL DAILY
Qty: 90 TABLET | Refills: 3 | Status: SHIPPED | OUTPATIENT
Start: 2024-06-10 | End: 2025-06-05

## 2024-06-10 RX ORDER — BLOOD SUGAR DIAGNOSTIC
STRIP MISCELLANEOUS
Qty: 50 EACH | Refills: 5 | Status: SHIPPED | OUTPATIENT
Start: 2024-06-10

## 2024-06-17 ENCOUNTER — TELEPHONE (OUTPATIENT)
Dept: FAMILY MEDICINE CLINIC | Facility: CLINIC | Age: 64
End: 2024-06-17

## 2024-06-17 DIAGNOSIS — E11.42 TYPE 2 DIABETES MELLITUS WITH PERIPHERAL NEUROPATHY: ICD-10-CM

## 2024-06-17 DIAGNOSIS — E78.2 MIXED HYPERLIPIDEMIA: ICD-10-CM

## 2024-06-17 DIAGNOSIS — I10 ESSENTIAL HYPERTENSION: Primary | ICD-10-CM

## 2024-06-17 DIAGNOSIS — E06.3 HYPOTHYROIDISM DUE TO HASHIMOTO'S THYROIDITIS: ICD-10-CM

## 2024-06-17 DIAGNOSIS — E03.8 HYPOTHYROIDISM DUE TO HASHIMOTO'S THYROIDITIS: ICD-10-CM

## 2024-06-17 NOTE — TELEPHONE ENCOUNTER
Caller: Lisa Hill    Relationship: Self    Best call back number: [phone]    Which medication are you concerned about: levothyroxine (SYNTHROID, LEVOTHROID) 175 MCG tablet     Who prescribed you this medication: DR. BEAL    What are your concerns: PATIENT STATES THAT SHE HAS BEEN EATING MORE AND FEELS JITTERY AT TIMES. PATIENT STATES THAT SHE THINKS MEDICATION NEEDS RO BE INCREASED. PLEASE ADVISE

## 2024-06-18 ENCOUNTER — LAB (OUTPATIENT)
Dept: FAMILY MEDICINE CLINIC | Facility: CLINIC | Age: 64
End: 2024-06-18
Payer: MEDICARE

## 2024-06-18 DIAGNOSIS — E06.3 HYPOTHYROIDISM DUE TO HASHIMOTO'S THYROIDITIS: ICD-10-CM

## 2024-06-18 DIAGNOSIS — E03.8 HYPOTHYROIDISM DUE TO HASHIMOTO'S THYROIDITIS: ICD-10-CM

## 2024-06-18 DIAGNOSIS — E11.42 TYPE 2 DIABETES MELLITUS WITH PERIPHERAL NEUROPATHY: ICD-10-CM

## 2024-06-18 DIAGNOSIS — E78.2 MIXED HYPERLIPIDEMIA: ICD-10-CM

## 2024-06-18 DIAGNOSIS — I10 ESSENTIAL HYPERTENSION: ICD-10-CM

## 2024-06-18 LAB
ALBUMIN SERPL-MCNC: 3.9 G/DL (ref 3.5–5.2)
ALBUMIN/GLOB SERPL: 1.3 G/DL
ALP SERPL-CCNC: 55 U/L (ref 39–117)
ALT SERPL W P-5'-P-CCNC: 17 U/L (ref 1–33)
ANION GAP SERPL CALCULATED.3IONS-SCNC: 13 MMOL/L (ref 5–15)
AST SERPL-CCNC: 12 U/L (ref 1–32)
BASOPHILS # BLD AUTO: 0.09 10*3/MM3 (ref 0–0.2)
BASOPHILS NFR BLD AUTO: 0.8 % (ref 0–1.5)
BILIRUB SERPL-MCNC: <0.2 MG/DL (ref 0–1.2)
BUN SERPL-MCNC: 12 MG/DL (ref 8–23)
BUN/CREAT SERPL: 13.2 (ref 7–25)
CALCIUM SPEC-SCNC: 9.6 MG/DL (ref 8.6–10.5)
CHLORIDE SERPL-SCNC: 99 MMOL/L (ref 98–107)
CHOLEST SERPL-MCNC: 123 MG/DL (ref 0–200)
CO2 SERPL-SCNC: 24 MMOL/L (ref 22–29)
CREAT SERPL-MCNC: 0.91 MG/DL (ref 0.57–1)
DEPRECATED RDW RBC AUTO: 44 FL (ref 37–54)
EGFRCR SERPLBLD CKD-EPI 2021: 69.7 ML/MIN/1.73
EOSINOPHIL # BLD AUTO: 0.17 10*3/MM3 (ref 0–0.4)
EOSINOPHIL NFR BLD AUTO: 1.5 % (ref 0.3–6.2)
ERYTHROCYTE [DISTWIDTH] IN BLOOD BY AUTOMATED COUNT: 12.6 % (ref 12.3–15.4)
GLOBULIN UR ELPH-MCNC: 3 GM/DL
GLUCOSE SERPL-MCNC: 80 MG/DL (ref 65–99)
HBA1C MFR BLD: 5.4 % (ref 4.8–5.6)
HCT VFR BLD AUTO: 43.7 % (ref 34–46.6)
HDLC SERPL-MCNC: 48 MG/DL (ref 40–60)
HGB BLD-MCNC: 14.8 G/DL (ref 12–15.9)
IMM GRANULOCYTES # BLD AUTO: 0.04 10*3/MM3 (ref 0–0.05)
IMM GRANULOCYTES NFR BLD AUTO: 0.3 % (ref 0–0.5)
LDLC SERPL CALC-MCNC: 49 MG/DL (ref 0–100)
LDLC/HDLC SERPL: 0.91 {RATIO}
LYMPHOCYTES # BLD AUTO: 3.31 10*3/MM3 (ref 0.7–3.1)
LYMPHOCYTES NFR BLD AUTO: 28.6 % (ref 19.6–45.3)
MCH RBC QN AUTO: 32.3 PG (ref 26.6–33)
MCHC RBC AUTO-ENTMCNC: 33.9 G/DL (ref 31.5–35.7)
MCV RBC AUTO: 95.4 FL (ref 79–97)
MONOCYTES # BLD AUTO: 0.69 10*3/MM3 (ref 0.1–0.9)
MONOCYTES NFR BLD AUTO: 6 % (ref 5–12)
NEUTROPHILS NFR BLD AUTO: 62.8 % (ref 42.7–76)
NEUTROPHILS NFR BLD AUTO: 7.26 10*3/MM3 (ref 1.7–7)
NRBC BLD AUTO-RTO: 0 /100 WBC (ref 0–0.2)
PLATELET # BLD AUTO: 380 10*3/MM3 (ref 140–450)
PMV BLD AUTO: 9.3 FL (ref 6–12)
POTASSIUM SERPL-SCNC: 5 MMOL/L (ref 3.5–5.2)
PROT SERPL-MCNC: 6.9 G/DL (ref 6–8.5)
RBC # BLD AUTO: 4.58 10*6/MM3 (ref 3.77–5.28)
SODIUM SERPL-SCNC: 136 MMOL/L (ref 136–145)
T3FREE SERPL-MCNC: 2.9 PG/ML (ref 2–4.4)
T4 FREE SERPL-MCNC: 1.78 NG/DL (ref 0.92–1.68)
TRIGL SERPL-MCNC: 156 MG/DL (ref 0–150)
TSH SERPL DL<=0.05 MIU/L-ACNC: 0.4 UIU/ML (ref 0.27–4.2)
VLDLC SERPL-MCNC: 26 MG/DL (ref 5–40)
WBC NRBC COR # BLD AUTO: 11.56 10*3/MM3 (ref 3.4–10.8)

## 2024-06-18 PROCEDURE — 84481 FREE ASSAY (FT-3): CPT | Performed by: GENERAL PRACTICE

## 2024-06-18 PROCEDURE — 80053 COMPREHEN METABOLIC PANEL: CPT | Performed by: GENERAL PRACTICE

## 2024-06-18 PROCEDURE — 80061 LIPID PANEL: CPT | Performed by: GENERAL PRACTICE

## 2024-06-18 PROCEDURE — 83036 HEMOGLOBIN GLYCOSYLATED A1C: CPT | Performed by: GENERAL PRACTICE

## 2024-06-18 PROCEDURE — 36415 COLL VENOUS BLD VENIPUNCTURE: CPT | Performed by: GENERAL PRACTICE

## 2024-06-18 PROCEDURE — 84443 ASSAY THYROID STIM HORMONE: CPT | Performed by: GENERAL PRACTICE

## 2024-06-18 PROCEDURE — 84439 ASSAY OF FREE THYROXINE: CPT | Performed by: GENERAL PRACTICE

## 2024-06-18 PROCEDURE — 85025 COMPLETE CBC W/AUTO DIFF WBC: CPT | Performed by: GENERAL PRACTICE

## 2024-06-18 RX ORDER — SEMAGLUTIDE 0.68 MG/ML
0.5 INJECTION, SOLUTION SUBCUTANEOUS WEEKLY
Qty: 9 ML | Refills: 0 | Status: SHIPPED | OUTPATIENT
Start: 2024-06-18

## 2024-06-18 NOTE — TELEPHONE ENCOUNTER
Caller: Lisa Hill    Relationship: Self    Best call back number: [phone]     Requested Prescriptions:   Requested Prescriptions     Pending Prescriptions Disp Refills    Semaglutide,0.25 or 0.5MG/DOS, (Ozempic, 0.25 or 0.5 MG/DOSE,) 2 MG/3ML solution pen-injector 9 mL 0     Si.25 mg SC weekly for 6 weeks, then 0.5 mg SC weekly afterward        Pharmacy where request should be sent: [address] [phone] [facility] [phone]      Last office visit with prescribing clinician: 2024   Last telemedicine visit with prescribing clinician: Visit date not found   Next office visit with prescribing clinician: 2024     Additional details provided by patient: PATIENT HAS 1 MORE SHOT OF THIS MEDICATION.    Does the patient have less than a 3 day supply:  [] Yes  [x] No    Would you like a call back once the refill request has been completed: [] Yes [x] No    If the office needs to give you a call back, can they leave a voicemail: [] Yes [x] No    Jorge Thomas Rep   24 09:40 EDT

## 2024-06-19 NOTE — PROGRESS NOTES
Sent [employer] message    -- Please let patient know that her thyroid tests look good She does not require a change in dose

## 2024-06-20 ENCOUNTER — OFFICE VISIT (OUTPATIENT)
Dept: PULMONOLOGY | Facility: CLINIC | Age: 64
End: 2024-06-20
Payer: MEDICARE

## 2024-06-20 VITALS
HEIGHT: 62 IN | BODY MASS INDEX: 36.07 KG/M2 | HEART RATE: 92 BPM | OXYGEN SATURATION: 98 % | WEIGHT: 196 LBS | DIASTOLIC BLOOD PRESSURE: 76 MMHG | TEMPERATURE: 97.7 F | SYSTOLIC BLOOD PRESSURE: 122 MMHG

## 2024-06-20 DIAGNOSIS — R91.1 PULMONARY NODULE: ICD-10-CM

## 2024-06-20 DIAGNOSIS — F17.200 SMOKER: ICD-10-CM

## 2024-06-20 DIAGNOSIS — J43.1 PANLOBULAR EMPHYSEMA: Primary | ICD-10-CM

## 2024-06-20 DIAGNOSIS — E66.9 OBESITY (BMI 30-39.9): ICD-10-CM

## 2024-06-20 DIAGNOSIS — G47.34 NOCTURNAL HYPOXIA: ICD-10-CM

## 2024-06-20 DIAGNOSIS — G47.33 OBSTRUCTIVE SLEEP APNEA: ICD-10-CM

## 2024-06-20 DIAGNOSIS — G72.9 MYOPATHY: ICD-10-CM

## 2024-06-20 PROCEDURE — 99214 OFFICE O/P EST MOD 30 MIN: CPT | Performed by: PHYSICIAN ASSISTANT

## 2024-06-20 PROCEDURE — 3074F SYST BP LT 130 MM HG: CPT | Performed by: PHYSICIAN ASSISTANT

## 2024-06-20 PROCEDURE — 3078F DIAST BP <80 MM HG: CPT | Performed by: PHYSICIAN ASSISTANT

## 2024-06-20 RX ORDER — IPRATROPIUM BROMIDE AND ALBUTEROL SULFATE 2.5; .5 MG/3ML; MG/3ML
3 SOLUTION RESPIRATORY (INHALATION) 4 TIMES DAILY PRN
Qty: 360 ML | Refills: 5 | Status: SHIPPED | OUTPATIENT
Start: 2024-06-20

## 2024-06-20 RX ORDER — ROFLUMILAST 250 UG/1
250 TABLET ORAL DAILY
Qty: 28 TABLET | Refills: 0 | Status: SHIPPED | OUTPATIENT
Start: 2024-06-20 | End: 2024-07-18

## 2024-06-20 NOTE — PROGRESS NOTES
"Chief Complaint  Emphysema and COPD    Subjective        Lisa Hill presents to [facility]  History of Present Illness    Mrs. Hill presents today for follow up. Continues with use of the breztri inhaler. Tolerates this well but still notes recurrent phlegm at baseline. Otherwise, no change in symptoms.   Previously notable for HERBERT in the past but could not tolerate the device. She uses supplemental oxygen occasionally, but states that her  has recently noted some episodes where she may stop breathing at nighttime.   Discussed possible inspire device, but she was diagnosed with mild sleep apnea in the past.   Remains a current smoker.     Objective   Vital Signs:  /76   Pulse 92   Temp 97.7 °F (36.5 °C) (Temporal)   Ht 157.5 cm (62\")   Wt 88.9 kg (196 lb)   SpO2 98%   BMI 35.85 kg/m²   Estimated body mass index is 35.85 kg/m² as calculated from the following:    Height as of this encounter: 157.5 cm (62\").    Weight as of this encounter: 88.9 kg (196 lb).           Physical Exam  Vitals reviewed.   Constitutional:       General: She is not in acute distress.     Appearance: She is not diaphoretic.   HENT:      Head: Normocephalic and atraumatic.   Cardiovascular:      Rate and Rhythm: Normal rate and regular rhythm.   Pulmonary:      Effort: Pulmonary effort is normal.      Breath sounds: No wheezing, rhonchi or rales.   Neurological:      Mental Status: She is alert and oriented to person, place, and time.   Psychiatric:         Behavior: Behavior normal.        Result Review :    The following data was reviewed by: Katarina Wan PA-C on 06/20/2024:  CT Chest imaging/erport 2024  Per personal review  -image 70 - 2 mm nodule remains stable (since 2019)  -calcified lymph notes stable  -emphysema noted    PFT 2019  Flow volume was assessed.  Spirometry showed mild obstruction.  Lung volumes are normal.  The RV/TLC ratio is increased " indicating air trapping.  Diffusing capacity, uncorrected for hemoglobin, is mildly reduced.     Conclusion:  Mild obstruction with mildly reduced diffusing capacit            Assessment and Plan     Diagnoses and all orders for this visit:    1. Panlobular emphysema (Primary)  -     roflumilast (Daliresp) 250 MCG tablet tablet; Take 1 tablet by mouth Daily for 28 days.  Dispense: 28 tablet; Refill: 0  -     ipratropium-albuterol (DUO-NEB) 0.5-2.5 mg/3 ml nebulizer; Take 3 mL by nebulization 4 (Four) Times a Day As Needed for Wheezing or Shortness of Air.  Dispense: 360 mL; Refill: 5  -     Miscellaneous DME  -     Complete PFT - Pre & Post Bronchodilator; Future    2. Obstructive sleep apnea    3. Smoker  -     Complete PFT - Pre & Post Bronchodilator; Future    4. Pulmonary nodule    5. Nocturnal hypoxia    6. Myopathy    7. Obesity (BMI 30-39.9)        COPD, emphysema type:   Also suspect some bronchitis component due to recurrent phlegm production at baseline.  Continue albuterol inhaler as needed  Continue DuoNeb treatments as needed  Continue Breztri inhaler as scheduled  Ordered new PFT (obtain baseline, assess for asthmatic component)  Discussed option of Daliresp  Possible side effects discussed.  She is interested in starting this.  Ordered due to 250 mcg daily for 20 days  If well-tolerated, will increase to 500 mcg daily      Unspecified myopathy, Obesity:   Notes chronic recurrent cough > 6 months, with phlegm production that is difficult to produce at times. Not able to perform CPT appropriately. Not resolved with cholinergic/mucolytic agents.  May benefit from percussion vest therapy to further help mobilize secretions  Ordered percussion vest therapy.         HERBERT:  Previously qualified for obstructive sleep apnea but cannot tolerate use the device previously.  Has supplemental oxygen supplies but only using occasionally.  Currently using 2 L nasal cannula intermittently during sleep  Recommended  nightly use  Due to concern of notable apnea events, ordered repeat home sleep study to reassess for HERBERT  Then may consider inspire device if HERBERT is moderate-severe      Pulmonary nodule, calcified lymph nodes, cigarette dependence:  Most recent low-dose CT screening reviewed.  Personal read, notable for  -Image 70, 2 mm nodule right lung, stable since 2019  -Calcified lymph nodes, stable, stable since 2017  Other previous 7 mm groundglass left nodule and 4 mm left nodule resolved on prior imaging  Remains current smoker  Has tried to quit multiple times without success and tried various methods of nicotine inhaler, nicotine nasal spray, nicotine gum.  Can consider repeat low-dose CT screenings upon continued qualification        Follow Up     Return in about 2 months (around 8/20/2024), or if symptoms worsen or fail to improve, for Recheck.  Patient was given instructions and counseling regarding her condition or for health maintenance advice. Please see specific information pulled into the AVS if appropriate.

## 2024-06-20 NOTE — TELEPHONE ENCOUNTER
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 She has 5 refills on her current script. She can call pharmacy to have refill ready.     Hub to relay

## 2024-06-20 NOTE — TELEPHONE ENCOUNTER
Caller: Lisa Hill    Relationship: Self    Best call back number: [phone]    What was the call regarding: PATIENT CALLED TO CHECK THE STATUS OF A REFILL FOR LEVOTHYROXINE.     [address] [phone] [facility] [phone] FX

## 2024-06-23 DIAGNOSIS — F41.8 DEPRESSION WITH ANXIETY: ICD-10-CM

## 2024-06-23 DIAGNOSIS — E11.42 TYPE 2 DIABETES MELLITUS WITH PERIPHERAL NEUROPATHY: ICD-10-CM

## 2024-06-24 ENCOUNTER — TELEPHONE (OUTPATIENT)
Dept: PULMONOLOGY | Facility: CLINIC | Age: 64
End: 2024-06-24

## 2024-06-24 RX ORDER — GUAIFENESIN 600 MG/1
TABLET, EXTENDED RELEASE ORAL
Qty: 60 TABLET | Refills: 6 | Status: SHIPPED | OUTPATIENT
Start: 2024-06-24

## 2024-06-24 RX ORDER — LANOLIN ALCOHOL/MO/W.PET/CERES
1000 CREAM (GRAM) TOPICAL DAILY
Qty: 30 TABLET | Refills: 5 | Status: SHIPPED | OUTPATIENT
Start: 2024-06-24

## 2024-06-24 RX ORDER — BUSPIRONE HYDROCHLORIDE 15 MG/1
15 TABLET ORAL 3 TIMES DAILY
Qty: 90 TABLET | Refills: 5 | OUTPATIENT
Start: 2024-06-24

## 2024-06-24 NOTE — TELEPHONE ENCOUNTER
Hub staff attempted to follow warm transfer process and was unsuccessful     Caller: Lisa Hill    Relationship to patient: Self    Best call back number: [phone]    Patient is needing: PT CALLING TO CHECK STATUS OF PA FOR HER Mucus Relief 600 MG 12 hr tablet . PT ALSO ADVISED SHE WAS SUPPOSED TO GET A VEST AND STILL HAS NOT HEARD BACK FROM THE OFFICE. PLEASE CALL PT BACK TO ADVISE. OK TO LEAVE VOICEMAIL

## 2024-06-26 ENCOUNTER — TELEPHONE (OUTPATIENT)
Dept: PULMONOLOGY | Facility: CLINIC | Age: 64
End: 2024-06-26
Payer: MEDICARE

## 2024-06-26 NOTE — TELEPHONE ENCOUNTER
"\"Called and spoke with patient to let her know we have started the paperwork for her to receive a vest, and explained to her that she can get the mucus relief medication over the counter.\"                 "

## 2024-07-01 ENCOUNTER — OFFICE VISIT (OUTPATIENT)
Dept: FAMILY MEDICINE CLINIC | Facility: CLINIC | Age: 64
End: 2024-07-01
Payer: MEDICARE

## 2024-07-01 VITALS
OXYGEN SATURATION: 96 % | DIASTOLIC BLOOD PRESSURE: 72 MMHG | BODY MASS INDEX: 35.51 KG/M2 | HEART RATE: 86 BPM | TEMPERATURE: 97.8 F | HEIGHT: 62 IN | RESPIRATION RATE: 15 BRPM | SYSTOLIC BLOOD PRESSURE: 128 MMHG | WEIGHT: 193 LBS

## 2024-07-01 DIAGNOSIS — E06.3 HYPOTHYROIDISM DUE TO HASHIMOTO'S THYROIDITIS: ICD-10-CM

## 2024-07-01 DIAGNOSIS — K59.09 CHRONIC CONSTIPATION: ICD-10-CM

## 2024-07-01 DIAGNOSIS — E03.8 HYPOTHYROIDISM DUE TO HASHIMOTO'S THYROIDITIS: ICD-10-CM

## 2024-07-01 DIAGNOSIS — F17.200 SMOKER: ICD-10-CM

## 2024-07-01 DIAGNOSIS — J43.1 PANLOBULAR EMPHYSEMA: ICD-10-CM

## 2024-07-01 DIAGNOSIS — I25.10 CORONARY ARTERY CALCIFICATION SEEN ON CT SCAN: ICD-10-CM

## 2024-07-01 DIAGNOSIS — J30.9 CHRONIC ALLERGIC RHINITIS: Primary | ICD-10-CM

## 2024-07-01 DIAGNOSIS — G47.33 OBSTRUCTIVE SLEEP APNEA: ICD-10-CM

## 2024-07-01 DIAGNOSIS — J30.1 SEASONAL ALLERGIC RHINITIS DUE TO POLLEN: ICD-10-CM

## 2024-07-01 DIAGNOSIS — E55.9 VITAMIN D DEFICIENCY: ICD-10-CM

## 2024-07-01 DIAGNOSIS — K21.9 GASTROESOPHAGEAL REFLUX DISEASE WITHOUT ESOPHAGITIS: ICD-10-CM

## 2024-07-01 DIAGNOSIS — I70.0 AORTIC ATHEROSCLEROSIS: ICD-10-CM

## 2024-07-01 DIAGNOSIS — Z00.00 HEALTHCARE MAINTENANCE: ICD-10-CM

## 2024-07-01 DIAGNOSIS — E11.42 TYPE 2 DIABETES MELLITUS WITH PERIPHERAL NEUROPATHY: ICD-10-CM

## 2024-07-01 DIAGNOSIS — M85.80 OSTEOPENIA, UNSPECIFIED LOCATION: ICD-10-CM

## 2024-07-01 DIAGNOSIS — F41.8 DEPRESSION WITH ANXIETY: ICD-10-CM

## 2024-07-01 DIAGNOSIS — I10 ESSENTIAL HYPERTENSION: ICD-10-CM

## 2024-07-01 DIAGNOSIS — N39.3 STRESS BLADDER INCONTINENCE, FEMALE: ICD-10-CM

## 2024-07-01 DIAGNOSIS — E78.2 MIXED HYPERLIPIDEMIA: ICD-10-CM

## 2024-07-01 DIAGNOSIS — E66.01 CLASS 2 SEVERE OBESITY WITH SERIOUS COMORBIDITY AND BODY MASS INDEX (BMI) OF 35.0 TO 35.9 IN ADULT, UNSPECIFIED OBESITY TYPE: ICD-10-CM

## 2024-07-01 DIAGNOSIS — Z51.81 ENCOUNTER FOR THERAPEUTIC DRUG LEVEL MONITORING: ICD-10-CM

## 2024-07-01 DIAGNOSIS — M06.9 RHEUMATOID ARTHRITIS INVOLVING MULTIPLE SITES, UNSPECIFIED WHETHER RHEUMATOID FACTOR PRESENT: ICD-10-CM

## 2024-07-01 DIAGNOSIS — M15.9 GENERALIZED OSTEOARTHRITIS: ICD-10-CM

## 2024-07-01 PROBLEM — R30.0 DYSURIA: Status: RESOLVED | Noted: 2023-08-04 | Resolved: 2024-07-01

## 2024-07-01 PROCEDURE — 3074F SYST BP LT 130 MM HG: CPT | Performed by: GENERAL PRACTICE

## 2024-07-01 PROCEDURE — G2211 COMPLEX E/M VISIT ADD ON: HCPCS | Performed by: GENERAL PRACTICE

## 2024-07-01 PROCEDURE — 99214 OFFICE O/P EST MOD 30 MIN: CPT | Performed by: GENERAL PRACTICE

## 2024-07-01 PROCEDURE — 3078F DIAST BP <80 MM HG: CPT | Performed by: GENERAL PRACTICE

## 2024-07-01 PROCEDURE — 3044F HG A1C LEVEL LT 7.0%: CPT | Performed by: GENERAL PRACTICE

## 2024-07-01 PROCEDURE — 1125F AMNT PAIN NOTED PAIN PRSNT: CPT | Performed by: GENERAL PRACTICE

## 2024-07-01 RX ORDER — FLUTICASONE PROPIONATE 50 MCG
2 SPRAY, SUSPENSION (ML) NASAL DAILY
Qty: 16 G | Refills: 5 | Status: SHIPPED | OUTPATIENT
Start: 2024-07-01

## 2024-07-01 RX ORDER — PANTOPRAZOLE SODIUM 40 MG/1
40 TABLET, DELAYED RELEASE ORAL DAILY
Qty: 30 TABLET | Refills: 5 | Status: SHIPPED | OUTPATIENT
Start: 2024-07-01

## 2024-07-01 RX ORDER — ASPIRIN 81 MG/1
81 TABLET ORAL DAILY
Qty: 30 TABLET | Refills: 5 | Status: SHIPPED | OUTPATIENT
Start: 2024-07-01

## 2024-07-01 RX ORDER — SIMVASTATIN 20 MG
20 TABLET ORAL
Qty: 30 TABLET | Refills: 5 | Status: SHIPPED | OUTPATIENT
Start: 2024-07-01

## 2024-07-01 RX ORDER — SEMAGLUTIDE 1.34 MG/ML
1 INJECTION, SOLUTION SUBCUTANEOUS WEEKLY
Qty: 3 ML | Refills: 5 | Status: SHIPPED | OUTPATIENT
Start: 2024-07-01

## 2024-07-01 NOTE — PROGRESS NOTES
Subjective   Lisa Hill is a 64 y.o. female.     Chief Complaint  She returns for a scheduled reassessment of multiple medical problems including chronic back and joint pain, chronic obstructive pulmonary disease, type 2 diabetes mellitus, hyperlipidemia, essential hypertension, hypothyroidism, and depression with anxiety    History of Present Illness     Chronic Back and Joint Pain  She complains of persistent low back and generalized joint pain. There has been no change in the quality of her discomfort, and apart from paresthesias of the feet, she continues to deny any associated symptoms. There is no history of any weakness, or any change in her bowel/bladder control.  There is no history of any fever, chills, or night sweats.  When she sits down the pain generally resolves within minutes and she continues to deny any problem at night. She continues to use her TENS unit for several hours daily and feels that it helps.  She also feels that meloxicam helps.  Plain films of the lumbar spine performed on 8/5/2019 were reported as showing degenerative disc disease as well as atherosclerotic calcification of the aorta    COPD  Her shortness of breath and cough have have improved some with weight loss. She admits to intermittent nasal congestion, but continues to deny any other upper respiratory tract symptoms, chest pain, hemoptysis, fever or chills.  Her symptoms worsen with activity, exposure to cold air and environmental allergens and improve some with rest, supplemental oxygen, and inhaled inhaled medication.  She remains on on breztri and is using her rescue inhaler once or twice daily. She continues to smoke 1 pack per day on average.  She underwent an updated low dose CT of the chest on 4/11/2024 with evidence of emphysema, granulomatous changes with calcified hilar mediastinal lymph nodes, atherosclerosis of the thoracic aorta, and degenerative changes of the thoracic spine.  She is scheduled for pulmonary  function testing on 7/25/2024, and will undergo a pulmonology reassessment with Katarina Wan PA-C on 8/20/2024  Lab Results   Component Value Date    WBC 11.56 (H) 06/18/2024    HGB 14.8 06/18/2024    HCT 43.7 06/18/2024    MCV 95.4 06/18/2024     06/18/2024     Type 2 Diabetes Mellitus  She admits to paresthesias of the feet.  There is no history of any visual disturbances, polydipsia, polyuria, hypoglycemia or foot ulcerations.  She remains on metformin, empagliflozin, and semaglutide.  She is currently on 0.5 mg of the latter weekly with no apparent side effects  Lab Results   Component Value Date    HGBA1C 5.40 06/18/2024     Lab Results   Component Value Date    MICROALBUR <1.2 03/11/2024     Lab Results   Component Value Date    PWZTGJRQ17 1,635 (H) 03/11/2024     Hyperlipidemia  Her compliance with treatment has been fair.  She has been following her diet closer and with the weight that she has lost has been able to tolerate more activity. She remains on simvastatin with no apparent side effects  Lab Results   Component Value Date    CHOL 123 06/18/2024    CHLPL 141 02/05/2016    TRIG 156 (H) 06/18/2024    HDL 48 06/18/2024    LDL 49 06/18/2024     Essential Hypertension  She admits to shortness of breath with intermittent lower extremity swelling but continues to deny any chest pain, orthopnea, PND, palpitations, or lightheadedness.  She is taking losartan with no apparent side effects  Lab Results   Component Value Date    GLUCOSE 80 06/18/2024    BUN 12 06/18/2024    CREATININE 0.91 06/18/2024    EGFR 69.7 06/18/2024    BCR 13.2 06/18/2024    K 5.0 06/18/2024    CO2 24.0 06/18/2024    CALCIUM 9.6 06/18/2024    ALBUMIN 3.9 06/18/2024    BILITOT <0.2 06/18/2024    AST 12 06/18/2024    ALT 17 06/18/2024     Lab Results   Component Value Date    ALKPHOS 55 06/18/2024     Hypothyroidism  She has a history of hypothyroidism associated with Hashimoto's disease.  She remains on levothyroxine 175 daily    Lab Results   Component Value Date    TSH 0.395 06/18/2024     Depression  She has a long history of intermittent depression, nervousness, anhedonia, difficulty concentrating, fatigue and impaired memory.  Since last here she has experienced increased anxiety. She continues to deny any suicidal ideation. Risk factors: history of seasonal affective disorder.  She remains on escitalopram 20 daily,  bupropion 450 daily, risperdone 2 nightly, and lorazepam 1 twice a day.  She is scheduled to undergo a psychiatric reassessment with Evelyne ESTRADA on 7/9/2024    The following portions of the patient's history were reviewed and updated as appropriate: allergies, current medications, past medical history, past social history, and problem list.    Review of Systems   Constitutional:  Positive for fatigue. Negative for chills and fever.   HENT:  Positive for hearing loss and rhinorrhea. Negative for congestion, ear pain, postnasal drip, sinus pressure, sneezing, sore throat and voice change.    Eyes:  Negative for visual disturbance.   Respiratory:  Positive for cough and shortness of breath. Negative for wheezing.    Cardiovascular:  Positive for leg swelling. Negative for chest pain and palpitations.   Gastrointestinal:  Positive for constipation. Negative for abdominal pain, blood in stool, diarrhea, nausea, vomiting and GERD.   Genitourinary:  Positive for urinary incontinence (with coughing, sneezing lifting etc). Negative for dysuria, frequency, hematuria, pelvic pain and urgency.   Musculoskeletal:  Positive for arthralgias and back pain. Negative for joint swelling and myalgias.   Skin:  Negative for rash.   Neurological:  Positive for numbness (with paresthesias of the feet) and headache. Negative for dizziness and weakness.   Psychiatric/Behavioral:  Positive for decreased concentration, sleep disturbance and depressed mood. Negative for hallucinations and suicidal ideas. The patient is nervous/anxious.       Objective   Physical Exam  Constitutional:       General: She is not in acute distress.     Appearance: Normal appearance. She is well-developed. She is not diaphoretic.      Comments: Accompanied by her .  Bright and in fair spirits.  Climbed onto the exam table with minimal assistance.  No apparent distress.   HENT:      Head: Atraumatic.      Right Ear: Tympanic membrane, ear canal and external ear normal. Decreased hearing noted.      Left Ear: Tympanic membrane, ear canal and external ear normal. Decreased hearing noted.      Mouth/Throat:      Lips: No lesions.      Mouth: Mucous membranes are moist. No oral lesions.      Pharynx: No oropharyngeal exudate or posterior oropharyngeal erythema.   Eyes:      General: Lids are normal.      Extraocular Movements: Extraocular movements intact.      Conjunctiva/sclera: Conjunctivae normal.      Pupils: Pupils are equal.   Neck:      Thyroid: No thyroid mass or thyromegaly.      Vascular: No carotid bruit or JVD.      Trachea: Trachea normal. No tracheal deviation.   Cardiovascular:      Rate and Rhythm: Normal rate and regular rhythm.      Pulses:           Dorsalis pedis pulses are 2+ on the right side and 2+ on the left side.        Posterior tibial pulses are 2+ on the right side and 2+ on the left side.      Heart sounds: Normal heart sounds, S1 normal and S2 normal. No murmur heard.     No gallop.   Pulmonary:      Effort: Pulmonary effort is normal.      Breath sounds: Decreased air movement present. Examination of the right-lower field reveals decreased breath sounds. Examination of the left-lower field reveals decreased breath sounds. Decreased breath sounds and wheezing (diffuse - mild) present. No rales.      Comments: Pulmonary hyperinflation  Abdominal:      General: Bowel sounds are normal. There is no distension.   Musculoskeletal:      Right lower leg: No edema.      Left lower leg: No edema.   Lymphadenopathy:      Head:      Right side of  head: No submental, submandibular, tonsillar, preauricular, posterior auricular or occipital adenopathy.      Left side of head: No submental, submandibular, tonsillar, preauricular, posterior auricular or occipital adenopathy.      Cervical: No cervical adenopathy.      Upper Body:      Right upper body: No supraclavicular adenopathy.      Left upper body: No supraclavicular adenopathy.   Skin:     General: Skin is warm.      Coloration: Skin is not cyanotic, jaundiced or pale.      Findings: No rash.      Nails: There is no clubbing.   Neurological:      Mental Status: She is alert and oriented to person, place, and time.      Cranial Nerves: No cranial nerve deficit, dysarthria or facial asymmetry.      Sensory: Sensory deficit (decreased vibration sense both feet) present.      Motor: No tremor.      Coordination: Coordination normal.      Gait: Gait normal.   Psychiatric:         Attention and Perception: Attention normal.         Mood and Affect: Mood normal.         Speech: Speech normal.         Behavior: Behavior normal.         Thought Content: Thought content normal.       Assessment & Plan   Problems Addressed this Visit          Allergies and Adverse Reactions    Chronic allergic rhinitis    Relevant Medications    fluticasone (FLONASE) 50 MCG/ACT nasal spray       Cardiac and Vasculature    Aortic atherosclerosis  Reminded regarding risk factor modification with an emphasis on tobacco cessation.  Continue low-dose ASA.    Coronary artery calcification seen on CT scan  As above.    Relevant Medications    aspirin 81 MG EC tablet    Essential hypertension   Hypertension: at goal. Evidence of target organ damage:  evidence of atherosclerosis and coronary artery calcifications on previous imaging .  Encouraged to continue to work on diet and exercise plan.   Continue current medication    Mixed hyperlipidemia  As above.   Continue current medication.    Relevant Medications    simvastatin (ZOCOR) 20 MG  tablet       Endocrine and Metabolic    Class 2 severe obesity with serious comorbidity and body mass index (BMI) of 35.0 to 35.9 in adult    Hypothyroidism due to Hashimoto's thyroiditis  Clinically and bio-chemically euthyroid.  Continue current medication.    Type 2 diabetes mellitus with peripheral neuropathy  Diabetes mellitus Type II, under excellent control.   Encouraged to continue to pursue ADA diet  Encouraged aerobic exercise.  Semaglutide will be titrated to 1 mg daily  Referral to podiatry    Relevant Medications    Semaglutide, 1 MG/DOSE, (Ozempic, 1 MG/DOSE,) 4 MG/3ML solution pen-injector    Other Relevant Orders    Ambulatory Referral to Podiatry (Completed)    Vitamin D deficiency       Gastrointestinal Abdominal     Chronic constipation    Gastroesophageal reflux disease without esophagitis    Relevant Medications    pantoprazole (PROTONIX) 40 MG EC tablet       Genitourinary and Reproductive     Stress bladder incontinence, female       Health Encounters    Healthcare maintenance  Recommended RSV  Reminded to get updated COVID and flu shots this fall       Mental Health    Depression with anxiety  Stable.  Supportive therapy.   Continue current medication.  Follow up with psychiatry    Relevant Orders    Urine Drug Screen - Urine, Clean Catch       Musculoskeletal and Injuries    Generalized osteoarthritis    Osteopenia    Rheumatoid arthritis       Pulmonary and Pneumonias    Panlobular emphysema   COPD is stable.  Reminded of the importance of smoking cessation  Encouraged to remain as active as symptoms allow for  Continue current medication  Follow up with pulmonology    Relevant Medications    fluticasone (FLONASE) 50 MCG/ACT nasal spray       Sleep    Obstructive sleep apnea       Tobacco    Smoker     Diagnoses         Codes Comments    Chronic allergic rhinitis    -  Primary ICD-10-CM: J30.9  ICD-9-CM: 477.9     Mixed hyperlipidemia     ICD-10-CM: E78.2  ICD-9-CM: 272.2     Essential  hypertension     ICD-10-CM: I10  ICD-9-CM: 401.9     Coronary artery calcification seen on CT scan     ICD-10-CM: I25.10  ICD-9-CM: 414.00     Aortic atherosclerosis     ICD-10-CM: I70.0  ICD-9-CM: 440.0     Vitamin D deficiency     ICD-10-CM: E55.9  ICD-9-CM: 268.9     Type 2 diabetes mellitus with peripheral neuropathy     ICD-10-CM: E11.42  ICD-9-CM: 250.60, 357.2     Hypothyroidism due to Hashimoto's thyroiditis     ICD-10-CM: E03.8, E06.3  ICD-9-CM: 244.8, 245.2     Class 2 severe obesity with serious comorbidity and body mass index (BMI) of 35.0 to 35.9 in adult, unspecified obesity type     ICD-10-CM: E66.01, Z68.35  ICD-9-CM: 278.01, V85.35     Gastroesophageal reflux disease without esophagitis     ICD-10-CM: K21.9  ICD-9-CM: 530.81     Chronic constipation     ICD-10-CM: K59.09  ICD-9-CM: 564.00     Stress bladder incontinence, female     ICD-10-CM: N39.3  ICD-9-CM: 625.6     Healthcare maintenance     ICD-10-CM: Z00.00  ICD-9-CM: V70.0     Depression with anxiety     ICD-10-CM: F41.8  ICD-9-CM: 300.4     Rheumatoid arthritis involving multiple sites, unspecified whether rheumatoid factor present     ICD-10-CM: M06.9  ICD-9-CM: 714.0     Osteopenia, unspecified location     ICD-10-CM: M85.80  ICD-9-CM: 733.90     Generalized osteoarthritis     ICD-10-CM: M15.9  ICD-9-CM: 715.00     Panlobular emphysema     ICD-10-CM: J43.1  ICD-9-CM: 492.8     Obstructive sleep apnea     ICD-10-CM: G47.33  ICD-9-CM: 327.23     Smoker     ICD-10-CM: F17.200  ICD-9-CM: 305.1     Encounter for therapeutic drug level monitoring     ICD-10-CM: Z51.81  ICD-9-CM: V58.83     Seasonal allergic rhinitis due to pollen     ICD-10-CM: J30.1  ICD-9-CM: 477.0

## 2024-07-05 PROBLEM — G72.9 MYOPATHY: Status: ACTIVE | Noted: 2024-07-05

## 2024-07-05 PROBLEM — E66.9 OBESITY (BMI 30-39.9): Status: ACTIVE | Noted: 2024-07-05

## 2024-07-09 ENCOUNTER — OFFICE VISIT (OUTPATIENT)
Dept: PSYCHIATRY | Facility: CLINIC | Age: 64
End: 2024-07-09
Payer: MEDICARE

## 2024-07-09 VITALS
DIASTOLIC BLOOD PRESSURE: 68 MMHG | OXYGEN SATURATION: 96 % | BODY MASS INDEX: 35.7 KG/M2 | SYSTOLIC BLOOD PRESSURE: 138 MMHG | WEIGHT: 194 LBS | HEIGHT: 62 IN | HEART RATE: 92 BPM

## 2024-07-09 DIAGNOSIS — F39 UNSPECIFIED MOOD (AFFECTIVE) DISORDER: ICD-10-CM

## 2024-07-09 DIAGNOSIS — F41.9 ANXIETY DISORDER, UNSPECIFIED TYPE: ICD-10-CM

## 2024-07-09 DIAGNOSIS — F41.8 DEPRESSION WITH ANXIETY: ICD-10-CM

## 2024-07-09 RX ORDER — BUPROPION HYDROCHLORIDE 450 MG/1
450 TABLET, FILM COATED, EXTENDED RELEASE ORAL EVERY MORNING
Start: 2024-07-09

## 2024-07-09 RX ORDER — ESCITALOPRAM OXALATE 20 MG/1
20 TABLET ORAL DAILY
Start: 2024-07-09

## 2024-07-09 RX ORDER — RISPERIDONE 2 MG/1
3 TABLET ORAL
Qty: 45 TABLET | Refills: 1 | Status: SHIPPED | OUTPATIENT
Start: 2024-07-09

## 2024-07-09 NOTE — PROGRESS NOTES
"      Subjective   Lisa Hill is a 64 y.o. female is here today for medication management follow-up.    Chief Complaint:  anxiety depression     History of Present Illness:    Patient presents today for a follow up for medication management for anxiety and depression. Denies any medical changes since last visit. Patient states started a new medication from pulmonologist. Patient states she has stopped the Buspar due to dizziness with the increase dose. Patient states taking the ativan twice a day and helping for the most part. Denies any panic attacks. Patient states still taking wellbutrin and lexapro. Patient reports her anxiety and nerves have been \"pretty good\". Anxiety/nerves at a 6 on a 0/10 scale with 10 the worst. Denies any anger, irritability, or aggression. Patient states having some mood swings. Patient states sometimes not sleeping good. Patient reports a couple nights ago woke up at 1 am and was up the rest of the night. Sleeping about 4 hours a night. Denies any nightmares. Patient states waking up wide awake. Depression at a 5 on a 0/10 scale with 10 the worst. Denies any thoughts to harm self or others. Patient is eating about twice a day. Denies any nausea or vomiting. Patient reports hearing the music over and over. Denies any other auditory or visual hallucinations. Patient states hearing ring of fire, chrisitian music, and walk the line. Denies any paranoia. Denies any other side effects.   The following portions of the patient's history were reviewed and updated as appropriate: allergies, current medications, past family history, past medical history, past social history, past surgical history, and problem list.    Review of Systems   Constitutional: Negative.    Respiratory: Negative.     Cardiovascular: Negative.    Gastrointestinal: Negative.    Neurological: Negative.    Psychiatric/Behavioral:  Positive for dysphoric mood and sleep disturbance. The patient is nervous/anxious.  " "      Objective   Physical Exam  Vitals reviewed.   Constitutional:       Appearance: Normal appearance. She is well-developed and well-groomed.   Neurological:      Mental Status: She is alert.   Psychiatric:         Attention and Perception: Attention and perception normal.         Mood and Affect: Affect normal. Mood is anxious.         Speech: Speech normal.         Behavior: Behavior normal. Behavior is cooperative.         Thought Content: Thought content normal.         Cognition and Memory: Cognition and memory normal.         Judgment: Judgment normal.       Blood pressure 138/68, pulse 92, height 157.5 cm (62\"), weight 88 kg (194 lb), SpO2 96%.Body mass index is 35.48 kg/m².    Allergies   Allergen Reactions    Sulfa Antibiotics        Medication List:   Current Outpatient Medications   Medication Sig Dispense Refill    buPROPion XL (FORFIVO XL) 450 MG 24 hr tablet Take 1 tablet by mouth Every Morning.      escitalopram (LEXAPRO) 20 MG tablet Take 1 tablet by mouth Daily.      risperiDONE (risperDAL) 2 MG tablet Take 1.5 tablets by mouth every night at bedtime. 45 tablet 1    Acetaminophen Extra Strength 500 MG tablet TAKE TWO TABLETS BY MOUTH FOUR TIMES DAILY 180 tablet 5    albuterol sulfate  (90 Base) MCG/ACT inhaler INHALE TWO PUFFS BY MOUTH EVERY 4 HOURS AS NEEDED FOR WHEEZING OR SHORTNESS OF BREATH 18 g 8    aspirin 81 MG EC tablet Take 1 tablet by mouth Daily. 30 tablet 5    Breztri Aerosphere 160-9-4.8 MCG/ACT aerosol inhaler INHALE TWO PUFFS BY MOUTH TWICE DAILY 10.7 g 8    cholecalciferol (VITAMIN D3) 25 MCG (1000 UT) tablet TAKE TWO TABLETS BY MOUTH EVERY DAY 60 tablet 5    Continuous Blood Gluc  (Dexcom G6 ) device 1 Device Daily. 1 each 0    Continuous Blood Gluc Sensor (Dexcom G6 Sensor) Every 10 (Ten) Days. 9 each 3    Empagliflozin-metFORMIN HCl 5-500 MG tablet Take 1 tablet by mouth Daily. 30 tablet 5    fluticasone (FLONASE) 50 MCG/ACT nasal spray 2 sprays by Each " Nare route Daily. 16 g 5    glucose blood (OneTouch Verio) test strip USE TO TEST BLOOD GLUCOSE DAILY 50 each 5    ipratropium-albuterol (DUO-NEB) 0.5-2.5 mg/3 ml nebulizer Take 3 mL by nebulization 4 (Four) Times a Day As Needed for Wheezing or Shortness of Air. 360 mL 5    Lancets (OneTouch Delica Plus [id number]) misc USE TO CHECK BLOOD SUGAR THREE TIMES A  each 5    levothyroxine (SYNTHROID, LEVOTHROID) 175 MCG tablet Take 1 tablet by mouth Daily. 30 tablet 5    LORazepam (ATIVAN) 1 MG tablet take 1/2 to 1 tablet BY MOUTH TWICE DAILY AS NEEDED 90 tablet 0    losartan (COZAAR) 50 MG tablet TAKE ONE TABLET BY MOUTH EVERY DAY 30 tablet 5    meloxicam (MOBIC) 15 MG tablet TAKE ONE TABLET BY MOUTH EVERY DAY 30 tablet 5    montelukast (SINGULAIR) 10 MG tablet Take 1 tablet by mouth Daily for 360 days. 90 tablet 3    Mucus Relief 600 MG 12 hr tablet TAKE ONE TABLET BY MOUTH TWICE DAILY AS NEEDED FOR COUGH OR CONGESTION FOR UP TO 30 DAYS 60 tablet 6    pantoprazole (PROTONIX) 40 MG EC tablet Take 1 tablet by mouth Daily. 30 tablet 5    roflumilast (Daliresp) 250 MCG tablet tablet Take 1 tablet by mouth Daily for 28 days. 28 tablet 0    Semaglutide, 1 MG/DOSE, (Ozempic, 1 MG/DOSE,) 4 MG/3ML solution pen-injector Inject 1 mg under the skin into the appropriate area as directed 1 (One) Time Per Week. 3 mL 5    simvastatin (ZOCOR) 20 MG tablet Take 1 tablet by mouth every night at bedtime. 30 tablet 5    vitamin B-12 (CYANOCOBALAMIN) 1000 MCG tablet TAKE ONE TABLET BY MOUTH EVERY DAY 30 tablet 5     No current facility-administered medications for this visit.       Mental Status Exam:   Hygiene:   good  Cooperation:  Cooperative  Eye Contact:  Good  Psychomotor Behavior:  Appropriate  Affect:  Appropriate  Hopelessness: Denies  Speech:  Normal  Thought Process:  Goal directed and Linear  Thought Content:  Normal  Suicidal:  None  Homicidal:  None  Hallucinations:  None  Delusion:  None  Memory:  Intact  Orientation:   Person, Place, Time, and Situation  Reliability:  fair  Insight:  Fair  Judgement:  Fair  Impulse Control:  Fair  Physical/Medical Issues:  No               Assessment & Plan   Diagnoses and all orders for this visit:    1. Unspecified mood (affective) disorder  -     risperiDONE (risperDAL) 2 MG tablet; Take 1.5 tablets by mouth every night at bedtime.  Dispense: 45 tablet; Refill: 1  -     buPROPion XL (FORFIVO XL) 450 MG 24 hr tablet; Take 1 tablet by mouth Every Morning.  -     escitalopram (LEXAPRO) 20 MG tablet; Take 1 tablet by mouth Daily.    2. Anxiety disorder, unspecified type  -     escitalopram (LEXAPRO) 20 MG tablet; Take 1 tablet by mouth Daily.    3. Depression with anxiety  -     Urine Drug Screen - Urine, Clean Catch            Discussed medication options with patient. Discont. Buspar. Increase Risperdal to 2 mg 1.5 tab daily at night for worsening mood and depression. Cont. Bupropion  mg daily every morning for depression and mood. Cont Lexapro 20 mg daily for depression and anxiety. Cont. Lorazepam as prescribed by PCP. UDS ordered for baseline with high risk medications. Reviewed the risks, benefits, and side effects of the medications; patient acknowledged and verbally consented.   Patient was instructed on medication side effects, benefits, and also of no treatment.  Patient was given an explanation regarding potential for increased risk of diabetes, lipids, and weight gain.  Labs will be assessed as clinically indicated.  Diet was discussed especially healthy diet choices and increasing activity and exercise.  Patient was strongly urged to continue weight maintenance or weight loss efforts.  Patient reported verbalized understanding of instructions.  Patient is agreeable to call the office with any questions, concerns, or worsening of symptoms. Patient is aware to call 911 or go to the nearest ER should begin having any thoughts to harm self or others.           Follow up in four to  six weeks            Errors in dictation may reflect use of voice recognition software and not all errors in transcription may have been detected prior to signing.              This document has been electronically signed by NATALIE Pringle   July 26, 2024 12:10 EDT

## 2024-07-10 DIAGNOSIS — E11.42 TYPE 2 DIABETES MELLITUS WITH PERIPHERAL NEUROPATHY: ICD-10-CM

## 2024-07-10 RX ORDER — SEMAGLUTIDE 1.34 MG/ML
1 INJECTION, SOLUTION SUBCUTANEOUS WEEKLY
Qty: 3 ML | Refills: 5 | Status: SHIPPED | OUTPATIENT
Start: 2024-07-10

## 2024-07-21 DIAGNOSIS — F39 UNSPECIFIED MOOD (AFFECTIVE) DISORDER: ICD-10-CM

## 2024-07-21 DIAGNOSIS — F41.9 ANXIETY DISORDER, UNSPECIFIED TYPE: ICD-10-CM

## 2024-07-22 DIAGNOSIS — J43.1 PANLOBULAR EMPHYSEMA: ICD-10-CM

## 2024-07-22 RX ORDER — BUPROPION HYDROCHLORIDE 450 MG/1
450 TABLET, FILM COATED, EXTENDED RELEASE ORAL EVERY MORNING
Qty: 30 TABLET | Refills: 5 | OUTPATIENT
Start: 2024-07-22

## 2024-07-22 RX ORDER — ROFLUMILAST 250 UG/1
250 TABLET ORAL DAILY
Qty: 28 TABLET | Refills: 0 | Status: SHIPPED | OUTPATIENT
Start: 2024-07-22 | End: 2024-07-26

## 2024-07-22 RX ORDER — ESCITALOPRAM OXALATE 20 MG/1
20 TABLET ORAL DAILY
Qty: 30 TABLET | Refills: 5 | OUTPATIENT
Start: 2024-07-22

## 2024-07-26 DIAGNOSIS — J43.1 PANLOBULAR EMPHYSEMA: Primary | ICD-10-CM

## 2024-07-26 RX ORDER — ROFLUMILAST 500 UG/1
500 TABLET ORAL DAILY
Qty: 30 TABLET | Refills: 6 | Status: SHIPPED | OUTPATIENT
Start: 2024-07-26

## 2024-08-18 DIAGNOSIS — F39 UNSPECIFIED MOOD (AFFECTIVE) DISORDER: ICD-10-CM

## 2024-08-19 RX ORDER — RISPERIDONE 2 MG/1
2 TABLET ORAL
Qty: 30 TABLET | Refills: 5 | Status: SHIPPED | OUTPATIENT
Start: 2024-08-19 | End: 2024-08-20 | Stop reason: SDUPTHER

## 2024-08-20 ENCOUNTER — OFFICE VISIT (OUTPATIENT)
Dept: PSYCHIATRY | Facility: CLINIC | Age: 64
End: 2024-08-20
Payer: MEDICARE

## 2024-08-20 VITALS
SYSTOLIC BLOOD PRESSURE: 132 MMHG | DIASTOLIC BLOOD PRESSURE: 84 MMHG | HEIGHT: 62 IN | HEART RATE: 91 BPM | OXYGEN SATURATION: 95 % | WEIGHT: 189 LBS | BODY MASS INDEX: 34.78 KG/M2

## 2024-08-20 DIAGNOSIS — Z51.81 ENCOUNTER FOR THERAPEUTIC DRUG MONITORING: ICD-10-CM

## 2024-08-20 DIAGNOSIS — Z79.899 ENCOUNTER FOR LONG-TERM (CURRENT) USE OF OTHER MEDICATIONS: ICD-10-CM

## 2024-08-20 DIAGNOSIS — F41.9 ANXIETY DISORDER, UNSPECIFIED TYPE: ICD-10-CM

## 2024-08-20 DIAGNOSIS — F39 UNSPECIFIED MOOD (AFFECTIVE) DISORDER: Primary | ICD-10-CM

## 2024-08-20 DIAGNOSIS — F41.8 DEPRESSION WITH ANXIETY: ICD-10-CM

## 2024-08-20 RX ORDER — ESCITALOPRAM OXALATE 20 MG/1
20 TABLET ORAL DAILY
Qty: 30 TABLET | Refills: 0 | Status: SHIPPED | OUTPATIENT
Start: 2024-08-20

## 2024-08-20 RX ORDER — BUPROPION HYDROCHLORIDE 450 MG/1
450 TABLET, FILM COATED, EXTENDED RELEASE ORAL EVERY MORNING
Qty: 30 TABLET | Refills: 0 | Status: SHIPPED | OUTPATIENT
Start: 2024-08-20

## 2024-08-20 RX ORDER — RISPERIDONE 4 MG/1
4 TABLET ORAL
Qty: 30 TABLET | Refills: 0 | Status: SHIPPED | OUTPATIENT
Start: 2024-08-20

## 2024-08-20 RX ORDER — LORAZEPAM 1 MG/1
.5-1 TABLET ORAL 2 TIMES DAILY PRN
Qty: 60 TABLET | Refills: 0 | Status: SHIPPED | OUTPATIENT
Start: 2024-08-20

## 2024-08-20 NOTE — PROGRESS NOTES
Subjective   Lisa Hill is a 64 y.o. female is here today for medication management follow-up.    Chief Complaint:  anxiety depression     History of Present Illness:    Patient presents today for a follow up for medication management for anxiety and depression. Denies any medical changes since last visit. Patient states having some anxiety attacks still but better. Patient reports it feels like anxiety starting out that lasts for a while then leaves. Patient states heart beating really fast when sitting not doing anything. Denies any chest pain. Denies any full panic attacks. Patient states been taking two of the ativan daily to help with anxiety. Patient is sleeping ok but still not a lot. Patient only sleeping about 4-5 hours a night. Patient reports going to bed around 9-9:30 then waking up anywhere from 3-5 am. Denies any nightmares or bad dreams. Patient states she has been taking 1.5 tab of the Risperdal since last visit. Depression at a 5 on a 0/10 scale with 10 the worst. Anxiety/nerves at a 8-9 on a 0/10 scale with 10 the worst. Denies any thoughts to harm self or others. Appetite is decreased. Patient is eating about twice a day. Denies any nausea or vomiting. Denies any auditory or visual hallucinations. Patient reports her depression gets worse around third week in September then ends in spring time. Patient reports medication compliance and denies any side effects.   The following portions of the patient's history were reviewed and updated as appropriate: allergies, current medications, past family history, past medical history, past social history, past surgical history, and problem list.    Review of Systems   Constitutional:  Positive for appetite change.   Respiratory: Negative.     Cardiovascular: Negative.    Gastrointestinal: Negative.    Neurological: Negative.    Psychiatric/Behavioral:  Positive for dysphoric mood and sleep disturbance. The patient is nervous/anxious.   "      Objective   Physical Exam  Vitals reviewed.   Constitutional:       Appearance: Normal appearance. She is well-developed and well-groomed.   Neurological:      Mental Status: She is alert.   Psychiatric:         Attention and Perception: Attention and perception normal.         Mood and Affect: Affect normal. Mood is anxious.         Speech: Speech normal.         Behavior: Behavior normal. Behavior is cooperative.         Thought Content: Thought content normal.         Cognition and Memory: Cognition and memory normal.         Judgment: Judgment normal.       Blood pressure 132/84, pulse 91, height 157.5 cm (62\"), weight 85.7 kg (189 lb), SpO2 95%.Body mass index is 34.57 kg/m².    Allergies   Allergen Reactions    Sulfa Antibiotics        Medication List:   Current Outpatient Medications   Medication Sig Dispense Refill    buPROPion XL (FORFIVO XL) 450 MG 24 hr tablet Take 1 tablet by mouth Every Morning. 30 tablet 0    escitalopram (LEXAPRO) 20 MG tablet Take 1 tablet by mouth Daily. 30 tablet 0    LORazepam (ATIVAN) 1 MG tablet Take 0.5-1 tablets by mouth 2 (Two) Times a Day As Needed for Anxiety. 60 tablet 0    risperiDONE (risperDAL) 4 MG tablet Take 1 tablet by mouth every night at bedtime. 30 tablet 0    Acetaminophen Extra Strength 500 MG tablet TAKE TWO TABLETS BY MOUTH FOUR TIMES DAILY 180 tablet 5    albuterol sulfate  (90 Base) MCG/ACT inhaler INHALE TWO PUFFS BY MOUTH EVERY 4 HOURS AS NEEDED FOR WHEEZING OR SHORTNESS OF BREATH 18 g 8    aspirin 81 MG EC tablet Take 1 tablet by mouth Daily. 30 tablet 5    Breztri Aerosphere 160-9-4.8 MCG/ACT aerosol inhaler INHALE TWO PUFFS BY MOUTH TWICE DAILY 10.7 g 8    cholecalciferol (VITAMIN D3) 25 MCG (1000 UT) tablet TAKE TWO TABLETS BY MOUTH EVERY DAY 60 tablet 5    Continuous Blood Gluc  (Dexcom G6 ) device 1 Device Daily. 1 each 0    Continuous Blood Gluc Sensor (Dexcom G6 Sensor) Every 10 (Ten) Days. 9 each 3    " Empagliflozin-metFORMIN HCl 5-500 MG tablet Take 1 tablet by mouth Daily. 30 tablet 5    fluticasone (FLONASE) 50 MCG/ACT nasal spray 2 sprays by Each Nare route Daily. 16 g 5    glucose blood (OneTouch Verio) test strip USE TO TEST BLOOD GLUCOSE DAILY 50 each 5    ipratropium-albuterol (DUO-NEB) 0.5-2.5 mg/3 ml nebulizer Take 3 mL by nebulization 4 (Four) Times a Day As Needed for Wheezing or Shortness of Air. 360 mL 5    Lancets (OneTouch Delica Plus [id number]) misc USE TO CHECK BLOOD SUGAR THREE TIMES A  each 5    levothyroxine (SYNTHROID, LEVOTHROID) 175 MCG tablet Take 1 tablet by mouth Daily. 30 tablet 5    losartan (COZAAR) 50 MG tablet TAKE ONE TABLET BY MOUTH EVERY DAY 30 tablet 5    meloxicam (MOBIC) 15 MG tablet TAKE ONE TABLET BY MOUTH EVERY DAY 30 tablet 5    montelukast (SINGULAIR) 10 MG tablet Take 1 tablet by mouth Daily for 360 days. 90 tablet 3    Mucus Relief 600 MG 12 hr tablet TAKE ONE TABLET BY MOUTH TWICE DAILY AS NEEDED FOR COUGH OR CONGESTION FOR UP TO 30 DAYS 60 tablet 6    pantoprazole (PROTONIX) 40 MG EC tablet Take 1 tablet by mouth Daily. 30 tablet 5    roflumilast (Daliresp) 500 MCG tablet tablet Take 1 tablet by mouth Daily. 30 tablet 6    Semaglutide, 1 MG/DOSE, (Ozempic, 1 MG/DOSE,) 4 MG/3ML solution pen-injector Inject 1 mg under the skin into the appropriate area as directed 1 (One) Time Per Week. 3 mL 5    simvastatin (ZOCOR) 20 MG tablet Take 1 tablet by mouth every night at bedtime. 30 tablet 5    vitamin B-12 (CYANOCOBALAMIN) 1000 MCG tablet TAKE ONE TABLET BY MOUTH EVERY DAY 30 tablet 5     No current facility-administered medications for this visit.       Mental Status Exam:   Hygiene:   good  Cooperation:  Cooperative  Eye Contact:  Good  Psychomotor Behavior:  Appropriate  Affect:  Appropriate  Hopelessness: Denies  Speech:  Normal  Thought Process:  Goal directed and Linear  Thought Content:  Normal  Suicidal:  None  Homicidal:  None  Hallucinations:   None  Delusion:  None  Memory:  Intact  Orientation:  Person, Place, Time, and Situation  Reliability:  fair  Insight:  Fair  Judgement:  Fair  Impulse Control:  Fair  Physical/Medical Issues:  No                 Assessment & Plan   Diagnoses and all orders for this visit:    1. Unspecified mood (affective) disorder (Primary)  -     risperiDONE (risperDAL) 4 MG tablet; Take 1 tablet by mouth every night at bedtime.  Dispense: 30 tablet; Refill: 0  -     escitalopram (LEXAPRO) 20 MG tablet; Take 1 tablet by mouth Daily.  Dispense: 30 tablet; Refill: 0  -     buPROPion XL (FORFIVO XL) 450 MG 24 hr tablet; Take 1 tablet by mouth Every Morning.  Dispense: 30 tablet; Refill: 0    2. Depression with anxiety  -     LORazepam (ATIVAN) 1 MG tablet; Take 0.5-1 tablets by mouth 2 (Two) Times a Day As Needed for Anxiety.  Dispense: 60 tablet; Refill: 0    3. Anxiety disorder, unspecified type  -     escitalopram (LEXAPRO) 20 MG tablet; Take 1 tablet by mouth Daily.  Dispense: 30 tablet; Refill: 0    4. Encounter for therapeutic drug monitoring  -     Urine Drug Screen - Urine, Clean Catch; Future  -     Urine Drug Screen - Urine, Clean Catch    5. Encounter for long-term (current) use of other medications  -     Urine Drug Screen - Urine, Clean Catch; Future  -     Urine Drug Screen - Urine, Clean Catch            Discussed medication options with patient. Increase Risperdal to 4 mg daily at night for worsening mood and depression. Cont. Lexapro 20 mg daily for depression and anxiety. Cont. Wellbutrin  mg daily every morning for depression and mood. Cont. Ativan 1 mg 0.5-1 tab twice daily as needed for anxiety. Discussed with patient if not effective then will discuss changing lexapro at next visit. Patient acknowledged and agreed.  Reviewed the risks, benefits, and side effects of the medications; patient acknowledged and verbally consented.   Patient is being prescribed a controlled substance as part of treatment plan.  Patient has been educated of appropriate use of the medications, including risk of somnolence, limited ability to drive and/or work safely, and potential for dependence, respiratory depression and overdose. Patient is also informed that the medication are to be used by the patient only- avoid any combined use of ETOH or other substances unless prescribed.   UDS Ordered.    [facility] Patient Controlled Substance Report (from 9/6/2023 to 9/3/2024)    Dispensed  Strength Quantity Days Supply Provider Pharmacy   08/20/2024 [facility]MG 60 [facility]   07/01/2024 Lorazepam 1MG 90 each 45 [facility]   04/30/2024 Lorazepam 1MG 90 each 45 LINA,JUAN [facility]   03/04/2024 Lorazepam 1MG 90 each 45 LIAN,JUAN [facility]   12/12/2023 Lorazepam 1MG 90 each 45 [facility]AN,JU[facility]   09/14/2023 Lorazepam 1MG 90 each 90 [facility]         Disclaimer    *The information in this report is based upon Schedule [place] through [place] controlled substance records reported by dispensers. Data should appear on [facility] reports within two to three business days after dispensing.   *The records listed in the report are based on the patient identification information entered by the report requestor, and if not sufficiently unique may result in the report including records for multiple patients. Please verify the information in the report by contacting the prescribers and/or dispensers listed.   *If the controlled substance records on this report appear to be in error, the patient or provider should contact the dispenser to determine if the information was reported accurately. If the dispenser certifies the information was reported accurately, the dispenser can contact the Drug Enforcement and Professional Practices Branch at [phone] to investigate the error.   *The information in this report is  intended for informational use only by the person authorized to request the report. Intentional disclosure of the report or data to someone not authorized to obtain the data is a Class B Misdemeanor.      Report Restrictions - A practitioner or pharmacist may share the report with the patient or person authorized to act on the patient's behalf and place the report in the patient's medical record, with the report then being deemed a medical record subject to the same disclosure terms and conditions as an ordinary medical record. ([place]RS [place]202)      Patient is agreeable to call the office with any questions, concerns, or worsening of symptoms. Patient is aware to call 911 or go to the nearest ER should begin having any thoughts to harm self or others.          Follow up in four to six weeks        Errors in dictation may reflect use of voice recognition software and not all errors in transcription may have been detected prior to signing.              This document has been electronically signed by NATALIE Pringle   September 5, 2024 08:46 EDT

## 2024-08-27 ENCOUNTER — TELEPHONE (OUTPATIENT)
Dept: FAMILY MEDICINE CLINIC | Facility: CLINIC | Age: 64
End: 2024-08-27
Payer: MEDICARE

## 2024-08-27 NOTE — TELEPHONE ENCOUNTER
Called to make sure that patient knows that appointment is scheduled for tomorrow 8/28/24 @ 9:45am with [facility]

## 2024-09-03 ENCOUNTER — TELEPHONE (OUTPATIENT)
Dept: FAMILY MEDICINE CLINIC | Facility: CLINIC | Age: 64
End: 2024-09-03

## 2024-09-03 NOTE — TELEPHONE ENCOUNTER
Caller: Lisa Hill    Relationship: Self    Best call back number: [phone]    What is the best time to reach you: ANYTIME     Who are you requesting to speak with (clinical staff, provider,  specific staff member): CLINICAL STAFF    PATIENT IS WANTING TO KNOW IF SHE CAN SWITCH HER DIABETIC MEDICATION TO THE PILL INSTEAD OF OZEMPIC. PLEASE CALL TO DISCUSS.

## 2024-09-04 NOTE — TELEPHONE ENCOUNTER
PATIENT CALLED BACK AND STATED SHE WANTED TO STAY ON OZEMPIC AND CANCEL REQUEST FOR A PILL.  THANK YOU.

## 2024-09-15 DIAGNOSIS — M47.816 SPONDYLOSIS OF LUMBAR REGION WITHOUT MYELOPATHY OR RADICULOPATHY: ICD-10-CM

## 2024-09-16 RX ORDER — MELOXICAM 15 MG/1
15 TABLET ORAL DAILY
Qty: 30 TABLET | Refills: 5 | Status: SHIPPED | OUTPATIENT
Start: 2024-09-16

## 2024-09-20 DIAGNOSIS — F41.8 DEPRESSION WITH ANXIETY: ICD-10-CM

## 2024-09-20 RX ORDER — LORAZEPAM 1 MG/1
.5-1 TABLET ORAL 2 TIMES DAILY PRN
Qty: 60 TABLET | Refills: 0 | Status: SHIPPED | OUTPATIENT
Start: 2024-09-20

## 2024-09-30 ENCOUNTER — OFFICE VISIT (OUTPATIENT)
Dept: PSYCHIATRY | Facility: CLINIC | Age: 64
End: 2024-09-30
Payer: MEDICARE

## 2024-09-30 VITALS
HEART RATE: 96 BPM | WEIGHT: 184 LBS | DIASTOLIC BLOOD PRESSURE: 70 MMHG | SYSTOLIC BLOOD PRESSURE: 130 MMHG | BODY MASS INDEX: 33.65 KG/M2 | OXYGEN SATURATION: 95 %

## 2024-09-30 DIAGNOSIS — F41.9 ANXIETY DISORDER, UNSPECIFIED TYPE: ICD-10-CM

## 2024-09-30 DIAGNOSIS — Z79.899 ENCOUNTER FOR LONG-TERM (CURRENT) USE OF OTHER MEDICATIONS: ICD-10-CM

## 2024-09-30 DIAGNOSIS — J43.1 PANLOBULAR EMPHYSEMA: ICD-10-CM

## 2024-09-30 DIAGNOSIS — F33.1 MAJOR DEPRESSIVE DISORDER, RECURRENT EPISODE, MODERATE: Primary | ICD-10-CM

## 2024-09-30 DIAGNOSIS — Z51.81 ENCOUNTER FOR THERAPEUTIC DRUG MONITORING: ICD-10-CM

## 2024-09-30 RX ORDER — ROFLUMILAST 500 UG/1
500 TABLET ORAL DAILY
Qty: 90 TABLET | Refills: 3 | Status: SHIPPED | OUTPATIENT
Start: 2024-09-30

## 2024-09-30 RX ORDER — RISPERIDONE 3 MG/1
3 TABLET ORAL
Qty: 30 TABLET | Refills: 0 | Status: SHIPPED | OUTPATIENT
Start: 2024-09-30

## 2024-09-30 RX ORDER — DEXTROMETHORPHAN HYDROBROMIDE, BUPROPION HYDROCHLORIDE 105; 45 MG/1; MG/1
1 TABLET, MULTILAYER, EXTENDED RELEASE ORAL 2 TIMES DAILY
Qty: 60 TABLET | Refills: 0 | Status: SHIPPED | OUTPATIENT
Start: 2024-09-30

## 2024-09-30 RX ORDER — ESCITALOPRAM OXALATE 20 MG/1
20 TABLET ORAL DAILY
Qty: 30 TABLET | Refills: 0 | Status: SHIPPED | OUTPATIENT
Start: 2024-09-30

## 2024-09-30 NOTE — PROGRESS NOTES
Subjective   Lisa Hill is a 64 y.o. female is here today for medication management follow-up.    Chief Complaint:  anxiety depression     History of Present Illness:    Patient presents today for a follow up for medication management for anxiety and depression. Denies any medical changes since last visit. Patient states after last visit had a spell in which got really sick to stomach, vomiting, and nervous on inside. Patient states a few days had another spell similar. Patient states can tell depression has gotten worse with seasonal. Depression at a 6 on a 0/10 scale with 10 the worst. Anxiety at a 9 on a 0/10 scale with 10 the worst. Denies any panic attacks but having some anxiety attacks. Patient states sleeping has been pretty good and almost out of risperdal. Patient going to bed around 9-9:30 then up around 3 am. Patient states up for a couple hours then lay back down but don't go to sleep. Sleeping about 6 hours a night. Denies any nightmares. Appetite is decreased some but currently on Ozempic. Patient is eating at least twice a day. Patient reports get really jerky inside and shoulder want to shake. Denies any thoughts to harm self or others.  Denies any auditory or visual hallucinations. Denies any anger or aggression. Patient states glucose has been running good. Patient reports medication compliance and denies any side effects.   The following portions of the patient's history were reviewed and updated as appropriate: allergies, current medications, past family history, past medical history, past social history, past surgical history, and problem list.    Review of Systems   Constitutional:  Positive for appetite change.   Respiratory: Negative.     Cardiovascular: Negative.    Gastrointestinal: Negative.    Neurological: Negative.    Psychiatric/Behavioral:  Positive for dysphoric mood and sleep disturbance. The patient is nervous/anxious.        Objective   Physical Exam  Vitals reviewed.    Constitutional:       Appearance: Normal appearance. She is well-developed and well-groomed.   Neurological:      Mental Status: She is alert.   Psychiatric:         Attention and Perception: Attention and perception normal.         Mood and Affect: Affect normal. Mood is anxious.         Speech: Speech normal.         Behavior: Behavior normal. Behavior is cooperative.         Thought Content: Thought content normal.         Cognition and Memory: Cognition and memory normal.         Judgment: Judgment normal.       Blood pressure 130/70, pulse 96, weight 83.5 kg (184 lb), SpO2 95%.Body mass index is 33.65 kg/m².    Allergies   Allergen Reactions    Sulfa Antibiotics        Medication List:   Current Outpatient Medications   Medication Sig Dispense Refill    escitalopram (LEXAPRO) 20 MG tablet Take 1 tablet by mouth Daily. 30 tablet 0    risperiDONE (risperDAL) 3 MG tablet Take 1 tablet by mouth every night at bedtime. 30 tablet 0    Acetaminophen Extra Strength 500 MG tablet TAKE TWO TABLETS BY MOUTH FOUR TIMES DAILY 180 tablet 5    albuterol sulfate  (90 Base) MCG/ACT inhaler INHALE TWO PUFFS BY MOUTH EVERY 4 HOURS AS NEEDED FOR WHEEZING OR SHORTNESS OF BREATH 18 g 8    aspirin 81 MG EC tablet Take 1 tablet by mouth Daily. 30 tablet 5    Breztri Aerosphere 160-9-4.8 MCG/ACT aerosol inhaler INHALE TWO PUFFS BY MOUTH TWICE DAILY 10.7 g 8    cholecalciferol (VITAMIN D3) 25 MCG (1000 UT) tablet TAKE TWO TABLETS BY MOUTH EVERY DAY 60 tablet 5    Continuous Blood Gluc  (Dexcom G6 ) device 1 Device Daily. 1 each 0    Continuous Blood Gluc Sensor (Dexcom G6 Sensor) Every 10 (Ten) Days. 9 each 3    Dextromethorphan-buPROPion ER (Auvelity)  MG tablet controlled-release Take 1 tablet by mouth 2 (Two) Times a Day. 60 tablet 0    Empagliflozin-metFORMIN HCl 5-500 MG tablet Take 1 tablet by mouth Daily. 30 tablet 5    fluticasone (FLONASE) 50 MCG/ACT nasal spray 2 sprays by Each Nare route Daily.  16 g 5    glucose blood (OneTouch Verio) test strip USE TO TEST BLOOD GLUCOSE DAILY 50 each 5    ipratropium-albuterol (DUO-NEB) 0.5-2.5 mg/3 ml nebulizer Take 3 mL by nebulization 4 (Four) Times a Day As Needed for Wheezing or Shortness of Air. 360 mL 5    Lancets (OneTouch Delica Plus [id number]) misc USE TO CHECK BLOOD SUGAR THREE TIMES A  each 5    levothyroxine (SYNTHROID, LEVOTHROID) 175 MCG tablet Take 1 tablet by mouth Daily. 30 tablet 5    LORazepam (ATIVAN) 1 MG tablet Take 0.5-1 tablets by mouth 2 (Two) Times a Day As Needed for Anxiety. 60 tablet 0    losartan (COZAAR) 50 MG tablet TAKE ONE TABLET BY MOUTH EVERY DAY 30 tablet 5    meloxicam (MOBIC) 15 MG tablet TAKE ONE TABLET BY MOUTH DAILY 30 tablet 5    montelukast (SINGULAIR) 10 MG tablet Take 1 tablet by mouth Daily for 360 days. 90 tablet 3    Mucus Relief 600 MG 12 hr tablet TAKE ONE TABLET BY MOUTH TWICE DAILY AS NEEDED FOR COUGH OR CONGESTION FOR UP TO 30 DAYS 60 tablet 6    pantoprazole (PROTONIX) 40 MG EC tablet Take 1 tablet by mouth Daily. 30 tablet 5    roflumilast (Daliresp) 500 MCG tablet tablet Take 1 tablet by mouth Daily. 90 tablet 3    Semaglutide, 1 MG/DOSE, (Ozempic, 1 MG/DOSE,) 4 MG/3ML solution pen-injector Inject 1 mg under the skin into the appropriate area as directed 1 (One) Time Per Week. 3 mL 5    simvastatin (ZOCOR) 20 MG tablet Take 1 tablet by mouth every night at bedtime. 30 tablet 5    vitamin B-12 (CYANOCOBALAMIN) 1000 MCG tablet TAKE ONE TABLET BY MOUTH EVERY DAY 30 tablet 5     No current facility-administered medications for this visit.       Mental Status Exam:   Hygiene:   good  Cooperation:  Cooperative  Eye Contact:  Good  Psychomotor Behavior:  Appropriate  Affect:  Appropriate  Hopelessness: Denies  Speech:  Normal  Thought Process:  Goal directed and Linear  Thought Content:  Normal  Suicidal:  None  Homicidal:  None  Hallucinations:  None  Delusion:  None  Memory:  Intact  Orientation:  Person, Place,  Time, and Situation  Reliability:  fair  Insight:  Fair  Judgement:  Fair  Impulse Control:  Fair  Physical/Medical Issues:  No        PHQ-9 Depression Screening  Little interest or pleasure in doing things? 2-->more than half the days   Feeling down, depressed, or hopeless? 3-->nearly every day   Trouble falling or staying asleep, or sleeping too much? 3-->nearly every day   Feeling tired or having little energy? 3-->nearly every day   Poor appetite or overeating? 3-->nearly every day   Feeling bad about yourself - or that you are a failure or have let yourself or your family down? 1-->several days   Trouble concentrating on things, such as reading the newspaper or watching television? 3-->nearly every day   Moving or speaking so slowly that other people could have noticed? Or the opposite - being so fidgety or restless that you have been moving around a lot more than usual? 3-->nearly every day   Thoughts that you would be better off dead, or of hurting yourself in some way? 0-->not at all   PHQ-9 Total Score 21   If you checked off any problems, how difficult have these problems made it for you to do your work, take care of things at home, or get along with other people? very difficult           Assessment & Plan   Diagnoses and all orders for this visit:    1. Major depressive disorder, recurrent episode, moderate (Primary)  -     escitalopram (LEXAPRO) 20 MG tablet; Take 1 tablet by mouth Daily.  Dispense: 30 tablet; Refill: 0  -     risperiDONE (risperDAL) 3 MG tablet; Take 1 tablet by mouth every night at bedtime.  Dispense: 30 tablet; Refill: 0  -     Dextromethorphan-buPROPion ER (Auvelity)  MG tablet controlled-release; Take 1 tablet by mouth 2 (Two) Times a Day.  Dispense: 60 tablet; Refill: 0    2. Anxiety disorder, unspecified type  -     escitalopram (LEXAPRO) 20 MG tablet; Take 1 tablet by mouth Daily.  Dispense: 30 tablet; Refill: 0    3. Encounter for therapeutic drug monitoring  -     Urine  Drug Screen - Urine, Clean Catch; Future  -     Urine Drug Screen - Urine, Clean Catch    4. Encounter for long-term (current) use of other medications  -     Urine Drug Screen - Urine, Clean Catch; Future  -     Urine Drug Screen - Urine, Clean Catch              Discussed medication options with patient. Start Auvelity  mg twice daily for worsening depression. Discont. Wellbutrin. Decrease Risperdal to 3 mg daily at night for depression and mood. Cont. Lexapro 20 mg daily for depression and anxiety. Cont. Ativan 1 mg 0.5-1 tab twice daily as needed for anxiety. Reviewed the risks, benefits, and side effects of the medications; patient acknowledged and verbally consented. Patient is being prescribed a controlled substance as part of treatment plan. Patient has been educated of appropriate use of the medications, including risk of somnolence, limited ability to drive and/or work safely, and potential for dependence, respiratory depression and overdose. Patient is also informed that the medication are to be used by the patient only- avoid any combined use of ETOH or other substances unless prescribed.   UDS Ordered.     [facility] Patient Controlled Substance Report (from 10/11/2023 to 10/4/2024)    Dispensed  Strength Quantity Days Supply Provider Pharmacy   09/23/2024 Lorazepam 1MG 60 each 30 CLOUD,MIKHAIL [facility]   08/20/2024 Lorazepam 1MG 60 each 30 CLOUD,MIKHAIL [facility]   07/01/2024 Lorazepam 1MG 90 each 45 LIAN,JUAN [facility]   04/30/2024 Lorazepam 1MG 90 each 45 LIAN,JUAN [facility]   03/04/2024 Lorazepam 1MG 90 each 45 LIAN,JUAN [facility]   12/12/2023 Lorazepam 1MG 90 each 45 LIAN,JUAN [facility]         Disclaimer    *The information in this report is based upon Schedule [place] through V controlled substance records reported by dispensers. Data should appear on [facility] reports within two to three  business days after dispensing.   *The records listed in the report are based on the patient identification information entered by the report requestor, and if not sufficiently unique may result in the report including records for multiple patients. Please verify the information in the report by contacting the prescribers and/or dispensers listed.   *If the controlled substance records on this report appear to be in error, the patient or provider should contact the dispenser to determine if the information was reported accurately. If the dispenser certifies the information was reported accurately, the dispenser can contact the Drug Enforcement and Professional Practices Branch at [phone] to investigate the error.   *The information in this report is intended for informational use only by the person authorized to request the report. Intentional disclosure of the report or data to someone not authorized to obtain the data is a Class B Misdemeanor.      Report Restrictions - A practitioner or pharmacist may share the report with the patient or person authorized to act on the patient's behalf and place the report in the patient's medical record, with the report then being deemed a medical record subject to the same disclosure terms and conditions as an ordinary medical record. ([place]RS 218[place]202)     Patient was instructed on medication side effects, benefits, and also of no treatment.  Patient was given an explanation regarding potential for increased risk of diabetes, lipids, and weight gain.  Labs will be assessed as clinically indicated.  Diet was discussed especially healthy diet choices and increasing activity and exercise.  Patient was strongly urged to continue weight maintenance or weight loss efforts.  Patient reported verbalized understanding of instructions.   Patient is agreeable to call the office with any questions, concerns, or worsening of symptoms. Patient is aware to call 911 or go to the nearest ER  should begin having any thoughts to harm self or others.           Follow up in four to six weeks          Errors in dictation may reflect use of voice recognition software and not all errors in transcription may have been detected prior to signing.              This document has been electronically signed by NATALIE Pringle   October 10, 2024 16:57 EDT

## 2024-10-04 ENCOUNTER — TELEPHONE (OUTPATIENT)
Dept: FAMILY MEDICINE CLINIC | Facility: CLINIC | Age: 64
End: 2024-10-04

## 2024-10-04 NOTE — TELEPHONE ENCOUNTER
Caller: Lisa Hill    Relationship: Self    Best call back number: [phone]     What is the best time to reach you: ANYTIME    Who are you requesting to speak with (clinical staff, provider,  specific staff member): REFERRAL COORDINATOR      What was the call regarding: PATIENT WAS REFERRED TO PODIATRY AND WOULD LIKE TO KNOW WHO SHE WAS REFERRED TO, SHE NEEDS TO RESCHEDULE AN APPOINTMENT. SHE KNOWS IT IS AT THE [facility]. PLEASE CALL BACK WITH THE INFORMATION.

## 2024-10-13 DIAGNOSIS — E55.9 VITAMIN D DEFICIENCY: ICD-10-CM

## 2024-10-13 DIAGNOSIS — I10 ESSENTIAL HYPERTENSION: ICD-10-CM

## 2024-10-13 DIAGNOSIS — E11.42 TYPE 2 DIABETES MELLITUS WITH PERIPHERAL NEUROPATHY: ICD-10-CM

## 2024-10-13 DIAGNOSIS — E03.9 ACQUIRED HYPOTHYROIDISM: ICD-10-CM

## 2024-10-14 ENCOUNTER — OFFICE VISIT (OUTPATIENT)
Dept: FAMILY MEDICINE CLINIC | Facility: CLINIC | Age: 64
End: 2024-10-14
Payer: MEDICARE

## 2024-10-14 VITALS
HEART RATE: 96 BPM | WEIGHT: 183 LBS | DIASTOLIC BLOOD PRESSURE: 64 MMHG | RESPIRATION RATE: 15 BRPM | TEMPERATURE: 97.7 F | SYSTOLIC BLOOD PRESSURE: 118 MMHG | HEIGHT: 62 IN | BODY MASS INDEX: 33.68 KG/M2 | OXYGEN SATURATION: 96 %

## 2024-10-14 DIAGNOSIS — F17.200 SMOKER: ICD-10-CM

## 2024-10-14 DIAGNOSIS — J30.9 CHRONIC ALLERGIC RHINITIS: Primary | ICD-10-CM

## 2024-10-14 DIAGNOSIS — K59.09 CHRONIC CONSTIPATION: ICD-10-CM

## 2024-10-14 DIAGNOSIS — I25.10 CORONARY ARTERY CALCIFICATION SEEN ON CT SCAN: ICD-10-CM

## 2024-10-14 DIAGNOSIS — M85.80 OSTEOPENIA, UNSPECIFIED LOCATION: ICD-10-CM

## 2024-10-14 DIAGNOSIS — M47.816 SPONDYLOSIS OF LUMBAR REGION WITHOUT MYELOPATHY OR RADICULOPATHY: ICD-10-CM

## 2024-10-14 DIAGNOSIS — F41.8 DEPRESSION WITH ANXIETY: ICD-10-CM

## 2024-10-14 DIAGNOSIS — J43.1 PANLOBULAR EMPHYSEMA: ICD-10-CM

## 2024-10-14 DIAGNOSIS — E66.811 CLASS 1 OBESITY WITH SERIOUS COMORBIDITY AND BODY MASS INDEX (BMI) OF 34.0 TO 34.9 IN ADULT, UNSPECIFIED OBESITY TYPE: ICD-10-CM

## 2024-10-14 DIAGNOSIS — Z00.00 HEALTHCARE MAINTENANCE: ICD-10-CM

## 2024-10-14 DIAGNOSIS — E55.9 VITAMIN D DEFICIENCY: ICD-10-CM

## 2024-10-14 DIAGNOSIS — M06.9 RHEUMATOID ARTHRITIS INVOLVING MULTIPLE SITES, UNSPECIFIED WHETHER RHEUMATOID FACTOR PRESENT: ICD-10-CM

## 2024-10-14 DIAGNOSIS — Z23 ENCOUNTER FOR IMMUNIZATION: ICD-10-CM

## 2024-10-14 DIAGNOSIS — E11.42 TYPE 2 DIABETES MELLITUS WITH PERIPHERAL NEUROPATHY: ICD-10-CM

## 2024-10-14 DIAGNOSIS — N39.3 STRESS BLADDER INCONTINENCE, FEMALE: ICD-10-CM

## 2024-10-14 DIAGNOSIS — I10 ESSENTIAL HYPERTENSION: ICD-10-CM

## 2024-10-14 DIAGNOSIS — K21.9 GASTROESOPHAGEAL REFLUX DISEASE WITHOUT ESOPHAGITIS: ICD-10-CM

## 2024-10-14 DIAGNOSIS — E06.3 HYPOTHYROIDISM DUE TO HASHIMOTO'S THYROIDITIS: ICD-10-CM

## 2024-10-14 DIAGNOSIS — I70.0 AORTIC ATHEROSCLEROSIS: ICD-10-CM

## 2024-10-14 DIAGNOSIS — G47.33 OBSTRUCTIVE SLEEP APNEA: ICD-10-CM

## 2024-10-14 DIAGNOSIS — Z12.31 ENCOUNTER FOR SCREENING MAMMOGRAM FOR BREAST CANCER: ICD-10-CM

## 2024-10-14 DIAGNOSIS — E78.2 MIXED HYPERLIPIDEMIA: ICD-10-CM

## 2024-10-14 DIAGNOSIS — M15.9 GENERALIZED OSTEOARTHRITIS: ICD-10-CM

## 2024-10-14 PROBLEM — E66.9 OBESITY (BMI 30-39.9): Status: RESOLVED | Noted: 2024-07-05 | Resolved: 2024-10-14

## 2024-10-14 PROCEDURE — 1125F AMNT PAIN NOTED PAIN PRSNT: CPT | Performed by: GENERAL PRACTICE

## 2024-10-14 PROCEDURE — G0439 PPPS, SUBSEQ VISIT: HCPCS | Performed by: GENERAL PRACTICE

## 2024-10-14 PROCEDURE — 90662 IIV NO PRSV INCREASED AG IM: CPT | Performed by: GENERAL PRACTICE

## 2024-10-14 PROCEDURE — G0008 ADMIN INFLUENZA VIRUS VAC: HCPCS | Performed by: GENERAL PRACTICE

## 2024-10-14 PROCEDURE — 3044F HG A1C LEVEL LT 7.0%: CPT | Performed by: GENERAL PRACTICE

## 2024-10-14 PROCEDURE — 3074F SYST BP LT 130 MM HG: CPT | Performed by: GENERAL PRACTICE

## 2024-10-14 PROCEDURE — 99214 OFFICE O/P EST MOD 30 MIN: CPT | Performed by: GENERAL PRACTICE

## 2024-10-14 PROCEDURE — 3078F DIAST BP <80 MM HG: CPT | Performed by: GENERAL PRACTICE

## 2024-10-14 PROCEDURE — 1170F FXNL STATUS ASSESSED: CPT | Performed by: GENERAL PRACTICE

## 2024-10-14 RX ORDER — BLOOD SUGAR DIAGNOSTIC
STRIP MISCELLANEOUS
Qty: 50 EACH | Refills: 5 | Status: SHIPPED | OUTPATIENT
Start: 2024-10-14

## 2024-10-14 RX ORDER — LEVOTHYROXINE SODIUM 175 UG/1
175 TABLET ORAL DAILY
Qty: 30 TABLET | Refills: 5 | Status: SHIPPED | OUTPATIENT
Start: 2024-10-14 | End: 2024-10-14 | Stop reason: SDUPTHER

## 2024-10-14 RX ORDER — LEVOTHYROXINE SODIUM 175 UG/1
175 TABLET ORAL DAILY
Qty: 30 TABLET | Refills: 5 | Status: SHIPPED | OUTPATIENT
Start: 2024-10-14

## 2024-10-14 RX ORDER — LOSARTAN POTASSIUM 50 MG/1
50 TABLET ORAL DAILY
Qty: 30 TABLET | Refills: 5 | Status: SHIPPED | OUTPATIENT
Start: 2024-10-14

## 2024-10-14 RX ORDER — PANTOPRAZOLE SODIUM 40 MG/1
40 TABLET, DELAYED RELEASE ORAL DAILY
Qty: 30 TABLET | Refills: 5 | Status: SHIPPED | OUTPATIENT
Start: 2024-10-14

## 2024-10-14 RX ORDER — LOSARTAN POTASSIUM 50 MG/1
50 TABLET ORAL DAILY
Qty: 30 TABLET | Refills: 5 | Status: SHIPPED | OUTPATIENT
Start: 2024-10-14 | End: 2024-10-14 | Stop reason: SDUPTHER

## 2024-10-14 RX ORDER — SIMVASTATIN 20 MG
20 TABLET ORAL
Qty: 30 TABLET | Refills: 5 | Status: SHIPPED | OUTPATIENT
Start: 2024-10-14

## 2024-10-14 RX ORDER — METFORMIN HCL 500 MG
2000 TABLET, EXTENDED RELEASE 24 HR ORAL DAILY
Qty: 120 TABLET | Refills: 5 | OUTPATIENT
Start: 2024-10-14

## 2024-10-14 RX ORDER — CHOLECALCIFEROL (VITAMIN D3) 25 MCG
2000 TABLET ORAL DAILY
Qty: 60 TABLET | Refills: 5 | Status: SHIPPED | OUTPATIENT
Start: 2024-10-14

## 2024-10-14 RX ORDER — ASPIRIN 81 MG/1
81 TABLET ORAL DAILY
Qty: 30 TABLET | Refills: 5 | Status: SHIPPED | OUTPATIENT
Start: 2024-10-14

## 2024-10-14 RX ORDER — LANCETS 33 GAUGE
1 EACH MISCELLANEOUS 3 TIMES DAILY
Qty: 100 EACH | Refills: 5 | Status: SHIPPED | OUTPATIENT
Start: 2024-10-14

## 2024-10-14 RX ORDER — LANOLIN ALCOHOL/MO/W.PET/CERES
1000 CREAM (GRAM) TOPICAL DAILY
Qty: 30 TABLET | Refills: 5 | Status: SHIPPED | OUTPATIENT
Start: 2024-10-14

## 2024-10-14 RX ORDER — PSEUDOEPHED/ACETAMINOPH/DIPHEN 30MG-500MG
2 TABLET ORAL 4 TIMES DAILY PRN
Qty: 240 TABLET | Refills: 5 | Status: SHIPPED | OUTPATIENT
Start: 2024-10-14

## 2024-10-14 RX ORDER — SEMAGLUTIDE 1.34 MG/ML
1 INJECTION, SOLUTION SUBCUTANEOUS WEEKLY
Qty: 3 ML | Refills: 5 | Status: SHIPPED | OUTPATIENT
Start: 2024-10-14

## 2024-10-14 RX ORDER — FLUTICASONE PROPIONATE 50 UG/1
2 SPRAY, METERED NASAL DAILY
Qty: 16 G | Refills: 5 | Status: SHIPPED | OUTPATIENT
Start: 2024-10-14

## 2024-10-14 RX ORDER — CHOLECALCIFEROL (VITAMIN D3) 25 MCG
2000 TABLET ORAL DAILY
Qty: 60 TABLET | Refills: 5 | Status: SHIPPED | OUTPATIENT
Start: 2024-10-14 | End: 2024-10-14 | Stop reason: SDUPTHER

## 2024-10-14 RX ORDER — MELOXICAM 15 MG/1
15 TABLET ORAL DAILY
Qty: 30 TABLET | Refills: 5 | Status: SHIPPED | OUTPATIENT
Start: 2024-10-14

## 2024-10-14 NOTE — ASSESSMENT & PLAN NOTE
COPD is stable.  Reminded of the importance of smoking cessation  Encouraged to remain as active as symptoms allow for  Continue current medication  Follow up with pulmonology

## 2024-10-14 NOTE — ASSESSMENT & PLAN NOTE
Flu shot administered.  Recommended RSV and COVID-19 shots this fall  We will arrange an updated mammogram and DEXA scan

## 2024-10-14 NOTE — ASSESSMENT & PLAN NOTE
Reminded regarding symptomatic treatment.   Will continue current treatment.  Encouraged to report if any worse or if any new symptoms or concerns

## 2024-10-14 NOTE — PROGRESS NOTES
Subjective   The ABCs of the Annual Wellness Visit  Medicare Wellness Visit    Lisa Hill is a 64 y.o. patient who presents for a Medicare Wellness Visit.    The following portions of the patient's history were reviewed and   updated as appropriate: allergies, current medications, past family history, past medical history, past social history, past surgical history, and problem list.    Compared to one year ago, the patient's physical   health is the same.  Compared to one year ago, the patient's mental   health is the same.    Recent Hospitalizations:  She was not admitted to the hospital during the last year.     Current Medical Providers:  Patient Care Team:  Scott Chavez MD as PCP - General  Scott Chavez MD as PCP - Family Medicine  Evelyne Bedoya APRN as Nurse Practitioner (Nurse Practitioner)  Katarina Wan PA-C as Physician Assistant (Pulmonary Disease)    Outpatient Medications Prior to Visit   Medication Sig Dispense Refill    albuterol sulfate  (90 Base) MCG/ACT inhaler INHALE TWO PUFFS BY MOUTH EVERY 4 HOURS AS NEEDED FOR WHEEZING OR SHORTNESS OF BREATH 18 g 8    Breztri Aerosphere 160-9-4.8 MCG/ACT aerosol inhaler INHALE TWO PUFFS BY MOUTH TWICE DAILY 10.7 g 8    Dextromethorphan-buPROPion ER (Auvelity)  MG tablet controlled-release Take 1 tablet by mouth 2 (Two) Times a Day. 60 tablet 0    escitalopram (LEXAPRO) 20 MG tablet Take 1 tablet by mouth Daily. 30 tablet 0    ipratropium-albuterol (DUO-NEB) 0.5-2.5 mg/3 ml nebulizer Take 3 mL by nebulization 4 (Four) Times a Day As Needed for Wheezing or Shortness of Air. 360 mL 5    LORazepam (ATIVAN) 1 MG tablet Take 0.5-1 tablets by mouth 2 (Two) Times a Day As Needed for Anxiety. 60 tablet 0    montelukast (SINGULAIR) 10 MG tablet Take 1 tablet by mouth Daily for 360 days. 90 tablet 3    Mucus Relief 600 MG 12 hr tablet TAKE ONE TABLET BY MOUTH TWICE DAILY AS NEEDED FOR COUGH OR CONGESTION FOR UP TO 30  DAYS 60 tablet 6    risperiDONE (risperDAL) 3 MG tablet Take 1 tablet by mouth every night at bedtime. 30 tablet 0    roflumilast (Daliresp) 500 MCG tablet tablet Take 1 tablet by mouth Daily. 90 tablet 3    Acetaminophen Extra Strength 500 MG tablet TAKE TWO TABLETS BY MOUTH FOUR TIMES DAILY 180 tablet 5    aspirin 81 MG EC tablet Take 1 tablet by mouth Daily. 30 tablet 5    cholecalciferol (VITAMIN D3) 25 MCG (1000 UT) tablet TAKE TWO TABLETS BY MOUTH EVERY DAY 60 tablet 5    Continuous Blood Gluc  (Dexcom G6 ) device 1 Device Daily. 1 each 0    Continuous Blood Gluc Sensor (Dexcom G6 Sensor) Every 10 (Ten) Days. 9 each 3    Empagliflozin-metFORMIN HCl 5-500 MG tablet Take 1 tablet by mouth Daily. 30 tablet 5    fluticasone (FLONASE) 50 MCG/ACT nasal spray 2 sprays by Each Nare route Daily. 16 g 5    glucose blood (OneTouch Verio) test strip USE TO TEST BLOOD GLUCOSE DAILY 50 each 5    Lancets (OneTouch Delica Plus Togluh78X) misc USE TO CHECK BLOOD SUGAR THREE TIMES A  each 5    levothyroxine (SYNTHROID, LEVOTHROID) 175 MCG tablet TAKE ONE TABLET BY MOUTH EVERY DAY 30 tablet 5    losartan (COZAAR) 50 MG tablet TAKE ONE TABLET BY MOUTH EVERY DAY 30 tablet 5    meloxicam (MOBIC) 15 MG tablet TAKE ONE TABLET BY MOUTH DAILY 30 tablet 5    pantoprazole (PROTONIX) 40 MG EC tablet Take 1 tablet by mouth Daily. 30 tablet 5    Semaglutide, 1 MG/DOSE, (Ozempic, 1 MG/DOSE,) 4 MG/3ML solution pen-injector Inject 1 mg under the skin into the appropriate area as directed 1 (One) Time Per Week. 3 mL 5    simvastatin (ZOCOR) 20 MG tablet Take 1 tablet by mouth every night at bedtime. 30 tablet 5    vitamin B-12 (CYANOCOBALAMIN) 1000 MCG tablet TAKE ONE TABLET BY MOUTH EVERY DAY 30 tablet 5     No facility-administered medications prior to visit.     No opioid medication identified on active medication list. I have reviewed chart for other potential  high risk medication/s and harmful drug interactions in  "the elderly.      Aspirin is on active medication list. Aspirin use is indicated based on review of current medical condition/s. Pros and cons of this therapy have been discussed today. Benefits of this medication outweigh potential harm.  Patient has been encouraged to continue taking this medication.  .    Patient Active Problem List   Diagnosis    Vitamin D deficiency    Hypothyroidism due to Hashimoto's thyroiditis    Mixed hyperlipidemia    Panlobular emphysema    Osteopenia    Depression with anxiety    Gastroesophageal reflux disease without esophagitis    Generalized osteoarthritis    Chronic allergic rhinitis    Stress bladder incontinence, female    Obstructive sleep apnea    Encounter for immunization    Chronic constipation    Healthcare maintenance    Macrocytosis    Encounter for screening mammogram for breast cancer    Somatization disorder    Essential hypertension    Mechanical low back pain    Nocturnal hypoxia    Class 1 obesity with serious comorbidity and body mass index (BMI) of 34.0 to 34.9 in adult    Pulmonary nodule    Smoker    Coronary artery calcification seen on CT scan    Type 2 diabetes mellitus with peripheral neuropathy    Rheumatoid arthritis    Metatarsalgia of both feet    Aortic atherosclerosis    Myopathy     Advance Care Planning Advance Directive is not on file.  ACP discussion was held with the patient during this visit. Patient does not have an advance directive, information provided.      Objective   Vitals:    10/14/24 1049   BP: 118/64   Pulse: 96   Resp: 15   Temp: 97.7 °F (36.5 °C)   TempSrc: Temporal   SpO2: 96%   Weight: 83 kg (183 lb)   Height: 157.5 cm (62\")     Estimated body mass index is 33.47 kg/m² as calculated from the following:    Height as of this encounter: 157.5 cm (62\").    Weight as of this encounter: 83 kg (183 lb).     Does the patient have evidence of cognitive impairment? No                                                                            "                   Health  Risk Assessment    Smoking Status:  Social History     Tobacco Use   Smoking Status Every Day    Current packs/day: 1.00    Average packs/day: 1 pack/day for 45.0 years (45.0 ttl pk-yrs)    Types: Cigarettes    Passive exposure: Current   Smokeless Tobacco Never   Tobacco Comments    cessation has been discussed     Alcohol Consumption:  Social History     Substance and Sexual Activity   Alcohol Use No     Fall Risk Screen  STEADI Fall Risk Assessment was completed, and patient is at LOW risk for falls.Assessment completed on:10/14/2024    Depression Screening:      10/14/2024    10:51 AM   PHQ-2/PHQ-9 Depression Screening   Little interest or pleasure in doing things Almost all   Feeling down, depressed, or hopeless Almost all   Trouble falling or staying asleep, or sleeping too much Not at all   Feeling tired or having little energy Almost all   Poor appetite or overeating Not at all   Feeling bad about yourself - or that you are a failure or have let yourself or your family down Not at all   Trouble concentrating on things, such as reading the newspaper or watching television Almost all   Moving or speaking so slowly that other people could have noticed? Or the opposite - being so fidgety or restless that you have been moving around a lot more than usual. Not at all   Thoughts that you would be better off dead or hurting yourself in some way Not at all   Patient Health Questionnaire-9 Score 12   How difficult have these problems made it for you to do your work, take care of things at home, or get along with other people? Not difficult at all     Health Habits and Functional and Cognitive Screening:      10/14/2024    10:50 AM   Functional & Cognitive Status   Do you have difficulty preparing food and eating? No   Do you have difficulty bathing yourself, getting dressed or grooming yourself? No   Do you have difficulty using the toilet? No   Do you have difficulty moving around from place  to place? No   Do you have trouble with steps or getting out of a bed or a chair? No   Current Diet Well Balanced Diet   Dental Exam Up to date   Eye Exam Up to date   Exercise (times per week) 0 times per week   Current Exercises Include No Regular Exercise   Do you need help using the phone?  No   Are you deaf or do you have serious difficulty hearing?  No   Do you need help to go to places out of walking distance? No   Do you need help shopping? No   Do you need help preparing meals?  No   Do you need help with housework?  No   Do you need help with laundry? No   Do you need help taking your medications? No   Do you need help managing money? No   Do you ever drive or ride in a car without wearing a seat belt? No   Have you felt unusual stress, anger or loneliness in the last month? Yes   Who do you live with? Spouse   If you need help, do you have trouble finding someone available to you? Yes   Have you been bothered in the last four weeks by sexual problems? No   Do you have difficulty concentrating, remembering or making decisions? Yes           Age-appropriate Screening Schedule:  Refer to the list below for future screening recommendations based on patient's age, sex and/or medical conditions. Orders for these recommended tests are listed in the plan section. The patient has been provided with a written plan.    Health Maintenance List  Health Maintenance   Topic Date Due    DIABETIC EYE EXAM  Never done    DIABETIC FOOT EXAM  Never done    DXA SCAN  12/06/2023    COVID-19 Vaccine (5 - 2023-24 season) 09/01/2024    HEMOGLOBIN A1C  12/18/2024    URINE MICROALBUMIN  03/11/2025    LUNG CANCER SCREENING  04/11/2025    LIPID PANEL  06/18/2025    BMI FOLLOWUP  07/10/2025    ANNUAL WELLNESS VISIT  10/14/2025    MAMMOGRAM  12/11/2025    TDAP/TD VACCINES (2 - Td or Tdap) 12/09/2026    COLORECTAL CANCER SCREENING  09/14/2028    HEPATITIS C SCREENING  Completed    INFLUENZA VACCINE  Completed    Pneumococcal Vaccine 65+   Completed    ZOSTER VACCINE  Completed                                                                                                                                              CMS Preventative Services Quick Reference  Risk Factors Identified During Encounter  Chronic Pain: Natural history and expected course discussed. Questions answered.  Depression/Dysphoria: Current medication is effective, no change recommended  Immunizations Discussed/Encouraged: Influenza, COVID19, and RSV (Respiratory Syncytial Virus)  Tobacco Use/Dependance Risk (use dotphrase .tobaccocessation for documentation)    The above risks/problems have been discussed with the patient.  Pertinent information has been shared with the patient in the After Visit Summary.  An After Visit Summary and PPPS were made available to the patient.    Follow Up:   Next Medicare Wellness visit to be scheduled in 1 year.     Additional E&M Note during same encounter follows:  Patient has additional, significant, and separately identifiable condition(s)/problem(s) that require work above and beyond the Medicare Wellness Visit     Chief Complaint  She returns for a scheduled reassessment of multiple medical problems including chronic back and joint pain, chronic obstructive pulmonary disease, type 2 diabetes mellitus, hyperlipidemia, essential hypertension, hypothyroidism, and depression with anxiety    Subjective     HPI  Lisa is also being seen today for additional medical problem/s.    Chronic Back and Joint Pain  She complains of persistent low back and generalized joint pain. There has been no change in the quality of her discomfort, and apart from paresthesias of the feet, she continues to deny any associated symptoms. There is no history of any weakness, or any change in her bowel/bladder control.  There is no history of any fever, chills, or night sweats.  When she sits down the pain generally resolves within minutes and she continues to deny any  problem at night. She continues to use her TENS unit for several hours daily and feels that it helps.  She also feels that meloxicam helps.  Plain films of the lumbar spine performed on 8/5/2019 were reported as showing degenerative disc disease as well as atherosclerotic calcification of the aorta    COPD  Her shortness of breath and cough have have improved some with weight loss. She admits to intermittent nasal congestion, but continues to deny any other upper respiratory tract symptoms, chest pain, hemoptysis, fever or chills.  Her symptoms worsen with activity, exposure to cold air and environmental allergens and improve some with rest, supplemental oxygen, and inhaled inhaled medication.  She is currently on daliresp and breztri and is using her rescue inhaler once or twice daily. She continues to smoke 1 pack per day on average.  She underwent an updated low dose CT of the chest on 4/11/2024 with evidence of emphysema, granulomatous changes with calcified hilar mediastinal lymph nodes, atherosclerosis of the thoracic aorta, and degenerative changes of the thoracic spine.  She was to undergo a pulmonology reassessment with Katarina Wan PA-C on on 9/4/2024 but missed this and has yet to reschedule    Type 2 Diabetes Mellitus  She admits to paresthesias of the feet.  There is no history of any visual disturbances, polydipsia, polyuria, hypoglycemia or foot ulcerations.  She remains on metformin, empagliflozin, and semaglutide.  She is currently on 1 mg of the latter weekly with no apparent side effects    Hyperlipidemia  Her compliance with treatment has been fair.  She has been following her diet closer and with the weight that she has lost has been able to tolerate more activity. She remains on simvastatin with no apparent side effects    Essential Hypertension  She admits to shortness of breath with intermittent lower extremity swelling but continues to deny any chest pain, orthopnea, PND, palpitations, or  lightheadedness.  She is taking losartan with no apparent side effects    Hypothyroidism  She has a history of hypothyroidism associated with Hashimoto's disease.  She remains on levothyroxine 175 daily     Depression  She has a long history of intermittent depression, nervousness, anhedonia, difficulty concentrating, fatigue and impaired memory.  Since last here she has experienced increased anxiety. She continues to deny any suicidal ideation. Risk factors: history of seasonal affective disorder.  She is currently on escitalopram 20 daily, dextromethorphan-bupropion 45-1 05 twice daily, risperdone 3 nightly, and lorazepam 1 twice a day.  She is scheduled to undergo a psychiatric reassessment with Evelyne ESTRADA on 11/1/2024    Review of Systems   Constitutional:  Positive for fatigue. Negative for appetite change, chills, diaphoresis and fever.   HENT:  Positive for hearing loss and rhinorrhea. Negative for congestion, ear pain, postnasal drip, sinus pressure, sneezing, sore throat, tinnitus and voice change.    Eyes:  Negative for visual disturbance.   Respiratory:  Positive for cough and shortness of breath. Negative for wheezing.    Cardiovascular:  Positive for leg swelling. Negative for chest pain and palpitations.   Gastrointestinal:  Positive for constipation. Negative for abdominal pain, blood in stool, diarrhea, nausea and vomiting.   Endocrine: Negative for polydipsia and polyuria.   Genitourinary:  Negative for dysuria, frequency, hematuria and urgency.        Incontinence with coughing, sneezing, lifting etc.   Musculoskeletal:  Positive for arthralgias and back pain. Negative for joint swelling and myalgias.   Skin:  Negative for rash.   Neurological:  Positive for numbness. Negative for weakness and confusion.   Psychiatric/Behavioral:  Positive for decreased concentration. Negative for dysphoric mood, sleep disturbance and suicidal ideas. The patient is nervous/anxious.       Objective   Vital  "Signs:  /64   Pulse 96   Temp 97.7 °F (36.5 °C) (Temporal)   Resp 15   Ht 157.5 cm (62\")   Wt 83 kg (183 lb)   SpO2 96%   BMI 33.47 kg/m²     Physical Exam  Constitutional:       General: She is not in acute distress.     Appearance: Normal appearance. She is well-developed. She is not diaphoretic.      Comments: Accompanied by her .  Bright and in fair spirits.  Climbed onto the exam table with minimal assistance.  No apparent distress.   HENT:      Head: Atraumatic.      Right Ear: Tympanic membrane, ear canal and external ear normal. Decreased hearing noted.      Left Ear: Tympanic membrane, ear canal and external ear normal. Decreased hearing noted.      Mouth/Throat:      Lips: No lesions.      Mouth: Mucous membranes are moist. No oral lesions.      Pharynx: No oropharyngeal exudate or posterior oropharyngeal erythema.   Eyes:      General: Lids are normal.      Extraocular Movements: Extraocular movements intact.      Conjunctiva/sclera: Conjunctivae normal.      Pupils: Pupils are equal.   Neck:      Thyroid: No thyroid mass or thyromegaly.      Vascular: No carotid bruit or JVD.      Trachea: Trachea normal. No tracheal deviation.   Cardiovascular:      Rate and Rhythm: Normal rate and regular rhythm.      Pulses:           Dorsalis pedis pulses are 2+ on the right side and 2+ on the left side.        Posterior tibial pulses are 2+ on the right side and 2+ on the left side.      Heart sounds: Normal heart sounds, S1 normal and S2 normal. No murmur heard.     No gallop.   Pulmonary:      Effort: Pulmonary effort is normal.      Breath sounds: Decreased air movement present. Examination of the right-lower field reveals decreased breath sounds. Examination of the left-lower field reveals decreased breath sounds. Decreased breath sounds and wheezing (diffuse - mild) present. No rales.      Comments: Pulmonary hyperinflation  Abdominal:      General: Bowel sounds are normal. There is no " distension or abdominal bruit.      Palpations: Abdomen is soft. There is no hepatomegaly, splenomegaly or mass.      Tenderness: There is no abdominal tenderness.      Hernia: No hernia is present.   Musculoskeletal:      Right lower leg: No edema.      Left lower leg: No edema.   Lymphadenopathy:      Head:      Right side of head: No submental, submandibular, tonsillar, preauricular, posterior auricular or occipital adenopathy.      Left side of head: No submental, submandibular, tonsillar, preauricular, posterior auricular or occipital adenopathy.      Cervical: No cervical adenopathy.      Upper Body:      Right upper body: No supraclavicular adenopathy.      Left upper body: No supraclavicular adenopathy.   Skin:     General: Skin is warm.      Coloration: Skin is not cyanotic, jaundiced or pale.      Findings: No rash.      Nails: There is no clubbing.   Neurological:      Mental Status: She is alert and oriented to person, place, and time.      Cranial Nerves: No cranial nerve deficit, dysarthria or facial asymmetry.      Sensory: Sensory deficit (decreased vibration sense both feet) present.      Motor: No tremor.      Coordination: Coordination normal.      Gait: Gait normal.   Psychiatric:         Attention and Perception: Attention normal.         Mood and Affect: Mood normal.         Speech: Speech normal.         Behavior: Behavior normal.         Thought Content: Thought content normal.           Assessment and Plan   Additional age appropriate preventative wellness advice topics were discussed during today's preventative wellness exam(some topics already addressed during AWV portion of the note above):    Physical Activity: Advised cardiovascular activity 150 minutes per week as tolerated. (example brisk walk for 30 minutes, 5 days a week).     Nutrition: Discussed nutrition plan with patient. Information shared in after visit summary. Goal is for a well balanced diet to enhance overall health.      Healthy Weight: Discussed current and goal BMI with patient. Steps to attain this goal discussed. Information shared in after visit summary.     Tobacco Misuse Discussion: Information shared in after visit summary.       Chronic allergic rhinitis  Continue current medication  Encouraged to report if any worse or if any new symptoms or concerns.  Mixed hyperlipidemia   Encouraged to continue to work on her diet and exercise plan.  Continue current medication  Essential hypertension  As above.   Continue current medication.  Coronary artery calcification seen on CT scan  Reminded regarding risk factor modification with an emphasis on tobacco cessation.  Continue low-dose ASA.  Aortic atherosclerosis  As above.  Vitamin D deficiency    Type 2 diabetes mellitus with peripheral neuropathy  Diabetes mellitus Type II, under excellent control.   Encouraged to continue to pursue ADA diet  Encouraged aerobic exercise.  Continue current medication  Updated labs will be drawn at her return.  Hypothyroidism due to Hashimoto's thyroiditis  Clinically euthyroid.  Continue current medication.  Class 1 obesity with serious comorbidity and body mass index (BMI) of 34.0 to 34.9 in adult, unspecified obesity type    Gastroesophageal reflux disease without esophagitis  Reminded regarding lifestyle modification  Continue current medication  Chronic constipation  As above.   Continue current medication  Stress bladder incontinence, female    Healthcare maintenance  Flu shot administered.  Recommended RSV and COVID-19 shots this fall  We will arrange an updated mammogram and DEXA scan  Depression with anxiety  Stable.  Supportive therapy.   Continue current medication.  Follow up with psychiatry  Rheumatoid arthritis involving multiple sites, unspecified whether rheumatoid factor present    Osteopenia, unspecified location  Encouraged to continue to pursue weight bearing activities while exercising joint protection.  As  above.  Generalized osteoarthritis  Reminded regarding symptomatic treatment.   Will continue current treatment.  Encouraged to report if any worse or if any new symptoms or concerns  Panlobular emphysema   COPD is stable.  Reminded of the importance of smoking cessation  Encouraged to remain as active as symptoms allow for  Continue current medication  Follow up with pulmonology   Obstructive sleep apnea  Follow up with pulmonology   Smoker  Lengthy discussion regarding the potential sequela of continued tobacco use and the options with respect to cessation.  Patient uninterested in pursuing at present but will consider.  Encounter for immunization    Spondylosis of lumbar region without myelopathy or radiculopathy    Encounter for screening mammogram for breast cancer      Orders Placed This Encounter   Procedures    Mammo Screening Digital Tomosynthesis Bilateral With CAD     Standing Status:   Future     Standing Expiration Date:   10/14/2025     Scheduling Instructions:      Please schedule first thing one morning after December 11     Order Specific Question:   Reason for Exam:     Answer:   screening     Order Specific Question:   Release to patient     Answer:   Routine Release [[id number]]    DEXA Bone Density Axial     Standing Status:   Future     Standing Expiration Date:   10/14/2025     Scheduling Instructions:      Please schedule for the same day as her mammogram     Order Specific Question:   Release to patient     Answer:   Routine Release [[id number]]     Order Specific Question:   Reason for Exam:     Answer:   FU osteopenia     Order Specific Question:   Does this patient have a diabetic monitoring/medication delivering device on?     Answer:   No     Order Specific Question:   Is patient taking or have taken long term Glucocorticoid (steroids)?     Answer:   No     Order Specific Question:   Does the patient have rheumatoid arthritis?     Answer:   Yes     Order Specific Question:   Does the  patient have secondary osteoporosis?     Answer:   No    Fluzone High-Dose 65+yrs     New Medications Ordered This Visit   Medications    vitamin B-12 (CYANOCOBALAMIN) 1000 MCG tablet     Sig: Take 1 tablet by mouth Daily.     Dispense:  30 tablet     Refill:  5     This prescription was filled on 6/3/2024. Any refills authorized will be placed on file.    simvastatin (ZOCOR) 20 MG tablet     Sig: Take 1 tablet by mouth every night at bedtime.     Dispense:  30 tablet     Refill:  5     This prescription was filled on 2024. Any refills authorized will be placed on file.    pantoprazole (PROTONIX) 40 MG EC tablet     Sig: Take 1 tablet by mouth Daily.     Dispense:  30 tablet     Refill:  5     This prescription was filled on 2024. Any refills authorized will be placed on file.    meloxicam (MOBIC) 15 MG tablet     Sig: Take 1 tablet by mouth Daily.     Dispense:  30 tablet     Refill:  5     This prescription was filled on 2024. Any refills authorized will be placed on file.    losartan (COZAAR) 50 MG tablet     Sig: Take 1 tablet by mouth Daily.     Dispense:  30 tablet     Refill:  5     This prescription was filled on 2024. Any refills authorized will be placed on file.    levothyroxine (SYNTHROID, LEVOTHROID) 175 MCG tablet     Sig: Take 1 tablet by mouth Daily.     Dispense:  30 tablet     Refill:  5     This prescription was filled on 2024. Any refills authorized will be placed on file.    Lancets (OneTouch Delica Plus [id number]) misc     Si each by Other route 3 (Three) Times a Day.     Dispense:  100 each     Refill:  5    glucose blood (OneTouch Verio) test strip     Sig: Use as instructed     Dispense:  50 each     Refill:  5    fluticasone (FLONASE) 50 MCG/ACT nasal spray     Si sprays by Each Nare route Daily.     Dispense:  16 g     Refill:  5     This prescription was filled on 2022. Any refills authorized will be placed on file.    Empagliflozin-metFORMIN HCl 5-500  MG tablet     Sig: Take 1 tablet by mouth Daily.     Dispense:  30 tablet     Refill:  5    cholecalciferol (VITAMIN D3) 25 MCG (1000 UT) tablet     Sig: Take 2 tablets by mouth Daily.     Dispense:  60 tablet     Refill:  5     This prescription was filled on 9/23/2024. Any refills authorized will be placed on file.    aspirin 81 MG EC tablet     Sig: Take 1 tablet by mouth Daily.     Dispense:  30 tablet     Refill:  5    Acetaminophen Extra Strength 500 MG tablet     Sig: Take 2 tablets by mouth 4 (Four) Times a Day As Needed (pain).     Dispense:  240 tablet     Refill:  5     This prescription was filled on 4/30/2024. Any refills authorized will be placed on file.    Semaglutide, 1 MG/DOSE, (Ozempic, 1 MG/DOSE,) 4 MG/3ML solution pen-injector     Sig: Inject 1 mg under the skin into the appropriate area as directed 1 (One) Time Per Week.     Dispense:  3 mL     Refill:  5     Will replace the 0.5 mg dose          Follow Up   Return in about 14 weeks (around 1/20/2025).  Patient was given instructions and counseling regarding her condition or for health maintenance advice. Please see specific information pulled into the AVS if appropriate.

## 2024-10-14 NOTE — ASSESSMENT & PLAN NOTE
Diabetes mellitus Type II, under excellent control.   Encouraged to continue to pursue ADA diet  Encouraged aerobic exercise.  Continue current medication  Updated labs will be drawn at her return.

## 2024-10-21 DIAGNOSIS — F41.8 DEPRESSION WITH ANXIETY: ICD-10-CM

## 2024-10-21 DIAGNOSIS — E11.42 TYPE 2 DIABETES MELLITUS WITH PERIPHERAL NEUROPATHY: ICD-10-CM

## 2024-10-21 RX ORDER — METFORMIN HCL 500 MG
2000 TABLET, EXTENDED RELEASE 24 HR ORAL DAILY
Qty: 120 TABLET | Refills: 5 | OUTPATIENT
Start: 2024-10-21

## 2024-10-21 RX ORDER — LORAZEPAM 1 MG/1
.5-1 TABLET ORAL 2 TIMES DAILY PRN
Qty: 60 TABLET | Refills: 0 | Status: SHIPPED | OUTPATIENT
Start: 2024-10-21

## 2024-10-21 RX ORDER — LORAZEPAM 1 MG/1
TABLET ORAL
Qty: 60 TABLET | Refills: 0 | OUTPATIENT
Start: 2024-10-21

## 2024-10-22 DIAGNOSIS — E11.42 TYPE 2 DIABETES MELLITUS WITH PERIPHERAL NEUROPATHY: ICD-10-CM

## 2024-10-22 RX ORDER — METFORMIN HCL 500 MG
2000 TABLET, EXTENDED RELEASE 24 HR ORAL DAILY
Qty: 120 TABLET | Refills: 5 | OUTPATIENT
Start: 2024-10-22

## 2024-10-23 ENCOUNTER — OFFICE VISIT (OUTPATIENT)
Dept: PULMONOLOGY | Facility: CLINIC | Age: 64
End: 2024-10-23
Payer: MEDICARE

## 2024-10-23 VITALS
OXYGEN SATURATION: 96 % | SYSTOLIC BLOOD PRESSURE: 134 MMHG | HEART RATE: 94 BPM | HEIGHT: 62 IN | TEMPERATURE: 97 F | WEIGHT: 182.6 LBS | BODY MASS INDEX: 33.6 KG/M2 | DIASTOLIC BLOOD PRESSURE: 82 MMHG

## 2024-10-23 DIAGNOSIS — J43.1 PANLOBULAR EMPHYSEMA: Primary | ICD-10-CM

## 2024-10-23 DIAGNOSIS — R91.1 PULMONARY NODULE: ICD-10-CM

## 2024-10-23 DIAGNOSIS — G47.33 OBSTRUCTIVE SLEEP APNEA: ICD-10-CM

## 2024-10-23 DIAGNOSIS — G47.34 NOCTURNAL HYPOXIA: ICD-10-CM

## 2024-10-23 DIAGNOSIS — F17.200 SMOKER: ICD-10-CM

## 2024-10-23 PROCEDURE — 99214 OFFICE O/P EST MOD 30 MIN: CPT | Performed by: PHYSICIAN ASSISTANT

## 2024-10-23 PROCEDURE — 1159F MED LIST DOCD IN RCRD: CPT | Performed by: PHYSICIAN ASSISTANT

## 2024-10-23 PROCEDURE — 3079F DIAST BP 80-89 MM HG: CPT | Performed by: PHYSICIAN ASSISTANT

## 2024-10-23 PROCEDURE — 1160F RVW MEDS BY RX/DR IN RCRD: CPT | Performed by: PHYSICIAN ASSISTANT

## 2024-10-23 PROCEDURE — 3075F SYST BP GE 130 - 139MM HG: CPT | Performed by: PHYSICIAN ASSISTANT

## 2024-10-23 RX ORDER — BUPROPION HYDROCHLORIDE 450 MG/1
TABLET, FILM COATED, EXTENDED RELEASE ORAL
COMMUNITY
Start: 2024-10-21

## 2024-10-23 NOTE — PROGRESS NOTES
"Chief Complaint  Panlobular emphysema    Subjective        Lisa Hill presents to [facility]  History of Present Illness    Mrs. Hill presents today for follow up.  She did have benefit symptomatically after starting Daliresp.  Now tolerating the higher dose.  Still continues on Breztri.  Has rescue options for as needed use but cannot tolerate frequent use of the nebulizers.  States that after a few weeks it seems to cause more chest tightness.  Otherwise, symptomatically stable at this time.  Still using supplemental oxygen at nighttime, but is interested in restarting her sleep machine again.  Needs an order for a new mask.  Received percussion vest, notes benefit from this.    Objective   Vital Signs:  /82   Pulse 94   Temp 97 °F (36.1 °C)   Ht 157.5 cm (62\")   Wt 82.8 kg (182 lb 9.6 oz)   SpO2 96%   BMI 33.40 kg/m²   Estimated body mass index is 33.4 kg/m² as calculated from the following:    Height as of this encounter: 157.5 cm (62\").    Weight as of this encounter: 82.8 kg (182 lb 9.6 oz).      Physical Exam  Vitals reviewed.   Constitutional:       General: She is not in acute distress.     Appearance: She is not diaphoretic.   HENT:      Head: Normocephalic and atraumatic.   Cardiovascular:      Rate and Rhythm: Normal rate and regular rhythm.   Pulmonary:      Effort: Pulmonary effort is normal.      Breath sounds: No wheezing, rhonchi or rales.   Neurological:      Mental Status: She is alert and oriented to person, place, and time.   Psychiatric:         Behavior: Behavior normal.        Result Review :  The following data was reviewed by: Katarina Wan PA-C on 10/23/2024:      CT chest imaging/report 2024  Per personal review  -Image 70 - 2 mm nodule remained stable since 2019  -Calcified lymph nodes stable  -Emphysema noted      -----------------------------------------------------------------------------------    PFT " 2019    Flow volume was assessed.  Spirometry showed mild obstruction.  Lung volumes are normal.  The RV/TLC ratio is increased indicating air trapping.  Diffusing capacity, uncorrected for hemoglobin, is mildly reduced.     Conclusion:  Mild obstruction with mildly reduced diffusing capacity.           Assessment and Plan   Diagnoses and all orders for this visit:    1. Panlobular emphysema (Primary)    2. Obstructive sleep apnea  -     Miscellaneous DME    3. Nocturnal hypoxia    4. Smoker    5. Pulmonary nodule          COPD, emphysema type:   Also suspect some bronchitis component due to recurrent phlegm production at baseline.  Continue albuterol inhaler as needed  Continue DuoNeb treatments as needed  Continue breztri as scheduled  Continue Daliresp 500 mcg daily  Discussed addition of Ohtuvayre, but decided to wait adding at this time  Has percussion vest, with benefit for mobilizing secretions          HERBERT:  Previously qualified for obstructive sleep apnea but had difficulty with tolerance of the device.  Switched to using nasal cannula, but now interested in resuming use.  Tried repeat home sleep study, but had difficulty with the machine.  This was ordered to help her be considered for inspire device.  Do not have the previous in lab sleep study on file  Continue 2 L nasal cannula at nighttime  Once new mask is received, can resume trial of AutoPap  Order placed for new mask  Will consider repeating the in lab sleep study in the future as needed for possible inspire device consideration if HERBERT device again is not tolerated.           Pulmonary nodule, calcified lymph nodes, cigarette dependence:  Most recent low-dose CT screening reviewed.  Personal read, notable for  -Image 70, 2 mm nodule right lung, stable since 2019  -Calcified lymph nodes, stable, stable since 2017  Other previous 7 mm groundglass left nodule and 4 mm left nodule resolved on prior imaging  Remains a current smoker.  Has been  unsuccessful with other h assistance methods.    Has tried to quit multiple times without success and tried various methods of nicotine inhaler, nicotine nasal spray, nicotine gum.  Can consider repeat low-dose CT screenings upon continued qualification  Will consider next test closer to April 2025          Follow Up   Return in about 3 months (around 1/23/2025), or if symptoms worsen or fail to improve, for Next scheduled follow up.  Patient was given instructions and counseling regarding her condition or for health maintenance advice. Please see specific information pulled into the AVS if appropriate.

## 2024-10-31 DIAGNOSIS — F33.1 MAJOR DEPRESSIVE DISORDER, RECURRENT EPISODE, MODERATE: ICD-10-CM

## 2024-10-31 RX ORDER — RISPERIDONE 3 MG/1
3 TABLET ORAL
Qty: 30 TABLET | Refills: 0 | Status: SHIPPED | OUTPATIENT
Start: 2024-10-31 | End: 2024-11-01 | Stop reason: SDUPTHER

## 2024-11-01 ENCOUNTER — OFFICE VISIT (OUTPATIENT)
Dept: PSYCHIATRY | Facility: CLINIC | Age: 64
End: 2024-11-01
Payer: MEDICARE

## 2024-11-01 VITALS
HEART RATE: 91 BPM | DIASTOLIC BLOOD PRESSURE: 74 MMHG | SYSTOLIC BLOOD PRESSURE: 132 MMHG | WEIGHT: 184.4 LBS | BODY MASS INDEX: 33.73 KG/M2 | OXYGEN SATURATION: 95 %

## 2024-11-01 DIAGNOSIS — F41.9 ANXIETY DISORDER, UNSPECIFIED TYPE: ICD-10-CM

## 2024-11-01 DIAGNOSIS — F33.1 MAJOR DEPRESSIVE DISORDER, RECURRENT EPISODE, MODERATE: Primary | ICD-10-CM

## 2024-11-01 RX ORDER — RISPERIDONE 3 MG/1
3 TABLET ORAL
Qty: 30 TABLET | Refills: 3 | Status: SHIPPED | OUTPATIENT
Start: 2024-11-01

## 2024-11-01 RX ORDER — LORAZEPAM 1 MG/1
.5-1 TABLET ORAL 2 TIMES DAILY PRN
Qty: 60 TABLET | Refills: 0 | Status: SHIPPED | OUTPATIENT
Start: 2024-11-01

## 2024-11-01 RX ORDER — DEXTROMETHORPHAN HYDROBROMIDE, BUPROPION HYDROCHLORIDE 105; 45 MG/1; MG/1
1 TABLET, MULTILAYER, EXTENDED RELEASE ORAL 2 TIMES DAILY
Qty: 60 TABLET | Refills: 2 | Status: SHIPPED | OUTPATIENT
Start: 2024-11-01

## 2024-11-01 NOTE — PROGRESS NOTES
Subjective   Lisa Hill is a 64 y.o. female is here today for medication management follow-up.    Chief Complaint:  anxiety depression     History of Present Illness:    Patient presents today for follow up for medication management for depression and anxiety. Patient states stopped the wellbutrin and started the Auvelity. Denies any side effects. Patient reports medicine is helping and mood has been better. Denies any anger or irritability. Patient reports currently depression is at a 5 on a 0-10 scale with 10 being the worst. Patient states the anxiety has not been bad. Patient reports sometimes heart beat fast but not often. Patient reports anxiety is at a 4 on a 0-10 scale with 10 being the worst. Patient denies any panic attacks. Patient reports sleeping 8-9 hours a night and patient denies any nightmares. Patient reports appetite is good and eating two full meals a day. Denies any nausea or vomiting. Patient denies any auditory or visual hallucinations. Patient adamantly denies any thoughts to harm self or others. Patient denies any side effects to medications and reports compliance. Patient denies any medical changes since last visit.   The following portions of the patient's history were reviewed and updated as appropriate: allergies, current medications, past family history, past medical history, past social history, past surgical history, and problem list.    Review of Systems   Constitutional: Negative.    Respiratory: Negative.     Cardiovascular: Negative.    Gastrointestinal: Negative.    Neurological: Negative.    Psychiatric/Behavioral:  Positive for dysphoric mood. The patient is nervous/anxious.        Objective   Physical Exam  Vitals reviewed.   Constitutional:       Appearance: Normal appearance. She is well-developed and well-groomed.   Neurological:      Mental Status: She is alert.   Psychiatric:         Attention and Perception: Attention and perception normal.         Mood and  Affect: Mood and affect normal.         Speech: Speech normal.         Behavior: Behavior normal. Behavior is cooperative.         Thought Content: Thought content normal.         Cognition and Memory: Cognition and memory normal.         Judgment: Judgment normal.       Blood pressure 132/74, pulse 91, weight 83.6 kg (184 lb 6.4 oz), SpO2 95%.Body mass index is 33.73 kg/m².    Allergies   Allergen Reactions    Sulfa Antibiotics        Medication List:   Current Outpatient Medications   Medication Sig Dispense Refill    Dextromethorphan-buPROPion ER (Auvelity)  MG tablet controlled-release Take 1 tablet by mouth 2 (Two) Times a Day. 60 tablet 2    Diclofenac Sodium (VOLTAREN) 1 % gel gel apply FOUR grams TO single knee, ankle, foot (foot includes sole/toes/top of foot)      LORazepam (ATIVAN) 1 MG tablet Take 0.5-1 tablets by mouth 2 (Two) Times a Day As Needed for Anxiety. 60 tablet 0    risperiDONE (risperDAL) 3 MG tablet Take 1 tablet by mouth every night at bedtime. 30 tablet 3    Acetaminophen Extra Strength 500 MG tablet Take 2 tablets by mouth 4 (Four) Times a Day As Needed (pain). 240 tablet 5    albuterol sulfate  (90 Base) MCG/ACT inhaler INHALE TWO PUFFS BY MOUTH EVERY 4 HOURS AS NEEDED FOR WHEEZING OR SHORTNESS OF BREATH 18 g 8    aspirin 81 MG EC tablet Take 1 tablet by mouth Daily. 30 tablet 5    Breztri Aerosphere 160-9-4.8 MCG/ACT aerosol inhaler INHALE TWO PUFFS BY MOUTH TWICE DAILY 10.7 g 8    cholecalciferol (VITAMIN D3) 25 MCG (1000 UT) tablet Take 2 tablets by mouth Daily. 60 tablet 5    Empagliflozin-metFORMIN HCl 5-500 MG tablet Take 1 tablet by mouth Daily. 30 tablet 5    escitalopram (LEXAPRO) 20 MG tablet Take 1 tablet by mouth Daily. 30 tablet 0    fluticasone (FLONASE) 50 MCG/ACT nasal spray 2 sprays by Each Nare route Daily. 16 g 5    glucose blood (OneTouch Verio) test strip Use as instructed 50 each 5    ipratropium-albuterol (DUO-NEB) 0.5-2.5 mg/3 ml nebulizer Take 3 mL  by nebulization 4 (Four) Times a Day As Needed for Wheezing or Shortness of Air. 360 mL 5    Lancets (OneTouch Delica Plus [id number]) misc 1 each by Other route 3 (Three) Times a Day. 100 each 5    levothyroxine (SYNTHROID, LEVOTHROID) 175 MCG tablet Take 1 tablet by mouth Daily. 30 tablet 5    losartan (COZAAR) 50 MG tablet Take 1 tablet by mouth Daily. 30 tablet 5    montelukast (SINGULAIR) 10 MG tablet Take 1 tablet by mouth Daily for 360 days. 90 tablet 3    Mucus Relief 600 MG 12 hr tablet TAKE ONE TABLET BY MOUTH TWICE DAILY AS NEEDED FOR COUGH OR CONGESTION FOR UP TO 30 DAYS 60 tablet 6    pantoprazole (PROTONIX) 40 MG EC tablet Take 1 tablet by mouth Daily. 30 tablet 5    roflumilast (Daliresp) 500 MCG tablet tablet Take 1 tablet by mouth Daily. 90 tablet 3    Semaglutide, 1 MG/DOSE, (Ozempic, 1 MG/DOSE,) 4 MG/3ML solution pen-injector Inject 1 mg under the skin into the appropriate area as directed 1 (One) Time Per Week. 3 mL 5    simvastatin (ZOCOR) 20 MG tablet Take 1 tablet by mouth every night at bedtime. 30 tablet 5    vitamin B-12 (CYANOCOBALAMIN) 1000 MCG tablet Take 1 tablet by mouth Daily. 30 tablet 5     No current facility-administered medications for this visit.       Mental Status Exam:   Hygiene:   good  Cooperation:  Cooperative  Eye Contact:  Good  Psychomotor Behavior:  Appropriate  Affect:  Appropriate  Hopelessness: Denies  Speech:  Normal  Thought Process:  Goal directed and Linear  Thought Content:  Normal  Suicidal:  None  Homicidal:  None  Hallucinations:  None  Delusion:  None  Memory:  Intact  Orientation:  Person, Place, Time, and Situation  Reliability:  fair  Insight:  Fair  Judgement:  Fair  Impulse Control:  Fair  Physical/Medical Issues:  No               Assessment & Plan   Diagnoses and all orders for this visit:    1. Major depressive disorder, recurrent episode, moderate (Primary)  -     risperiDONE (risperDAL) 3 MG tablet; Take 1 tablet by mouth every night at bedtime.   Dispense: 30 tablet; Refill: 3  -     Dextromethorphan-buPROPion ER (Auvelity)  MG tablet controlled-release; Take 1 tablet by mouth 2 (Two) Times a Day.  Dispense: 60 tablet; Refill: 2    2. Anxiety disorder, unspecified type  -     LORazepam (ATIVAN) 1 MG tablet; Take 0.5-1 tablets by mouth 2 (Two) Times a Day As Needed for Anxiety.  Dispense: 60 tablet; Refill: 0              Discussed medication options with patient. Cont. Auvelity  mg twice daily for depression. Cont. Ativan 1 mg 0.5-1 tab twice daily as needed for anxiety. Reviewed the risks, benefits, and side effects of the medications; patient acknowledged and verbally consented.   Patient is being prescribed a controlled substance as part of treatment plan. Patient has been educated of appropriate use of the medications, including risk of somnolence, limited ability to drive and/or work safely, and potential for dependence, respiratory depression and overdose. Patient is also informed that the medication are to be used by the patient only- avoid any combined use of ETOH or other substances unless prescribed.     [facility] Patient Controlled Substance Report (from 11/2/2023 to 11/1/2024)    Dispensed  Strength Quantity Days Supply Provider Pharmacy   10/21/2024 Lorazepam 1MG 60 each 30 CLOUD,MIKHAIL [facility]   09/23/2024 Lorazepam 1MG 60 each 30 CLOUD,MIKHAIL [facility]   08/20/2024 Lorazepam 1MG 60 each 30 [facility]   07/01/2024 [facility] 1MG [facility]   04/30/2024 Lorazepam 1MG 90 each 45 [facility]   03/04/2024 [facility]am 1MG 90 [facility]   12/12/2023 [facility]epam 1MG 90 each [facility]         Disclaimer    *The information in this report is based upon Schedule II through [place] controlled substance records reported by dispensers. Data should appear on  [facility] reports within two to three business days after dispensing.   *The records listed in the report are based on the patient identification information entered by the report requestor, and if not sufficiently unique may result in the report including records for multiple patients. Please verify the information in the report by contacting the prescribers and/or dispensers listed.   *If the controlled substance records on this report appear to be in error, the patient or provider should contact the dispenser to determine if the information was reported accurately. If the dispenser certifies the information was reported accurately, the dispenser can contact the Drug Enforcement and Professional Practices Branch at [phone] to investigate the error.   *The information in this report is intended for informational use only by the person authorized to request the report. Intentional disclosure of the report or data to someone not authorized to obtain the data is a Class B Misdemeanor.      Report Restrictions - A practitioner or pharmacist may share the report with the patient or person authorized to act on the patient's behalf and place the report in the patient's medical record, with the report then being deemed a medical record subject to the same disclosure terms and conditions as an ordinary medical record. ([place]RS [place]202)     Patient is agreeable to call the office with any questions, concerns, or worsening of symptoms. Patient is aware to call 911 or go to the nearest ER should begin having any thoughts to harm self or others.         Follow up in six weeks            Errors in dictation may reflect use of voice recognition software and not all errors in transcription may have been detected prior to signing.                This document has been electronically signed by NATALIE Pringle   November 1, 2024 11:58 EDT

## 2024-11-03 DIAGNOSIS — M15.9 GENERALIZED OSTEOARTHRITIS: ICD-10-CM

## 2024-11-04 RX ORDER — PSEUDOEPHED/ACETAMINOPH/DIPHEN 30MG-500MG
TABLET ORAL
Qty: 180 TABLET | Refills: 5 | Status: SHIPPED | OUTPATIENT
Start: 2024-11-04

## 2024-11-10 DIAGNOSIS — J43.1 PANLOBULAR EMPHYSEMA: ICD-10-CM

## 2024-11-11 RX ORDER — BUDESONIDE, GLYCOPYRROLATE, AND FORMOTEROL FUMARATE 160; 9; 4.8 UG/1; UG/1; UG/1
2 AEROSOL, METERED RESPIRATORY (INHALATION) 2 TIMES DAILY
Qty: 10.7 G | Refills: 8 | Status: SHIPPED | OUTPATIENT
Start: 2024-11-11

## 2024-11-20 ENCOUNTER — TELEPHONE (OUTPATIENT)
Dept: FAMILY MEDICINE CLINIC | Facility: CLINIC | Age: 64
End: 2024-11-20

## 2024-11-20 DIAGNOSIS — M15.9 GENERALIZED OSTEOARTHRITIS: Primary | ICD-10-CM

## 2024-11-20 DIAGNOSIS — M54.59 MECHANICAL LOW BACK PAIN: ICD-10-CM

## 2024-11-20 RX ORDER — MELOXICAM 15 MG/1
15 TABLET ORAL DAILY
COMMUNITY
Start: 2024-11-18 | End: 2024-11-20 | Stop reason: SDUPTHER

## 2024-11-20 RX ORDER — MELOXICAM 15 MG/1
15 TABLET ORAL DAILY PRN
Qty: 30 TABLET | Refills: 2 | Status: SHIPPED | OUTPATIENT
Start: 2024-11-20

## 2024-11-20 RX ORDER — DICLOFENAC SODIUM 25 MG/1
25 TABLET, DELAYED RELEASE ORAL 2 TIMES DAILY
Qty: 60 TABLET | Refills: 5 | Status: SHIPPED | OUTPATIENT
Start: 2024-11-20

## 2024-11-20 NOTE — TELEPHONE ENCOUNTER
Caller: Lisa Hill    Relationship: Self    Best call back number: [phone]     What is the best time to reach you: ANY    Who are you requesting to speak with (clinical staff, provider,  specific staff member): NURSE    Do you know the name of the person who called: PATIENT    What was the call regarding: PATIENT WOULD LIKE TO INCREASE DOSAGE OF ARTHRITIS MEDICATION.      PHARMACY:  [facility]    Is it okay if the provider responds through MyChart: PHONE CALL PLEASE

## 2024-11-27 DIAGNOSIS — E11.42 TYPE 2 DIABETES MELLITUS WITH PERIPHERAL NEUROPATHY: ICD-10-CM

## 2024-11-27 RX ORDER — METFORMIN HYDROCHLORIDE 500 MG/1
2000 TABLET, EXTENDED RELEASE ORAL DAILY
Qty: 120 TABLET | Refills: 5 | OUTPATIENT
Start: 2024-11-27

## 2024-11-27 NOTE — TELEPHONE ENCOUNTER
Caller: Lisa Hill    Relationship: Self    Best call back number: [phone]     Requested Prescriptions:   Requested Prescriptions     Pending Prescriptions Disp Refills    Empagliflozin-metFORMIN HCl 5-500 MG tablet 30 tablet 5     Sig: Take 1 tablet by mouth Daily.        Pharmacy where request should be sent: [facility] [address] - [phone]  - [phone] FX     Last office visit with prescribing clinician: 10/14/2024   Last telemedicine visit with prescribing clinician: Visit date not found   Next office visit with prescribing clinician: 1/21/2025     Additional details provided by patient: OUT OF MEDICATION     Does the patient have less than a 3 day supply:  [x] Yes  [] No    Would you like a call back once the refill request has been completed: [] Yes [x] No    If the office needs to give you a call back, can they leave a voicemail: [] Yes [x] No    Jorge Ruiz Rep   11/27/24 09:13 EST

## 2024-12-02 DIAGNOSIS — E11.42 TYPE 2 DIABETES MELLITUS WITH PERIPHERAL NEUROPATHY: ICD-10-CM

## 2024-12-02 RX ORDER — METFORMIN HYDROCHLORIDE 500 MG/1
2000 TABLET, EXTENDED RELEASE ORAL DAILY
Qty: 120 TABLET | Refills: 5 | OUTPATIENT
Start: 2024-12-02

## 2024-12-05 ENCOUNTER — TELEPHONE (OUTPATIENT)
Dept: FAMILY MEDICINE CLINIC | Facility: CLINIC | Age: 64
End: 2024-12-05

## 2024-12-05 DIAGNOSIS — E11.42 TYPE 2 DIABETES MELLITUS WITH PERIPHERAL NEUROPATHY: ICD-10-CM

## 2024-12-05 RX ORDER — METFORMIN HYDROCHLORIDE 500 MG/1
2000 TABLET, EXTENDED RELEASE ORAL DAILY
Qty: 120 TABLET | Refills: 5 | OUTPATIENT
Start: 2024-12-05

## 2024-12-05 NOTE — TELEPHONE ENCOUNTER
Caller: Lisa Hill    Relationship to patient: Self    Best call back number: [phone]     PATIENT IS CALLING TO STATE THAT DR BEAL PUT HER ON SYNJARDI TODAY AND WANTED TO MAKE SURE THAT SHE COULD TAKE THIS WITH HER OTHER MEDICATION THAT SHE IS ALREADY ON.

## 2024-12-08 DIAGNOSIS — E11.42 TYPE 2 DIABETES MELLITUS WITH PERIPHERAL NEUROPATHY: ICD-10-CM

## 2024-12-09 RX ORDER — LANOLIN ALCOHOL/MO/W.PET/CERES
1000 CREAM (GRAM) TOPICAL DAILY
Qty: 30 TABLET | Refills: 5 | Status: SHIPPED | OUTPATIENT
Start: 2024-12-09

## 2024-12-12 ENCOUNTER — OFFICE VISIT (OUTPATIENT)
Dept: PSYCHIATRY | Facility: CLINIC | Age: 64
End: 2024-12-12
Payer: MEDICARE

## 2024-12-12 VITALS
SYSTOLIC BLOOD PRESSURE: 140 MMHG | OXYGEN SATURATION: 97 % | DIASTOLIC BLOOD PRESSURE: 82 MMHG | BODY MASS INDEX: 33.62 KG/M2 | HEART RATE: 91 BPM | WEIGHT: 183.8 LBS

## 2024-12-12 DIAGNOSIS — F33.1 MAJOR DEPRESSIVE DISORDER, RECURRENT EPISODE, MODERATE: ICD-10-CM

## 2024-12-12 DIAGNOSIS — F41.9 ANXIETY DISORDER, UNSPECIFIED TYPE: ICD-10-CM

## 2024-12-12 PROCEDURE — 3077F SYST BP >= 140 MM HG: CPT | Performed by: NURSE PRACTITIONER

## 2024-12-12 PROCEDURE — 99214 OFFICE O/P EST MOD 30 MIN: CPT | Performed by: NURSE PRACTITIONER

## 2024-12-12 PROCEDURE — 1160F RVW MEDS BY RX/DR IN RCRD: CPT | Performed by: NURSE PRACTITIONER

## 2024-12-12 PROCEDURE — 1159F MED LIST DOCD IN RCRD: CPT | Performed by: NURSE PRACTITIONER

## 2024-12-12 PROCEDURE — 3079F DIAST BP 80-89 MM HG: CPT | Performed by: NURSE PRACTITIONER

## 2024-12-12 RX ORDER — RISPERIDONE 3 MG/1
3 TABLET ORAL
Qty: 30 TABLET | Refills: 3 | Status: SHIPPED | OUTPATIENT
Start: 2024-12-12

## 2024-12-12 RX ORDER — DEXTROMETHORPHAN HYDROBROMIDE, BUPROPION HYDROCHLORIDE 105; 45 MG/1; MG/1
1 TABLET, MULTILAYER, EXTENDED RELEASE ORAL 2 TIMES DAILY
Qty: 60 TABLET | Refills: 2 | Status: SHIPPED | OUTPATIENT
Start: 2024-12-12

## 2024-12-12 RX ORDER — ESCITALOPRAM OXALATE 20 MG/1
20 TABLET ORAL DAILY
Qty: 30 TABLET | Refills: 1 | Status: SHIPPED | OUTPATIENT
Start: 2024-12-12

## 2024-12-12 RX ORDER — LORAZEPAM 1 MG/1
.5-1 TABLET ORAL 2 TIMES DAILY PRN
Qty: 60 TABLET | Refills: 0 | Status: SHIPPED | OUTPATIENT
Start: 2024-12-12

## 2024-12-12 NOTE — PROGRESS NOTES
Subjective   Lisa iHll is a 64 y.o. female is here today for medication management follow-up.    Chief Complaint:  anxiety depression     History of Present Illness:    Patient presents today for follow up for medication management for depression and anxiety. Patient states dealing with pain in both feet and PCP started new medications. Patient states have arthritis in both feet. Patient states reports medication compliance and denies any side effects. Patient denies any mood swings. Denies any anger or irritability. Patient reports currently depression is at a 3 on a 0-10 scale with 10 being the worst. Patient reports anxiety is at a 0 on a 0-10 scale with 10 being the worst. Patient denies any panic attacks. Patient reports sleeping 7-8 hours a night and patient denies any nightmares. Patient reports appetite is good and eating two to three times  a day. Patient denies any auditory or visual hallucinations. Patient adamantly denies any thoughts to harm self or others. Patient denies any side effects to medications. Patient denies any medical changes since last visit.   The following portions of the patient's history were reviewed and updated as appropriate: allergies, current medications, past family history, past medical history, past social history, past surgical history, and problem list.    Review of Systems   Constitutional: Negative.    Respiratory: Negative.     Cardiovascular: Negative.    Gastrointestinal: Negative.    Neurological: Negative.    Psychiatric/Behavioral:  Positive for dysphoric mood.        Objective   Physical Exam  Vitals reviewed.   Constitutional:       Appearance: Normal appearance. She is well-developed and well-groomed.   Neurological:      Mental Status: She is alert.   Psychiatric:         Attention and Perception: Attention and perception normal.         Mood and Affect: Mood and affect normal.         Speech: Speech normal.         Behavior: Behavior normal. Behavior  is cooperative.         Thought Content: Thought content normal.         Cognition and Memory: Cognition and memory normal.         Judgment: Judgment normal.       Blood pressure 140/82, pulse 91, weight 83.4 kg (183 lb 12.8 oz), SpO2 97%.Body mass index is 33.62 kg/m².    Allergies   Allergen Reactions    Sulfa Antibiotics        Medication List:   Current Outpatient Medications   Medication Sig Dispense Refill    Dextromethorphan-buPROPion ER (Auvelity)  MG tablet controlled-release Take 1 tablet by mouth 2 (Two) Times a Day. 60 tablet 2    Diclofenac Sodium (VOLTAREN) 1 % gel gel FOUR TIMES DAILY      escitalopram (LEXAPRO) 20 MG tablet Take 1 tablet by mouth Daily. 30 tablet 1    LORazepam (ATIVAN) 1 MG tablet Take 0.5-1 tablets by mouth 2 (Two) Times a Day As Needed for Anxiety. 60 tablet 0    risperiDONE (risperDAL) 3 MG tablet Take 1 tablet by mouth every night at bedtime. 30 tablet 3    Acetaminophen Extra Strength 500 MG tablet TAKE TWO TABLETS BY MOUTH FOUR TIMES DAILY 180 tablet 5    albuterol sulfate  (90 Base) MCG/ACT inhaler INHALE TWO PUFFS BY MOUTH EVERY 4 HOURS AS NEEDED FOR WHEEZING OR SHORTNESS OF BREATH 18 g 8    aspirin 81 MG EC tablet Take 1 tablet by mouth Daily. 30 tablet 5    Breztri Aerosphere 160-9-4.8 MCG/ACT aerosol inhaler INHALE TWO PUFFS BY MOUTH TWICE DAILY 10.7 g 8    cholecalciferol (VITAMIN D3) 25 MCG (1000 UT) tablet Take 2 tablets by mouth Daily. 60 tablet 5    diclofenac (VOLTAREN) 25 MG EC tablet Take 1 tablet by mouth 2 (Two) Times a Day. 60 tablet 5    Empagliflozin-metFORMIN HCl 5-500 MG tablet Take 1 tablet by mouth Daily. 30 tablet 5    fluticasone (FLONASE) 50 MCG/ACT nasal spray 2 sprays by Each Nare route Daily. 16 g 5    glucose blood (OneTouch Verio) test strip Use as instructed 50 each 5    ipratropium-albuterol (DUO-NEB) 0.5-2.5 mg/3 ml nebulizer Take 3 mL by nebulization 4 (Four) Times a Day As Needed for Wheezing or Shortness of Air. 360 mL 5     Lancets (OneTouch Delica Plus [id number]) misc 1 each by Other route 3 (Three) Times a Day. 100 each 5    levothyroxine (SYNTHROID, LEVOTHROID) 175 MCG tablet Take 1 tablet by mouth Daily. 30 tablet 5    losartan (COZAAR) 50 MG tablet Take 1 tablet by mouth Daily. 30 tablet 5    meloxicam (MOBIC) 15 MG tablet Take 1 tablet by mouth Daily As Needed for Moderate Pain. 30 tablet 2    montelukast (SINGULAIR) 10 MG tablet Take 1 tablet by mouth Daily for 360 days. 90 tablet 3    Mucus Relief 600 MG 12 hr tablet TAKE ONE TABLET BY MOUTH TWICE DAILY AS NEEDED FOR COUGH OR CONGESTION FOR UP TO 30 DAYS 60 tablet 6    pantoprazole (PROTONIX) 40 MG EC tablet Take 1 tablet by mouth Daily. 30 tablet 5    roflumilast (Daliresp) 500 MCG tablet tablet Take 1 tablet by mouth Daily. 90 tablet 3    Semaglutide, 1 MG/DOSE, (Ozempic, 1 MG/DOSE,) 4 MG/3ML solution pen-injector Inject 1 mg under the skin into the appropriate area as directed 1 (One) Time Per Week. 3 mL 5    simvastatin (ZOCOR) 20 MG tablet Take 1 tablet by mouth every night at bedtime. 30 tablet 5    vitamin B-12 (CYANOCOBALAMIN) 1000 MCG tablet TAKE ONE TABLET BY MOUTH EVERY DAY 30 tablet 5     No current facility-administered medications for this visit.       Mental Status Exam:   Hygiene:   good  Cooperation:  Cooperative  Eye Contact:  Good  Psychomotor Behavior:  Appropriate  Affect:  Appropriate  Hopelessness: Denies  Speech:  Normal  Thought Process:  Goal directed and Linear  Thought Content:  Normal  Suicidal:  None  Homicidal:  None  Hallucinations:  None  Delusion:  None  Memory:  Intact  Orientation:  Person, Place, Time, and Situation  Reliability:  fair  Insight:  Fair  Judgement:  Fair  Impulse Control:  Fair  Physical/Medical Issues:  No               Assessment & Plan   Diagnoses and all orders for this visit:    1. Major depressive disorder, recurrent episode, moderate  -     escitalopram (LEXAPRO) 20 MG tablet; Take 1 tablet by mouth Daily.  Dispense: 30  tablet; Refill: 1  -     risperiDONE (risperDAL) 3 MG tablet; Take 1 tablet by mouth every night at bedtime.  Dispense: 30 tablet; Refill: 3  -     Dextromethorphan-buPROPion ER (Auvelity)  MG tablet controlled-release; Take 1 tablet by mouth 2 (Two) Times a Day.  Dispense: 60 tablet; Refill: 2    2. Anxiety disorder, unspecified type  -     escitalopram (LEXAPRO) 20 MG tablet; Take 1 tablet by mouth Daily.  Dispense: 30 tablet; Refill: 1  -     LORazepam (ATIVAN) 1 MG tablet; Take 0.5-1 tablets by mouth 2 (Two) Times a Day As Needed for Anxiety.  Dispense: 60 tablet; Refill: 0            Discussed medication options with patient. Cont. Lexapro 20 mg daily for depression and anxiety. Cont. Ativan 1 mg 0.5-1 tab twice daily as needed for anxiety. Cont. Auvelity  mg twice daily for depression and mood. Cont. Risperdal 3 mg daily at night for depression and mood. Reviewed the risks, benefits, and side effects of the medications; patient acknowledged and verbally consented.   Patient was instructed on medication side effects, benefits, and also of no treatment.  Patient was given an explanation regarding potential for increased risk of diabetes, lipids, and weight gain.  Labs will be assessed as clinically indicated.  Diet was discussed especially healthy diet choices and increasing activity and exercise.  Patient was strongly urged to continue weight maintenance or weight loss efforts.  Patient reported verbalized understanding of instructions.   Patient is agreeable to call the office with any questions, concerns, or worsening of symptoms. Patient is aware to call 911 or go to the nearest ER should begin having any thoughts to harm self or others.           Follow up in two months      Errors in dictation may reflect use of voice recognition software and not all errors in transcription may have been detected prior to signing.              This document has been electronically signed by NATALIE Pringle    December 30, 2024 23:15 EST  Answers submitted by the patient for this visit:  Primary Reason for Visit (Submitted on 12/5/2024)  What is the primary reason for your visit?: Anxiety  Anxiety (Submitted on 12/5/2024)  Chief Complaint: Anxiety  Visit: follow-up  Frequency: occasionally  Severity: mild  depressed mood: No  dry mouth: Yes  excessive worry: Yes  insomnia: Yes  irritability: Yes  malaise/fatigue: Yes  obsessions: No  Hours of sleep per night: 7 Hours  Aggravated by: nothing, social activities  Medication compliance: %  Side effects: nausea

## 2024-12-16 DIAGNOSIS — F39 UNSPECIFIED MOOD (AFFECTIVE) DISORDER: ICD-10-CM

## 2024-12-16 RX ORDER — BUPROPION HYDROCHLORIDE 450 MG/1
450 TABLET, FILM COATED, EXTENDED RELEASE ORAL EVERY MORNING
Qty: 30 TABLET | Refills: 0 | OUTPATIENT
Start: 2024-12-16

## 2024-12-19 ENCOUNTER — TELEPHONE (OUTPATIENT)
Dept: FAMILY MEDICINE CLINIC | Facility: CLINIC | Age: 64
End: 2024-12-19

## 2024-12-19 NOTE — TELEPHONE ENCOUNTER
Caller: Lisa Hill    Relationship: Self    Best call back number: [phone]    SOMETHING FOR SYMPTOMS    What medication are you requesting:     What are your current symptoms:     FEET ARE SWELLING    How long have you been experiencing symptoms:     STARTED ABOUT THREE YEARS AGO  STARTED GOING TO A FOOT DOCTOR BUT HE IS NOT GIVING HER ANYTHING TO HELP    If a prescription is needed, what is your preferred pharmacy and phone number: [facility] - [place], [place] - [address] - [phone] [facility] [phone] FX     Additional notes:    PLEASE CALL PATIENT IF WE CALL SOMETHING IN

## 2024-12-19 NOTE — TELEPHONE ENCOUNTER
Patient said its mostly the bottom part of her foot around her toes. She is having joint pain. She said she can barley walk on that foot right now.

## 2024-12-23 DIAGNOSIS — J43.1 PANLOBULAR EMPHYSEMA: ICD-10-CM

## 2024-12-23 RX ORDER — ALBUTEROL SULFATE 90 UG/1
2 INHALANT RESPIRATORY (INHALATION) EVERY 4 HOURS PRN
Qty: 18 G | Refills: 8 | OUTPATIENT
Start: 2024-12-23

## 2025-01-06 RX ORDER — GUAIFENESIN 600 MG/1
TABLET, EXTENDED RELEASE ORAL
Qty: 60 TABLET | Refills: 6 | Status: SHIPPED | OUTPATIENT
Start: 2025-01-06

## 2025-01-09 ENCOUNTER — OFFICE VISIT (OUTPATIENT)
Dept: FAMILY MEDICINE CLINIC | Facility: CLINIC | Age: 65
End: 2025-01-09
Payer: MEDICARE

## 2025-01-09 ENCOUNTER — TELEPHONE (OUTPATIENT)
Dept: FAMILY MEDICINE CLINIC | Facility: CLINIC | Age: 65
End: 2025-01-09

## 2025-01-09 VITALS
BODY MASS INDEX: 33.86 KG/M2 | TEMPERATURE: 97.8 F | RESPIRATION RATE: 16 BRPM | DIASTOLIC BLOOD PRESSURE: 68 MMHG | WEIGHT: 184 LBS | HEART RATE: 96 BPM | SYSTOLIC BLOOD PRESSURE: 126 MMHG | HEIGHT: 62 IN | OXYGEN SATURATION: 92 %

## 2025-01-09 DIAGNOSIS — I10 ESSENTIAL HYPERTENSION: ICD-10-CM

## 2025-01-09 DIAGNOSIS — M77.41 METATARSALGIA OF BOTH FEET: ICD-10-CM

## 2025-01-09 DIAGNOSIS — M15.9 GENERALIZED OSTEOARTHRITIS: ICD-10-CM

## 2025-01-09 DIAGNOSIS — E55.9 VITAMIN D DEFICIENCY: ICD-10-CM

## 2025-01-09 DIAGNOSIS — F17.200 SMOKER: ICD-10-CM

## 2025-01-09 DIAGNOSIS — E06.3 HYPOTHYROIDISM DUE TO HASHIMOTO'S THYROIDITIS: ICD-10-CM

## 2025-01-09 DIAGNOSIS — M77.42 METATARSALGIA OF BOTH FEET: ICD-10-CM

## 2025-01-09 DIAGNOSIS — M54.59 MECHANICAL LOW BACK PAIN: ICD-10-CM

## 2025-01-09 DIAGNOSIS — E11.42 TYPE 2 DIABETES MELLITUS WITH PERIPHERAL NEUROPATHY: ICD-10-CM

## 2025-01-09 DIAGNOSIS — M06.9 RHEUMATOID ARTHRITIS INVOLVING MULTIPLE SITES, UNSPECIFIED WHETHER RHEUMATOID FACTOR PRESENT: ICD-10-CM

## 2025-01-09 DIAGNOSIS — J43.1 PANLOBULAR EMPHYSEMA: ICD-10-CM

## 2025-01-09 DIAGNOSIS — E11.42 TYPE 2 DIABETES MELLITUS WITH PERIPHERAL NEUROPATHY: Primary | ICD-10-CM

## 2025-01-09 DIAGNOSIS — Z00.00 HEALTHCARE MAINTENANCE: ICD-10-CM

## 2025-01-09 DIAGNOSIS — E78.2 MIXED HYPERLIPIDEMIA: Primary | ICD-10-CM

## 2025-01-09 PROCEDURE — 82306 VITAMIN D 25 HYDROXY: CPT | Performed by: GENERAL PRACTICE

## 2025-01-09 PROCEDURE — 80061 LIPID PANEL: CPT | Performed by: GENERAL PRACTICE

## 2025-01-09 PROCEDURE — 82607 VITAMIN B-12: CPT | Performed by: GENERAL PRACTICE

## 2025-01-09 PROCEDURE — 3074F SYST BP LT 130 MM HG: CPT | Performed by: GENERAL PRACTICE

## 2025-01-09 PROCEDURE — 80053 COMPREHEN METABOLIC PANEL: CPT | Performed by: GENERAL PRACTICE

## 2025-01-09 PROCEDURE — 85025 COMPLETE CBC W/AUTO DIFF WBC: CPT | Performed by: GENERAL PRACTICE

## 2025-01-09 PROCEDURE — 3078F DIAST BP <80 MM HG: CPT | Performed by: GENERAL PRACTICE

## 2025-01-09 PROCEDURE — 99214 OFFICE O/P EST MOD 30 MIN: CPT | Performed by: GENERAL PRACTICE

## 2025-01-09 PROCEDURE — 83036 HEMOGLOBIN GLYCOSYLATED A1C: CPT | Performed by: GENERAL PRACTICE

## 2025-01-09 PROCEDURE — 1125F AMNT PAIN NOTED PAIN PRSNT: CPT | Performed by: GENERAL PRACTICE

## 2025-01-09 PROCEDURE — 84443 ASSAY THYROID STIM HORMONE: CPT | Performed by: GENERAL PRACTICE

## 2025-01-09 RX ORDER — GABAPENTIN 100 MG/1
100 CAPSULE ORAL NIGHTLY
Qty: 30 CAPSULE | Refills: 2 | Status: SHIPPED | OUTPATIENT
Start: 2025-01-09

## 2025-01-09 RX ORDER — BLOOD-GLUCOSE METER
1 KIT MISCELLANEOUS ONCE
Qty: 1 EACH | Refills: 0 | Status: SHIPPED | OUTPATIENT
Start: 2025-01-09 | End: 2025-01-09

## 2025-01-09 NOTE — PROGRESS NOTES
Subjective   Lisa Hill is a 64 y.o. female.     Chief Complaint  Foot pain    History of Present Illness     Chronic Back and Joint Pain  She complains of increased pain about the base of the toes bilaterally.  This is worse with weightbearing, but she admits to burning and paresthesias at rest.  She continues to have low back and generalized joint pain. There is no history of any weakness, or any change in her bowel/bladder control.  There is no history of any fever, chills, or night sweats.  She has been followed by podiatry on and off.  Plain films of the lumbar spine performed on 8/5/2019 were reported as showing degenerative disc disease as well as atherosclerotic calcification of the aorta    COPD  Her shortness of breath and cough have have improved some with weight loss. She admits to intermittent nasal congestion, but continues to deny any other upper respiratory tract symptoms, chest pain, hemoptysis, fever or chills.  Her symptoms worsen with activity, exposure to cold air and environmental allergens and improve some with rest, supplemental oxygen, and inhaled inhaled medication.  She is currently on daliresp and breztri and is using her rescue inhaler once or twice daily. She continues to smoke 1 pack per day on average.  She underwent an updated low dose CT of the chest on 4/11/2024 with evidence of emphysema, granulomatous changes with calcified hilar mediastinal lymph nodes, atherosclerosis of the thoracic aorta, and degenerative changes of the thoracic spine.  She underwent a pulmonology reassessment with Katarina Wan PA-C on 10/23/2024 and is scheduled to return on 1/23/2025    Type 2 Diabetes Mellitus  She admits to eating and paresthesias of the feet.  She continues to deny any visual disturbances, polydipsia, polyuria, hypoglycemia or foot ulcerations.  She remains on metformin, empagliflozin, and semaglutide.  She is currently on 1 mg of the latter weekly with no apparent side effects.   She has had no recent labs    Hyperlipidemia  Her compliance with treatment has been fair.  She has been following her diet closer and with the weight that she has lost has been able to tolerate more activity. She remains on simvastatin with no apparent side effects    Essential Hypertension  She admits to shortness of breath with intermittent lower extremity swelling but continues to deny any chest pain, orthopnea, PND, palpitations, or lightheadedness.  She is taking losartan with no apparent side effects    Hypothyroidism  She has a history of hypothyroidism associated with Hashimoto's disease.  She remains on levothyroxine 175 daily     Depression  She has a long history of intermittent depression, nervousness, anhedonia, difficulty concentrating, fatigue and impaired memory.  Since last here she has experienced increased anxiety. She continues to deny any suicidal ideation. Risk factors: history of seasonal affective disorder.  She is currently on escitalopram 20 daily, dextromethorphan-bupropion 45-1 05 twice daily, risperdone 3 nightly, and lorazepam 1 twice a day.  She is scheduled to undergo a psychiatric reassessment with Evelyne ESTRADA on 2/6/2025    The following portions of the patient's history were reviewed and updated as appropriate: allergies, current medications, past medical history, past social history, and problem list.    Review of Systems   Constitutional:  Positive for fatigue. Negative for chills and fever.   HENT:  Positive for hearing loss and rhinorrhea. Negative for congestion, ear pain, postnasal drip, sinus pressure, sneezing, sore throat and voice change.    Eyes:  Negative for visual disturbance.   Respiratory:  Positive for cough and shortness of breath. Negative for wheezing.    Cardiovascular:  Positive for leg swelling. Negative for chest pain and palpitations.   Gastrointestinal:  Positive for constipation. Negative for abdominal pain, blood in stool, diarrhea, nausea,  vomiting and GERD.   Genitourinary:  Positive for urinary incontinence (with coughing, sneezing lifting etc). Negative for dysuria, frequency, hematuria, pelvic pain and urgency.   Musculoskeletal:  Positive for arthralgias and back pain. Negative for joint swelling and myalgias.   Skin:  Negative for rash.   Neurological:  Positive for numbness (with burning and paresthesias of the feet) and headache. Negative for dizziness and weakness.   Psychiatric/Behavioral:  Positive for decreased concentration, sleep disturbance and depressed mood. Negative for hallucinations and suicidal ideas. The patient is nervous/anxious.      Objective   Physical Exam  Constitutional:       General: She is not in acute distress.     Appearance: Normal appearance. She is well-developed. She is not diaphoretic.      Comments: Bright and in fair spirits.  Climbed onto the exam table with minimal assistance.  No apparent distress.   HENT:      Head: Atraumatic.      Right Ear: Tympanic membrane, ear canal and external ear normal. Decreased hearing noted.      Left Ear: Tympanic membrane, ear canal and external ear normal. Decreased hearing noted.      Mouth/Throat:      Lips: No lesions.      Mouth: Mucous membranes are moist. No oral lesions.      Pharynx: No oropharyngeal exudate or posterior oropharyngeal erythema.   Eyes:      General: Lids are normal.      Extraocular Movements: Extraocular movements intact.      Conjunctiva/sclera: Conjunctivae normal.      Pupils: Pupils are equal.   Neck:      Thyroid: No thyroid mass or thyromegaly.      Vascular: No carotid bruit or JVD.      Trachea: Trachea normal. No tracheal deviation.   Cardiovascular:      Rate and Rhythm: Normal rate and regular rhythm.      Pulses:           Dorsalis pedis pulses are 2+ on the right side and 2+ on the left side.        Posterior tibial pulses are 2+ on the right side and 2+ on the left side.      Heart sounds: Normal heart sounds, S1 normal and S2 normal.  No murmur heard.     No gallop.      Comments: Both feet warm with a good capillary refill  Pulmonary:      Effort: Pulmonary effort is normal.      Breath sounds: Decreased air movement present. Examination of the right-lower field reveals decreased breath sounds. Examination of the left-lower field reveals decreased breath sounds. Decreased breath sounds and wheezing (diffuse - mild) present. No rales.      Comments: Pulmonary hyperinflation  Abdominal:      General: Bowel sounds are normal. There is no distension.   Musculoskeletal:      Right lower leg: No edema.      Left lower leg: No edema.      Comments: Tender about the MTP joints of both feet   Lymphadenopathy:      Head:      Right side of head: No submental, submandibular, tonsillar, preauricular, posterior auricular or occipital adenopathy.      Left side of head: No submental, submandibular, tonsillar, preauricular, posterior auricular or occipital adenopathy.      Cervical: No cervical adenopathy.      Upper Body:      Right upper body: No supraclavicular adenopathy.      Left upper body: No supraclavicular adenopathy.   Skin:     General: Skin is warm.      Coloration: Skin is not cyanotic, jaundiced or pale.      Findings: No rash.      Nails: There is no clubbing.   Neurological:      Mental Status: She is alert and oriented to person, place, and time.      Cranial Nerves: No cranial nerve deficit, dysarthria or facial asymmetry.      Sensory: Sensory deficit (decreased vibration sense both feet) present.      Motor: No tremor.      Coordination: Coordination normal.      Gait: Gait normal.   Psychiatric:         Attention and Perception: Attention normal.         Mood and Affect: Mood normal.         Speech: Speech normal.         Behavior: Behavior normal.         Thought Content: Thought content normal.       Assessment & Plan   Problems Addressed this Visit          Cardiac and Vasculature    Essential hypertension   Hypertension: at goal.  Evidence of target organ damage:  evidence of aortic atherosclerosis and coronary artery calcifications on previous imaging .  Encouraged to continue to work on diet and exercise plan.   Continue current medication    Relevant Orders    CBC & Differential    Comprehensive Metabolic Panel    Mixed hyperlipidemia   As above.   Continue current medication.    Relevant Orders    Comprehensive Metabolic Panel    Lipid Panel       Endocrine and Metabolic    Hypothyroidism due to Hashimoto's thyroiditis  Clinically euthyroid.  Continue current medication.    Relevant Orders    TSH    Type 2 diabetes mellitus with peripheral neuropathy  Diabetes mellitus Type II, under unknown control.   Encouraged to continue to pursue ADA diet  Encouraged aerobic exercise.  Reviewed options for her neuropathic pain and agreed on a trial of gabapentin  Will continue remaining medication  Updated labs drawn    Relevant Medications    gabapentin (NEURONTIN) 100 MG capsule    Other Relevant Orders    Comprehensive Metabolic Panel    Hemoglobin A1c    Vitamin B12    MicroAlbumin, Urine, Random - Urine, Clean Catch    Vitamin D deficiency    Relevant Orders    Vitamin D,25-Hydroxy       Health Encounters    Healthcare maintenance  Recommended an updated COVID-19 shot  Reminded to follow-up with her scheduled mammogram and DEXA scan       Musculoskeletal and Injuries    Generalized osteoarthritis  Reminded regarding symptomatic treatment.   Plain films of the right foot arranged  Continue current medication for now    Relevant Orders    XR foot 3+ vw right    Mechanical low back pain    Metatarsalgia of both feet  As above.    Relevant Orders    XR foot 3+ vw right    Rheumatoid arthritis    Relevant Orders    XR foot 3+ vw right    CBC & Differential    Comprehensive Metabolic Panel       Pulmonary and Pneumonias    Panlobular emphysema   COPD is stable.  Reminded of the importance of smoking cessation  Encouraged to remain as active as  symptoms allow for  Continue current medication   Follow up with pulmonology       Tobacco    Smoker     Diagnoses         Codes Comments    Mixed hyperlipidemia    -  Primary ICD-10-CM: E78.2  ICD-9-CM: 272.2     Essential hypertension     ICD-10-CM: I10  ICD-9-CM: 401.9     Type 2 diabetes mellitus with peripheral neuropathy     ICD-10-CM: E11.42  ICD-9-CM: 250.60, 357.2     Hypothyroidism due to Hashimoto's thyroiditis     ICD-10-CM: E06.3  ICD-9-CM: 245.2     Rheumatoid arthritis involving multiple sites, unspecified whether rheumatoid factor present     ICD-10-CM: M06.9  ICD-9-CM: 714.0     Metatarsalgia of both feet     ICD-10-CM: M77.41, M77.42  ICD-9-CM: 726.70     Mechanical low back pain     ICD-10-CM: M54.59  ICD-9-CM: 724.2     Generalized osteoarthritis     ICD-10-CM: M15.9  ICD-9-CM: 715.00     Panlobular emphysema     ICD-10-CM: J43.1  ICD-9-CM: 492.8     Smoker     ICD-10-CM: F17.200  ICD-9-CM: 305.1     Vitamin D deficiency     ICD-10-CM: E55.9  ICD-9-CM: 268.9     Healthcare maintenance     ICD-10-CM: Z00.00  ICD-9-CM: V70.0

## 2025-01-10 LAB
25(OH)D3 SERPL-MCNC: 74 NG/ML (ref 30–100)
ALBUMIN SERPL-MCNC: 3.5 G/DL (ref 3.5–5.2)
ALBUMIN/GLOB SERPL: 1.1 G/DL
ALP SERPL-CCNC: 81 U/L (ref 39–117)
ALT SERPL W P-5'-P-CCNC: 25 U/L (ref 1–33)
ANION GAP SERPL CALCULATED.3IONS-SCNC: 11.7 MMOL/L (ref 5–15)
AST SERPL-CCNC: 18 U/L (ref 1–32)
BASOPHILS # BLD AUTO: 0.08 10*3/MM3 (ref 0–0.2)
BASOPHILS NFR BLD AUTO: 0.8 % (ref 0–1.5)
BILIRUB SERPL-MCNC: <0.2 MG/DL (ref 0–1.2)
BUN SERPL-MCNC: 7 MG/DL (ref 8–23)
BUN/CREAT SERPL: 8.4 (ref 7–25)
CALCIUM SPEC-SCNC: 9.4 MG/DL (ref 8.6–10.5)
CHLORIDE SERPL-SCNC: 111 MMOL/L (ref 98–107)
CHOLEST SERPL-MCNC: 143 MG/DL (ref 0–200)
CO2 SERPL-SCNC: 22.3 MMOL/L (ref 22–29)
CREAT SERPL-MCNC: 0.83 MG/DL (ref 0.57–1)
DEPRECATED RDW RBC AUTO: 45.8 FL (ref 37–54)
EGFRCR SERPLBLD CKD-EPI 2021: 77.9 ML/MIN/1.73
EOSINOPHIL # BLD AUTO: 0.12 10*3/MM3 (ref 0–0.4)
EOSINOPHIL NFR BLD AUTO: 1.2 % (ref 0.3–6.2)
ERYTHROCYTE [DISTWIDTH] IN BLOOD BY AUTOMATED COUNT: 13.1 % (ref 12.3–15.4)
GLOBULIN UR ELPH-MCNC: 3.3 GM/DL
GLUCOSE SERPL-MCNC: 115 MG/DL (ref 65–99)
HBA1C MFR BLD: 5.3 % (ref 4.8–5.6)
HCT VFR BLD AUTO: 45.9 % (ref 34–46.6)
HDLC SERPL-MCNC: 42 MG/DL (ref 40–60)
HGB BLD-MCNC: 15.1 G/DL (ref 12–15.9)
IMM GRANULOCYTES # BLD AUTO: 0.03 10*3/MM3 (ref 0–0.05)
IMM GRANULOCYTES NFR BLD AUTO: 0.3 % (ref 0–0.5)
LDLC SERPL CALC-MCNC: 72 MG/DL (ref 0–100)
LDLC/HDLC SERPL: 1.61 {RATIO}
LYMPHOCYTES # BLD AUTO: 3.11 10*3/MM3 (ref 0.7–3.1)
LYMPHOCYTES NFR BLD AUTO: 31.6 % (ref 19.6–45.3)
MCH RBC QN AUTO: 31.1 PG (ref 26.6–33)
MCHC RBC AUTO-ENTMCNC: 32.9 G/DL (ref 31.5–35.7)
MCV RBC AUTO: 94.6 FL (ref 79–97)
MONOCYTES # BLD AUTO: 0.57 10*3/MM3 (ref 0.1–0.9)
MONOCYTES NFR BLD AUTO: 5.8 % (ref 5–12)
NEUTROPHILS NFR BLD AUTO: 5.94 10*3/MM3 (ref 1.7–7)
NEUTROPHILS NFR BLD AUTO: 60.3 % (ref 42.7–76)
NRBC BLD AUTO-RTO: 0 /100 WBC (ref 0–0.2)
PLATELET # BLD AUTO: 414 10*3/MM3 (ref 140–450)
PMV BLD AUTO: 10.5 FL (ref 6–12)
POTASSIUM SERPL-SCNC: 4.9 MMOL/L (ref 3.5–5.2)
PROT SERPL-MCNC: 6.8 G/DL (ref 6–8.5)
RBC # BLD AUTO: 4.85 10*6/MM3 (ref 3.77–5.28)
SODIUM SERPL-SCNC: 145 MMOL/L (ref 136–145)
TRIGL SERPL-MCNC: 167 MG/DL (ref 0–150)
TSH SERPL DL<=0.05 MIU/L-ACNC: 0.25 UIU/ML (ref 0.27–4.2)
VIT B12 BLD-MCNC: >2000 PG/ML (ref 211–946)
VLDLC SERPL-MCNC: 29 MG/DL (ref 5–40)
WBC NRBC COR # BLD AUTO: 9.85 10*3/MM3 (ref 3.4–10.8)

## 2025-01-10 NOTE — TELEPHONE ENCOUNTER
Caller: Lisa Hill    Relationship: Self    Best call back number: [phone]     What is the best time to reach you: ANY     What was the call regarding: LISA THOUGHT YOU WERE GOING TO CALL IN ANOTHER RX BESIDES THE GABAPENTIN.    Is it okay if the provider responds through MyChart: CALL   
Pt is aware of this information.     
right

## 2025-01-13 DIAGNOSIS — F41.9 ANXIETY DISORDER, UNSPECIFIED TYPE: ICD-10-CM

## 2025-01-13 DIAGNOSIS — J43.1 PANLOBULAR EMPHYSEMA: ICD-10-CM

## 2025-01-13 RX ORDER — ALBUTEROL SULFATE 90 UG/1
2 INHALANT RESPIRATORY (INHALATION) EVERY 4 HOURS PRN
Qty: 18 G | Refills: 8 | Status: SHIPPED | OUTPATIENT
Start: 2025-01-13

## 2025-01-13 RX ORDER — LORAZEPAM 1 MG/1
0.5-1 TABLET ORAL 2 TIMES DAILY PRN
Qty: 60 TABLET | Refills: 0 | Status: SHIPPED | OUTPATIENT
Start: 2025-01-13

## 2025-01-21 ENCOUNTER — OFFICE VISIT (OUTPATIENT)
Dept: FAMILY MEDICINE CLINIC | Facility: CLINIC | Age: 65
End: 2025-01-21
Payer: MEDICARE

## 2025-01-21 DIAGNOSIS — M54.59 MECHANICAL LOW BACK PAIN: ICD-10-CM

## 2025-01-21 DIAGNOSIS — I70.0 AORTIC ATHEROSCLEROSIS: ICD-10-CM

## 2025-01-21 DIAGNOSIS — J30.9 CHRONIC ALLERGIC RHINITIS: Primary | ICD-10-CM

## 2025-01-21 DIAGNOSIS — Z00.00 HEALTHCARE MAINTENANCE: ICD-10-CM

## 2025-01-21 DIAGNOSIS — M77.42 METATARSALGIA OF BOTH FEET: ICD-10-CM

## 2025-01-21 DIAGNOSIS — G47.33 OBSTRUCTIVE SLEEP APNEA: ICD-10-CM

## 2025-01-21 DIAGNOSIS — K59.09 CHRONIC CONSTIPATION: ICD-10-CM

## 2025-01-21 DIAGNOSIS — K21.9 GASTROESOPHAGEAL REFLUX DISEASE WITHOUT ESOPHAGITIS: ICD-10-CM

## 2025-01-21 DIAGNOSIS — M15.9 GENERALIZED OSTEOARTHRITIS: ICD-10-CM

## 2025-01-21 DIAGNOSIS — F41.8 DEPRESSION WITH ANXIETY: ICD-10-CM

## 2025-01-21 DIAGNOSIS — E11.42 TYPE 2 DIABETES MELLITUS WITH PERIPHERAL NEUROPATHY: ICD-10-CM

## 2025-01-21 DIAGNOSIS — M77.41 METATARSALGIA OF BOTH FEET: ICD-10-CM

## 2025-01-21 DIAGNOSIS — E78.2 MIXED HYPERLIPIDEMIA: ICD-10-CM

## 2025-01-21 DIAGNOSIS — M06.9 RHEUMATOID ARTHRITIS INVOLVING MULTIPLE SITES, UNSPECIFIED WHETHER RHEUMATOID FACTOR PRESENT: ICD-10-CM

## 2025-01-21 DIAGNOSIS — E55.9 VITAMIN D DEFICIENCY: ICD-10-CM

## 2025-01-21 DIAGNOSIS — D75.89 MACROCYTOSIS: ICD-10-CM

## 2025-01-21 DIAGNOSIS — Z51.81 ENCOUNTER FOR THERAPEUTIC DRUG LEVEL MONITORING: ICD-10-CM

## 2025-01-21 DIAGNOSIS — I25.10 CORONARY ARTERY CALCIFICATION SEEN ON CT SCAN: ICD-10-CM

## 2025-01-21 DIAGNOSIS — E66.811 CLASS 1 OBESITY WITH SERIOUS COMORBIDITY AND BODY MASS INDEX (BMI) OF 34.0 TO 34.9 IN ADULT, UNSPECIFIED OBESITY TYPE: ICD-10-CM

## 2025-01-21 DIAGNOSIS — I10 ESSENTIAL HYPERTENSION: ICD-10-CM

## 2025-01-21 DIAGNOSIS — E06.3 HYPOTHYROIDISM DUE TO HASHIMOTO'S THYROIDITIS: ICD-10-CM

## 2025-01-21 DIAGNOSIS — N39.3 STRESS BLADDER INCONTINENCE, FEMALE: ICD-10-CM

## 2025-01-21 DIAGNOSIS — F17.200 SMOKER: ICD-10-CM

## 2025-01-21 DIAGNOSIS — J43.1 PANLOBULAR EMPHYSEMA: ICD-10-CM

## 2025-01-21 DIAGNOSIS — M85.80 OSTEOPENIA, UNSPECIFIED LOCATION: ICD-10-CM

## 2025-01-21 PROBLEM — G72.9 MYOPATHY: Status: RESOLVED | Noted: 2024-07-05 | Resolved: 2025-01-21

## 2025-01-21 PROCEDURE — 96372 THER/PROPH/DIAG INJ SC/IM: CPT | Performed by: GENERAL PRACTICE

## 2025-01-21 PROCEDURE — 3078F DIAST BP <80 MM HG: CPT | Performed by: GENERAL PRACTICE

## 2025-01-21 PROCEDURE — 1125F AMNT PAIN NOTED PAIN PRSNT: CPT | Performed by: GENERAL PRACTICE

## 2025-01-21 PROCEDURE — 3044F HG A1C LEVEL LT 7.0%: CPT | Performed by: GENERAL PRACTICE

## 2025-01-21 PROCEDURE — 3074F SYST BP LT 130 MM HG: CPT | Performed by: GENERAL PRACTICE

## 2025-01-21 PROCEDURE — 99214 OFFICE O/P EST MOD 30 MIN: CPT | Performed by: GENERAL PRACTICE

## 2025-01-21 RX ORDER — METHYLPREDNISOLONE ACETATE 40 MG/ML
40 INJECTION, SUSPENSION INTRA-ARTICULAR; INTRALESIONAL; INTRAMUSCULAR; SOFT TISSUE ONCE
Status: COMPLETED | OUTPATIENT
Start: 2025-01-21 | End: 2025-01-21

## 2025-01-21 RX ADMIN — METHYLPREDNISOLONE ACETATE 40 MG: 40 INJECTION, SUSPENSION INTRA-ARTICULAR; INTRALESIONAL; INTRAMUSCULAR; SOFT TISSUE at 15:24

## 2025-01-21 NOTE — PROGRESS NOTES
Subjective   Lisa Hill is a 64 y.o. female.     Chief Complaint  She returns for a scheduled reassessment of multiple medical problems including chronic back and joint pain, chronic obstructive pulmonary disease, type 2 diabetes mellitus, hyperlipidemia, essential hypertension, hypothyroidism, and depression    History of Present Illness     Chronic Back and Joint Pain  She complains of persistent pain about the base of the toes bilaterally.  This is worse with weightbearing, but she admits to burning and paresthesias at rest.  She continues to have low back and generalized joint pain. There is no history of any weakness or any change in her bowel/bladder control.  There is no history of any fever, chills, or night sweats.  She has not experienced any side effects with gabapentin, but does not feel that it is helped with the burning or paresthesias.  She has been followed by podiatry on and off.  She has yet to follow-up with the x-rays of her foot arranged at her last visit.  Plain films of the lumbar spine performed on 8/5/2019 were reported as showing degenerative disc disease as well as atherosclerotic calcification of the aorta    COPD  Her shortness of breath and cough have have improved some with weight loss. She admits to intermittent nasal congestion, but continues to deny any other upper respiratory tract symptoms, chest pain, hemoptysis, fever or chills.  Her symptoms worsen with activity, exposure to cold air and environmental allergens, and improve some with rest, supplemental oxygen, and inhaled inhaled medication.  She is currently on daliresp and breztri and is using her rescue inhaler once or twice daily. She continues to smoke 1 pack per day on average.  She underwent an updated low dose CT of the chest on 4/11/2024 with evidence of emphysema, granulomatous changes with calcified hilar mediastinal lymph nodes, atherosclerosis of the thoracic aorta, and degenerative changes of the thoracic  spine.  She is scheduled to undergo a pulmonology reassessment with Katarina Wan PA-C on 2/10/2025   Lab Results   Component Value Date    WBC 9.85 01/09/2025    HGB 15.1 01/09/2025    HCT 45.9 01/09/2025    MCV 94.6 01/09/2025     01/09/2025     Type 2 Diabetes Mellitus  She admits to burning and paresthesias of the feet.  She continues to deny any visual disturbances, polydipsia, polyuria, hypoglycemia or foot ulcerations.  She remains on metformin, empagliflozin, and semaglutide with no apparent side effects.    Lab Results   Component Value Date    HGBA1C 5.30 01/09/2025     Lab Results   Component Value Date    RPXEYKDT89 >2,000 (H) 01/09/2025     Hyperlipidemia  Her compliance with treatment has been fair.  She has been following her diet closer and with the weight that she has lost has been able to tolerate more activity. She remains on simvastatin with no apparent side effects  Lab Results   Component Value Date    CHOL 143 01/09/2025    CHLPL 141 02/05/2016    TRIG 167 (H) 01/09/2025    HDL 42 01/09/2025    LDL 72 01/09/2025     Essential Hypertension  She admits to shortness of breath with intermittent lower extremity swelling but continues to deny any chest pain, orthopnea, PND, palpitations, or lightheadedness.  She is taking losartan with no apparent side effects  Lab Results   Component Value Date    GLUCOSE 115 (H) 01/09/2025    BUN 7 (L) 01/09/2025    CREATININE 0.83 01/09/2025     01/09/2025    K 4.9 01/09/2025     (H) 01/09/2025    CALCIUM 9.4 01/09/2025    PROTEINTOT 6.8 01/09/2025    ALBUMIN 3.5 01/09/2025    ALT 25 01/09/2025    AST 18 01/09/2025    ALKPHOS 81 01/09/2025    BILITOT <0.2 01/09/2025    GLOB 3.3 01/09/2025    AGRATIO 1.1 01/09/2025    BCR 8.4 01/09/2025    ANIONGAP 11.7 01/09/2025    EGFR 77.9 01/09/2025     Hypothyroidism  She has a history of hypothyroidism associated with Hashimoto's disease.  She remains on levothyroxine 175 daily   Lab Results   Component  Value Date    TSH 0.249 (L) 01/09/2025     Depression  She has a long history of intermittent depression, nervousness, anhedonia, difficulty concentrating, fatigue and impaired memory.  She continues to deny any suicidal ideation. Risk factors: history of seasonal affective disorder.  She is currently on escitalopram 20 daily, dextromethorphan-bupropion 45-1 05 twice daily, risperdone 3 nightly, and lorazepam 1 twice a day.  She is scheduled to undergo a psychiatric reassessment with Evelyne ESTRADA on 2/6/2025    Labs  Most recent vitamin D 74    The following portions of the patient's history were reviewed and updated as appropriate: allergies, current medications, past medical history, past social history, and problem list.    Review of Systems   Constitutional:  Positive for fatigue. Negative for chills and fever.   HENT:  Positive for hearing loss and rhinorrhea. Negative for congestion, ear pain, postnasal drip, sinus pressure, sneezing, sore throat and voice change.    Eyes:  Negative for visual disturbance.   Respiratory:  Positive for cough and shortness of breath. Negative for wheezing.    Cardiovascular:  Positive for leg swelling. Negative for chest pain and palpitations.   Gastrointestinal:  Positive for constipation. Negative for abdominal pain, blood in stool, diarrhea, nausea, vomiting and GERD.   Genitourinary:  Positive for urinary incontinence (with coughing, sneezing lifting etc). Negative for dysuria, frequency, hematuria, pelvic pain and urgency.   Musculoskeletal:  Positive for arthralgias and back pain. Negative for joint swelling and myalgias.   Skin:  Negative for rash.   Neurological:  Positive for numbness (with burning and paresthesias of the feet) and headache. Negative for dizziness and weakness.   Psychiatric/Behavioral:  Positive for decreased concentration, sleep disturbance, depressed mood and stress ( will be undergoing an AAA repair later this month). Negative for  hallucinations and suicidal ideas. The patient is nervous/anxious.      Objective   Physical Exam  Constitutional:       General: She is not in acute distress.     Appearance: Normal appearance. She is well-developed. She is not diaphoretic.      Comments: Bright and in fair spirits.  Climbed onto the exam table with minimal assistance.  No apparent distress.   HENT:      Head: Atraumatic.      Right Ear: Tympanic membrane, ear canal and external ear normal. Decreased hearing noted.      Left Ear: Tympanic membrane, ear canal and external ear normal. Decreased hearing noted.      Mouth/Throat:      Lips: No lesions.      Mouth: Mucous membranes are moist. No oral lesions.      Pharynx: No oropharyngeal exudate or posterior oropharyngeal erythema.   Eyes:      General: Lids are normal.      Extraocular Movements: Extraocular movements intact.      Conjunctiva/sclera: Conjunctivae normal.      Pupils: Pupils are equal.   Neck:      Thyroid: No thyroid mass or thyromegaly.      Vascular: No carotid bruit or JVD.      Trachea: Trachea normal. No tracheal deviation.   Cardiovascular:      Rate and Rhythm: Normal rate and regular rhythm.      Pulses:           Dorsalis pedis pulses are 2+ on the right side and 2+ on the left side.        Posterior tibial pulses are 2+ on the right side and 2+ on the left side.      Heart sounds: Normal heart sounds, S1 normal and S2 normal. No murmur heard.     No gallop.      Comments: Both feet warm with a good capillary refill  Pulmonary:      Effort: Pulmonary effort is normal.      Breath sounds: Decreased air movement present. Examination of the right-lower field reveals decreased breath sounds. Examination of the left-lower field reveals decreased breath sounds. Decreased breath sounds and wheezing (diffuse - mild) present. No rales.      Comments: Pulmonary hyperinflation  Abdominal:      General: Bowel sounds are normal. There is no distension.   Musculoskeletal:      Right lower  leg: No edema.      Left lower leg: No edema.      Comments: Tender about the MTP joints of both feet   Lymphadenopathy:      Head:      Right side of head: No submental, submandibular, tonsillar, preauricular, posterior auricular or occipital adenopathy.      Left side of head: No submental, submandibular, tonsillar, preauricular, posterior auricular or occipital adenopathy.      Cervical: No cervical adenopathy.      Upper Body:      Right upper body: No supraclavicular adenopathy.      Left upper body: No supraclavicular adenopathy.   Skin:     General: Skin is warm.      Coloration: Skin is not cyanotic, jaundiced or pale.      Findings: No rash.      Nails: There is no clubbing.   Neurological:      Mental Status: She is alert and oriented to person, place, and time.      Cranial Nerves: No cranial nerve deficit, dysarthria or facial asymmetry.      Sensory: Sensory deficit (decreased vibration sense both feet) present.      Motor: No tremor.      Coordination: Coordination normal.      Gait: Gait normal.   Psychiatric:         Attention and Perception: Attention normal.         Mood and Affect: Mood normal.         Speech: Speech normal.         Behavior: Behavior normal.         Thought Content: Thought content normal.       Assessment & Plan   Problems Addressed this Visit          Allergies and Adverse Reactions    Chronic allergic rhinitis     Continue current medication   Encouraged to report if any worse or if any new symptoms or concerns.           Cardiac and Vasculature    Aortic atherosclerosis  Reminded regarding risk factor modification with an emphasis on tobacco cessation.  Continue low-dose ASA.    Coronary artery calcification seen on CT scan  As above.    Essential hypertension   Hypertension: at goal. Evidence of target organ damage:  evidence of atherosclerosis and coronary artery calcifications on previous imaging .  Encouraged to continue to work on diet and exercise plan.   Continue  current medication    Mixed hyperlipidemia  As above.   Continue current medication.       Endocrine and Metabolic    Class 1 obesity with serious comorbidity and body mass index (BMI) of 34.0 to 34.9 in adult    Hypothyroidism due to Hashimoto's thyroiditis    Clinically euthyroid.  Continue current medication        Type 2 diabetes mellitus with peripheral neuropathy  Diabetes mellitus Type II, under excellent control.   Encouraged to continue to pursue ADA diet  Encouraged aerobic exercise.  Semaglutide will be titrated to 2 weekly  Will titrate gabapentin to 300 nightly  Will likely discontinue metformin and titrate empagliflozin at her return    Relevant Medications    Semaglutide, 2 MG/DOSE, (Ozempic, 2 MG/DOSE,) 8 MG/3ML solution pen-injector    gabapentin (NEURONTIN) 300 MG capsule    Other Relevant Orders    Urine Drug Screen - Urine, Clean Catch    Vitamin D deficiency       Gastrointestinal Abdominal     Chronic constipation    Gastroesophageal reflux disease without esophagitis       Genitourinary and Reproductive     Stress bladder incontinence, female       Health Encounters    Healthcare maintenance  Recommended an updated COVID-19 shot  Encouraged to follow-up with her scheduled mammogram  Advised that she will be due for an updated LDCT of the chest the spring       Hematology and Neoplasia    Macrocytosis       Mental Health    Depression with anxiety  Stable.  Supportive therapy.   Continue current medication.  Follow up with psychiatry    Relevant Orders    Urine Drug Screen - Urine, Clean Catch       Musculoskeletal and Injuries    Generalized osteoarthritis  Reminded regarding symptomatic treatment.   Continue current medication  Agreed on a corticosteroid ejection  Encouraged to follow-up with x-rays of the foot    Relevant Medications    methylPREDNISolone acetate (DEPO-medrol) injection 40 mg (Completed)    Mechanical low back pain  As above.    Metatarsalgia of both feet  As above.     Relevant Medications    methylPREDNISolone acetate (DEPO-medrol) injection 40 mg (Completed)    Osteopenia    Rheumatoid arthritis    Relevant Medications    methylPREDNISolone acetate (DEPO-medrol) injection 40 mg (Completed)       Pulmonary and Pneumonias    Panlobular emphysema     COPD is stable.  Reminded of the importance of smoking cessation  Encouraged to remain as active as symptoms allow for  Continue current medication  Follow up with pulmonology            Sleep    Obstructive sleep apnea       Tobacco    Smoker     Diagnoses         Codes Comments    Chronic allergic rhinitis    -  Primary ICD-10-CM: J30.9  ICD-9-CM: 477.9     Mixed hyperlipidemia     ICD-10-CM: E78.2  ICD-9-CM: 272.2     Essential hypertension     ICD-10-CM: I10  ICD-9-CM: 401.9     Coronary artery calcification seen on CT scan     ICD-10-CM: I25.10  ICD-9-CM: 414.00     Aortic atherosclerosis     ICD-10-CM: I70.0  ICD-9-CM: 440.0     Vitamin D deficiency     ICD-10-CM: E55.9  ICD-9-CM: 268.9     Type 2 diabetes mellitus with peripheral neuropathy     ICD-10-CM: E11.42  ICD-9-CM: 250.60, 357.2     Hypothyroidism due to Hashimoto's thyroiditis     ICD-10-CM: E06.3  ICD-9-CM: 245.2     Class 1 obesity with serious comorbidity and body mass index (BMI) of 34.0 to 34.9 in adult, unspecified obesity type     ICD-10-CM: E66.811, Z68.34  ICD-9-CM: 278.00, V85.34     Gastroesophageal reflux disease without esophagitis     ICD-10-CM: K21.9  ICD-9-CM: 530.81     Chronic constipation     ICD-10-CM: K59.09  ICD-9-CM: 564.00     Stress bladder incontinence, female     ICD-10-CM: N39.3  ICD-9-CM: 625.6     Healthcare maintenance     ICD-10-CM: Z00.00  ICD-9-CM: V70.0     Macrocytosis     ICD-10-CM: D75.89  ICD-9-CM: 289.89     Depression with anxiety     ICD-10-CM: F41.8  ICD-9-CM: 300.4     Rheumatoid arthritis involving multiple sites, unspecified whether rheumatoid factor present     ICD-10-CM: M06.9  ICD-9-CM: 714.0     Osteopenia, unspecified  location     ICD-10-CM: M85.80  ICD-9-CM: 733.90     Metatarsalgia of both feet     ICD-10-CM: M77.41, M77.42  ICD-9-CM: 726.70     Mechanical low back pain     ICD-10-CM: M54.59  ICD-9-CM: 724.2     Generalized osteoarthritis     ICD-10-CM: M15.9  ICD-9-CM: 715.00     Panlobular emphysema     ICD-10-CM: J43.1  ICD-9-CM: 492.8     Smoker     ICD-10-CM: F17.200  ICD-9-CM: 305.1     Obstructive sleep apnea     ICD-10-CM: G47.33  ICD-9-CM: 327.23     Encounter for therapeutic drug level monitoring     ICD-10-CM: Z51.81  ICD-9-CM: V58.83

## 2025-01-22 VITALS
HEART RATE: 96 BPM | TEMPERATURE: 98.6 F | SYSTOLIC BLOOD PRESSURE: 122 MMHG | HEIGHT: 62 IN | BODY MASS INDEX: 34.23 KG/M2 | OXYGEN SATURATION: 96 % | WEIGHT: 186 LBS | DIASTOLIC BLOOD PRESSURE: 62 MMHG | RESPIRATION RATE: 16 BRPM

## 2025-01-22 DIAGNOSIS — E11.42 TYPE 2 DIABETES MELLITUS WITH PERIPHERAL NEUROPATHY: ICD-10-CM

## 2025-01-22 RX ORDER — GABAPENTIN 300 MG/1
300 CAPSULE ORAL NIGHTLY
Qty: 30 CAPSULE | Refills: 3 | Status: SHIPPED | OUTPATIENT
Start: 2025-01-22

## 2025-01-22 RX ORDER — GABAPENTIN 100 MG/1
100 CAPSULE ORAL NIGHTLY
Qty: 30 CAPSULE | Refills: 2 | Status: CANCELLED | OUTPATIENT
Start: 2025-01-22

## 2025-01-22 RX ORDER — SEMAGLUTIDE 2.68 MG/ML
2 INJECTION, SOLUTION SUBCUTANEOUS WEEKLY
Qty: 9 ML | Refills: 1 | Status: SHIPPED | OUTPATIENT
Start: 2025-01-22

## 2025-01-22 NOTE — TELEPHONE ENCOUNTER
Caller: Lisa Hill    Relationship: Self    Best call back number: [phone]     Requested Prescriptions: gabapentin (NEURONTIN) 100 MG capsule      Pharmacy where request should be sent: [facility] [place] [address] [facility] - [phone]  - [phone] FX     Last office visit with prescribing clinician: 1/21/2025   Last telemedicine visit with prescribing clinician: Visit date not found   Next office visit with prescribing clinician: 4/21/2025     Additional details provided by patient: PATIENT WAS TOLD THIS MEDICATION WAS BEING CALLED IN YESTERDAY AT HER APPOINTMENT. THE PHARMACY DID NOT RECEIVE THE PRESCRIPTION. PLEASE RESEND.    Would you like a call back once the refill request has been completed: [] Yes [x] No    If the office needs to give you a call back, can they leave a voicemail: [] Yes [x] No    Jorge Hernandez Rep   01/22/25 10:51 EST

## 2025-01-23 DIAGNOSIS — G47.33 OBSTRUCTIVE SLEEP APNEA: Primary | ICD-10-CM

## 2025-01-23 DIAGNOSIS — J30.9 CHRONIC ALLERGIC RHINITIS: ICD-10-CM

## 2025-01-23 RX ORDER — MONTELUKAST SODIUM 10 MG/1
10 TABLET ORAL DAILY
Qty: 90 TABLET | Refills: 3 | Status: SHIPPED | OUTPATIENT
Start: 2025-01-23 | End: 2026-01-18

## 2025-01-23 NOTE — PROGRESS NOTES
Returned patient's call    There was some confusion at the last visit.  I had thought she still had an old sleep machine but did not have any supplies available.  Order was placed for supplies.    During discussion today, patient stated that she does not have a machine at all.  We will need to complete new sleep apnea testing at the last home sleep study in August did not obtain any hours of recording.   Would like to try a home sleep study again before in lab  Ordered in-home sleep study.  Will keep current appointment, but can reschedule as needed to ensure that she is able to complete testing and receive machine as soon as possible upon requalification  Refilled Singulair today.    Pulmonary other medication refills appear appropriate.

## 2025-02-02 DIAGNOSIS — F41.9 ANXIETY DISORDER, UNSPECIFIED TYPE: ICD-10-CM

## 2025-02-02 DIAGNOSIS — F33.1 MAJOR DEPRESSIVE DISORDER, RECURRENT EPISODE, MODERATE: ICD-10-CM

## 2025-02-02 DIAGNOSIS — J43.1 PANLOBULAR EMPHYSEMA: ICD-10-CM

## 2025-02-03 RX ORDER — ESCITALOPRAM OXALATE 20 MG/1
20 TABLET ORAL DAILY
Qty: 30 TABLET | Refills: 0 | Status: SHIPPED | OUTPATIENT
Start: 2025-02-03

## 2025-02-03 RX ORDER — ROFLUMILAST 500 UG/1
500 TABLET ORAL DAILY
Qty: 30 TABLET | Refills: 6 | Status: SHIPPED | OUTPATIENT
Start: 2025-02-03

## 2025-02-11 DIAGNOSIS — F41.9 ANXIETY DISORDER, UNSPECIFIED TYPE: ICD-10-CM

## 2025-02-11 RX ORDER — LORAZEPAM 1 MG/1
TABLET ORAL
Qty: 60 TABLET | Refills: 0 | Status: SHIPPED | OUTPATIENT
Start: 2025-02-11

## 2025-02-13 ENCOUNTER — TELEPHONE (OUTPATIENT)
Dept: PSYCHIATRY | Facility: CLINIC | Age: 65
End: 2025-02-13
Payer: MEDICARE

## 2025-02-13 NOTE — TELEPHONE ENCOUNTER
HUB OK TO RELAY:    Provider Cloud has sent in her script to the pharmacy.  I attempted to call patient to inform her to check with the pharmacy but there was no answer

## 2025-02-27 ENCOUNTER — OFFICE VISIT (OUTPATIENT)
Dept: PSYCHIATRY | Facility: CLINIC | Age: 65
End: 2025-02-27
Payer: MEDICARE

## 2025-02-27 VITALS
BODY MASS INDEX: 32.52 KG/M2 | HEART RATE: 91 BPM | SYSTOLIC BLOOD PRESSURE: 132 MMHG | DIASTOLIC BLOOD PRESSURE: 82 MMHG | OXYGEN SATURATION: 96 % | WEIGHT: 177.8 LBS

## 2025-02-27 DIAGNOSIS — F33.1 MAJOR DEPRESSIVE DISORDER, RECURRENT EPISODE, MODERATE: ICD-10-CM

## 2025-02-27 DIAGNOSIS — F41.9 ANXIETY DISORDER, UNSPECIFIED TYPE: ICD-10-CM

## 2025-02-27 RX ORDER — LORAZEPAM 1 MG/1
.5-1 TABLET ORAL 2 TIMES DAILY PRN
Qty: 60 TABLET | Refills: 0 | Status: SHIPPED | OUTPATIENT
Start: 2025-02-27

## 2025-02-27 RX ORDER — ESCITALOPRAM OXALATE 20 MG/1
20 TABLET ORAL DAILY
Qty: 30 TABLET | Refills: 3 | Status: SHIPPED | OUTPATIENT
Start: 2025-02-27

## 2025-02-27 RX ORDER — RISPERIDONE 3 MG/1
3 TABLET ORAL
Qty: 30 TABLET | Refills: 3 | Status: SHIPPED | OUTPATIENT
Start: 2025-02-27

## 2025-02-27 RX ORDER — DEXTROMETHORPHAN HYDROBROMIDE, BUPROPION HYDROCHLORIDE 105; 45 MG/1; MG/1
1 TABLET, MULTILAYER, EXTENDED RELEASE ORAL 2 TIMES DAILY
Qty: 60 TABLET | Refills: 2 | Status: SHIPPED | OUTPATIENT
Start: 2025-02-27

## 2025-03-03 DIAGNOSIS — F33.1 MAJOR DEPRESSIVE DISORDER, RECURRENT EPISODE, MODERATE: ICD-10-CM

## 2025-03-03 RX ORDER — RISPERIDONE 3 MG/1
3 TABLET ORAL
Qty: 30 TABLET | Refills: 3 | OUTPATIENT
Start: 2025-03-03

## 2025-03-27 ENCOUNTER — TELEPHONE (OUTPATIENT)
Dept: FAMILY MEDICINE CLINIC | Facility: CLINIC | Age: 65
End: 2025-03-27

## 2025-03-27 DIAGNOSIS — M15.9 GENERALIZED OSTEOARTHRITIS: ICD-10-CM

## 2025-03-27 DIAGNOSIS — M54.59 MECHANICAL LOW BACK PAIN: ICD-10-CM

## 2025-03-27 NOTE — TELEPHONE ENCOUNTER
Caller: Lisa Hill    Relationship: Self    Best call back number: [phone]     Which medication are you concerned about: diclofenac (VOLTAREN) 25 MG EC tablet     Who prescribed you this medication: DR BEAL    When did you start taking this medication: 4 MONTHS    What are your concerns: PT IS ASKING IF PCP WOULD PRESCRIBE A STRONGER DOSE OF THE MEDICATION. CURRENT DOSE IS NOT WORKING VERY WELL.  [facility] - [place], [place] - [address] [facility] - [phone]  - [phone]  [phone]

## 2025-04-01 ENCOUNTER — OFFICE VISIT (OUTPATIENT)
Dept: PULMONOLOGY | Facility: CLINIC | Age: 65
End: 2025-04-01
Payer: MEDICARE

## 2025-04-01 VITALS
SYSTOLIC BLOOD PRESSURE: 138 MMHG | BODY MASS INDEX: 34.25 KG/M2 | DIASTOLIC BLOOD PRESSURE: 88 MMHG | TEMPERATURE: 98.4 F | OXYGEN SATURATION: 96 % | HEART RATE: 86 BPM | HEIGHT: 61 IN | WEIGHT: 181.4 LBS

## 2025-04-01 DIAGNOSIS — R91.1 PULMONARY NODULE: ICD-10-CM

## 2025-04-01 DIAGNOSIS — J30.9 CHRONIC ALLERGIC RHINITIS: ICD-10-CM

## 2025-04-01 DIAGNOSIS — J43.9 PULMONARY EMPHYSEMA, UNSPECIFIED EMPHYSEMA TYPE: Primary | ICD-10-CM

## 2025-04-01 DIAGNOSIS — F17.210 CIGARETTE NICOTINE DEPENDENCE WITHOUT COMPLICATION: ICD-10-CM

## 2025-04-01 DIAGNOSIS — G47.33 OBSTRUCTIVE SLEEP APNEA: ICD-10-CM

## 2025-04-01 DIAGNOSIS — G47.34 NOCTURNAL HYPOXIA: ICD-10-CM

## 2025-04-01 PROCEDURE — 99214 OFFICE O/P EST MOD 30 MIN: CPT | Performed by: PHYSICIAN ASSISTANT

## 2025-04-01 RX ORDER — MONTELUKAST SODIUM 10 MG/1
1 TABLET ORAL DAILY
COMMUNITY
Start: 2025-03-10

## 2025-04-01 NOTE — PROGRESS NOTES
"Chief Complaint  Panlobular emphysema    Subjective        Lisa Hill presents to [facility]  History of Present Illness    Mrs. Hill presents today for follow up evaluation.  Notable for COPD, continues with use of Breztr and rescue medications. Notices increased smothering from rescue neb treatment, thus doesn't use this often.   Discussed that she notes some issues with anxiety. Seeing psychiatry for this which has allowed for improvement.     Had difficulty with home sleep test but would like to repeat if she has some assistance.     Remains a current smoker, but states that she went from 4 cartons per month (with 10 packs of cigarettes) to 3 cartons per month.   This averages out to around 1.25 ppd for the last 45 years, now to 1 ppd average.       Objective   Vital Signs:  /88   Pulse 86   Temp 98.4 °F (36.9 °C)   Ht 154.9 cm (61\")   Wt 82.3 kg (181 lb 6.4 oz)   SpO2 96%   BMI 34.28 kg/m²   Estimated body mass index is 34.28 kg/m² as calculated from the following:    Height as of this encounter: 154.9 cm (61\").    Weight as of this encounter: 82.3 kg (181 lb 6.4 oz).        Physical Exam  Vitals reviewed.   Constitutional:       General: She is not in acute distress.  Cardiovascular:      Rate and Rhythm: Normal rate and regular rhythm.   Pulmonary:      Effort: Pulmonary effort is normal.      Breath sounds: No wheezing, rhonchi or rales.   Neurological:      Mental Status: She is alert and oriented to person, place, and time.   Psychiatric:         Behavior: Behavior normal.            Result Review :  The following data was reviewed by: Katarina Wan PA-C on 04/01/2025:      CT chest imaging/report April 2024    Per personal review  - Image 70 - 2 mm nodule remained stable since 2019  - Calcified lymph nodes stable  - Emphysema noted      -----------------------------------------------------------------------------------    PFT " 2019    Flow volume was assessed.  Spirometry showed mild obstruction.  Lung volumes are normal.  The RV/TLC ratio is increased indicating air trapping.  Diffusing capacity, uncorrected for hemoglobin, is mildly reduced.     Conclusion:  Mild obstruction with mildly reduced diffusing capacity.         -----------------------------------------------------------------------------------    Previous labs reviewed - including CBC w/differentials from [id number]    -----------------------------------------------------------------------------------    Home sleep study January 2025              Assessment and Plan   Diagnoses and all orders for this visit:    1. Pulmonary emphysema, unspecified emphysema type (Primary)  -     Only Spirometry  -      CT Chest Low Dose Cancer Screening WO; Future    2. Obstructive sleep apnea  -     Home Sleep Study; Future    3. Chronic allergic rhinitis    4. Nocturnal hypoxia    5. Pulmonary nodule  -      CT Chest Low Dose Cancer Screening WO; Future    6. Cigarette nicotine dependence without complication  -      CT Chest Low Dose Cancer Screening WO; Future          COPD, emphysema type:  Visible emphysema noted, but may also have some bronchitis component due to for recurrent phlegm production at baseline.  Continue albuterol inhaler as needed  Continue DuoNeb as needed  Continue Breztri scheduled  Continue Daliresp 500 mcg  Has percussion vest, benefiting from use to mobilize secretions  Briefly discussed Ohtuv[employer]re again today, prefers to hold off on this option.        HERBERT:  Previously qualified, but had difficulty with tolerance of the device.  Switched to nasal cannula.    Patient later decided that she wanted to return to AutoPap or consider inspire device.  Initially thought she still had her old device, but no longer has this available.  New home study completed, but acquired 0 data   Had difficulty with the device.  Patient prefers to repeat home sleep study if she can be  provided initial instructions.  Contact the [employer] company and see if this is an option, but had to leave a message.  Recommended in lab sleep study, she preferred home test if possible  Reconsider this as needed.      Addendum - reached HSS assistant. They do not provide personal instruction other than pamphlet with the device.   She would like to repeat HSS and have family member come to assist her (she will ask one).   Ordered new home sleep study      Pulmonary nodule,  Calcified lymph nodes,  Cigarette dependence:  Last CT chest from April 2024 showed  -Image 70, 2 mm nodule right lung, stable since 2019  -Calcified lymph nodes, stable, stable since 2017  Other previous 7 mm groundglass left nodule and 4 mm left nodule resolved on prior imaging  Remains current smoker.  Has intermittently decreased use to around 1 pack/day average.  Previously using around 1.25 pack/day average. Smoked around 45 years  Has attempted to quit multiple times with various options.    Has tried nicotine inhaler, nicotine nasal spray, nicotine gum.  Preferred to avoid Chantix due to side effect concerns.  Has already taken Wellbutrin in the past.  Ordered new LDCT April 2025          Seasonal allergies:  Currently on Singulair nightly --with underlying anxiety concerns, recommended trial of medication break to see if this allows for some anxiety/depression approval due to blackbox warning.  Otherwise does not seem to make symptoms worse at this time, and can continue if needed.          Follow Up   Return in about 2 months (around 6/1/2025), or if symptoms worsen or fail to improve, for Recheck.  Patient was given instructions and counseling regarding her condition or for health maintenance advice. Please see specific information pulled into the AVS if appropriate.

## 2025-04-07 DIAGNOSIS — F41.9 ANXIETY DISORDER, UNSPECIFIED TYPE: ICD-10-CM

## 2025-04-07 RX ORDER — LORAZEPAM 1 MG/1
.5-1 TABLET ORAL 2 TIMES DAILY PRN
Qty: 60 TABLET | Refills: 0 | Status: SHIPPED | OUTPATIENT
Start: 2025-04-07

## 2025-04-08 ENCOUNTER — HOSPITAL ENCOUNTER (OUTPATIENT)
Dept: MAMMOGRAPHY | Facility: HOSPITAL | Age: 65
Discharge: HOME | End: 2025-04-08
Payer: MEDICARE

## 2025-04-08 ENCOUNTER — HOSPITAL ENCOUNTER (OUTPATIENT)
Dept: GENERAL RADIOLOGY | Facility: HOSPITAL | Age: 65
Discharge: HOME | End: 2025-04-08
Payer: MEDICARE

## 2025-04-08 ENCOUNTER — HOSPITAL ENCOUNTER (OUTPATIENT)
Dept: BONE DENSITY | Facility: HOSPITAL | Age: 65
Discharge: HOME | End: 2025-04-08
Payer: MEDICARE

## 2025-04-08 DIAGNOSIS — M85.80 OSTEOPENIA, UNSPECIFIED LOCATION: ICD-10-CM

## 2025-04-08 DIAGNOSIS — Z00.00 HEALTHCARE MAINTENANCE: ICD-10-CM

## 2025-04-08 DIAGNOSIS — M77.42 METATARSALGIA OF BOTH FEET: ICD-10-CM

## 2025-04-08 DIAGNOSIS — M15.9 GENERALIZED OSTEOARTHRITIS: ICD-10-CM

## 2025-04-08 DIAGNOSIS — M77.41 METATARSALGIA OF BOTH FEET: ICD-10-CM

## 2025-04-08 DIAGNOSIS — M06.9 RHEUMATOID ARTHRITIS INVOLVING MULTIPLE SITES, UNSPECIFIED WHETHER RHEUMATOID FACTOR PRESENT: ICD-10-CM

## 2025-04-08 DIAGNOSIS — Z12.31 ENCOUNTER FOR SCREENING MAMMOGRAM FOR BREAST CANCER: ICD-10-CM

## 2025-04-08 PROCEDURE — 77067 SCR MAMMO BI INCL CAD: CPT

## 2025-04-08 PROCEDURE — 77063 BREAST TOMOSYNTHESIS BI: CPT

## 2025-04-08 PROCEDURE — 77080 DXA BONE DENSITY AXIAL: CPT

## 2025-04-08 PROCEDURE — 73630 X-RAY EXAM OF FOOT: CPT | Performed by: RADIOLOGY

## 2025-04-08 PROCEDURE — 73630 X-RAY EXAM OF FOOT: CPT

## 2025-04-08 PROCEDURE — 77080 DXA BONE DENSITY AXIAL: CPT | Performed by: RADIOLOGY

## 2025-04-08 PROCEDURE — 77063 BREAST TOMOSYNTHESIS BI: CPT | Performed by: RADIOLOGY

## 2025-04-08 PROCEDURE — 77067 SCR MAMMO BI INCL CAD: CPT | Performed by: RADIOLOGY

## 2025-04-09 DIAGNOSIS — E11.42 TYPE 2 DIABETES MELLITUS WITH PERIPHERAL NEUROPATHY: ICD-10-CM

## 2025-04-09 RX ORDER — GABAPENTIN 300 MG/1
300 CAPSULE ORAL 2 TIMES DAILY
Qty: 60 CAPSULE | Refills: 3 | Status: SHIPPED | OUTPATIENT
Start: 2025-04-09

## 2025-04-11 LAB
FEV1/FVC: 74 %
FEV1: 1.38 LITERS
FVC VOL RESPIRATORY: 1.86 LITERS

## 2025-04-11 ASSESSMENT — PULMONARY FUNCTION TESTS
FEV1/FVC: 74
FEV1: 1.38
FVC: 1.86

## 2025-04-20 DIAGNOSIS — M15.9 GENERALIZED OSTEOARTHRITIS: ICD-10-CM

## 2025-04-21 RX ORDER — PSEUDOEPHED/ACETAMINOPH/DIPHEN 30MG-500MG
TABLET ORAL
Qty: 180 TABLET | Refills: 5 | Status: SHIPPED | OUTPATIENT
Start: 2025-04-21

## 2025-04-27 DIAGNOSIS — E78.2 MIXED HYPERLIPIDEMIA: ICD-10-CM

## 2025-04-27 DIAGNOSIS — I10 ESSENTIAL HYPERTENSION: ICD-10-CM

## 2025-04-27 DIAGNOSIS — E55.9 VITAMIN D DEFICIENCY: ICD-10-CM

## 2025-04-27 DIAGNOSIS — K21.9 GASTROESOPHAGEAL REFLUX DISEASE WITHOUT ESOPHAGITIS: ICD-10-CM

## 2025-04-27 DIAGNOSIS — E06.3 HYPOTHYROIDISM DUE TO HASHIMOTO'S THYROIDITIS: ICD-10-CM

## 2025-04-28 RX ORDER — LEVOTHYROXINE SODIUM 175 UG/1
175 TABLET ORAL DAILY
Qty: 30 TABLET | Refills: 5 | Status: SHIPPED | OUTPATIENT
Start: 2025-04-28

## 2025-04-28 RX ORDER — PANTOPRAZOLE SODIUM 40 MG/1
40 TABLET, DELAYED RELEASE ORAL DAILY
Qty: 30 TABLET | Refills: 5 | Status: SHIPPED | OUTPATIENT
Start: 2025-04-28

## 2025-04-28 RX ORDER — SIMVASTATIN 20 MG
20 TABLET ORAL
Qty: 30 TABLET | Refills: 5 | Status: SHIPPED | OUTPATIENT
Start: 2025-04-28

## 2025-04-28 RX ORDER — CHOLECALCIFEROL (VITAMIN D3) 25 MCG
2000 TABLET ORAL DAILY
Qty: 60 TABLET | Refills: 5 | Status: SHIPPED | OUTPATIENT
Start: 2025-04-28

## 2025-04-28 RX ORDER — LOSARTAN POTASSIUM 50 MG/1
50 TABLET ORAL DAILY
Qty: 30 TABLET | Refills: 5 | Status: SHIPPED | OUTPATIENT
Start: 2025-04-28

## 2025-05-01 ENCOUNTER — OFFICE VISIT (OUTPATIENT)
Dept: PSYCHIATRY | Facility: CLINIC | Age: 65
End: 2025-05-01
Payer: MEDICARE

## 2025-05-01 VITALS
DIASTOLIC BLOOD PRESSURE: 74 MMHG | OXYGEN SATURATION: 98 % | WEIGHT: 180.2 LBS | HEART RATE: 87 BPM | BODY MASS INDEX: 34.05 KG/M2 | SYSTOLIC BLOOD PRESSURE: 124 MMHG

## 2025-05-01 DIAGNOSIS — F33.1 MAJOR DEPRESSIVE DISORDER, RECURRENT EPISODE, MODERATE: ICD-10-CM

## 2025-05-01 DIAGNOSIS — Z51.81 ENCOUNTER FOR THERAPEUTIC DRUG MONITORING: ICD-10-CM

## 2025-05-01 DIAGNOSIS — F41.9 ANXIETY DISORDER, UNSPECIFIED TYPE: Primary | ICD-10-CM

## 2025-05-01 DIAGNOSIS — Z79.899 ENCOUNTER FOR LONG-TERM (CURRENT) USE OF HIGH-RISK MEDICATION: ICD-10-CM

## 2025-05-01 RX ORDER — DEXTROMETHORPHAN HYDROBROMIDE, BUPROPION HYDROCHLORIDE 105; 45 MG/1; MG/1
1 TABLET, MULTILAYER, EXTENDED RELEASE ORAL 2 TIMES DAILY
Qty: 60 TABLET | Refills: 2 | Status: SHIPPED | OUTPATIENT
Start: 2025-05-01

## 2025-05-01 RX ORDER — ESCITALOPRAM OXALATE 20 MG/1
20 TABLET ORAL DAILY
Qty: 30 TABLET | Refills: 3 | Status: SHIPPED | OUTPATIENT
Start: 2025-05-01

## 2025-05-01 RX ORDER — RISPERIDONE 3 MG/1
3 TABLET ORAL
Qty: 30 TABLET | Refills: 3 | Status: SHIPPED | OUTPATIENT
Start: 2025-05-01

## 2025-05-01 RX ORDER — LORAZEPAM 1 MG/1
.5-1 TABLET ORAL 3 TIMES DAILY PRN
Qty: 60 TABLET | Refills: 0 | Status: SHIPPED | OUTPATIENT
Start: 2025-05-01

## 2025-05-01 NOTE — PROGRESS NOTES
Subjective   Lisa Hill is a 65 y.o. female is here today for medication management follow-up.    Chief Complaint:  anxiety depression     History of Present Illness:    Patient presents today for follow up for medication management for depression and anxiety. Patient states lately having a lot of anxiety attacks. Patient states having spells in which shortness of breath, shaking, and heart racing. Patient states feeling really nervous and lasting more than 30 mins. Denies any mood swings. Denies any anger or irritability. Patient reports currently depression is at a 2-3 on a 0-10 scale with 10 being the worst. Patient reports anxiety is at a 8-9 on a 0-10 scale with 10 being the worst. Patient reports having a few panic attacks throughout the day. Patient states not triggered by anything specifically. Patient reports sleeping at least 7-8 hours a night. Patient states if wake up have a harder time to go back to sleep. Patient states about 3-4 am wake up and stay up for a little bit then go back to sleep. Denies any nightmares. Patient reports appetite is good. Patient states eating two to three times a day. Patient denies any auditory or visual hallucinations. Patient adamantly denies any thoughts to harm self or others. Patient denies any side effects to medications. Patient denies any medical changes since last visit.   The following portions of the patient's history were reviewed and updated as appropriate: allergies, current medications, past family history, past medical history, past social history, past surgical history, and problem list.    Review of Systems   Constitutional: Negative.    Respiratory: Negative.     Cardiovascular: Negative.    Gastrointestinal: Negative.    Neurological: Negative.    Psychiatric/Behavioral:  Positive for sleep disturbance. The patient is nervous/anxious.        Objective   Physical Exam  Vitals reviewed.   Constitutional:       Appearance: Normal appearance. She  is well-developed and well-groomed.   Neurological:      Mental Status: She is alert.   Psychiatric:         Attention and Perception: Attention and perception normal.         Mood and Affect: Affect normal. Mood is anxious.         Speech: Speech normal.         Behavior: Behavior normal. Behavior is cooperative.         Thought Content: Thought content normal.         Cognition and Memory: Cognition and memory normal.         Judgment: Judgment normal.       Blood pressure 124/74, pulse 87, weight 81.7 kg (180 lb 3.2 oz), SpO2 98%.Body mass index is 34.05 kg/m².    Allergies   Allergen Reactions    Sulfa Antibiotics        Medication List:   Current Outpatient Medications   Medication Sig Dispense Refill    Dextromethorphan-buPROPion ER (Auvelity)  MG tablet controlled-release Take 1 tablet by mouth 2 (Two) Times a Day. 60 tablet 2    escitalopram (LEXAPRO) 20 MG tablet Take 1 tablet by mouth Daily. 30 tablet 3    LORazepam (ATIVAN) 1 MG tablet Take 0.5-1 tablets by mouth 3 (Three) Times a Day As Needed for Anxiety. Max of two tablets daily. Must last 30 days. 60 tablet 0    risperiDONE (risperDAL) 3 MG tablet Take 1 tablet by mouth every night at bedtime. 30 tablet 3    Acetaminophen Extra Strength 500 MG tablet TAKE TWO TABLETS BY MOUTH FOUR TIMES DAILY 180 tablet 5    albuterol sulfate  (90 Base) MCG/ACT inhaler INHALE TWO PUFFS BY MOUTH EVERY 4 HOURS AS NEEDED FOR WHEEZING OR SHORTNESS OF BREATH 18 g 8    aspirin 81 MG EC tablet Take 1 tablet by mouth Daily. 30 tablet 5    Breztri Aerosphere 160-9-4.8 MCG/ACT aerosol inhaler INHALE TWO PUFFS BY MOUTH TWICE DAILY 10.7 g 8    cholecalciferol (VITAMIN D3) 25 MCG (1000 UT) tablet TAKE TWO TABLETS BY MOUTH EVERY DAY 60 tablet 5    diclofenac (VOLTAREN) 50 MG EC tablet Take 1 tablet by mouth 2 (Two) Times a Day. 60 tablet 5    Diclofenac Sodium (VOLTAREN) 1 % gel gel FOUR TIMES DAILY      Empagliflozin-metFORMIN HCl 5-500 MG tablet Take 1 tablet by  mouth Daily. 30 tablet 5    fluticasone (FLONASE) 50 MCG/ACT nasal spray 2 sprays by Each Nare route Daily. 16 g 5    gabapentin (NEURONTIN) 300 MG capsule Take 1 capsule by mouth 2 (Two) Times a Day. 60 capsule 3    glucose blood (OneTouch Verio) test strip Use as instructed 50 each 5    guaiFENesin (MUCINEX) 600 MG 12 hr tablet TAKE ONE TABLET BY MOUTH TWICE DAILY AS NEEDED FOR COUGH OR CONGESTION 60 tablet 6    Lancets (OneTouch Delica Plus Bwhzsd08G) misc 1 each by Other route 3 (Three) Times a Day. 100 each 5    levothyroxine (SYNTHROID, LEVOTHROID) 175 MCG tablet TAKE ONE TABLET BY MOUTH EVERY DAY 30 tablet 5    losartan (COZAAR) 50 MG tablet TAKE ONE TABLET BY MOUTH EVERY DAY 30 tablet 5    montelukast (SINGULAIR) 10 MG tablet Take 1 tablet by mouth Daily.      pantoprazole (PROTONIX) 40 MG EC tablet TAKE ONE TABLET BY MOUTH EVERY DAY 30 tablet 5    roflumilast (DALIRESP) 500 MCG tablet tablet TAKE ONE TABLET BY MOUTH EVERY DAY 30 tablet 6    Semaglutide, 2 MG/DOSE, (Ozempic, 2 MG/DOSE,) 8 MG/3ML solution pen-injector Inject 2 mg under the skin into the appropriate area as directed 1 (One) Time Per Week. 9 mL 1    simvastatin (ZOCOR) 20 MG tablet TAKE ONE TABLET BY MOUTH AT BEDTIME 30 tablet 5    vitamin B-12 (CYANOCOBALAMIN) 1000 MCG tablet TAKE ONE TABLET BY MOUTH EVERY DAY 30 tablet 5     No current facility-administered medications for this visit.       Mental Status Exam:   Hygiene:   good  Cooperation:  Cooperative  Eye Contact:  Good  Psychomotor Behavior:  Appropriate  Affect:  Appropriate  Hopelessness: Denies  Speech:  Normal  Thought Process:  Goal directed and Linear  Thought Content:  Normal  Suicidal:  None  Homicidal:  None  Hallucinations:  None  Delusion:  None  Memory:  Intact  Orientation:  Person, Place, Time, and Situation  Reliability:  fair  Insight:  Fair  Judgement:  Fair  Impulse Control:  Fair  Physical/Medical Issues:  No               Assessment & Plan   Diagnoses and all orders  for this visit:    1. Anxiety disorder, unspecified type (Primary)  -     escitalopram (LEXAPRO) 20 MG tablet; Take 1 tablet by mouth Daily.  Dispense: 30 tablet; Refill: 3  -     LORazepam (ATIVAN) 1 MG tablet; Take 0.5-1 tablets by mouth 3 (Three) Times a Day As Needed for Anxiety. Max of two tablets daily. Must last 30 days.  Dispense: 60 tablet; Refill: 0  -     Behavioral Health Full Screen and Definitive Testing (AIYANA) -; Future  -     Buprenorphine/Norbuprenorphine level, QUANT (AIYANA) - Urine, Clean Catch; Future  -     6-Acetylmorphine Definitive (AIYANA) - Urine, Clean Catch; Future  -     Methamphetamine Definitive (AIYANA) - Urine, Clean Catch; Future  -     LSD Definitive (AIYANA) - Urine, Clean Catch; Future  -     Barbiturates Definitive (AIYANA) - Urine, Clean Catch; Future  -     Benzodiazepines Definitive (AIYANA) - Urine, Clean Catch; Future  -     Fentanyl Definitive (AIYANA) - Urine, Clean Catch; Future  -     Methadone Definitive (AIYANA) - Urine, Clean Catch; Future  -     Phencyclidine Definitive (AIYANA) - Urine, Clean Catch; Future  -     THC-COOH Definitive (AIYANA) - Urine, Clean Catch; Future  -     Amphetamine Definitive (AIYANA) - Urine, Clean Catch; Future  -     Tricyclic Antidepressants Definitive (AIYANA) - Urine, Clean Catch; Future  -     Opiates Definitive (AIYANA) - Urine, Clean Catch; Future  -     Citalopram Definitive (AIYANA) - Urine, Clean Catch; Future  -     Lorazepam Definitive (AIYANA) - Urine, Clean Catch; Future  -     Risperidone - PTNCA (AIYANA) -; Future  -     Gabapentin - PTNCA (AIYANA) - Urine, Clean Catch; Future  -     Behavioral Health Full Screen and Definitive Testing (AIYANA) -  -     Buprenorphine/Norbuprenorphine level, QUANT (AIYANA) - Urine, Clean Catch  -     6-Acetylmorphine Definitive (AIYANA) - Urine, Clean Catch  -     Methamphetamine Definitive (AIYANA) - Urine, Clean Catch  -     LSD Definitive (AIYANA) - Urine, Clean Catch  -     Barbiturates Definitive (AIYANA) - Urine, Clean Catch  -      Benzodiazepines Definitive (AIYANA) - Urine, Clean Catch  -     Fentanyl Definitive (AIYANA) - Urine, Clean Catch  -     Methadone Definitive (AIYANA) - Urine, Clean Catch  -     Phencyclidine Definitive (AIYANA) - Urine, Clean Catch  -     THC-COOH Definitive (AIYANA) - Urine, Clean Catch  -     Amphetamine Definitive (AIYANA) - Urine, Clean Catch  -     Tricyclic Antidepressants Definitive (AIYANA) - Urine, Clean Catch  -     Opiates Definitive (AIYANA) - Urine, Clean Catch  -     Citalopram Definitive (AIYANA) - Urine, Clean Catch  -     Lorazepam Definitive (AIYANA) - Urine, Clean Catch  -     Risperidone - PTNCA (AIYANA) -  -     Gabapentin - PTNCA (AIYANA) - Urine, Clean Catch    2. Major depressive disorder, recurrent episode, moderate  -     escitalopram (LEXAPRO) 20 MG tablet; Take 1 tablet by mouth Daily.  Dispense: 30 tablet; Refill: 3  -     Dextromethorphan-buPROPion ER (Auvelity)  MG tablet controlled-release; Take 1 tablet by mouth 2 (Two) Times a Day.  Dispense: 60 tablet; Refill: 2  -     risperiDONE (risperDAL) 3 MG tablet; Take 1 tablet by mouth every night at bedtime.  Dispense: 30 tablet; Refill: 3  -     Behavioral Health Full Screen and Definitive Testing (AIYANA) -; Future  -     Buprenorphine/Norbuprenorphine level, QUANT (AIYANA) - Urine, Clean Catch; Future  -     6-Acetylmorphine Definitive (AIYANA) - Urine, Clean Catch; Future  -     Methamphetamine Definitive (AIYANA) - Urine, Clean Catch; Future  -     LSD Definitive (AIYANA) - Urine, Clean Catch; Future  -     Barbiturates Definitive (AIYANA) - Urine, Clean Catch; Future  -     Benzodiazepines Definitive (AIYANA) - Urine, Clean Catch; Future  -     Fentanyl Definitive (AIYANA) - Urine, Clean Catch; Future  -     Methadone Definitive (AIYANA) - Urine, Clean Catch; Future  -     Phencyclidine Definitive (AIYANA) - Urine, Clean Catch; Future  -     THC-COOH Definitive (AIYANA) - Urine, Clean Catch; Future  -     Amphetamine Definitive (AIYANA) - Urine, Clean Catch; Future  -      Tricyclic Antidepressants Definitive (AIYANA) - Urine, Clean Catch; Future  -     Opiates Definitive (AIYANA) - Urine, Clean Catch; Future  -     Citalopram Definitive (AIYANA) - Urine, Clean Catch; Future  -     Lorazepam Definitive (AIYANA) - Urine, Clean Catch; Future  -     Risperidone - PTNCA (AIYANA) -; Future  -     Gabapentin - PTNCA (AIYANA) - Urine, Clean Catch; Future  -     Behavioral Health Full Screen and Definitive Testing (AIYANA) -  -     Buprenorphine/Norbuprenorphine level, QUANT (AIYANA) - Urine, Clean Catch  -     6-Acetylmorphine Definitive (AIYANA) - Urine, Clean Catch  -     Methamphetamine Definitive (AIYANA) - Urine, Clean Catch  -     LSD Definitive (AIYANA) - Urine, Clean Catch  -     Barbiturates Definitive (AIYANA) - Urine, Clean Catch  -     Benzodiazepines Definitive (AIYANA) - Urine, Clean Catch  -     Fentanyl Definitive (AIYANA) - Urine, Clean Catch  -     Methadone Definitive (AIYANA) - Urine, Clean Catch  -     Phencyclidine Definitive (AIYANA) - Urine, Clean Catch  -     THC-COOH Definitive (AIYANA) - Urine, Clean Catch  -     Amphetamine Definitive (AIYANA) - Urine, Clean Catch  -     Tricyclic Antidepressants Definitive (AIYANA) - Urine, Clean Catch  -     Opiates Definitive (AIYANA) - Urine, Clean Catch  -     Citalopram Definitive (AIYANA) - Urine, Clean Catch  -     Lorazepam Definitive (AIYANA) - Urine, Clean Catch  -     Risperidone - PTNCA (AIYANA) -  -     Gabapentin - PTNCA (AIYANA) - Urine, Clean Catch    3. Encounter for therapeutic drug monitoring  -     Urine Drug Screen - Urine, Clean Catch; Future  -     Behavioral Health Full Screen and Definitive Testing (AIYANA) -; Future  -     Buprenorphine/Norbuprenorphine level, QUANT (AIYANA) - Urine, Clean Catch; Future  -     6-Acetylmorphine Definitive (AIYANA) - Urine, Clean Catch; Future  -     Methamphetamine Definitive (AIYANA) - Urine, Clean Catch; Future  -     LSD Definitive (AIYANA) - Urine, Clean Catch; Future  -     Barbiturates Definitive (AIYANA) - Urine, Clean  Catch; Future  -     Benzodiazepines Definitive (AIYANA) - Urine, Clean Catch; Future  -     Fentanyl Definitive (AIYANA) - Urine, Clean Catch; Future  -     Methadone Definitive (AIYANA) - Urine, Clean Catch; Future  -     Phencyclidine Definitive (AIYANA) - Urine, Clean Catch; Future  -     THC-COOH Definitive (AIYANA) - Urine, Clean Catch; Future  -     Amphetamine Definitive (AIYANA) - Urine, Clean Catch; Future  -     Tricyclic Antidepressants Definitive (AIYANA) - Urine, Clean Catch; Future  -     Opiates Definitive (AIYANA) - Urine, Clean Catch; Future  -     Citalopram Definitive (AIYANA) - Urine, Clean Catch; Future  -     Lorazepam Definitive (AIYANA) - Urine, Clean Catch; Future  -     Risperidone - PTNCA (AIYANA) -; Future  -     Gabapentin - PTNCA (AIYANA) - Urine, Clean Catch; Future  -     Behavioral Health Full Screen and Definitive Testing (AIYANA) -  -     Buprenorphine/Norbuprenorphine level, QUANT (AIYANA) - Urine, Clean Catch  -     6-Acetylmorphine Definitive (AIYANA) - Urine, Clean Catch  -     Methamphetamine Definitive (AIYANA) - Urine, Clean Catch  -     LSD Definitive (AIYANA) - Urine, Clean Catch  -     Barbiturates Definitive (AIYANA) - Urine, Clean Catch  -     Benzodiazepines Definitive (AIYANA) - Urine, Clean Catch  -     Fentanyl Definitive (AIYANA) - Urine, Clean Catch  -     Methadone Definitive (AIYANA) - Urine, Clean Catch  -     Phencyclidine Definitive (AIYANA) - Urine, Clean Catch  -     THC-COOH Definitive (AIYANA) - Urine, Clean Catch  -     Amphetamine Definitive (AIYANA) - Urine, Clean Catch  -     Tricyclic Antidepressants Definitive (AIYANA) - Urine, Clean Catch  -     Opiates Definitive (AIYANA) - Urine, Clean Catch  -     Citalopram Definitive (AIYANA) - Urine, Clean Catch  -     Lorazepam Definitive (AIYANA) - Urine, Clean Catch  -     Risperidone - PTNCA (AIYANA) -  -     Gabapentin - PTNCA (AIYANA) - Urine, Clean Catch    4. Encounter for long-term (current) use of high-risk medication  -     Behavioral Health Full Screen  and Definitive Testing (AIYANA) -; Future  -     Buprenorphine/Norbuprenorphine level, QUANT (AIYANA) - Urine, Clean Catch; Future  -     6-Acetylmorphine Definitive (AIYANA) - Urine, Clean Catch; Future  -     Methamphetamine Definitive (AIYANA) - Urine, Clean Catch; Future  -     LSD Definitive (AIYANA) - Urine, Clean Catch; Future  -     Barbiturates Definitive (AIYANA) - Urine, Clean Catch; Future  -     Benzodiazepines Definitive (AIYANA) - Urine, Clean Catch; Future  -     Fentanyl Definitive (AIYANA) - Urine, Clean Catch; Future  -     Methadone Definitive (AIYANA) - Urine, Clean Catch; Future  -     Phencyclidine Definitive (AIYANA) - Urine, Clean Catch; Future  -     THC-COOH Definitive (AIYANA) - Urine, Clean Catch; Future  -     Amphetamine Definitive (AIYANA) - Urine, Clean Catch; Future  -     Tricyclic Antidepressants Definitive (AIYANA) - Urine, Clean Catch; Future  -     Opiates Definitive (AIYANA) - Urine, Clean Catch; Future  -     Citalopram Definitive (AIYANA) - Urine, Clean Catch; Future  -     Lorazepam Definitive (AIYANA) - Urine, Clean Catch; Future  -     Risperidone - PTNCA (AIYANA) -; Future  -     Gabapentin - PTNCA (AIYANA) - Urine, Clean Catch; Future  -     Behavioral Health Full Screen and Definitive Testing (AIYANA) -  -     Buprenorphine/Norbuprenorphine level, QUANT (AIYANA) - Urine, Clean Catch  -     6-Acetylmorphine Definitive (AIYANA) - Urine, Clean Catch  -     Methamphetamine Definitive (AIYANA) - Urine, Clean Catch  -     LSD Definitive (AIYANA) - Urine, Clean Catch  -     Barbiturates Definitive (AIYANA) - Urine, Clean Catch  -     Benzodiazepines Definitive (AIYANA) - Urine, Clean Catch  -     Fentanyl Definitive (AIYANA) - Urine, Clean Catch  -     Methadone Definitive (AIYANA) - Urine, Clean Catch  -     Phencyclidine Definitive (AIYANA) - Urine, Clean Catch  -     THC-COOH Definitive (AIYANA) - Urine, Clean Catch  -     Amphetamine Definitive (AIYANA) - Urine, Clean Catch  -     Tricyclic Antidepressants Definitive (AIYANA) -  Urine, Clean Catch  -     Opiates Definitive (AIYANA) - Urine, Clean Catch  -     Citalopram Definitive (AIYANA) - Urine, Clean Catch  -     Lorazepam Definitive (AIYANA) - Urine, Clean Catch  -     Risperidone - PTNCA (AIYANA) -  -     Gabapentin - PTNCA (AIYANA) - Urine, Clean Catch            Discussed medication options with patient. Cont. Risperdal 3 mg daily at night for depression and mood. Cont. Lexapro 20 mg daily for depression and anxiety. Cont. Ativan 1 mg 0.5-1 tab three times daily as needed for anxiety. Reviewed the risks, benefits, and side effects of the medications; patient acknowledged and verbally consented.    Patient is being prescribed a controlled substance as part of treatment plan. Patient has been educated of appropriate use of the medications, including risk of somnolence, limited ability to drive and/or work safely, and potential for dependence, respiratory depression and overdose. Patient is also informed that the medication are to be used by the patient only- avoid any combined use of ETOH or other substances unless prescribed.   UDS Ordered.     ALISIA Patient Controlled Substance Report (from 5/15/2024 to 5/15/2025)    Dispensed  Strength Quantity Days Supply Provider Pharmacy   05/06/2025 Lorazepam 1MG 60 each 30 CLOUD,MIKHAIL [facility]   05/05/2025 Gabapentin 300MG 30 each 30 LIAN,JUAN [facility]   04/09/2025 Lorazepam 1MG 60 each 30 CLOUD,MIKHAIL [facility]   04/07/2025 Gabapentin 300MG 30 each 30 [facility]   02/13/2025 Lorazepam 1MG 60 each 30 CLOUD[facility]MIKHAIL [facility]   01/22/2025 Gabapentin 300MG 30 each 30 LIAN,JUAN [facility]   01/14/2025 Lorazepam 1MG 60 each 30 CLOUD,MIKHAIL [facility]   01/09/2025 Gabapentin 100MG 30 each 30 LIAN,JUAN [facility]   12/16/2024 Lorazepam 1MG 60 each 30 CLOUD,MIKHAIL [facility]   11/18/2024  Lorazepam 1MG 60 each 30 CLOUD,MIKHAIL [facility]   10/21/2024 Lorazepam 1MG 60 each 30 CLOUD,MIKHAIL [facility]   09/23/2024 Lorazepam 1MG 60 each 30 CLOUD,MIKHAIL [facility]   08/20/2024 Lorazepam 1MG 60 each 30 CLOUD,MIKHAIL [facility]   07/01/2024 Lorazepam 1MG 90 each 45 LIAN,JUAN [facility]         Disclaimer    *The information in this report is based upon Schedule II through V controlled substance records reported by dispensers. Data should appear on [facility] reports within two to three business days after dispensing.   *The records listed in the report are based on the patient identification information entered by the report requestor, and if not sufficiently unique may result in the report including records for multiple patients. Please verify the information in the report by contacting the prescribers and/or dispensers listed.   *If the controlled substance records on this report appear to be in error, the patient or provider should contact the dispenser to determine if the information was reported accurately. If the dispenser certifies the information was reported accurately, the dispenser can contact the Drug Enforcement and Professional Practices Branch at [phone] to investigate the error.   *The information in this report is intended for informational use only by the person authorized to request the report. Intentional disclosure of the report or data to someone not authorized to obtain the data is a Class B Misdemeanor.      Report Restrictions - A practitioner or pharmacist may share the report with the patient or person authorized to act on the patient's behalf and place the report in the patient's medical record, with the report then being deemed a medical record subject to the same disclosure terms and conditions as an ordinary medical record. ([place]RS 218[place].202)     Patient is agreeable to call the office with any  questions, concerns or worsening of symptoms. Patient is aware to call 911 or go to the nearest ER should begin having thoughts to harm self or others.        Follow up in four to six weeks          Errors in dictation may reflect use of voice recognition software and not all errors in transcription may have been detected prior to signing.              This document has been electronically signed by NATALIE Pringle   May 15, 2025 14:56 EDT

## 2025-05-04 LAB — REF LAB TEST METHOD: NORMAL

## 2025-05-06 LAB
6-ACETYLMORPHINE: NOT DETECTED NG/ML
6MAM SERPLBLD-MCNC: NOT DETECTED NG/ML
7- AMINOCLONAZEPAM: NOT DETECTED NG/ML
9-DELTA-THC-COOH: NOT DETECTED NG/ML
ALCOHOL, ETHYL: NOT DETECTED NG/ML
ALPHA-HYDROXYALPRAZOLAM: NOT DETECTED NG/ML
ALPRAZ SPEC-MCNC: NOT DETECTED NG/ML
AMITRIP SERPL-MCNC: NOT DETECTED NG/ML
AMPHET SAL QL CFM: NOT DETECTED NG/ML
AMPHETAMINE: NOT DETECTED NG/ML
BENZODIAZ BLD QL: DETECTED NG/ML
BUPRENORPHINE SAL CFM-MCNC: NOT DETECTED NG/ML
BUPRENORPHINE: NOT DETECTED NG/ML
BUTALBITAL: NOT DETECTED NG/ML
CITALOPRAM: NORMAL NG/ML
CLONAZEPAM: NOT DETECTED NG/ML
COCAINE METABOLITE: NOT DETECTED NG/ML
CODEINE: NOT DETECTED NG/ML
DESIPRAMINE: NOT DETECTED NG/ML
DIAZEPAM: NOT DETECTED NG/ML
DOXEPIN: NOT DETECTED NG/ML
EDDP SERPL QL: NOT DETECTED NG/ML
EDDP: NOT DETECTED NG/ML
FENTANYL SAL QL SCN: NOT DETECTED NG/ML
FENTANYL-EIA: NOT DETECTED NG/ML
FLUNITRAZEPAM SERPLBLD-MCNC: NOT DETECTED NG/ML
FLURAZEPAM BLD CFM-MCNC: NOT DETECTED NG/ML
GABAPENTIN: NORMAL NG/ML
HYDROCODONE: NOT DETECTED NG/ML
HYDROMORPHONE: NOT DETECTED NG/ML
IMIPRAMINE: NOT DETECTED NG/ML
LORAZEPAM: NORMAL NG/ML
METHADONE: NOT DETECTED NG/ML
METHAMPHET/CREAT UR: NOT DETECTED NG/ML
METHAMPHETAMINE: NOT DETECTED NG/ML
MIDAZOLAM, URINE: NOT DETECTED NG/ML
MORPHINE: NOT DETECTED NG/ML
NORBUPRENORPHINE: NOT DETECTED NG/ML
NORDIAZEPAM: NOT DETECTED NG/ML
NORFENTANYL: NOT DETECTED NG/ML
NORHYDROCODONE: NOT DETECTED NG/ML
NORTRIPTYLINE: NOT DETECTED NG/ML
OPIATES: NOT DETECTED NG/ML
OXAZEPAM: NOT DETECTED NG/ML
OXYCODONE: NOT DETECTED NG/ML
PCP SAL CFM-MCNC: NOT DETECTED NG/ML
PCP: NOT DETECTED NG/ML
PHENOBARBITAL: NOT DETECTED NG/ML
PHENTERMINE: NOT DETECTED NG/ML
TEMAZEPAM: NOT DETECTED NG/ML
THC: NOT DETECTED NG/ML
TRICYCLIC ANTIDEPRESSANTS: NOT DETECTED NG/ML

## 2025-05-22 ENCOUNTER — TELEPHONE (OUTPATIENT)
Dept: FAMILY MEDICINE CLINIC | Facility: CLINIC | Age: 65
End: 2025-05-22
Payer: MEDICARE

## 2025-05-22 NOTE — TELEPHONE ENCOUNTER
Relay - Provider will be out of the office on 6/24 - appointment was changed to 6/26 at 2pm. If  this doesn't work for the patient we can reschedule them. Called patient and was unable to leave a message.

## 2025-05-23 ENCOUNTER — TELEPHONE (OUTPATIENT)
Dept: PULMONOLOGY | Facility: CLINIC | Age: 65
End: 2025-05-23
Payer: MEDICARE

## 2025-05-23 NOTE — TELEPHONE ENCOUNTER
Updated patient that sleep study did not record data due to a sensor movement/error.   Per note, company will contact patient to repeat testing.     Provided patient with phone number as well so she can call if she does not hear from them/misses their call.

## 2025-05-25 DIAGNOSIS — E11.42 TYPE 2 DIABETES MELLITUS WITH PERIPHERAL NEUROPATHY: ICD-10-CM

## 2025-05-27 RX ORDER — LANOLIN ALCOHOL/MO/W.PET/CERES
1000 CREAM (GRAM) TOPICAL DAILY
Qty: 30 TABLET | Refills: 5 | Status: SHIPPED | OUTPATIENT
Start: 2025-05-27

## 2025-05-27 RX ORDER — GABAPENTIN 300 MG/1
300 CAPSULE ORAL NIGHTLY
Qty: 30 CAPSULE | Refills: 3 | Status: SHIPPED | OUTPATIENT
Start: 2025-05-27

## 2025-06-02 ENCOUNTER — TELEPHONE (OUTPATIENT)
Dept: PULMONOLOGY | Facility: CLINIC | Age: 65
End: 2025-06-02
Payer: MEDICARE

## 2025-06-02 ENCOUNTER — TELEPHONE (OUTPATIENT)
Dept: PULMONOLOGY | Facility: CLINIC | Age: 65
End: 2025-06-02

## 2025-06-02 DIAGNOSIS — F41.9 ANXIETY DISORDER, UNSPECIFIED TYPE: ICD-10-CM

## 2025-06-02 DIAGNOSIS — F33.1 MAJOR DEPRESSIVE DISORDER, RECURRENT EPISODE, MODERATE: ICD-10-CM

## 2025-06-02 RX ORDER — DEXTROMETHORPHAN HYDROBROMIDE, BUPROPION HYDROCHLORIDE 105; 45 MG/1; MG/1
1 TABLET, MULTILAYER, EXTENDED RELEASE ORAL 2 TIMES DAILY
Qty: 60 TABLET | Refills: 2 | Status: SHIPPED | OUTPATIENT
Start: 2025-06-02

## 2025-06-02 RX ORDER — LORAZEPAM 1 MG/1
TABLET ORAL
Qty: 60 TABLET | Refills: 0 | Status: SHIPPED | OUTPATIENT
Start: 2025-06-02

## 2025-06-02 NOTE — TELEPHONE ENCOUNTER
Hub staff attempted to follow warm transfer process and was unsuccessful     Caller: JAMA JUNIOR    Relationship to patient: Other    Best call back number: [phone]    Patient is needing: [facility] CALLED TO ADVISE THAT PT DOES NOT WANT TO DO AT HOME SLEEP STUDY, PT STATED TO REST [facility] THEY WILL CALL BE TO SCHEDULE APPT WHEN THEY ARE READY

## 2025-06-04 ENCOUNTER — TELEPHONE (OUTPATIENT)
Dept: FAMILY MEDICINE CLINIC | Facility: CLINIC | Age: 65
End: 2025-06-04

## 2025-06-04 DIAGNOSIS — E11.42 TYPE 2 DIABETES MELLITUS WITH PERIPHERAL NEUROPATHY: ICD-10-CM

## 2025-06-04 NOTE — TELEPHONE ENCOUNTER
Caller: Lisa Hill    Relationship: Self    Best call back number: [phone]     Requested Prescriptions:   Requested Prescriptions      No prescriptions requested or ordered in this encounter    [facility]    Pharmacy where request should be sent: [address] [phone] [facility] [phone] [facility]     Last office visit with prescribing clinician: 1/21/2025   Last telemedicine visit with prescribing clinician: Visit date not found   Next office visit with prescribing clinician: 6/26/2025     Additional details provided by patient:  SYNJARDY  PATIENT IS OUT OF MEDICATION.    Does the patient have less than a 3 day supply:  [x] Yes  [] No      Jorge Edmond   06/04/25 14:28 EDT

## 2025-06-13 ENCOUNTER — OFFICE VISIT (OUTPATIENT)
Dept: PSYCHIATRY | Facility: CLINIC | Age: 65
End: 2025-06-13
Payer: MEDICARE

## 2025-06-13 VITALS
DIASTOLIC BLOOD PRESSURE: 72 MMHG | SYSTOLIC BLOOD PRESSURE: 122 MMHG | HEART RATE: 86 BPM | WEIGHT: 177.4 LBS | OXYGEN SATURATION: 97 % | BODY MASS INDEX: 33.52 KG/M2

## 2025-06-13 DIAGNOSIS — F41.9 ANXIETY DISORDER, UNSPECIFIED TYPE: ICD-10-CM

## 2025-06-13 DIAGNOSIS — F33.1 MAJOR DEPRESSIVE DISORDER, RECURRENT EPISODE, MODERATE: ICD-10-CM

## 2025-06-13 PROBLEM — M19.071 ARTHRITIS OF BOTH FEET: Status: ACTIVE | Noted: 2025-06-13

## 2025-06-13 PROBLEM — M19.072 ARTHRITIS OF BOTH FEET: Status: ACTIVE | Noted: 2025-06-13

## 2025-06-13 RX ORDER — DEXTROMETHORPHAN HYDROBROMIDE, BUPROPION HYDROCHLORIDE 105; 45 MG/1; MG/1
1 TABLET, MULTILAYER, EXTENDED RELEASE ORAL 2 TIMES DAILY
Qty: 60 TABLET | Refills: 3 | Status: SHIPPED | OUTPATIENT
Start: 2025-06-13

## 2025-06-13 RX ORDER — LORAZEPAM 1 MG/1
.5-1 TABLET ORAL 3 TIMES DAILY PRN
Qty: 60 TABLET | Refills: 0 | Status: SHIPPED | OUTPATIENT
Start: 2025-06-13

## 2025-06-13 RX ORDER — ESCITALOPRAM OXALATE 20 MG/1
20 TABLET ORAL DAILY
Qty: 30 TABLET | Refills: 3 | Status: SHIPPED | OUTPATIENT
Start: 2025-06-13

## 2025-06-13 RX ORDER — RISPERIDONE 3 MG/1
3 TABLET ORAL
Qty: 30 TABLET | Refills: 3 | Status: SHIPPED | OUTPATIENT
Start: 2025-06-13

## 2025-06-13 NOTE — PROGRESS NOTES
Subjective   Lisa Hill is a 65 y.o. female is here today for medication management follow-up.    Chief Complaint:  anxiety depression     History of Present Illness:    Patient presents today for follow up for medication management for depression and anxiety. Patient states everything has been going good. Patient reports medication compliance and denies any side effects. Denies any mood swings, irritability, or anger. Patient reports currently depression is at a 5 on a 0-10 scale with 10 being the worst. Patient states nervous spells are not as bad as they were but still getting nervous.  Patient reports anxiety is at a 7 on a 0-10 scale with 10 being the worst. Patient reports having a few panic attacks that last a couple minutes. Patient reports sleeping 10 hours a night. Patient states sleeping in some in the mornings. Denies any nightmares. Patient reports appetite is good. Patient states still eating at least two to three times a day. Patient denies any auditory or visual hallucinations. Patient adamantly denies any thoughts to harm self or others. Patient denies any side effects to medications. Patient denies any medical changes since last visit.   The following portions of the patient's history were reviewed and updated as appropriate: allergies, current medications, past family history, past medical history, past social history, past surgical history, and problem list.    Review of Systems   Constitutional: Negative.    Respiratory: Negative.     Cardiovascular: Negative.    Gastrointestinal: Negative.    Neurological: Negative.    Psychiatric/Behavioral:  The patient is nervous/anxious.        Objective   Physical Exam  Vitals reviewed.   Constitutional:       Appearance: Normal appearance. She is well-developed and well-groomed.   Neurological:      Mental Status: She is alert.   Psychiatric:         Attention and Perception: Attention and perception normal.         Mood and Affect: Affect  normal. Mood is anxious.         Speech: Speech normal.         Behavior: Behavior normal. Behavior is cooperative.         Thought Content: Thought content normal.         Cognition and Memory: Cognition and memory normal.         Judgment: Judgment normal.       Blood pressure 122/72, pulse 86, weight 80.5 kg (177 lb 6.4 oz), SpO2 97%.Body mass index is 33.52 kg/m².    Allergies   Allergen Reactions    Sulfa Antibiotics        Medication List:   Current Outpatient Medications   Medication Sig Dispense Refill    Dextromethorphan-buPROPion ER (Auvelity)  MG tablet controlled-release Take 1 tablet by mouth 2 (Two) Times a Day. 60 tablet 3    escitalopram (LEXAPRO) 20 MG tablet Take 1 tablet by mouth Daily. 30 tablet 3    LORazepam (ATIVAN) 1 MG tablet Take 0.5-1 tablets by mouth 3 (Three) Times a Day As Needed for Anxiety. Max of 2 tablets daily 60 tablet 0    risperiDONE (risperDAL) 3 MG tablet Take 1 tablet by mouth every night at bedtime. 30 tablet 3    Acetaminophen Extra Strength 500 MG tablet TAKE TWO TABLETS BY MOUTH FOUR TIMES DAILY 180 tablet 5    albuterol sulfate  (90 Base) MCG/ACT inhaler INHALE TWO PUFFS BY MOUTH EVERY 4 HOURS AS NEEDED FOR WHEEZING OR SHORTNESS OF BREATH 18 g 8    aspirin 81 MG EC tablet Take 1 tablet by mouth Daily. 30 tablet 5    Breztri Aerosphere 160-9-4.8 MCG/ACT aerosol inhaler INHALE TWO PUFFS BY MOUTH TWICE DAILY 10.7 g 8    cholecalciferol (VITAMIN D3) 25 MCG (1000 UT) tablet TAKE TWO TABLETS BY MOUTH EVERY DAY 60 tablet 5    diclofenac (VOLTAREN) 50 MG EC tablet Take 1 tablet by mouth 2 (Two) Times a Day. 60 tablet 5    Diclofenac Sodium (VOLTAREN) 1 % gel gel FOUR TIMES DAILY      Empagliflozin-metFORMIN HCl 5-500 MG tablet Take 1 tablet by mouth Daily. 30 tablet 5    fluticasone (FLONASE) 50 MCG/ACT nasal spray 2 sprays by Each Nare route Daily. 16 g 5    gabapentin (NEURONTIN) 300 MG capsule TAKE ONE CAPSULE BY MOUTH EVERY NIGHT 30 capsule 3    glucose blood  (OneTouch Verio) test strip USE TO TEST BLOOD GLUCOSE DAILY 50 each 5    guaiFENesin (MUCINEX) 600 MG 12 hr tablet TAKE ONE TABLET BY MOUTH TWICE DAILY AS NEEDED FOR COUGH OR CONGESTION 60 tablet 6    Lancets (OneTouch Delica Plus Swiocl26L) misc 1 each by Other route 3 (Three) Times a Day. 100 each 5    levothyroxine (SYNTHROID, LEVOTHROID) 175 MCG tablet TAKE ONE TABLET BY MOUTH EVERY DAY 30 tablet 5    losartan (COZAAR) 50 MG tablet TAKE ONE TABLET BY MOUTH EVERY DAY 30 tablet 5    montelukast (SINGULAIR) 10 MG tablet Take 1 tablet by mouth Daily.      pantoprazole (PROTONIX) 40 MG EC tablet TAKE ONE TABLET BY MOUTH EVERY DAY 30 tablet 5    roflumilast (DALIRESP) 500 MCG tablet tablet TAKE ONE TABLET BY MOUTH EVERY DAY 30 tablet 6    Semaglutide, 2 MG/DOSE, (Ozempic, 2 MG/DOSE,) 8 MG/3ML solution pen-injector Inject 2 mg under the skin into the appropriate area as directed 1 (One) Time Per Week. 9 mL 1    simvastatin (ZOCOR) 20 MG tablet TAKE ONE TABLET BY MOUTH AT BEDTIME 30 tablet 5    vitamin B-12 (CYANOCOBALAMIN) 1000 MCG tablet TAKE ONE TABLET BY MOUTH EVERY DAY 30 tablet 5     No current facility-administered medications for this visit.       Mental Status Exam:   Hygiene:   good  Cooperation:  Cooperative  Eye Contact:  Good  Psychomotor Behavior:  Appropriate  Affect:  Appropriate  Hopelessness: Denies  Speech:  Normal  Thought Process:  Goal directed and Linear  Thought Content:  Normal  Suicidal:  None  Homicidal:  None  Hallucinations:  None  Delusion:  None  Memory:  Intact  Orientation:  Person, Place, Time, and Situation  Reliability:  fair  Insight:  Fair  Judgement:  Fair  Impulse Control:  Fair  Physical/Medical Issues:  No              Assessment & Plan   Diagnoses and all orders for this visit:    1. Major depressive disorder, recurrent episode, moderate  -     risperiDONE (risperDAL) 3 MG tablet; Take 1 tablet by mouth every night at bedtime.  Dispense: 30 tablet; Refill: 3  -     escitalopram  (LEXAPRO) 20 MG tablet; Take 1 tablet by mouth Daily.  Dispense: 30 tablet; Refill: 3  -     Dextromethorphan-buPROPion ER (Auvelity)  MG tablet controlled-release; Take 1 tablet by mouth 2 (Two) Times a Day.  Dispense: 60 tablet; Refill: 3    2. Anxiety disorder, unspecified type  -     LORazepam (ATIVAN) 1 MG tablet; Take 0.5-1 tablets by mouth 3 (Three) Times a Day As Needed for Anxiety. Max of 2 tablets daily  Dispense: 60 tablet; Refill: 0  -     escitalopram (LEXAPRO) 20 MG tablet; Take 1 tablet by mouth Daily.  Dispense: 30 tablet; Refill: 3            Discussed medication options with patient.  Continue Risperdal 3 mg take 1 tablet by mouth every night at bedtime for depression.  Continue Lexapro 20 mg take 1 tablet by mouth daily for depression and anxiety.  Continue Auvelity 45/105 mg take 1 tablet by mouth twice daily for depression.  Continue Ativan 1 mg take 1/2 to 1 tablet by mouth 3 times daily as needed for anxiety.  Reviewed the risks, benefits, and side effects of the medications; patient acknowledged and verbally consented.   Patient is being prescribed a controlled substance as part of treatment plan. Patient has been educated of appropriate use of the medications, including risk of somnolence, limited ability to drive and/or work safely, and potential for dependence, respiratory depression and overdose. Patient is also informed that the medication are to be used by the patient only- avoid any combined use of ETOH or other substances unless prescribed.   UDS reviewed from 5/2/25.     [facility] Patient Controlled Substance Report (from 6/19/2024 to 6/19/2025)    Dispensed  Strength Quantity Days Supply Provider Pharmacy   06/02/2025 Gabapentin 300MG 60 each 30 [facility]   06/02/2025 Lorazepam 1MG 60 each 30 [facility]   05/06/2025 Lorazepam 1MG 60 each 30 [facility]   05/05/2025 Gabapentin 300MG 30 each 30  [facility]   04/09/2025 Lorazepam 1MG 60 each 30 CLOUD[facility]KHAIL [facility]   04/07/2025 Gabapentin 300MG 30 each 30 [facility]UAN [facility]   02/13/2025 Lorazepam 1MG 60 each 30 [facility]IL [facility]   01/22/2025 Gabapentin 300MG 30 each 30 [facility]   01/14/2025 Lorazepam 1MG 60 each 30 [facility]KHAIL [facility]   01/09/2025 Gabapentin 100MG 30 each 30 [facility]   12/16/2024 Lorazepam 1MG 60 each 30 [facility]KHAIL [facility]   11/18/2024 Lorazepam 1MG 60 each 30 [facility]   10/21/2024 Lorazepam 1MG 60 each 30 [facility]   09/23/2024 Lorazepam 1MG 60 each 30 [facility]   08/20/2024 Lorazepam 1MG 60 each 30 [facility]   07/01/2024 Lorazepam 1MG 90 each 45 [facility]UAN [facility]         Disclaimer    *The information in this report is based upon Schedule II through V controlled substance records reported by dispensers. Data should appear on [facility] reports within two to three business days after dispensing.   *The records listed in the report are based on the patient identification information entered by the report requestor, and if not sufficiently unique may result in the report including records for multiple patients. Please verify the information in the report by contacting the prescribers and/or dispensers listed.   *If the controlled substance records on this report appear to be in error, the patient or provider should contact the dispenser to determine if the information was reported accurately. If the dispenser certifies the information was reported accurately, the dispenser can contact the Drug Enforcement and Professional Practices Branch at [phone] to investigate the error.   *The information in this report is  intended for informational use only by the person authorized to request the report. Intentional disclosure of the report or data to someone not authorized to obtain the data is a Class B Misdemeanor.      Report Restrictions - A practitioner or pharmacist may share the report with the patient or person authorized to act on the patient's behalf and place the report in the patient's medical record, with the report then being deemed a medical record subject to the same disclosure terms and conditions as an ordinary medical record. ([place]202)        Patient is agreeable to call the office with any questions, concerns, or worsening of symptoms.  Patient is aware to call 911 or go to the nearest ER should begin having thoughts to harm self or others.         Follow up in two months  Discussed with patient provider transferring to [place] office and patient is welcome to establish with [place] office or establish with upcoming provider at [place] office.  Patient states due to transportation will establish with upcoming provider at [place].  Patient was offered telehealth visit until new provider begins.  Patient declined telehealth visit.  Discussed with patient will continue refills until patient is established with new provider in [place].  Patient acknowledged and agreed.        Errors in dictation may reflect use of voice recognition software and not all errors in transcription may have been detected prior to signing.              This document has been electronically signed by NATALIE Pringle   June 19, 2025 12:51 EDT

## 2025-06-14 DIAGNOSIS — E11.42 TYPE 2 DIABETES MELLITUS WITH PERIPHERAL NEUROPATHY: ICD-10-CM

## 2025-06-16 RX ORDER — BLOOD SUGAR DIAGNOSTIC
STRIP MISCELLANEOUS
Qty: 50 EACH | Refills: 5 | Status: SHIPPED | OUTPATIENT
Start: 2025-06-16

## 2025-06-20 DIAGNOSIS — E11.42 TYPE 2 DIABETES MELLITUS WITH PERIPHERAL NEUROPATHY: ICD-10-CM

## 2025-06-20 RX ORDER — SEMAGLUTIDE 2.68 MG/ML
INJECTION, SOLUTION SUBCUTANEOUS
Qty: 9 ML | Refills: 0 | Status: SHIPPED | OUTPATIENT
Start: 2025-06-20

## 2025-06-22 DIAGNOSIS — F33.1 MAJOR DEPRESSIVE DISORDER, RECURRENT EPISODE, MODERATE: ICD-10-CM

## 2025-06-22 DIAGNOSIS — F41.9 ANXIETY DISORDER, UNSPECIFIED TYPE: ICD-10-CM

## 2025-06-23 RX ORDER — RISPERIDONE 3 MG/1
3 TABLET ORAL
Qty: 30 TABLET | Refills: 3 | OUTPATIENT
Start: 2025-06-23

## 2025-06-23 RX ORDER — ESCITALOPRAM OXALATE 20 MG/1
20 TABLET ORAL DAILY
Qty: 30 TABLET | Refills: 3 | OUTPATIENT
Start: 2025-06-23

## 2025-06-26 ENCOUNTER — OFFICE VISIT (OUTPATIENT)
Dept: FAMILY MEDICINE CLINIC | Facility: CLINIC | Age: 65
End: 2025-06-26
Payer: MEDICARE

## 2025-06-26 VITALS
TEMPERATURE: 98.6 F | SYSTOLIC BLOOD PRESSURE: 118 MMHG | BODY MASS INDEX: 33.61 KG/M2 | WEIGHT: 178 LBS | HEIGHT: 61 IN | OXYGEN SATURATION: 95 % | HEART RATE: 87 BPM | RESPIRATION RATE: 16 BRPM | DIASTOLIC BLOOD PRESSURE: 64 MMHG

## 2025-06-26 DIAGNOSIS — E11.42 TYPE 2 DIABETES MELLITUS WITH PERIPHERAL NEUROPATHY: ICD-10-CM

## 2025-06-26 DIAGNOSIS — E06.3 HYPOTHYROIDISM DUE TO HASHIMOTO'S THYROIDITIS: ICD-10-CM

## 2025-06-26 DIAGNOSIS — F17.200 SMOKER: ICD-10-CM

## 2025-06-26 DIAGNOSIS — K59.09 CHRONIC CONSTIPATION: ICD-10-CM

## 2025-06-26 DIAGNOSIS — Z00.00 HEALTHCARE MAINTENANCE: ICD-10-CM

## 2025-06-26 DIAGNOSIS — M06.9 RHEUMATOID ARTHRITIS INVOLVING MULTIPLE SITES, UNSPECIFIED WHETHER RHEUMATOID FACTOR PRESENT: ICD-10-CM

## 2025-06-26 DIAGNOSIS — E66.811 CLASS 1 OBESITY WITH SERIOUS COMORBIDITY AND BODY MASS INDEX (BMI) OF 33.0 TO 33.9 IN ADULT, UNSPECIFIED OBESITY TYPE: ICD-10-CM

## 2025-06-26 DIAGNOSIS — I25.10 CORONARY ARTERY CALCIFICATION SEEN ON CT SCAN: ICD-10-CM

## 2025-06-26 DIAGNOSIS — M15.9 GENERALIZED OSTEOARTHRITIS: ICD-10-CM

## 2025-06-26 DIAGNOSIS — F41.8 DEPRESSION WITH ANXIETY: ICD-10-CM

## 2025-06-26 DIAGNOSIS — J30.9 CHRONIC ALLERGIC RHINITIS: Primary | ICD-10-CM

## 2025-06-26 DIAGNOSIS — G47.33 OBSTRUCTIVE SLEEP APNEA: ICD-10-CM

## 2025-06-26 DIAGNOSIS — E55.9 VITAMIN D DEFICIENCY: ICD-10-CM

## 2025-06-26 DIAGNOSIS — I10 ESSENTIAL HYPERTENSION: ICD-10-CM

## 2025-06-26 DIAGNOSIS — N39.3 STRESS BLADDER INCONTINENCE, FEMALE: ICD-10-CM

## 2025-06-26 DIAGNOSIS — I70.0 AORTIC ATHEROSCLEROSIS: ICD-10-CM

## 2025-06-26 DIAGNOSIS — J43.1 PANLOBULAR EMPHYSEMA: ICD-10-CM

## 2025-06-26 DIAGNOSIS — K21.9 GASTROESOPHAGEAL REFLUX DISEASE WITHOUT ESOPHAGITIS: ICD-10-CM

## 2025-06-26 DIAGNOSIS — E78.2 MIXED HYPERLIPIDEMIA: ICD-10-CM

## 2025-06-26 DIAGNOSIS — M85.80 OSTEOPENIA, UNSPECIFIED LOCATION: ICD-10-CM

## 2025-06-26 PROBLEM — G47.34 NOCTURNAL HYPOXIA: Status: RESOLVED | Noted: 2019-05-15 | Resolved: 2025-06-26

## 2025-06-26 PROBLEM — J43.9 PULMONARY EMPHYSEMA: Status: RESOLVED | Noted: 2019-11-06 | Resolved: 2025-06-26

## 2025-06-26 PROCEDURE — 83036 HEMOGLOBIN GLYCOSYLATED A1C: CPT | Performed by: GENERAL PRACTICE

## 2025-06-26 PROCEDURE — 80061 LIPID PANEL: CPT | Performed by: GENERAL PRACTICE

## 2025-06-26 PROCEDURE — 82043 UR ALBUMIN QUANTITATIVE: CPT | Performed by: GENERAL PRACTICE

## 2025-06-26 PROCEDURE — 80053 COMPREHEN METABOLIC PANEL: CPT | Performed by: GENERAL PRACTICE

## 2025-06-26 PROCEDURE — 84443 ASSAY THYROID STIM HORMONE: CPT | Performed by: GENERAL PRACTICE

## 2025-06-26 PROCEDURE — 82570 ASSAY OF URINE CREATININE: CPT | Performed by: GENERAL PRACTICE

## 2025-06-26 PROCEDURE — 85025 COMPLETE CBC W/AUTO DIFF WBC: CPT | Performed by: GENERAL PRACTICE

## 2025-06-26 RX ORDER — BUDESONIDE, GLYCOPYRROLATE, AND FORMOTEROL FUMARATE 160; 9; 4.8 UG/1; UG/1; UG/1
2 AEROSOL, METERED RESPIRATORY (INHALATION) 2 TIMES DAILY
Qty: 10.7 G | Refills: 8 | Status: SHIPPED | OUTPATIENT
Start: 2025-06-26

## 2025-06-26 RX ORDER — ALBUTEROL SULFATE 90 UG/1
2 INHALANT RESPIRATORY (INHALATION) EVERY 4 HOURS PRN
Qty: 18 G | Refills: 8 | Status: SHIPPED | OUTPATIENT
Start: 2025-06-26

## 2025-06-26 RX ORDER — GABAPENTIN 300 MG/1
300 CAPSULE ORAL 3 TIMES DAILY
Qty: 90 CAPSULE | Refills: 3 | Status: SHIPPED | OUTPATIENT
Start: 2025-06-26

## 2025-06-26 RX ORDER — ASPIRIN 81 MG/1
81 TABLET ORAL DAILY
Qty: 30 TABLET | Refills: 5 | Status: SHIPPED | OUTPATIENT
Start: 2025-06-26

## 2025-06-26 RX ORDER — PSEUDOEPHED/ACETAMINOPH/DIPHEN 30MG-500MG
1000 TABLET ORAL 3 TIMES DAILY PRN
Qty: 180 TABLET | Refills: 5 | Status: SHIPPED | OUTPATIENT
Start: 2025-06-26

## 2025-06-26 NOTE — PROGRESS NOTES
Subjective   Lisa Hill is a 65 y.o. female.     Chief Complaint  She returns for a scheduled reassessment of multiple medical problems including chronic back and joint pain, chronic obstructive pulmonary disease, type 2 diabetes mellitus, hyperlipidemia, essential hypertension, hypothyroidism, and depression    History of Present Illness     Chronic Back and Joint Pain  She complains of persistent pain about the base of the toes bilaterally.  This is worse with weightbearing, but she continues to experience burning and paresthesias at rest.  She continues to have low back and generalized joint pain. There is no history of any weakness or any change in her bowel/bladder control.  There is no history of any fever, chills, or night sweats.  She feels that gabapentin has helped some.  She has been followed by podiatry on and off.  Plain films of the right foot performed on 4/8/2025 were unremarkable..  Plain films of the lumbar spine performed on 8/5/2019 were reported as showing degenerative disc disease as well as atherosclerotic calcification of the aorta    COPD  She feels that her shortness of breath and cough remain at baseline. She admits to intermittent nasal congestion, but continues to deny any other upper respiratory tract symptoms, chest pain, hemoptysis, fever or chills.  Her symptoms worsen with activity, exposure to cold air and environmental allergens, and improve some with rest, supplemental oxygen, and inhaled inhaled medication.  She is currently on daliresp and breztri and is using her rescue inhaler once or twice daily. She continues to smoke 1 pack per day on average.  She underwent an updated low dose CT of the chest on 4/11/2024 with evidence of emphysema, granulomatous changes with calcified hilar mediastinal lymph nodes, atherosclerosis of the thoracic aorta, and degenerative changes of the thoracic spine.  She is scheduled to undergo an updated study and a pulmonology reassessment with  Katarina Wan PA-C on 7/24/2025    Type 2 Diabetes Mellitus  She admits to burning and paresthesias of the feet.  She continues to deny any visual disturbances, polydipsia, polyuria, hypoglycemia or foot ulcerations.  She remains on metformin, empagliflozin, and semaglutide with no apparent side effects.      Hyperlipidemia  Her compliance with treatment has been fair.  She has been following her diet closer and with the weight that she has lost has been able to tolerate more activity. She remains on simvastatin with no apparent side effects    Essential Hypertension  She admits to shortness of breath with intermittent lower extremity swelling but continues to deny any chest pain, orthopnea, PND, palpitations, or lightheadedness.  She is taking losartan with no apparent side effects    Hypothyroidism  She has a history of hypothyroidism associated with Hashimoto's disease.  She remains on levothyroxine 175 daily     Depression  She has a long history of intermittent depression, nervousness, anhedonia, difficulty concentrating, fatigue and impaired memory.  She continues to deny any suicidal ideation. Risk factors: history of seasonal affective disorder.  She remains on escitalopram 20 daily, dextromethorphan-bupropion 45-1 05 twice daily, risperdone 3 nightly, and lorazepam 1 twice a day.      Imaging  DEXA performed on 4/8/2025 returned with a T-score as low as -1.8 at the left femoral neck relatively unchanged from a study performed on 7/31/2018    The following portions of the patient's history were reviewed and updated as appropriate: allergies, current medications, past medical history, past social history, and problem list.    Review of Systems   Constitutional:  Positive for fatigue. Negative for chills and fever.   HENT:  Positive for hearing loss and rhinorrhea. Negative for congestion, ear pain, postnasal drip, sinus pressure, sneezing, sore throat and voice change.    Eyes:  Negative for visual  disturbance.   Respiratory:  Positive for cough and shortness of breath. Negative for wheezing.    Cardiovascular:  Positive for leg swelling. Negative for chest pain and palpitations.   Gastrointestinal:  Positive for constipation. Negative for abdominal pain, blood in stool, diarrhea, nausea, vomiting and GERD.   Genitourinary:  Positive for urinary incontinence (with coughing, sneezing lifting etc). Negative for dysuria, frequency, hematuria, pelvic pain and urgency.   Musculoskeletal:  Positive for arthralgias and back pain. Negative for joint swelling and myalgias.   Skin:  Negative for rash.   Neurological:  Positive for numbness (with burning and paresthesias of the feet) and headache. Negative for dizziness and weakness.   Psychiatric/Behavioral:  Positive for decreased concentration, sleep disturbance, depressed mood and stress. Negative for hallucinations and suicidal ideas. The patient is nervous/anxious.      Objective   Physical Exam  Constitutional:       General: She is not in acute distress.     Appearance: Normal appearance. She is well-developed. She is not diaphoretic.      Comments: Bright and in fair spirits.  Climbed onto the exam table with minimal assistance.  No apparent distress.   HENT:      Head: Atraumatic.      Right Ear: Tympanic membrane, ear canal and external ear normal. Decreased hearing noted.      Left Ear: Tympanic membrane, ear canal and external ear normal. Decreased hearing noted.      Mouth/Throat:      Lips: No lesions.      Mouth: Mucous membranes are moist. No oral lesions.      Pharynx: No oropharyngeal exudate or posterior oropharyngeal erythema.   Eyes:      General: Lids are normal.      Extraocular Movements: Extraocular movements intact.      Conjunctiva/sclera: Conjunctivae normal.      Pupils: Pupils are equal.   Neck:      Thyroid: No thyroid mass or thyromegaly.      Vascular: No carotid bruit or JVD.      Trachea: Trachea normal. No tracheal deviation.    Cardiovascular:      Rate and Rhythm: Normal rate and regular rhythm.      Pulses:           Dorsalis pedis pulses are 2+ on the right side and 2+ on the left side.        Posterior tibial pulses are 2+ on the right side and 2+ on the left side.      Heart sounds: Normal heart sounds, S1 normal and S2 normal. No murmur heard.     No gallop.      Comments: Both feet warm with a good capillary refill  Pulmonary:      Effort: Pulmonary effort is normal.      Breath sounds: Decreased air movement present. Examination of the right-lower field reveals decreased breath sounds. Examination of the left-lower field reveals decreased breath sounds. Decreased breath sounds and wheezing (diffuse - mild) present. No rales.      Comments: Pulmonary hyperinflation  Abdominal:      General: Bowel sounds are normal. There is no distension.   Musculoskeletal:      Right lower leg: No edema.      Left lower leg: No edema.      Comments: Tender about the MTP joints of both feet   Lymphadenopathy:      Head:      Right side of head: No submental, submandibular, tonsillar, preauricular, posterior auricular or occipital adenopathy.      Left side of head: No submental, submandibular, tonsillar, preauricular, posterior auricular or occipital adenopathy.      Cervical: No cervical adenopathy.      Upper Body:      Right upper body: No supraclavicular adenopathy.      Left upper body: No supraclavicular adenopathy.   Skin:     General: Skin is warm.      Coloration: Skin is not cyanotic, jaundiced or pale.      Findings: No rash.      Nails: There is no clubbing.   Neurological:      Mental Status: She is alert and oriented to person, place, and time.      Cranial Nerves: No cranial nerve deficit, dysarthria or facial asymmetry.      Sensory: Sensory deficit (decreased vibration sense both feet) present.      Motor: No tremor.      Coordination: Coordination normal.      Gait: Gait normal.   Psychiatric:         Attention and Perception:  Attention normal.         Mood and Affect: Mood normal.         Speech: Speech normal.         Behavior: Behavior normal.         Thought Content: Thought content normal.       Assessment & Plan   Problems Addressed this Visit          Allergies and Adverse Reactions    Chronic allergic rhinitis       Cardiac and Vasculature    Aortic atherosclerosis  Reminded regarding risk factor modification with an emphasis on tobacco cessation.  Continue low-dose ASA.    Relevant Orders    CBC & Differential    Coronary artery calcification seen on CT scan  As above.    Relevant Medications    aspirin 81 MG EC tablet    Other Relevant Orders    CBC & Differential    Essential hypertension   Hypertension: at goal. Evidence of target organ damage: evidence of atherosclerosis and coronary artery calcifications on previous imaging.  Encouraged to continue to work on diet and exercise plan.   Continue current medication    Relevant Orders    Comprehensive Metabolic Panel    CBC & Differential    Mixed hyperlipidemia  As above.   Continue current medication.    Relevant Orders    Comprehensive Metabolic Panel    Lipid Panel       Endocrine and Metabolic    Class 1 obesity with serious comorbidity and body mass index (BMI) of 33.0 to 33.9 in adult    Hypothyroidism due to Hashimoto's thyroiditis  Clinically euthyroid.  Continue current medication.    Relevant Orders    TSH    Type 2 diabetes mellitus with peripheral neuropathy  Diabetes mellitus Type II, under excellent control.   Encouraged to continue to pursue ADA diet  Encouraged aerobic exercise.  Gabapentin will be increased to 300 3 times daily  Remaining medication continued  Updated labs drawn.    Relevant Medications    gabapentin (NEURONTIN) 300 MG capsule    Other Relevant Orders    Comprehensive Metabolic Panel    Hemoglobin A1c    Microalbumin / Creatinine Urine Ratio - Urine, Clean Catch    Vitamin D deficiency  Continue supplementation with monitoring.        Gastrointestinal Abdominal     Chronic constipation    Relevant Orders    CBC & Differential    Gastroesophageal reflux disease without esophagitis    Relevant Orders    CBC & Differential       Genitourinary and Reproductive     Stress bladder incontinence, female       Health Encounters    Healthcare maintenance  Reminded to follow-up with her scheduled LDCT of the chest, and to get a flu shot when available.       Mental Health    Depression with anxiety  Significant situational component.   Supportive therapy.   Continue current medication.  Encouraged to report if any worse or if any new symptoms or concerns.  Follow up with psychiatry       Musculoskeletal and Injuries    Generalized osteoarthritis  Reminded regarding symptomatic treatment.   Continue current medication    Relevant Medications    Acetaminophen Extra Strength 500 MG tablet    Osteopenia  Encouraged to continue to pursue weight bearing activities while exercising joint protection.  Will continue to monitor    Rheumatoid arthritis    Relevant Orders    Comprehensive Metabolic Panel    CBC & Differential       Pulmonary and Pneumonias    Panlobular emphysema   COPD is stable.  Reminded of the importance of smoking cessation  Encouraged to remain as active as symptoms allow for  Continue current medication  Follow up with pulmonology     Relevant Medications    Budeson-Glycopyrrol-Formoterol (Breztri Aerosphere) 160-9-4.8 MCG/ACT aerosol inhaler    albuterol sulfate  (90 Base) MCG/ACT inhaler       Sleep    Obstructive sleep apnea       Tobacco    Smoker     Diagnoses         Codes Comments      Chronic allergic rhinitis    -  Primary ICD-10-CM: J30.9  ICD-9-CM: 477.9       Mixed hyperlipidemia     ICD-10-CM: E78.2  ICD-9-CM: 272.2       Essential hypertension     ICD-10-CM: I10  ICD-9-CM: 401.9       Coronary artery calcification seen on CT scan     ICD-10-CM: I25.10  ICD-9-CM: 414.00       Aortic atherosclerosis     ICD-10-CM:  I70.0  ICD-9-CM: 440.0       Vitamin D deficiency     ICD-10-CM: E55.9  ICD-9-CM: 268.9       Type 2 diabetes mellitus with peripheral neuropathy     ICD-10-CM: E11.42  ICD-9-CM: 250.60, 357.2       Hypothyroidism due to Hashimoto's thyroiditis     ICD-10-CM: E06.3  ICD-9-CM: 245.2       Class 1 obesity with serious comorbidity and body mass index (BMI) of 33.0 to 33.9 in adult, unspecified obesity type     ICD-10-CM: E66.811, Z68.33  ICD-9-CM: 278.00, V85.33       Gastroesophageal reflux disease without esophagitis     ICD-10-CM: K21.9  ICD-9-CM: 530.81       Chronic constipation     ICD-10-CM: K59.09  ICD-9-CM: 564.00       Stress bladder incontinence, female     ICD-10-CM: N39.3  ICD-9-CM: 625.6       Healthcare maintenance     ICD-10-CM: Z00.00  ICD-9-CM: V70.0       Depression with anxiety     ICD-10-CM: F41.8  ICD-9-CM: 300.4       Rheumatoid arthritis involving multiple sites, unspecified whether rheumatoid factor present     ICD-10-CM: M06.9  ICD-9-CM: 714.0       Osteopenia, unspecified location     ICD-10-CM: M85.80  ICD-9-CM: 733.90       Generalized osteoarthritis     ICD-10-CM: M15.9  ICD-9-CM: 715.00       Panlobular emphysema     ICD-10-CM: J43.1  ICD-9-CM: 492.8       Obstructive sleep apnea     ICD-10-CM: G47.33  ICD-9-CM: 327.23       Smoker     ICD-10-CM: F17.200  ICD-9-CM: 305.1

## 2025-06-27 LAB
ALBUMIN SERPL-MCNC: 4 G/DL (ref 3.5–5.2)
ALBUMIN UR-MCNC: <1.2 MG/DL
ALBUMIN/GLOB SERPL: 1.1 G/DL
ALP SERPL-CCNC: 88 U/L (ref 39–117)
ALT SERPL W P-5'-P-CCNC: 19 U/L (ref 1–33)
ANION GAP SERPL CALCULATED.3IONS-SCNC: 11.5 MMOL/L (ref 5–15)
AST SERPL-CCNC: 15 U/L (ref 1–32)
BASOPHILS # BLD AUTO: 0.11 10*3/MM3 (ref 0–0.2)
BASOPHILS NFR BLD AUTO: 0.9 % (ref 0–1.5)
BILIRUB SERPL-MCNC: <0.2 MG/DL (ref 0–1.2)
BUN SERPL-MCNC: 9 MG/DL (ref 8–23)
BUN/CREAT SERPL: 10.2 (ref 7–25)
CALCIUM SPEC-SCNC: 9.8 MG/DL (ref 8.6–10.5)
CHLORIDE SERPL-SCNC: 100 MMOL/L (ref 98–107)
CHOLEST SERPL-MCNC: 135 MG/DL (ref 0–200)
CO2 SERPL-SCNC: 23.5 MMOL/L (ref 22–29)
CREAT SERPL-MCNC: 0.88 MG/DL (ref 0.57–1)
CREAT UR-MCNC: 30.1 MG/DL
DEPRECATED RDW RBC AUTO: 44.7 FL (ref 37–54)
EGFRCR SERPLBLD CKD-EPI 2021: 72.1 ML/MIN/1.73
EOSINOPHIL # BLD AUTO: 0.16 10*3/MM3 (ref 0–0.4)
EOSINOPHIL NFR BLD AUTO: 1.3 % (ref 0.3–6.2)
ERYTHROCYTE [DISTWIDTH] IN BLOOD BY AUTOMATED COUNT: 12.8 % (ref 12.3–15.4)
GLOBULIN UR ELPH-MCNC: 3.7 GM/DL
GLUCOSE SERPL-MCNC: 77 MG/DL (ref 65–99)
HBA1C MFR BLD: 5.1 % (ref 4.8–5.6)
HCT VFR BLD AUTO: 47.1 % (ref 34–46.6)
HDLC SERPL-MCNC: 45 MG/DL (ref 40–60)
HGB BLD-MCNC: 16 G/DL (ref 12–15.9)
IMM GRANULOCYTES # BLD AUTO: 0.04 10*3/MM3 (ref 0–0.05)
IMM GRANULOCYTES NFR BLD AUTO: 0.3 % (ref 0–0.5)
LDLC SERPL CALC-MCNC: 62 MG/DL (ref 0–100)
LDLC/HDLC SERPL: 1.27 {RATIO}
LYMPHOCYTES # BLD AUTO: 3.3 10*3/MM3 (ref 0.7–3.1)
LYMPHOCYTES NFR BLD AUTO: 26.6 % (ref 19.6–45.3)
MCH RBC QN AUTO: 32.3 PG (ref 26.6–33)
MCHC RBC AUTO-ENTMCNC: 34 G/DL (ref 31.5–35.7)
MCV RBC AUTO: 95 FL (ref 79–97)
MICROALBUMIN/CREAT UR: NORMAL MG/G{CREAT}
MONOCYTES # BLD AUTO: 0.72 10*3/MM3 (ref 0.1–0.9)
MONOCYTES NFR BLD AUTO: 5.8 % (ref 5–12)
NEUTROPHILS NFR BLD AUTO: 65.1 % (ref 42.7–76)
NEUTROPHILS NFR BLD AUTO: 8.09 10*3/MM3 (ref 1.7–7)
NRBC BLD AUTO-RTO: 0 /100 WBC (ref 0–0.2)
PLATELET # BLD AUTO: 460 10*3/MM3 (ref 140–450)
PMV BLD AUTO: 9.9 FL (ref 6–12)
POTASSIUM SERPL-SCNC: 5.3 MMOL/L (ref 3.5–5.2)
PROT SERPL-MCNC: 7.7 G/DL (ref 6–8.5)
RBC # BLD AUTO: 4.96 10*6/MM3 (ref 3.77–5.28)
SODIUM SERPL-SCNC: 135 MMOL/L (ref 136–145)
TRIGL SERPL-MCNC: 164 MG/DL (ref 0–150)
TSH SERPL DL<=0.05 MIU/L-ACNC: 0.55 UIU/ML (ref 0.27–4.2)
VLDLC SERPL-MCNC: 28 MG/DL (ref 5–40)
WBC NRBC COR # BLD AUTO: 12.42 10*3/MM3 (ref 3.4–10.8)

## 2025-07-10 ENCOUNTER — TELEPHONE (OUTPATIENT)
Dept: FAMILY MEDICINE CLINIC | Facility: CLINIC | Age: 65
End: 2025-07-10

## 2025-07-20 DIAGNOSIS — J43.1 PANLOBULAR EMPHYSEMA: ICD-10-CM

## 2025-07-21 RX ORDER — BUDESONIDE, GLYCOPYRROLATE, AND FORMOTEROL FUMARATE 160; 9; 4.8 UG/1; UG/1; UG/1
2 AEROSOL, METERED RESPIRATORY (INHALATION) 2 TIMES DAILY
Qty: 10.7 G | Refills: 8 | OUTPATIENT
Start: 2025-07-21

## 2025-07-21 RX ORDER — GUAIFENESIN 600 MG/1
TABLET, EXTENDED RELEASE ORAL
Qty: 60 TABLET | Refills: 6 | Status: SHIPPED | OUTPATIENT
Start: 2025-07-21

## 2025-07-28 ENCOUNTER — TELEPHONE (OUTPATIENT)
Age: 65
End: 2025-07-28
Payer: MEDICARE

## 2025-07-28 DIAGNOSIS — F41.9 ANXIETY DISORDER, UNSPECIFIED TYPE: ICD-10-CM

## 2025-07-28 RX ORDER — LORAZEPAM 1 MG/1
.5-1 TABLET ORAL 3 TIMES DAILY PRN
Qty: 60 TABLET | Refills: 0 | Status: SHIPPED | OUTPATIENT
Start: 2025-07-28

## 2025-07-28 NOTE — TELEPHONE ENCOUNTER
Incoming Refill Request      Medication requested (name and dose): Ativan 1 mg.     Pharmacy where request should be sent: [facility] [place], [place]    Additional details provided by patient: Patient stated she did not know how many pills she had left.     Best call back number: [phone]    Does the patient have less than a 3 day supply:  [] Yes  [] No    Jorge Whatley Rep  07/28/25, 09:10 EDT

## 2025-08-24 DIAGNOSIS — I25.10 CORONARY ARTERY CALCIFICATION SEEN ON CT SCAN: ICD-10-CM

## 2025-08-25 ENCOUNTER — TELEPHONE (OUTPATIENT)
Age: 65
End: 2025-08-25
Payer: MEDICARE

## 2025-08-25 DIAGNOSIS — F41.9 ANXIETY DISORDER, UNSPECIFIED TYPE: ICD-10-CM

## 2025-08-25 RX ORDER — LORAZEPAM 1 MG/1
.5-1 TABLET ORAL 3 TIMES DAILY PRN
Qty: 60 TABLET | Refills: 0 | Status: SHIPPED | OUTPATIENT
Start: 2025-08-25

## 2025-08-25 RX ORDER — ASPIRIN 81 MG/1
81 TABLET ORAL DAILY
Qty: 30 TABLET | Refills: 5 | Status: SHIPPED | OUTPATIENT
Start: 2025-08-25

## (undated) DEVICE — THE SINGLE USE ETRAP – POLYP TRAP IS USED FOR SUCTION RETRIEVAL OF ENDOSCOPICALLY REMOVED POLYPS.: Brand: ETRAP

## (undated) DEVICE — SNAR POLYP CAPTIFLX MICRO OVL 13MM 240CM

## (undated) DEVICE — Device

## (undated) DEVICE — Device: Brand: DEFENDO AIR/WATER/SUCTION AND BIOPSY VALVE

## (undated) DEVICE — SUCTION CANISTER, 1500CC, RIGID: Brand: DEROYAL

## (undated) DEVICE — SYR LUERLOK 30CC

## (undated) DEVICE — TUBING, SUCTION, 1/4" X 20', STRAIGHT: Brand: MEDLINE INDUSTRIES, INC.

## (undated) DEVICE — Device: Brand: ENDOGATOR

## (undated) DEVICE — CONN Y IRR DISP 1P/U

## (undated) DEVICE — MARKR SPOT ENDOSCOPIC LESION INJ

## (undated) DEVICE — GOWN,REINF,POLY,ECL,PP SLV,XL: Brand: MEDLINE